# Patient Record
Sex: FEMALE | Race: WHITE | Employment: OTHER | URBAN - METROPOLITAN AREA
[De-identification: names, ages, dates, MRNs, and addresses within clinical notes are randomized per-mention and may not be internally consistent; named-entity substitution may affect disease eponyms.]

---

## 2017-05-22 RX ORDER — AMLODIPINE BESYLATE 5 MG/1
TABLET ORAL
Qty: 30 TABLET | Refills: 0 | Status: SHIPPED | OUTPATIENT
Start: 2017-05-22 | End: 2017-08-30 | Stop reason: SDUPTHER

## 2017-06-05 RX ORDER — PANTOPRAZOLE SODIUM 40 MG/1
TABLET, DELAYED RELEASE ORAL
Qty: 90 TABLET | Refills: 2 | OUTPATIENT
Start: 2017-06-05

## 2017-06-05 RX ORDER — DARIFENACIN HYDROBROMIDE 15 MG/1
TABLET, EXTENDED RELEASE ORAL
Qty: 90 TABLET | Refills: 2 | OUTPATIENT
Start: 2017-06-05

## 2017-06-13 ENCOUNTER — OFFICE VISIT (OUTPATIENT)
Dept: PRIMARY CARE CLINIC | Age: 67
End: 2017-06-13

## 2017-06-13 VITALS
HEIGHT: 64 IN | HEART RATE: 72 BPM | TEMPERATURE: 98.4 F | RESPIRATION RATE: 14 BRPM | DIASTOLIC BLOOD PRESSURE: 72 MMHG | SYSTOLIC BLOOD PRESSURE: 126 MMHG | WEIGHT: 211 LBS | BODY MASS INDEX: 36.02 KG/M2

## 2017-06-13 DIAGNOSIS — D68.9 COAGULOPATHY (HCC): ICD-10-CM

## 2017-06-13 DIAGNOSIS — Z12.31 ENCOUNTER FOR MAMMOGRAM TO ESTABLISH BASELINE MAMMOGRAM: ICD-10-CM

## 2017-06-13 DIAGNOSIS — Z23 NEED FOR PNEUMOCOCCAL VACCINATION: ICD-10-CM

## 2017-06-13 DIAGNOSIS — M81.0 OSTEOPOROSIS: ICD-10-CM

## 2017-06-13 DIAGNOSIS — Z00.00 ROUTINE GENERAL MEDICAL EXAMINATION AT A HEALTH CARE FACILITY: Primary | ICD-10-CM

## 2017-06-13 DIAGNOSIS — F32.A DEPRESSION, UNSPECIFIED DEPRESSION TYPE: ICD-10-CM

## 2017-06-13 DIAGNOSIS — Z00.00 ROUTINE GENERAL MEDICAL EXAMINATION AT A HEALTH CARE FACILITY: ICD-10-CM

## 2017-06-13 LAB
ALBUMIN SERPL-MCNC: 4.1 G/DL (ref 3.9–4.9)
ALP BLD-CCNC: 61 U/L (ref 40–130)
ALT SERPL-CCNC: 18 U/L (ref 0–33)
ANION GAP SERPL CALCULATED.3IONS-SCNC: 13 MEQ/L (ref 7–13)
AST SERPL-CCNC: 25 U/L (ref 0–35)
BASOPHILS ABSOLUTE: 0.1 K/UL (ref 0–0.2)
BASOPHILS RELATIVE PERCENT: 0.7 %
BILIRUB SERPL-MCNC: 0.5 MG/DL (ref 0–1.2)
BUN BLDV-MCNC: 22 MG/DL (ref 8–23)
CALCIUM SERPL-MCNC: 9.7 MG/DL (ref 8.6–10.2)
CHLORIDE BLD-SCNC: 106 MEQ/L (ref 98–107)
CHOLESTEROL, TOTAL: 181 MG/DL (ref 0–199)
CO2: 23 MEQ/L (ref 22–29)
CREAT SERPL-MCNC: 0.95 MG/DL (ref 0.5–0.9)
EOSINOPHILS ABSOLUTE: 0.3 K/UL (ref 0–0.7)
EOSINOPHILS RELATIVE PERCENT: 3.8 %
GFR AFRICAN AMERICAN: >60
GFR NON-AFRICAN AMERICAN: 58.3
GLOBULIN: 2.8 G/DL (ref 2.3–3.5)
GLUCOSE BLD-MCNC: 83 MG/DL (ref 74–109)
HCT VFR BLD CALC: 41.1 % (ref 37–47)
HDLC SERPL-MCNC: 70 MG/DL (ref 40–59)
HEMOGLOBIN: 13.4 G/DL (ref 12–16)
LDL CHOLESTEROL CALCULATED: 90 MG/DL (ref 0–129)
LYMPHOCYTES ABSOLUTE: 3.4 K/UL (ref 1–4.8)
LYMPHOCYTES RELATIVE PERCENT: 42.8 %
MCH RBC QN AUTO: 28 PG (ref 27–31.3)
MCHC RBC AUTO-ENTMCNC: 32.5 % (ref 33–37)
MCV RBC AUTO: 86.1 FL (ref 82–100)
MONOCYTES ABSOLUTE: 0.7 K/UL (ref 0.2–0.8)
MONOCYTES RELATIVE PERCENT: 8.2 %
NEUTROPHILS ABSOLUTE: 3.5 K/UL (ref 1.4–6.5)
NEUTROPHILS RELATIVE PERCENT: 44.5 %
PDW BLD-RTO: 14.6 % (ref 11.5–14.5)
PLATELET # BLD: 276 K/UL (ref 130–400)
POTASSIUM SERPL-SCNC: 4 MEQ/L (ref 3.5–5.1)
RBC # BLD: 4.77 M/UL (ref 4.2–5.4)
SODIUM BLD-SCNC: 142 MEQ/L (ref 132–144)
TOTAL PROTEIN: 6.9 G/DL (ref 6.4–8.1)
TRIGL SERPL-MCNC: 105 MG/DL (ref 0–200)
WBC # BLD: 8 K/UL (ref 4.8–10.8)

## 2017-06-13 PROCEDURE — 90670 PCV13 VACCINE IM: CPT | Performed by: FAMILY MEDICINE

## 2017-06-13 PROCEDURE — G0438 PPPS, INITIAL VISIT: HCPCS | Performed by: FAMILY MEDICINE

## 2017-06-13 PROCEDURE — G0009 ADMIN PNEUMOCOCCAL VACCINE: HCPCS | Performed by: FAMILY MEDICINE

## 2017-06-13 RX ORDER — PANTOPRAZOLE SODIUM 40 MG/1
40 TABLET, DELAYED RELEASE ORAL DAILY
Qty: 90 TABLET | Refills: 3 | Status: SHIPPED | OUTPATIENT
Start: 2017-06-13 | End: 2018-03-26 | Stop reason: SDUPTHER

## 2017-06-13 RX ORDER — DARIFENACIN HYDROBROMIDE 15 MG/1
TABLET, EXTENDED RELEASE ORAL
Qty: 90 TABLET | Refills: 3 | Status: SHIPPED | OUTPATIENT
Start: 2017-06-13 | End: 2018-03-26 | Stop reason: SDUPTHER

## 2017-06-13 ASSESSMENT — ANXIETY QUESTIONNAIRES: GAD7 TOTAL SCORE: 2

## 2017-06-13 ASSESSMENT — LIFESTYLE VARIABLES
AUDIT-C TOTAL SCORE: 2
HOW OFTEN DO YOU HAVE A DRINK CONTAINING ALCOHOL: 1
HOW OFTEN DO YOU HAVE SIX OR MORE DRINKS ON ONE OCCASION: 0
HOW MANY STANDARD DRINKS CONTAINING ALCOHOL DO YOU HAVE ON A TYPICAL DAY: 1

## 2017-06-13 ASSESSMENT — PATIENT HEALTH QUESTIONNAIRE - PHQ9: SUM OF ALL RESPONSES TO PHQ QUESTIONS 1-9: 2

## 2017-06-15 ENCOUNTER — TELEPHONE (OUTPATIENT)
Dept: PRIMARY CARE CLINIC | Age: 67
End: 2017-06-15

## 2017-06-26 ENCOUNTER — HOSPITAL ENCOUNTER (OUTPATIENT)
Dept: WOMENS IMAGING | Age: 67
Discharge: HOME | End: 2017-06-26
Payer: MEDICARE

## 2017-06-26 DIAGNOSIS — M81.0 OSTEOPOROSIS: ICD-10-CM

## 2017-06-26 DIAGNOSIS — Z12.31 ENCOUNTER FOR MAMMOGRAM TO ESTABLISH BASELINE MAMMOGRAM: ICD-10-CM

## 2017-06-26 PROCEDURE — 77080 DXA BONE DENSITY AXIAL: CPT

## 2017-06-26 PROCEDURE — G0202 SCR MAMMO BI INCL CAD: HCPCS

## 2017-06-28 ENCOUNTER — OFFICE VISIT (OUTPATIENT)
Dept: BEHAVIORAL/MENTAL HEALTH | Age: 67
End: 2017-06-28

## 2017-06-28 DIAGNOSIS — F32.A DEPRESSION, UNSPECIFIED DEPRESSION TYPE: Primary | ICD-10-CM

## 2017-06-28 PROCEDURE — 90791 PSYCH DIAGNOSTIC EVALUATION: CPT | Performed by: PSYCHOLOGIST

## 2017-06-28 ASSESSMENT — PATIENT HEALTH QUESTIONNAIRE - PHQ9
3. TROUBLE FALLING OR STAYING ASLEEP: 2
4. FEELING TIRED OR HAVING LITTLE ENERGY: 2
1. LITTLE INTEREST OR PLEASURE IN DOING THINGS: 1
SUM OF ALL RESPONSES TO PHQ9 QUESTIONS 1 & 2: 2
SUM OF ALL RESPONSES TO PHQ QUESTIONS 1-9: 12
7. TROUBLE CONCENTRATING ON THINGS, SUCH AS READING THE NEWSPAPER OR WATCHING TELEVISION: 1
6. FEELING BAD ABOUT YOURSELF - OR THAT YOU ARE A FAILURE OR HAVE LET YOURSELF OR YOUR FAMILY DOWN: 1
2. FEELING DOWN, DEPRESSED OR HOPELESS: 1
9. THOUGHTS THAT YOU WOULD BE BETTER OFF DEAD, OR OF HURTING YOURSELF: 0
8. MOVING OR SPEAKING SO SLOWLY THAT OTHER PEOPLE COULD HAVE NOTICED. OR THE OPPOSITE, BEING SO FIGETY OR RESTLESS THAT YOU HAVE BEEN MOVING AROUND A LOT MORE THAN USUAL: 1
10. IF YOU CHECKED OFF ANY PROBLEMS, HOW DIFFICULT HAVE THESE PROBLEMS MADE IT FOR YOU TO DO YOUR WORK, TAKE CARE OF THINGS AT HOME, OR GET ALONG WITH OTHER PEOPLE: 1
5. POOR APPETITE OR OVEREATING: 3

## 2017-07-13 ENCOUNTER — OFFICE VISIT (OUTPATIENT)
Dept: BEHAVIORAL/MENTAL HEALTH | Age: 67
End: 2017-07-13

## 2017-07-13 DIAGNOSIS — F32.A DEPRESSION, UNSPECIFIED DEPRESSION TYPE: Primary | ICD-10-CM

## 2017-07-13 PROCEDURE — 90832 PSYTX W PT 30 MINUTES: CPT | Performed by: PSYCHOLOGIST

## 2017-07-13 RX ORDER — PREDNISONE 10 MG/1
TABLET ORAL
Qty: 20 TABLET | Refills: 0 | Status: SHIPPED | OUTPATIENT
Start: 2017-07-13 | End: 2018-03-26

## 2017-07-13 RX ORDER — DARIFENACIN HYDROBROMIDE 7.5 MG/1
TABLET, EXTENDED RELEASE ORAL
Qty: 90 TABLET | Refills: 2 | Status: SHIPPED | OUTPATIENT
Start: 2017-07-13 | End: 2018-03-26 | Stop reason: SDUPTHER

## 2017-07-13 ASSESSMENT — PATIENT HEALTH QUESTIONNAIRE - PHQ9
9. THOUGHTS THAT YOU WOULD BE BETTER OFF DEAD, OR OF HURTING YOURSELF: 0
7. TROUBLE CONCENTRATING ON THINGS, SUCH AS READING THE NEWSPAPER OR WATCHING TELEVISION: 1
6. FEELING BAD ABOUT YOURSELF - OR THAT YOU ARE A FAILURE OR HAVE LET YOURSELF OR YOUR FAMILY DOWN: 1
SUM OF ALL RESPONSES TO PHQ9 QUESTIONS 1 & 2: 2
4. FEELING TIRED OR HAVING LITTLE ENERGY: 2
2. FEELING DOWN, DEPRESSED OR HOPELESS: 1
8. MOVING OR SPEAKING SO SLOWLY THAT OTHER PEOPLE COULD HAVE NOTICED. OR THE OPPOSITE, BEING SO FIGETY OR RESTLESS THAT YOU HAVE BEEN MOVING AROUND A LOT MORE THAN USUAL: 0
1. LITTLE INTEREST OR PLEASURE IN DOING THINGS: 1
5. POOR APPETITE OR OVEREATING: 2
SUM OF ALL RESPONSES TO PHQ QUESTIONS 1-9: 11
10. IF YOU CHECKED OFF ANY PROBLEMS, HOW DIFFICULT HAVE THESE PROBLEMS MADE IT FOR YOU TO DO YOUR WORK, TAKE CARE OF THINGS AT HOME, OR GET ALONG WITH OTHER PEOPLE: 1
3. TROUBLE FALLING OR STAYING ASLEEP: 3

## 2017-08-03 ENCOUNTER — OFFICE VISIT (OUTPATIENT)
Dept: BEHAVIORAL/MENTAL HEALTH | Age: 67
End: 2017-08-03

## 2017-08-03 DIAGNOSIS — F32.A DEPRESSION, UNSPECIFIED DEPRESSION TYPE: Primary | ICD-10-CM

## 2017-08-03 PROCEDURE — 90832 PSYTX W PT 30 MINUTES: CPT | Performed by: PSYCHOLOGIST

## 2017-08-30 RX ORDER — AMLODIPINE BESYLATE 5 MG/1
5 TABLET ORAL DAILY
Qty: 90 TABLET | Refills: 1 | Status: SHIPPED | OUTPATIENT
Start: 2017-08-30 | End: 2018-03-26 | Stop reason: SDUPTHER

## 2018-01-09 ENCOUNTER — TELEPHONE (OUTPATIENT)
Dept: PRIMARY CARE CLINIC | Age: 68
End: 2018-01-09

## 2018-01-10 DIAGNOSIS — F32.A DEPRESSION, UNSPECIFIED DEPRESSION TYPE: Primary | ICD-10-CM

## 2018-02-26 RX ORDER — AMLODIPINE BESYLATE 5 MG/1
TABLET ORAL
Qty: 90 TABLET | Refills: 1 | OUTPATIENT
Start: 2018-02-26

## 2018-03-26 ENCOUNTER — OFFICE VISIT (OUTPATIENT)
Dept: PRIMARY CARE CLINIC | Age: 68
End: 2018-03-26
Payer: MEDICARE

## 2018-03-26 VITALS
WEIGHT: 196 LBS | BODY MASS INDEX: 33.46 KG/M2 | HEART RATE: 61 BPM | SYSTOLIC BLOOD PRESSURE: 136 MMHG | HEIGHT: 64 IN | OXYGEN SATURATION: 96 % | RESPIRATION RATE: 15 BRPM | TEMPERATURE: 98.6 F | DIASTOLIC BLOOD PRESSURE: 86 MMHG

## 2018-03-26 DIAGNOSIS — M79.645 PAIN OF LEFT THUMB: ICD-10-CM

## 2018-03-26 DIAGNOSIS — D68.9 COAGULOPATHY (HCC): ICD-10-CM

## 2018-03-26 DIAGNOSIS — M54.16 LUMBAR RADICULITIS: ICD-10-CM

## 2018-03-26 DIAGNOSIS — G89.29 CHRONIC RIGHT-SIDED LOW BACK PAIN WITH RIGHT-SIDED SCIATICA: ICD-10-CM

## 2018-03-26 DIAGNOSIS — F32.5 MAJOR DEPRESSION, SINGLE EPISODE, IN COMPLETE REMISSION (HCC): ICD-10-CM

## 2018-03-26 DIAGNOSIS — M65.312 TRIGGER FINGER OF LEFT THUMB: Primary | ICD-10-CM

## 2018-03-26 DIAGNOSIS — M54.41 CHRONIC RIGHT-SIDED LOW BACK PAIN WITH RIGHT-SIDED SCIATICA: ICD-10-CM

## 2018-03-26 DIAGNOSIS — M48.061 SPINAL STENOSIS OF LUMBAR REGION, UNSPECIFIED WHETHER NEUROGENIC CLAUDICATION PRESENT: ICD-10-CM

## 2018-03-26 PROCEDURE — 99214 OFFICE O/P EST MOD 30 MIN: CPT | Performed by: FAMILY MEDICINE

## 2018-03-26 RX ORDER — AMLODIPINE BESYLATE 5 MG/1
5 TABLET ORAL DAILY
Qty: 90 TABLET | Refills: 1 | Status: SHIPPED | OUTPATIENT
Start: 2018-03-26 | End: 2018-03-26 | Stop reason: SDUPTHER

## 2018-03-26 RX ORDER — DARIFENACIN HYDROBROMIDE 7.5 MG/1
TABLET, EXTENDED RELEASE ORAL
Qty: 14 TABLET | Refills: 2 | Status: SHIPPED | OUTPATIENT
Start: 2018-03-26

## 2018-03-26 RX ORDER — AMLODIPINE BESYLATE 5 MG/1
5 TABLET ORAL DAILY
Qty: 14 TABLET | Refills: 1 | Status: SHIPPED | OUTPATIENT
Start: 2018-03-26 | End: 2018-10-25 | Stop reason: SDUPTHER

## 2018-03-26 RX ORDER — AMLODIPINE BESYLATE 5 MG/1
5 TABLET ORAL DAILY
Qty: 90 TABLET | Refills: 1 | Status: SHIPPED | OUTPATIENT
Start: 2018-03-26 | End: 2018-07-30

## 2018-03-26 RX ORDER — DARIFENACIN HYDROBROMIDE 15 MG/1
TABLET, EXTENDED RELEASE ORAL
Qty: 14 TABLET | Refills: 3 | Status: SHIPPED | OUTPATIENT
Start: 2018-03-26 | End: 2018-09-28

## 2018-03-26 RX ORDER — DARIFENACIN HYDROBROMIDE 15 MG/1
15 TABLET, EXTENDED RELEASE ORAL EVERY MORNING
Qty: 90 TABLET | Refills: 1 | Status: SHIPPED | OUTPATIENT
Start: 2018-03-26 | End: 2018-07-30

## 2018-03-26 RX ORDER — PANTOPRAZOLE SODIUM 40 MG/1
40 TABLET, DELAYED RELEASE ORAL DAILY
Qty: 90 TABLET | Refills: 3 | Status: SHIPPED | OUTPATIENT
Start: 2018-03-26

## 2018-03-26 RX ORDER — SIMVASTATIN 40 MG
TABLET ORAL
Qty: 90 TABLET | Refills: 3 | Status: SHIPPED | OUTPATIENT
Start: 2018-03-26 | End: 2019-03-21 | Stop reason: SDUPTHER

## 2018-03-26 RX ORDER — DARIFENACIN HYDROBROMIDE 15 MG/1
TABLET, EXTENDED RELEASE ORAL
Qty: 90 TABLET | Refills: 3 | Status: SHIPPED | OUTPATIENT
Start: 2018-03-26 | End: 2018-03-26 | Stop reason: SDUPTHER

## 2018-03-26 RX ORDER — DARIFENACIN HYDROBROMIDE 7.5 MG/1
TABLET, EXTENDED RELEASE ORAL
Qty: 90 TABLET | Refills: 2 | Status: SHIPPED | OUTPATIENT
Start: 2018-03-26 | End: 2018-03-26 | Stop reason: SDUPTHER

## 2018-03-26 RX ORDER — DARIFENACIN HYDROBROMIDE 7.5 MG/1
7.5 TABLET, EXTENDED RELEASE ORAL EVERY EVENING
Qty: 90 TABLET | Refills: 1 | Status: SHIPPED | OUTPATIENT
Start: 2018-03-26 | End: 2018-07-06 | Stop reason: SDUPTHER

## 2018-03-26 ASSESSMENT — ENCOUNTER SYMPTOMS
COLOR CHANGE: 0
PHOTOPHOBIA: 0
ABDOMINAL PAIN: 0
NAUSEA: 0
CHEST TIGHTNESS: 0
STRIDOR: 0
EYE PAIN: 0
BOWEL INCONTINENCE: 0
BACK PAIN: 1
FACIAL SWELLING: 0
CONSTIPATION: 0
APNEA: 0
EYE REDNESS: 0
WHEEZING: 0
DIARRHEA: 0
EYE DISCHARGE: 0
CHOKING: 0

## 2018-03-26 NOTE — PROGRESS NOTES
Refill:  3    simvastatin (ZOCOR) 40 MG tablet     Sig: TAKE 1 TABLET EVERY EVENING     Dispense:  90 tablet     Refill:  3    DISCONTD: darifenacin (ENABLEX) 15 MG extended release tablet     Sig: TAKE 1 TABLET DAILY     Dispense:  90 tablet     Refill:  3    DISCONTD: darifenacin (ENABLEX) 7.5 MG extended release tablet     Sig: TAKE 2 TABLETS (15 MG) EVERY MORNING AND 1 TABLET (7.5 MG) EVERY EVENING     Dispense:  90 tablet     Refill:  2    DISCONTD: amLODIPine (NORVASC) 5 MG tablet     Sig: Take 1 tablet by mouth daily     Dispense:  90 tablet     Refill:  1    darifenacin (ENABLEX) 15 MG extended release tablet     Sig: TAKE 1 TABLET DAILY     Dispense:  14 tablet     Refill:  3    amLODIPine (NORVASC) 5 MG tablet     Sig: Take 1 tablet by mouth daily     Dispense:  14 tablet     Refill:  1    darifenacin (ENABLEX) 7.5 MG extended release tablet     Sig: TAKE 2 TABLETS (15 MG) EVERY MORNING AND 1 TABLET (7.5 MG) EVERY EVENING     Dispense:  14 tablet     Refill:  2       Controlled Substances Monitoring:      No Follow-up on file. Jakub SANFORD RMA  , am scribing for and in the presence of [place] [employer], DO. Electronically signed by :  SIERRA Zelaya [employer], DO, personally performed the services described in this documentation, as scribed by SIERRA Zelaya   in my presence, and it is both accurate and complete.  Electronically signed by: Sapna Marte DO    3/26/18 2:52 PM    Sapna Marte DO

## 2018-03-29 ENCOUNTER — TELEPHONE (OUTPATIENT)
Dept: PRIMARY CARE CLINIC | Age: 68
End: 2018-03-29

## 2018-03-29 NOTE — TELEPHONE ENCOUNTER
[facility] called and needed clarification on enablex. Per Dr. Karlee Nance it is supposed to be the 15 mg once daily. This was verified by Sunny Hummel with the  and [facility] was called back.

## 2018-07-09 RX ORDER — DARIFENACIN HYDROBROMIDE 7.5 MG/1
7.5 TABLET, EXTENDED RELEASE ORAL EVERY EVENING
Qty: 90 TABLET | Refills: 3 | Status: SHIPPED | OUTPATIENT
Start: 2018-07-09 | End: 2018-07-30

## 2018-07-30 ENCOUNTER — OFFICE VISIT (OUTPATIENT)
Dept: PRIMARY CARE CLINIC | Age: 68
End: 2018-07-30
Payer: MEDICARE

## 2018-07-30 VITALS
HEIGHT: 64 IN | DIASTOLIC BLOOD PRESSURE: 80 MMHG | SYSTOLIC BLOOD PRESSURE: 138 MMHG | OXYGEN SATURATION: 95 % | TEMPERATURE: 98.5 F | WEIGHT: 198.8 LBS | BODY MASS INDEX: 33.94 KG/M2 | HEART RATE: 61 BPM

## 2018-07-30 DIAGNOSIS — I10 ESSENTIAL HYPERTENSION: ICD-10-CM

## 2018-07-30 DIAGNOSIS — M81.0 OSTEOPOROSIS, UNSPECIFIED OSTEOPOROSIS TYPE, UNSPECIFIED PATHOLOGICAL FRACTURE PRESENCE: ICD-10-CM

## 2018-07-30 DIAGNOSIS — Z01.818 PRE-OP EXAM: ICD-10-CM

## 2018-07-30 DIAGNOSIS — R79.1 ABNORMAL COAGULATION PROFILE: ICD-10-CM

## 2018-07-30 DIAGNOSIS — G89.29 CHRONIC RIGHT-SIDED LOW BACK PAIN WITH RIGHT-SIDED SCIATICA: ICD-10-CM

## 2018-07-30 DIAGNOSIS — M54.41 CHRONIC RIGHT-SIDED LOW BACK PAIN WITH RIGHT-SIDED SCIATICA: ICD-10-CM

## 2018-07-30 DIAGNOSIS — I25.10 CORONARY ARTERY DISEASE INVOLVING NATIVE CORONARY ARTERY OF NATIVE HEART WITHOUT ANGINA PECTORIS: ICD-10-CM

## 2018-07-30 DIAGNOSIS — Z01.818 PRE-OP EXAM: Primary | ICD-10-CM

## 2018-07-30 LAB
ALBUMIN SERPL-MCNC: 4 G/DL (ref 3.9–4.9)
ALP BLD-CCNC: 56 U/L (ref 40–130)
ALT SERPL-CCNC: 14 U/L (ref 0–33)
ANION GAP SERPL CALCULATED.3IONS-SCNC: 13 MEQ/L (ref 7–13)
APTT: 45.1 SEC (ref 21.6–35.4)
AST SERPL-CCNC: 21 U/L (ref 0–35)
BASOPHILS ABSOLUTE: 0.1 K/UL (ref 0–0.2)
BASOPHILS RELATIVE PERCENT: 0.9 %
BILIRUB SERPL-MCNC: 0.4 MG/DL (ref 0–1.2)
BILIRUBIN, POC: NORMAL
BLOOD URINE, POC: NORMAL
BUN BLDV-MCNC: 29 MG/DL (ref 8–23)
CALCIUM SERPL-MCNC: 9.4 MG/DL (ref 8.6–10.2)
CHLORIDE BLD-SCNC: 101 MEQ/L (ref 98–107)
CLARITY, POC: CLEAR
CO2: 24 MEQ/L (ref 22–29)
COLOR, POC: YELLOW
CREAT SERPL-MCNC: 0.83 MG/DL (ref 0.5–0.9)
EOSINOPHILS ABSOLUTE: 0.3 K/UL (ref 0–0.7)
EOSINOPHILS RELATIVE PERCENT: 3.9 %
GFR AFRICAN AMERICAN: >60
GFR NON-AFRICAN AMERICAN: >60
GLOBULIN: 2.8 G/DL (ref 2.3–3.5)
GLUCOSE BLD-MCNC: 83 MG/DL (ref 74–109)
GLUCOSE URINE, POC: NORMAL
HCT VFR BLD CALC: 37.8 % (ref 37–47)
HEMOGLOBIN: 12.4 G/DL (ref 12–16)
INR BLD: 1.3
KETONES, POC: NORMAL
LEUKOCYTE EST, POC: NORMAL
LYMPHOCYTES ABSOLUTE: 2.7 K/UL (ref 1–4.8)
LYMPHOCYTES RELATIVE PERCENT: 37.8 %
MCH RBC QN AUTO: 29.1 PG (ref 27–31.3)
MCHC RBC AUTO-ENTMCNC: 32.9 % (ref 33–37)
MCV RBC AUTO: 88.5 FL (ref 82–100)
MONOCYTES ABSOLUTE: 0.5 K/UL (ref 0.2–0.8)
MONOCYTES RELATIVE PERCENT: 7.1 %
NEUTROPHILS ABSOLUTE: 3.6 K/UL (ref 1.4–6.5)
NEUTROPHILS RELATIVE PERCENT: 50.3 %
NITRITE, POC: NORMAL
PDW BLD-RTO: 14.5 % (ref 11.5–14.5)
PH, POC: 6
PLATELET # BLD: 262 K/UL (ref 130–400)
POTASSIUM SERPL-SCNC: 4.4 MEQ/L (ref 3.5–5.1)
PROTEIN, POC: NORMAL
PROTHROMBIN TIME: 13.2 SEC (ref 9.6–12.3)
RBC # BLD: 4.28 M/UL (ref 4.2–5.4)
SODIUM BLD-SCNC: 138 MEQ/L (ref 132–144)
SPECIFIC GRAVITY, POC: 1.02
TOTAL PROTEIN: 6.8 G/DL (ref 6.4–8.1)
UROBILINOGEN, POC: NORMAL
WBC # BLD: 7.2 K/UL (ref 4.8–10.8)

## 2018-07-30 PROCEDURE — 81002 URINALYSIS NONAUTO W/O SCOPE: CPT | Performed by: FAMILY MEDICINE

## 2018-07-30 PROCEDURE — 99214 OFFICE O/P EST MOD 30 MIN: CPT | Performed by: FAMILY MEDICINE

## 2018-07-30 RX ORDER — ZOLPIDEM TARTRATE 5 MG/1
5 TABLET ORAL NIGHTLY PRN
COMMUNITY
End: 2018-09-28

## 2018-07-30 ASSESSMENT — ENCOUNTER SYMPTOMS
DIARRHEA: 0
FACIAL SWELLING: 0
APNEA: 0
NAUSEA: 0
EYE DISCHARGE: 0
CHEST TIGHTNESS: 0
WHEEZING: 0
PHOTOPHOBIA: 0
CONSTIPATION: 0
EYE PAIN: 0
COLOR CHANGE: 0
BACK PAIN: 1
EYE REDNESS: 0
STRIDOR: 0
ABDOMINAL PAIN: 0
CHOKING: 0

## 2018-07-30 NOTE — PROGRESS NOTES
Subjective:      Patient ID: Mitzi Jean is a 77 y.o. female who presents today for:  Chief Complaint   Patient presents with    Pre-op Exam     pt here for medical clearance for surgery. the surgery is dated for 8/22/2018. pt had an EKG done by her cardiologist last week that he will be faxing over to her Ortho Dr. She states she does not need another one.  Health Maintenance     pt wants to wait till after surgery for her pneumonia shot       HPI  Pre-op Exam  Patient is here today for pre-op exam for L3-4, L4-5,L5-S1 Laminectomy L3-4, L4-5 posterior lateral fusion and L5-S1 instrumented posterior interbody fusion with Dr. Darion Garnica at hospitals on 08/22/18. Patient has had an EKG with her cardiologist last week. Past Medical History:   Diagnosis Date    Abnormal EKG, needs cardiac clearence 12/23/2013    ACE-inhibitor cough 7/10/2012    ARB     Anemia     CAD (coronary artery disease) 2/2008    Coagulopathy ([address] 10/4/2012    Coagulopathy, on coumadin 11/13/2013    Cough 5/29/2012    Cough, Neg w/u Dr Sherryle Holm, ? Betablocker 9/22/2014    Depression     Depression 6/13/2017    Epistaxis 8/20/2012    Fibrocystic breast disease     Hiatal hernia 5/3/2016    History of chest x-ray, normal, 8/6/14 10/17/2014    History of PTCA 2, stent x 2 2009, 2012, Callkandace Sternhke 12/23/2013    Hyperlipidemia     Hypertension     Nephrolithiasis     OA (osteoarthritis)     Osteoporosis     Right-sided low back pain with right-sided sciatica 5/3/2016    Urge incontinence     Wheezes 4/20/2015     Past Surgical History:   Procedure Laterality Date    CORONARY ANGIOPLASTY WITH STENT PLACEMENT  3/12    [facility]    HYSTERECTOMY, TOTAL ABDOMINAL  1998    KNEE ARTHROSCOPY  11/11    [facility], [facility]    LITHOTRIPSY      PTCA  2/2008    Sherryle Holm    ROTATOR CUFF REPAIR      SKIN GRAFT  2/9/11    JORDAN AND BSO      TOTAL KNEE ARTHROPLASTY  8/2008     No family history on file.   Social History     Social History    Marital status:      Spouse name: N/A    Number of children: N/A    Years of education: N/A     Occupational History    Not on file. Social History Main Topics    Smoking status: Never Smoker    Smokeless tobacco: Never Used    Alcohol use Yes      Comment: occ    Drug use: No    Sexual activity: Not on file     Other Topics Concern    Not on file     Social History Narrative    No narrative on file     Allergies: Tolterodine    Review of Systems   Constitutional: Negative for activity change, appetite change, chills, diaphoresis and fever. HENT: Negative for congestion, ear discharge, ear pain, facial swelling, hearing loss and mouth sores. Eyes: Negative for photophobia, pain, discharge and redness. Respiratory: Negative for apnea, choking, chest tightness, wheezing and stridor. Cardiovascular: Negative for chest pain, palpitations and leg swelling. Gastrointestinal: Negative for abdominal pain, constipation, diarrhea and nausea. Endocrine: Negative for cold intolerance, heat intolerance, polydipsia and polyphagia. Genitourinary: Negative for dysuria and frequency. Musculoskeletal: Positive for back pain and gait problem. Negative for joint swelling, neck pain and neck stiffness. Skin: Negative for color change, pallor, rash and wound. Allergic/Immunologic: Negative for immunocompromised state. Neurological: Negative for tremors, syncope, facial asymmetry, speech difficulty and headaches. Psychiatric/Behavioral: Negative for confusion and hallucinations. The patient is not nervous/anxious and is not hyperactive. Objective:   /80 (Site: Right Arm, Position: Sitting, Cuff Size: Medium Adult)   Pulse 61   Temp 98.5 °F (36.9 °C) (Oral)   Ht 5' 4\" (1.626 m)   Wt 198 lb 12.8 oz (90.2 kg)   LMP 05/29/1998   SpO2 95%   BMI 34.12 kg/m²     Physical Exam   Constitutional: She is oriented to person, place, and time. She appears well-developed and well-nourished. without angina pectoris     6. Abnormal coagulation profile   APTT    Protime-INR       Plan:      Orders Placed This Encounter   Procedures    Mrsa Screening Culture Only     Standing Status:   Future     Standing Expiration Date:   7/30/2019    XR CHEST STANDARD (2 VW)     Standing Status:   Future     Standing Expiration Date:   7/30/2019    APTT     Standing Status:   Future     Standing Expiration Date:   7/30/2019     Order Specific Question:   Daily Heparin Dose? Answer:   0    Comprehensive Metabolic Panel     Standing Status:   Future     Standing Expiration Date:   7/30/2019    CBC Auto Differential     Standing Status:   Future     Standing Expiration Date:   7/30/2019    Protime-INR     Standing Status:   Future     Standing Expiration Date:   7/30/2019     Order Specific Question:   Daily Coumadin Dose? Answer:   0    POCT Urinalysis no Micro     No orders of the defined types were placed in this encounter. Controlled Substances Monitoring:      No Follow-up on file. I, Jane Miranda CMA   , am scribing for and in the presence of [email or web address], DO. Electronically signed by :  Jane Navarro DO, personally performed the services described in this documentation, as scribed by Joe Villavicencio CMA   in my presence, and it is both accurate and complete.  Electronically signed by: [place] [employer], DO    7/30/18 2:07 PM    [place] DO Rosanna

## 2018-08-01 LAB — MRSA CULTURE ONLY: NORMAL

## 2018-08-15 ENCOUNTER — HOSPITAL ENCOUNTER (OUTPATIENT)
Dept: PREADMISSION TESTING | Age: 68
Discharge: HOME | End: 2018-08-19
Payer: MEDICARE

## 2018-08-15 VITALS
HEIGHT: 64 IN | BODY MASS INDEX: 33.87 KG/M2 | TEMPERATURE: 98.5 F | SYSTOLIC BLOOD PRESSURE: 138 MMHG | WEIGHT: 198.4 LBS | OXYGEN SATURATION: 94 % | HEART RATE: 60 BPM | DIASTOLIC BLOOD PRESSURE: 65 MMHG | RESPIRATION RATE: 16 BRPM

## 2018-08-15 DIAGNOSIS — M71.38 SYNOVIAL CYST OF LUMBAR FACET JOINT: ICD-10-CM

## 2018-08-15 DIAGNOSIS — I26.99 PE (PULMONARY THROMBOEMBOLISM) (HCC): Chronic | ICD-10-CM

## 2018-08-15 PROBLEM — M48.061 LUMBAR STENOSIS: Status: ACTIVE | Noted: 2018-08-15

## 2018-08-15 LAB
BACTERIA: NORMAL /HPF
BILIRUBIN URINE: NEGATIVE
BLOOD, URINE: NEGATIVE
CLARITY: CLEAR
COLOR: YELLOW
EPITHELIAL CELLS, UA: NORMAL /HPF
GLUCOSE URINE: NEGATIVE MG/DL
KETONES, URINE: NEGATIVE MG/DL
LEUKOCYTE ESTERASE, URINE: ABNORMAL
NITRITE, URINE: NEGATIVE
PH UA: 6 (ref 5–9)
PROTEIN UA: NEGATIVE MG/DL
RBC UA: NORMAL /HPF (ref 0–2)
SPECIFIC GRAVITY UA: 1.02 (ref 1–1.03)
URINE REFLEX TO CULTURE: YES
UROBILINOGEN, URINE: 0.2 E.U./DL
WBC UA: NORMAL /HPF (ref 0–5)

## 2018-08-15 PROCEDURE — 86900 BLOOD TYPING SEROLOGIC ABO: CPT

## 2018-08-15 PROCEDURE — 81001 URINALYSIS AUTO W/SCOPE: CPT

## 2018-08-15 PROCEDURE — 86850 RBC ANTIBODY SCREEN: CPT

## 2018-08-15 PROCEDURE — 87086 URINE CULTURE/COLONY COUNT: CPT

## 2018-08-15 PROCEDURE — 86901 BLOOD TYPING SEROLOGIC RH(D): CPT

## 2018-08-15 RX ORDER — SODIUM CHLORIDE 0.9 % (FLUSH) 0.9 %
10 SYRINGE (ML) INJECTION PRN
Status: CANCELLED | OUTPATIENT
Start: 2018-08-15

## 2018-08-15 RX ORDER — LIDOCAINE HYDROCHLORIDE 10 MG/ML
1 INJECTION, SOLUTION EPIDURAL; INFILTRATION; INTRACAUDAL; PERINEURAL
Status: CANCELLED | OUTPATIENT
Start: 2018-08-15 | End: 2018-08-15

## 2018-08-15 RX ORDER — SODIUM CHLORIDE 0.9 % (FLUSH) 0.9 %
10 SYRINGE (ML) INJECTION EVERY 12 HOURS SCHEDULED
Status: CANCELLED | OUTPATIENT
Start: 2018-08-15

## 2018-08-15 RX ORDER — SODIUM CHLORIDE, SODIUM LACTATE, POTASSIUM CHLORIDE, CALCIUM CHLORIDE 600; 310; 30; 20 MG/100ML; MG/100ML; MG/100ML; MG/100ML
INJECTION, SOLUTION INTRAVENOUS CONTINUOUS
Status: CANCELLED | OUTPATIENT
Start: 2018-08-15

## 2018-08-15 NOTE — PROGRESS NOTES
Cardiologist Dr. Maurice Skinner / 6  / [facility]. EKG done 7/24/2018 & paper copy on chart. H & PE done by Dr. Talon Reaves dated 7/30/2018 & on Epic. Labs: CBC with diff, PT /INR, APTT, CMP, MRSA nasal swab done 7/30/2018 & on Epic.

## 2018-08-16 LAB
ABO/RH: NORMAL
ANTIBODY SCREEN: NORMAL
URINE CULTURE, ROUTINE: NORMAL

## 2018-08-21 ENCOUNTER — ANESTHESIA EVENT (OUTPATIENT)
Dept: OPERATING ROOM | Age: 68
DRG: 460 | End: 2018-08-21
Payer: MEDICARE

## 2018-08-22 ENCOUNTER — HOSPITAL ENCOUNTER (INPATIENT)
Age: 68
LOS: 2 days | Discharge: HOME | DRG: 460 | End: 2018-08-24
Attending: ORTHOPAEDIC SURGERY | Admitting: ORTHOPAEDIC SURGERY
Payer: MEDICARE

## 2018-08-22 ENCOUNTER — HOSPITAL ENCOUNTER (OUTPATIENT)
Dept: GENERAL RADIOLOGY | Age: 68
Setting detail: SURGERY ADMIT
Discharge: HOME | DRG: 460 | End: 2018-08-24
Attending: ORTHOPAEDIC SURGERY
Payer: MEDICARE

## 2018-08-22 ENCOUNTER — ANESTHESIA (OUTPATIENT)
Dept: OPERATING ROOM | Age: 68
DRG: 460 | End: 2018-08-22
Payer: MEDICARE

## 2018-08-22 VITALS — OXYGEN SATURATION: 100 % | DIASTOLIC BLOOD PRESSURE: 69 MMHG | SYSTOLIC BLOOD PRESSURE: 107 MMHG | TEMPERATURE: 95.5 F

## 2018-08-22 DIAGNOSIS — Z98.1 STATUS POST LUMBAR SPINAL FUSION: ICD-10-CM

## 2018-08-22 DIAGNOSIS — Z98.890 S/P LAMINECTOMY: ICD-10-CM

## 2018-08-22 DIAGNOSIS — M48.062 LUMBAR STENOSIS WITH NEUROGENIC CLAUDICATION: Primary | ICD-10-CM

## 2018-08-22 LAB
HCT VFR BLD CALC: 35 % (ref 37–47)
HEMOGLOBIN: 11.6 G/DL (ref 12–16)
MCH RBC QN AUTO: 29.1 PG (ref 27–31.3)
MCHC RBC AUTO-ENTMCNC: 33 % (ref 33–37)
MCV RBC AUTO: 88 FL (ref 82–100)
PDW BLD-RTO: 13.8 % (ref 11.5–14.5)
PLATELET # BLD: 286 K/UL (ref 130–400)
RBC # BLD: 3.97 M/UL (ref 4.2–5.4)
WBC # BLD: 16.3 K/UL (ref 4.8–10.8)

## 2018-08-22 PROCEDURE — 2709999900 HC NON-CHARGEABLE SUPPLY: Performed by: ORTHOPAEDIC SURGERY

## 2018-08-22 PROCEDURE — 6360000002 HC RX W HCPCS: Performed by: ORTHOPAEDIC SURGERY

## 2018-08-22 PROCEDURE — C1713 ANCHOR/SCREW BN/BN,TIS/BN: HCPCS | Performed by: ORTHOPAEDIC SURGERY

## 2018-08-22 PROCEDURE — 3600000004 HC SURGERY LEVEL 4 BASE: Performed by: ORTHOPAEDIC SURGERY

## 2018-08-22 PROCEDURE — 3700000001 HC ADD 15 MINUTES (ANESTHESIA): Performed by: ORTHOPAEDIC SURGERY

## 2018-08-22 PROCEDURE — 7100000000 HC PACU RECOVERY - FIRST 15 MIN: Performed by: ORTHOPAEDIC SURGERY

## 2018-08-22 PROCEDURE — A4648 IMPLANTABLE TISSUE MARKER: HCPCS | Performed by: ORTHOPAEDIC SURGERY

## 2018-08-22 PROCEDURE — 6360000002 HC RX W HCPCS: Performed by: ANESTHESIOLOGY

## 2018-08-22 PROCEDURE — 2500000003 HC RX 250 WO HCPCS: Performed by: NURSE PRACTITIONER

## 2018-08-22 PROCEDURE — 3600000014 HC SURGERY LEVEL 4 ADDTL 15MIN: Performed by: ORTHOPAEDIC SURGERY

## 2018-08-22 PROCEDURE — 2580000003 HC RX 258: Performed by: ORTHOPAEDIC SURGERY

## 2018-08-22 PROCEDURE — 6370000000 HC RX 637 (ALT 250 FOR IP)

## 2018-08-22 PROCEDURE — 2500000003 HC RX 250 WO HCPCS: Performed by: NURSE ANESTHETIST, CERTIFIED REGISTERED

## 2018-08-22 PROCEDURE — 2500000003 HC RX 250 WO HCPCS: Performed by: ORTHOPAEDIC SURGERY

## 2018-08-22 PROCEDURE — 6370000000 HC RX 637 (ALT 250 FOR IP): Performed by: ORTHOPAEDIC SURGERY

## 2018-08-22 PROCEDURE — 6360000002 HC RX W HCPCS: Performed by: NURSE PRACTITIONER

## 2018-08-22 PROCEDURE — 2580000003 HC RX 258

## 2018-08-22 PROCEDURE — 2720000010 HC SURG SUPPLY STERILE: Performed by: ORTHOPAEDIC SURGERY

## 2018-08-22 PROCEDURE — 01NB0ZZ RELEASE LUMBAR NERVE, OPEN APPROACH: ICD-10-PCS | Performed by: ORTHOPAEDIC SURGERY

## 2018-08-22 PROCEDURE — 88304 TISSUE EXAM BY PATHOLOGIST: CPT

## 2018-08-22 PROCEDURE — 2580000003 HC RX 258: Performed by: NURSE ANESTHETIST, CERTIFIED REGISTERED

## 2018-08-22 PROCEDURE — 2780000010 HC IMPLANT OTHER: Performed by: ORTHOPAEDIC SURGERY

## 2018-08-22 PROCEDURE — 2580000003 HC RX 258: Performed by: NURSE PRACTITIONER

## 2018-08-22 PROCEDURE — 6370000000 HC RX 637 (ALT 250 FOR IP): Performed by: PHYSICIAN ASSISTANT

## 2018-08-22 PROCEDURE — 0SG1071 FUSION OF 2 OR MORE LUMBAR VERTEBRAL JOINTS WITH AUTOLOGOUS TISSUE SUBSTITUTE, POSTERIOR APPROACH, POSTERIOR COLUMN, OPEN APPROACH: ICD-10-PCS | Performed by: ORTHOPAEDIC SURGERY

## 2018-08-22 PROCEDURE — 6360000002 HC RX W HCPCS: Performed by: NURSE ANESTHETIST, CERTIFIED REGISTERED

## 2018-08-22 PROCEDURE — 0SG3071 FUSION OF LUMBOSACRAL JOINT WITH AUTOLOGOUS TISSUE SUBSTITUTE, POSTERIOR APPROACH, POSTERIOR COLUMN, OPEN APPROACH: ICD-10-PCS | Performed by: ORTHOPAEDIC SURGERY

## 2018-08-22 PROCEDURE — 1210000000 HC MED SURG R&B

## 2018-08-22 PROCEDURE — 85027 COMPLETE CBC AUTOMATED: CPT

## 2018-08-22 PROCEDURE — 3700000000 HC ANESTHESIA ATTENDED CARE: Performed by: ORTHOPAEDIC SURGERY

## 2018-08-22 PROCEDURE — 3209999900 FLUORO FOR SURGICAL PROCEDURES

## 2018-08-22 PROCEDURE — 7100000001 HC PACU RECOVERY - ADDTL 15 MIN: Performed by: ORTHOPAEDIC SURGERY

## 2018-08-22 DEVICE — 5.5MM CURVED ROD, 40MM
Type: IMPLANTABLE DEVICE | Site: BACK | Status: FUNCTIONAL
Brand: REVERE

## 2018-08-22 DEVICE — REVERE LOCKING CAP
Type: IMPLANTABLE DEVICE | Site: BACK | Status: FUNCTIONAL
Brand: REVERE

## 2018-08-22 DEVICE — 6.5MM REVERE PEDICLE SCREW 50MM
Type: IMPLANTABLE DEVICE | Site: BACK | Status: FUNCTIONAL
Brand: REVERE

## 2018-08-22 DEVICE — 6.5MM REVERE PEDICLE SCREW 45MM
Type: IMPLANTABLE DEVICE | Site: BACK | Status: FUNCTIONAL
Brand: REVERE

## 2018-08-22 DEVICE — FLOSEAL HEMOSTATIC MATRIX, 5 ML
Type: IMPLANTABLE DEVICE | Site: BACK | Status: FUNCTIONAL
Brand: FLOSEAL

## 2018-08-22 RX ORDER — LIDOCAINE HYDROCHLORIDE 10 MG/ML
1 INJECTION, SOLUTION EPIDURAL; INFILTRATION; INTRACAUDAL; PERINEURAL
Status: COMPLETED | OUTPATIENT
Start: 2018-08-22 | End: 2018-08-22

## 2018-08-22 RX ORDER — PANTOPRAZOLE SODIUM 40 MG/1
40 TABLET, DELAYED RELEASE ORAL DAILY
Status: DISCONTINUED | OUTPATIENT
Start: 2018-08-23 | End: 2018-08-24 | Stop reason: HOSPADM

## 2018-08-22 RX ORDER — SIMVASTATIN 40 MG
40 TABLET ORAL NIGHTLY
Status: DISCONTINUED | OUTPATIENT
Start: 2018-08-22 | End: 2018-08-24 | Stop reason: HOSPADM

## 2018-08-22 RX ORDER — DIPHENHYDRAMINE HYDROCHLORIDE 50 MG/ML
12.5 INJECTION INTRAMUSCULAR; INTRAVENOUS
Status: DISCONTINUED | OUTPATIENT
Start: 2018-08-22 | End: 2018-08-22 | Stop reason: HOSPADM

## 2018-08-22 RX ORDER — PROPOFOL 10 MG/ML
INJECTION, EMULSION INTRAVENOUS PRN
Status: DISCONTINUED | OUTPATIENT
Start: 2018-08-22 | End: 2018-08-22 | Stop reason: SDUPTHER

## 2018-08-22 RX ORDER — DOCUSATE SODIUM 100 MG/1
100 CAPSULE, LIQUID FILLED ORAL 2 TIMES DAILY
Status: DISCONTINUED | OUTPATIENT
Start: 2018-08-22 | End: 2018-08-24 | Stop reason: HOSPADM

## 2018-08-22 RX ORDER — FENTANYL CITRATE 50 UG/ML
INJECTION, SOLUTION INTRAMUSCULAR; INTRAVENOUS PRN
Status: DISCONTINUED | OUTPATIENT
Start: 2018-08-22 | End: 2018-08-22 | Stop reason: SDUPTHER

## 2018-08-22 RX ORDER — REMIFENTANIL HYDROCHLORIDE 1 MG/ML
INJECTION, POWDER, LYOPHILIZED, FOR SOLUTION INTRAVENOUS PRN
Status: DISCONTINUED | OUTPATIENT
Start: 2018-08-22 | End: 2018-08-22 | Stop reason: SDUPTHER

## 2018-08-22 RX ORDER — SODIUM CHLORIDE 0.9 % (FLUSH) 0.9 %
10 SYRINGE (ML) INJECTION PRN
Status: DISCONTINUED | OUTPATIENT
Start: 2018-08-22 | End: 2018-08-24 | Stop reason: HOSPADM

## 2018-08-22 RX ORDER — FLUOXETINE HYDROCHLORIDE 20 MG/1
60 CAPSULE ORAL DAILY
COMMUNITY
Start: 2018-08-22

## 2018-08-22 RX ORDER — OMEGA-3/DHA/EPA/FISH OIL 500-1000MG
500 CAPSULE ORAL DAILY
Status: DISCONTINUED | OUTPATIENT
Start: 2018-08-23 | End: 2018-08-24 | Stop reason: HOSPADM

## 2018-08-22 RX ORDER — LIDOCAINE HYDROCHLORIDE AND EPINEPHRINE 10; 10 MG/ML; UG/ML
INJECTION, SOLUTION INFILTRATION; PERINEURAL PRN
Status: DISCONTINUED | OUTPATIENT
Start: 2018-08-22 | End: 2018-08-22 | Stop reason: HOSPADM

## 2018-08-22 RX ORDER — GLYCOPYRROLATE 1 MG/5 ML
SYRINGE (ML) INTRAVENOUS PRN
Status: DISCONTINUED | OUTPATIENT
Start: 2018-08-22 | End: 2018-08-22 | Stop reason: SDUPTHER

## 2018-08-22 RX ORDER — MEPERIDINE HYDROCHLORIDE 25 MG/ML
12.5 INJECTION INTRAMUSCULAR; INTRAVENOUS; SUBCUTANEOUS EVERY 5 MIN PRN
Status: DISCONTINUED | OUTPATIENT
Start: 2018-08-22 | End: 2018-08-22 | Stop reason: HOSPADM

## 2018-08-22 RX ORDER — HEPARIN SODIUM 1000 [USP'U]/ML
INJECTION, SOLUTION INTRAVENOUS; SUBCUTANEOUS PRN
Status: DISCONTINUED | OUTPATIENT
Start: 2018-08-22 | End: 2018-08-22 | Stop reason: HOSPADM

## 2018-08-22 RX ORDER — ACETAMINOPHEN 325 MG/1
650 TABLET ORAL EVERY 4 HOURS PRN
Status: DISCONTINUED | OUTPATIENT
Start: 2018-08-22 | End: 2018-08-24 | Stop reason: HOSPADM

## 2018-08-22 RX ORDER — MAGNESIUM HYDROXIDE 1200 MG/15ML
LIQUID ORAL CONTINUOUS PRN
Status: COMPLETED | OUTPATIENT
Start: 2018-08-22 | End: 2018-08-22

## 2018-08-22 RX ORDER — SODIUM CHLORIDE 0.9 % (FLUSH) 0.9 %
10 SYRINGE (ML) INJECTION EVERY 12 HOURS SCHEDULED
Status: DISCONTINUED | OUTPATIENT
Start: 2018-08-22 | End: 2018-08-24 | Stop reason: HOSPADM

## 2018-08-22 RX ORDER — LIDOCAINE HYDROCHLORIDE 20 MG/ML
INJECTION, SOLUTION INFILTRATION; PERINEURAL PRN
Status: DISCONTINUED | OUTPATIENT
Start: 2018-08-22 | End: 2018-08-22 | Stop reason: SDUPTHER

## 2018-08-22 RX ORDER — ONDANSETRON 2 MG/ML
4 INJECTION INTRAMUSCULAR; INTRAVENOUS
Status: DISCONTINUED | OUTPATIENT
Start: 2018-08-22 | End: 2018-08-22 | Stop reason: HOSPADM

## 2018-08-22 RX ORDER — FLUOXETINE HYDROCHLORIDE 20 MG/1
60 CAPSULE ORAL DAILY
Status: DISCONTINUED | OUTPATIENT
Start: 2018-08-23 | End: 2018-08-24 | Stop reason: HOSPADM

## 2018-08-22 RX ORDER — ROCURONIUM BROMIDE 10 MG/ML
INJECTION, SOLUTION INTRAVENOUS PRN
Status: DISCONTINUED | OUTPATIENT
Start: 2018-08-22 | End: 2018-08-22 | Stop reason: SDUPTHER

## 2018-08-22 RX ORDER — OXYCODONE HYDROCHLORIDE AND ACETAMINOPHEN 5; 325 MG/1; MG/1
2 TABLET ORAL EVERY 4 HOURS PRN
Status: DISCONTINUED | OUTPATIENT
Start: 2018-08-22 | End: 2018-08-23

## 2018-08-22 RX ORDER — HYDROCODONE BITARTRATE AND ACETAMINOPHEN 5; 325 MG/1; MG/1
2 TABLET ORAL PRN
Status: DISCONTINUED | OUTPATIENT
Start: 2018-08-22 | End: 2018-08-22 | Stop reason: HOSPADM

## 2018-08-22 RX ORDER — TROSPIUM CHLORIDE 20 MG/1
20 TABLET, FILM COATED ORAL
Status: DISCONTINUED | OUTPATIENT
Start: 2018-08-23 | End: 2018-08-24 | Stop reason: HOSPADM

## 2018-08-22 RX ORDER — SODIUM CHLORIDE, SODIUM LACTATE, POTASSIUM CHLORIDE, CALCIUM CHLORIDE 600; 310; 30; 20 MG/100ML; MG/100ML; MG/100ML; MG/100ML
INJECTION, SOLUTION INTRAVENOUS CONTINUOUS PRN
Status: DISCONTINUED | OUTPATIENT
Start: 2018-08-22 | End: 2018-08-22 | Stop reason: SDUPTHER

## 2018-08-22 RX ORDER — EPHEDRINE SULFATE 50 MG/ML
INJECTION, SOLUTION INTRAVENOUS PRN
Status: DISCONTINUED | OUTPATIENT
Start: 2018-08-22 | End: 2018-08-22 | Stop reason: SDUPTHER

## 2018-08-22 RX ORDER — SODIUM CHLORIDE 9 MG/ML
INJECTION, SOLUTION INTRAVENOUS CONTINUOUS
Status: DISCONTINUED | OUTPATIENT
Start: 2018-08-22 | End: 2018-08-24 | Stop reason: HOSPADM

## 2018-08-22 RX ORDER — TROSPIUM CHLORIDE 20 MG/1
20 TABLET, FILM COATED ORAL NIGHTLY
Status: DISCONTINUED | OUTPATIENT
Start: 2018-08-22 | End: 2018-08-22

## 2018-08-22 RX ORDER — ONDANSETRON 2 MG/ML
4 INJECTION INTRAMUSCULAR; INTRAVENOUS EVERY 6 HOURS PRN
Status: DISCONTINUED | OUTPATIENT
Start: 2018-08-22 | End: 2018-08-24 | Stop reason: HOSPADM

## 2018-08-22 RX ORDER — OXYCODONE HYDROCHLORIDE AND ACETAMINOPHEN 5; 325 MG/1; MG/1
1 TABLET ORAL EVERY 4 HOURS PRN
Status: DISCONTINUED | OUTPATIENT
Start: 2018-08-22 | End: 2018-08-24 | Stop reason: HOSPADM

## 2018-08-22 RX ORDER — FENTANYL CITRATE 50 UG/ML
50 INJECTION, SOLUTION INTRAMUSCULAR; INTRAVENOUS EVERY 10 MIN PRN
Status: DISCONTINUED | OUTPATIENT
Start: 2018-08-22 | End: 2018-08-22 | Stop reason: HOSPADM

## 2018-08-22 RX ORDER — METOCLOPRAMIDE HYDROCHLORIDE 5 MG/ML
10 INJECTION INTRAMUSCULAR; INTRAVENOUS
Status: DISCONTINUED | OUTPATIENT
Start: 2018-08-22 | End: 2018-08-22 | Stop reason: HOSPADM

## 2018-08-22 RX ORDER — HYDROCODONE BITARTRATE AND ACETAMINOPHEN 5; 325 MG/1; MG/1
1 TABLET ORAL PRN
Status: DISCONTINUED | OUTPATIENT
Start: 2018-08-22 | End: 2018-08-22 | Stop reason: HOSPADM

## 2018-08-22 RX ORDER — DEXAMETHASONE SODIUM PHOSPHATE 10 MG/ML
INJECTION INTRAMUSCULAR; INTRAVENOUS PRN
Status: DISCONTINUED | OUTPATIENT
Start: 2018-08-22 | End: 2018-08-22 | Stop reason: SDUPTHER

## 2018-08-22 RX ORDER — FERROUS SULFATE 325(65) MG
325 TABLET ORAL
Status: DISCONTINUED | OUTPATIENT
Start: 2018-08-23 | End: 2018-08-24 | Stop reason: HOSPADM

## 2018-08-22 RX ORDER — DIMETHICONE, CAMPHOR (SYNTHETIC), MENTHOL, AND PHENOL 1.1; .5; .625; .5 G/100G; G/100G; G/100G; G/100G
OINTMENT TOPICAL
Status: COMPLETED
Start: 2018-08-22 | End: 2018-08-22

## 2018-08-22 RX ORDER — ONDANSETRON 2 MG/ML
INJECTION INTRAMUSCULAR; INTRAVENOUS PRN
Status: DISCONTINUED | OUTPATIENT
Start: 2018-08-22 | End: 2018-08-22 | Stop reason: SDUPTHER

## 2018-08-22 RX ORDER — SODIUM CHLORIDE, SODIUM LACTATE, POTASSIUM CHLORIDE, CALCIUM CHLORIDE 600; 310; 30; 20 MG/100ML; MG/100ML; MG/100ML; MG/100ML
INJECTION, SOLUTION INTRAVENOUS CONTINUOUS
Status: DISCONTINUED | OUTPATIENT
Start: 2018-08-22 | End: 2018-08-22

## 2018-08-22 RX ORDER — MIDAZOLAM HYDROCHLORIDE 1 MG/ML
INJECTION INTRAMUSCULAR; INTRAVENOUS PRN
Status: DISCONTINUED | OUTPATIENT
Start: 2018-08-22 | End: 2018-08-22 | Stop reason: SDUPTHER

## 2018-08-22 RX ORDER — SODIUM CHLORIDE 9 MG/ML
INJECTION, SOLUTION INTRAVENOUS
Status: COMPLETED
Start: 2018-08-22 | End: 2018-08-22

## 2018-08-22 RX ADMIN — PHENYLEPHRINE HYDROCHLORIDE 100 MCG: 10 INJECTION INTRAVENOUS at 09:20

## 2018-08-22 RX ADMIN — REMIFENTANIL HYDROCHLORIDE 100 MCG: 1 INJECTION, POWDER, LYOPHILIZED, FOR SOLUTION INTRAVENOUS at 12:05

## 2018-08-22 RX ADMIN — DOCUSATE SODIUM 100 MG: 100 CAPSULE, LIQUID FILLED ORAL at 22:18

## 2018-08-22 RX ADMIN — DEXAMETHASONE SODIUM PHOSPHATE 10 MG: 10 INJECTION INTRAMUSCULAR; INTRAVENOUS at 09:16

## 2018-08-22 RX ADMIN — PHENYLEPHRINE HYDROCHLORIDE 100 MCG: 10 INJECTION INTRAVENOUS at 09:53

## 2018-08-22 RX ADMIN — PHENYLEPHRINE HYDROCHLORIDE 50 MCG: 10 INJECTION INTRAVENOUS at 10:44

## 2018-08-22 RX ADMIN — PHENYLEPHRINE HYDROCHLORIDE 50 MCG: 10 INJECTION INTRAVENOUS at 10:36

## 2018-08-22 RX ADMIN — PHENYLEPHRINE HYDROCHLORIDE 100 MCG: 10 INJECTION INTRAVENOUS at 10:21

## 2018-08-22 RX ADMIN — Medication 0.5 MG: at 14:25

## 2018-08-22 RX ADMIN — ROCURONIUM BROMIDE 30 MG: 10 INJECTION INTRAVENOUS at 09:42

## 2018-08-22 RX ADMIN — EPHEDRINE SULFATE 10 MG: 50 INJECTION, SOLUTION INTRAMUSCULAR; INTRAVENOUS; SUBCUTANEOUS at 09:37

## 2018-08-22 RX ADMIN — DIMETHICONE, CAMPHOR (SYNTHETIC), MENTHOL, AND PHENOL: 1.1; .5; .625; .5 OINTMENT TOPICAL at 19:39

## 2018-08-22 RX ADMIN — ROCURONIUM BROMIDE 10 MG: 10 INJECTION INTRAVENOUS at 10:59

## 2018-08-22 RX ADMIN — SODIUM CHLORIDE, POTASSIUM CHLORIDE, SODIUM LACTATE AND CALCIUM CHLORIDE: 600; 310; 30; 20 INJECTION, SOLUTION INTRAVENOUS at 07:40

## 2018-08-22 RX ADMIN — PROPOFOL 30 MG: 10 INJECTION, EMULSION INTRAVENOUS at 12:01

## 2018-08-22 RX ADMIN — EPHEDRINE SULFATE 10 MG: 50 INJECTION, SOLUTION INTRAMUSCULAR; INTRAVENOUS; SUBCUTANEOUS at 11:55

## 2018-08-22 RX ADMIN — EPHEDRINE SULFATE 10 MG: 50 INJECTION, SOLUTION INTRAMUSCULAR; INTRAVENOUS; SUBCUTANEOUS at 09:18

## 2018-08-22 RX ADMIN — PHENYLEPHRINE HYDROCHLORIDE 25 MCG/MIN: 10 INJECTION INTRAVENOUS at 10:27

## 2018-08-22 RX ADMIN — SODIUM CHLORIDE, POTASSIUM CHLORIDE, SODIUM LACTATE AND CALCIUM CHLORIDE: 600; 310; 30; 20 INJECTION, SOLUTION INTRAVENOUS at 09:20

## 2018-08-22 RX ADMIN — PROPOFOL 30 MG: 10 INJECTION, EMULSION INTRAVENOUS at 10:15

## 2018-08-22 RX ADMIN — FENTANYL CITRATE 50 MCG: 50 INJECTION, SOLUTION INTRAMUSCULAR; INTRAVENOUS at 13:55

## 2018-08-22 RX ADMIN — PHENYLEPHRINE HYDROCHLORIDE 100 MCG: 10 INJECTION INTRAVENOUS at 09:47

## 2018-08-22 RX ADMIN — REMIFENTANIL HYDROCHLORIDE 50 MCG: 1 INJECTION, POWDER, LYOPHILIZED, FOR SOLUTION INTRAVENOUS at 11:21

## 2018-08-22 RX ADMIN — SIMVASTATIN 40 MG: 40 TABLET, FILM COATED ORAL at 22:19

## 2018-08-22 RX ADMIN — SODIUM CHLORIDE: 9 INJECTION, SOLUTION INTRAVENOUS at 15:55

## 2018-08-22 RX ADMIN — FENTANYL CITRATE 50 MCG: 50 INJECTION, SOLUTION INTRAMUSCULAR; INTRAVENOUS at 13:09

## 2018-08-22 RX ADMIN — FENTANYL CITRATE 50 MCG: 50 INJECTION, SOLUTION INTRAMUSCULAR; INTRAVENOUS at 14:05

## 2018-08-22 RX ADMIN — ONDANSETRON HYDROCHLORIDE 4 MG: 2 INJECTION, SOLUTION INTRAMUSCULAR; INTRAVENOUS at 12:12

## 2018-08-22 RX ADMIN — PROPOFOL 100 MG: 10 INJECTION, EMULSION INTRAVENOUS at 08:50

## 2018-08-22 RX ADMIN — FENTANYL CITRATE 50 MCG: 50 INJECTION, SOLUTION INTRAMUSCULAR; INTRAVENOUS at 10:12

## 2018-08-22 RX ADMIN — OXYCODONE HYDROCHLORIDE AND ACETAMINOPHEN 2 TABLET: 5; 325 TABLET ORAL at 17:37

## 2018-08-22 RX ADMIN — PROPOFOL 50 MG: 10 INJECTION, EMULSION INTRAVENOUS at 12:07

## 2018-08-22 RX ADMIN — PROPOFOL 30 MG: 10 INJECTION, EMULSION INTRAVENOUS at 12:09

## 2018-08-22 RX ADMIN — FENTANYL CITRATE 50 MCG: 50 INJECTION, SOLUTION INTRAMUSCULAR; INTRAVENOUS at 13:13

## 2018-08-22 RX ADMIN — Medication 0.5 MG: at 13:45

## 2018-08-22 RX ADMIN — PHENYLEPHRINE HYDROCHLORIDE 100 MCG: 10 INJECTION INTRAVENOUS at 09:22

## 2018-08-22 RX ADMIN — SODIUM CHLORIDE, POTASSIUM CHLORIDE, SODIUM LACTATE AND CALCIUM CHLORIDE: 600; 310; 30; 20 INJECTION, SOLUTION INTRAVENOUS at 08:48

## 2018-08-22 RX ADMIN — LIDOCAINE HYDROCHLORIDE 0.1 ML: 10 INJECTION, SOLUTION EPIDURAL; INFILTRATION; INTRACAUDAL; PERINEURAL at 07:38

## 2018-08-22 RX ADMIN — SUGAMMADEX 200 MG: 100 INJECTION, SOLUTION INTRAVENOUS at 11:39

## 2018-08-22 RX ADMIN — Medication 0.5 MG: at 13:35

## 2018-08-22 RX ADMIN — PROPOFOL 30 MG: 10 INJECTION, EMULSION INTRAVENOUS at 12:04

## 2018-08-22 RX ADMIN — PHENYLEPHRINE HYDROCHLORIDE 100 MCG: 10 INJECTION INTRAVENOUS at 10:26

## 2018-08-22 RX ADMIN — Medication 2 G: at 08:55

## 2018-08-22 RX ADMIN — PHENYLEPHRINE HYDROCHLORIDE 100 MCG: 10 INJECTION INTRAVENOUS at 09:50

## 2018-08-22 RX ADMIN — SODIUM CHLORIDE, POTASSIUM CHLORIDE, SODIUM LACTATE AND CALCIUM CHLORIDE: 600; 310; 30; 20 INJECTION, SOLUTION INTRAVENOUS at 14:00

## 2018-08-22 RX ADMIN — ROCURONIUM BROMIDE 50 MG: 10 INJECTION INTRAVENOUS at 08:52

## 2018-08-22 RX ADMIN — LIDOCAINE HYDROCHLORIDE 60 MG: 20 INJECTION, SOLUTION INFILTRATION; PERINEURAL at 08:50

## 2018-08-22 RX ADMIN — Medication 2 G: at 17:38

## 2018-08-22 RX ADMIN — REMIFENTANIL HYDROCHLORIDE 100 MCG: 1 INJECTION, POWDER, LYOPHILIZED, FOR SOLUTION INTRAVENOUS at 09:30

## 2018-08-22 RX ADMIN — MIDAZOLAM HYDROCHLORIDE 2 MG: 1 INJECTION, SOLUTION INTRAMUSCULAR; INTRAVENOUS at 08:48

## 2018-08-22 RX ADMIN — OXYCODONE HYDROCHLORIDE AND ACETAMINOPHEN 1 TABLET: 5; 325 TABLET ORAL at 22:18

## 2018-08-22 RX ADMIN — Medication 0.2 MG: at 12:00

## 2018-08-22 RX ADMIN — REMIFENTANIL HYDROCHLORIDE 0.1 MCG/KG/MIN: 1 INJECTION, POWDER, LYOPHILIZED, FOR SOLUTION INTRAVENOUS at 08:57

## 2018-08-22 RX ADMIN — PHENYLEPHRINE HYDROCHLORIDE 100 MCG: 10 INJECTION INTRAVENOUS at 09:45

## 2018-08-22 RX ADMIN — EPHEDRINE SULFATE 10 MG: 50 INJECTION, SOLUTION INTRAMUSCULAR; INTRAVENOUS; SUBCUTANEOUS at 11:11

## 2018-08-22 RX ADMIN — EPHEDRINE SULFATE 10 MG: 50 INJECTION, SOLUTION INTRAMUSCULAR; INTRAVENOUS; SUBCUTANEOUS at 11:08

## 2018-08-22 RX ADMIN — FENTANYL CITRATE 50 MCG: 50 INJECTION, SOLUTION INTRAMUSCULAR; INTRAVENOUS at 08:51

## 2018-08-22 RX ADMIN — REMIFENTANIL HYDROCHLORIDE 100 MCG: 1 INJECTION, POWDER, LYOPHILIZED, FOR SOLUTION INTRAVENOUS at 11:03

## 2018-08-22 ASSESSMENT — PULMONARY FUNCTION TESTS
PIF_VALUE: 17
PIF_VALUE: 16
PIF_VALUE: 17
PIF_VALUE: 18
PIF_VALUE: 16
PIF_VALUE: 14
PIF_VALUE: 17
PIF_VALUE: 1
PIF_VALUE: 16
PIF_VALUE: 17
PIF_VALUE: 16
PIF_VALUE: 16
PIF_VALUE: 17
PIF_VALUE: 16
PIF_VALUE: 14
PIF_VALUE: 17
PIF_VALUE: 18
PIF_VALUE: 16
PIF_VALUE: 6
PIF_VALUE: 16
PIF_VALUE: 17
PIF_VALUE: 17
PIF_VALUE: 1
PIF_VALUE: 16
PIF_VALUE: 16
PIF_VALUE: 17
PIF_VALUE: 16
PIF_VALUE: 16
PIF_VALUE: 14
PIF_VALUE: 14
PIF_VALUE: 16
PIF_VALUE: 16
PIF_VALUE: 17
PIF_VALUE: 17
PIF_VALUE: 21
PIF_VALUE: 17
PIF_VALUE: 17
PIF_VALUE: 1
PIF_VALUE: 14
PIF_VALUE: 16
PIF_VALUE: 17
PIF_VALUE: 16
PIF_VALUE: 16
PIF_VALUE: 18
PIF_VALUE: 18
PIF_VALUE: 14
PIF_VALUE: 14
PIF_VALUE: 17
PIF_VALUE: 16
PIF_VALUE: 18
PIF_VALUE: 16
PIF_VALUE: 14
PIF_VALUE: 16
PIF_VALUE: 17
PIF_VALUE: 16
PIF_VALUE: 17
PIF_VALUE: 17
PIF_VALUE: 16
PIF_VALUE: 17
PIF_VALUE: 18
PIF_VALUE: 18
PIF_VALUE: 16
PIF_VALUE: 0
PIF_VALUE: 17
PIF_VALUE: 17
PIF_VALUE: 14
PIF_VALUE: 18
PIF_VALUE: 17
PIF_VALUE: 16
PIF_VALUE: 17
PIF_VALUE: 14
PIF_VALUE: 16
PIF_VALUE: 17
PIF_VALUE: 16
PIF_VALUE: 17
PIF_VALUE: 17
PIF_VALUE: 18
PIF_VALUE: 16
PIF_VALUE: 18
PIF_VALUE: 17
PIF_VALUE: 16
PIF_VALUE: 17
PIF_VALUE: 17
PIF_VALUE: 15
PIF_VALUE: 16
PIF_VALUE: 16
PIF_VALUE: 2
PIF_VALUE: 21
PIF_VALUE: 18
PIF_VALUE: 16
PIF_VALUE: 16
PIF_VALUE: 14
PIF_VALUE: 18
PIF_VALUE: 16
PIF_VALUE: 16
PIF_VALUE: 18
PIF_VALUE: 14
PIF_VALUE: 16
PIF_VALUE: 17
PIF_VALUE: 18
PIF_VALUE: 14
PIF_VALUE: 16
PIF_VALUE: 16
PIF_VALUE: 17
PIF_VALUE: 16
PIF_VALUE: 17
PIF_VALUE: 17
PIF_VALUE: 16
PIF_VALUE: 16
PIF_VALUE: 17
PIF_VALUE: 18
PIF_VALUE: 16
PIF_VALUE: 16
PIF_VALUE: 18
PIF_VALUE: 16
PIF_VALUE: 16
PIF_VALUE: 17
PIF_VALUE: 16
PIF_VALUE: 17
PIF_VALUE: 16
PIF_VALUE: 17
PIF_VALUE: 16
PIF_VALUE: 17
PIF_VALUE: 12
PIF_VALUE: 17
PIF_VALUE: 16
PIF_VALUE: 14
PIF_VALUE: 17
PIF_VALUE: 16
PIF_VALUE: 17
PIF_VALUE: 18
PIF_VALUE: 17
PIF_VALUE: 14
PIF_VALUE: 17
PIF_VALUE: 17
PIF_VALUE: 16
PIF_VALUE: 17
PIF_VALUE: 16
PIF_VALUE: 17
PIF_VALUE: 16
PIF_VALUE: 18
PIF_VALUE: 17
PIF_VALUE: 16
PIF_VALUE: 16
PIF_VALUE: 17
PIF_VALUE: 17
PIF_VALUE: 16
PIF_VALUE: 14
PIF_VALUE: 17
PIF_VALUE: 17
PIF_VALUE: 16
PIF_VALUE: 2
PIF_VALUE: 17
PIF_VALUE: 16
PIF_VALUE: 6
PIF_VALUE: 17
PIF_VALUE: 20
PIF_VALUE: 16
PIF_VALUE: 17
PIF_VALUE: 18
PIF_VALUE: 16
PIF_VALUE: 16
PIF_VALUE: 17
PIF_VALUE: 16
PIF_VALUE: 17
PIF_VALUE: 17
PIF_VALUE: 16
PIF_VALUE: 17
PIF_VALUE: 16
PIF_VALUE: 17
PIF_VALUE: 15
PIF_VALUE: 19
PIF_VALUE: 17
PIF_VALUE: 16
PIF_VALUE: 16
PIF_VALUE: 17
PIF_VALUE: 13
PIF_VALUE: 18
PIF_VALUE: 17
PIF_VALUE: 13
PIF_VALUE: 17
PIF_VALUE: 16
PIF_VALUE: 16
PIF_VALUE: 10
PIF_VALUE: 17
PIF_VALUE: 16
PIF_VALUE: 16
PIF_VALUE: 18
PIF_VALUE: 17
PIF_VALUE: 16
PIF_VALUE: 16
PIF_VALUE: 17
PIF_VALUE: 16
PIF_VALUE: 17
PIF_VALUE: 16
PIF_VALUE: 16
PIF_VALUE: 17
PIF_VALUE: 17
PIF_VALUE: 16
PIF_VALUE: 17
PIF_VALUE: 17
PIF_VALUE: 16
PIF_VALUE: 17
PIF_VALUE: 16
PIF_VALUE: 17
PIF_VALUE: 17
PIF_VALUE: 16
PIF_VALUE: 16
PIF_VALUE: 18
PIF_VALUE: 17

## 2018-08-22 ASSESSMENT — PAIN SCALES - GENERAL
PAINLEVEL_OUTOF10: 5
PAINLEVEL_OUTOF10: 6
PAINLEVEL_OUTOF10: 7
PAINLEVEL_OUTOF10: 7
PAINLEVEL_OUTOF10: 5
PAINLEVEL_OUTOF10: 6
PAINLEVEL_OUTOF10: 8
PAINLEVEL_OUTOF10: 6

## 2018-08-22 ASSESSMENT — PAIN - FUNCTIONAL ASSESSMENT: PAIN_FUNCTIONAL_ASSESSMENT: 0-10

## 2018-08-22 ASSESSMENT — PAIN DESCRIPTION - PAIN TYPE: TYPE: ACUTE PAIN

## 2018-08-22 ASSESSMENT — PAIN DESCRIPTION - DESCRIPTORS: DESCRIPTORS: ACHING

## 2018-08-22 NOTE — ANESTHESIA PRE PROCEDURE
Department of Anesthesiology  Preprocedure Note       Name:  Radha Coburn   Age:  77 y.o.  :  1950                                          MRN:  [id number]         Date:  2018      Surgeon: Nik Eric):  Jeremiah Sheffield MD    Procedure: Procedure(s):  SPINE L3-4, L4-5, L5-S1 LAMINECTOMY, L3-4, L4-5 POSTERIOR LATERAL FUSION, L5-S1 INSTRUMENTED POSTERIOR INTERBODY FUSION, PRONE, AXIS SPINE TABLE, ACTIFUSE, CELL SAVER, NEW MICROSCOPE, NEW C-ARM X2, PEDIGUARD, MISONIX BONE SCALPAL, PNEUMATIC KERRISONS, SPINAL CORD MONITORING, LUMBAR BRACE, GLOBUS, OPEN TLIF, (H.S.C.G.#[id number])    Medications prior to admission:   Prior to Admission medications    Medication Sig Start Date End Date Taking? Authorizing Provider   zolpidem (AMBIEN) 5 MG tablet Take 5 mg by mouth nightly as needed for Sleep. Jenelle Kehr Historical Provider, MD   pantoprazole (PROTONIX) 40 MG tablet Take 1 tablet by mouth daily 3/26/18  Yes Christine Murillo DO   simvastatin (ZOCOR) 40 MG tablet TAKE 1 TABLET EVERY EVENING 3/26/18  Yes Christine Murillo DO   darifenacin (ENABLEX) 15 MG extended release tablet TAKE 1 TABLET DAILY 3/26/18  Yes Jameel Dumont DO   amLODIPine (NORVASC) 5 MG tablet Take 1 tablet by mouth daily  Patient taking differently: Take 5 mg by mouth every evening  3/26/18  Yes Christine Murillo DO   darifenacin (ENABLEX) 7.5 MG extended release tablet TAKE 2 TABLETS (15 MG) EVERY MORNING AND 1 TABLET (7.5 MG) EVERY EVENING 3/26/18  Yes Jameel Dumont DO   FLUoxetine (PROZAC) 20 MG capsule TAKE 1 CAPSULE THREE TIMES A DAY 5/3/16  Yes Jameel Dumont DO   aspirin 81 MG tablet Take 81 mg by mouth    Historical Provider, MD   Calcium-Magnesium 500-250 MG TABS Take by mouth daily     Historical Provider, MD   Multiple Vitamin (MULTI VITAMIN DAILY PO) Take by mouth daily     Historical Provider, MD   dabigatran (PRADAXA) 75 MG capsule Take 75 mg by mouth 2 times daily.     Historical Provider, MD   Vitamin D (CHOLECALCIFEROL) 1000 UNITS CAPS capsule Take 1,000 Units by mouth daily. Historical Provider, MD   OMEGA 3 340 MG CPDR Take 500 mg by mouth daily     Historical Provider, MD   ferrous sulfate 325 (65 FE) MG tablet Take 325 mg by mouth daily (with breakfast). Historical Provider, MD   Coenzyme Q10 (COQ10) 100 MG CAPS Take  by mouth. Historical Provider, MD       Current medications:    Current Facility-Administered Medications   Medication Dose Route Frequency Provider Last Rate Last Dose    lactated ringers infusion   Intravenous Continuous Amber Postin, APRN -  mL/hr at 08/22/18 0740      ceFAZolin (ANCEF) 2 g in dextrose 5 % 100 mL IVPB  2 g Intravenous Once Amber Postin, APRN - CNP        sodium chloride 0.9 % infusion                Allergies: Allergies   Allergen Reactions    Tolterodine Diarrhea     Patient CANNOT tolerate this med. Causes diarrhea! Detrol       Problem List:    Patient Active Problem List   Diagnosis Code    Nephrolithiasis N20.0    Depression F32.9    Hyperlipidemia E78.5    OA (osteoarthritis) M19.90    Osteoporosis M81.0    Hypertension I10    Fibrocystic breast disease N60.19    CAD (coronary artery disease) I25.10    Urge incontinence N39.41    Anemia D64.9    Cough R05    ACE-inhibitor cough R05, T46.4X5A    Epistaxis R04.0    Need for tetanus booster Z23    Coagulopathy, on coumadin D68.9    Hiatal hernia K44.9    Calculus of gallbladder K80.20    History of PTCA 2, stent x 2 2009, 2012, Karissa Z98.61    Abnormal EKG, needs cardiac clearence R94.31    Cough, Neg w/u Dr Amber Mccall, ?  Betablocker R05    History of chest x-ray, normal, 8/6/14 Z92.89    Wheezes R06.2    Right-sided low back pain with right-sided sciatica M54.41    Lumbar stenosis M48.061    Synovial cyst of lumbar facet joint M71.38    PE (pulmonary thromboembolism) (HCC) I26.99       Past Medical History:        Diagnosis Date    Abnormal EKG, needs cardiac clearence 12/23/2013    ACE-inhibitor cough 7/10/2012    ARB     Anemia     CAD (coronary artery disease) 2/2008    cardia stents x 2    Cancer (HCC)     facial skin cancer excision    Coagulopathy ([address] 10/4/2012    Coagulopathy, on coumadin 11/13/2013    Cough 5/29/2012    Cough, Neg w/u Dr Yoon Lopez, ?  Betablocker 9/22/2014    Depression     Depression 6/13/2017    Epistaxis 8/20/2012    Fibrocystic breast disease     Hiatal hernia 5/3/2016    History of chest x-ray, normal, 8/6/14 10/17/2014    History of PTCA 2, stent x 2 2009, 2012, Jean Paul Less 12/23/2013    Hx of blood clots 2012    PE s/p RTKR post op    Hyperlipidemia     meds > 10 yrs    Hypertension     meds > 10 yrs    Nephrolithiasis     OA (osteoarthritis)     Osteoporosis     Right-sided low back pain with right-sided sciatica 5/3/2016    Urge incontinence     Wheezes 4/20/2015       Past Surgical History:        Procedure Laterality Date    CARDIAC SURGERY      stent x 2    COLONOSCOPY      [facility]ON[facility]    ENDOSCOPY, COLON, DIAGNOSTIC      EYE SURGERY      Phaco with IOL OU    HERNIA REPAIR      left inguinal hernia    HYSTERECTOMY, TOTAL ABDOMINAL  1998    JOINT REPLACEMENT  2008    LTKR    JOINT REPLACEMENT  2017    RTSR    KNEE ARTHROSCOPY  11/11    R knee, [facility]    LITHOTRIPSY  2003 approx    [facility]    ROTATOR CUFF REPAIR Right     SKIN GRAFT  2/9/11    JORDAN AND BSO  1996 approx    TOTAL KNEE ARTHROPLASTY Left 08/2008       Social History:    Social History   Substance Use Topics    Smoking status: Never Smoker    Smokeless tobacco: Never Used    Alcohol use Yes      Comment: occ                                Counseling given: Not Answered      Vital Signs (Current):   Vitals:    08/22/18 0659   BP: (!) 108/58   Pulse: 61   Resp: 20   Temp: 97 °F (36.1 °C)   TempSrc: Temporal   SpO2: 94%   Weight: 198 lb (89.8 kg)   Height: 5' 4\" (1.626 m)

## 2018-08-22 NOTE — ANESTHESIA POSTPROCEDURE EVALUATION
Department of Anesthesiology  Postprocedure Note    Patient: Leo Feldman  MRN: [id number]  YOB: 1950  Date of evaluation: 8/22/2018  Time:  1:22 PM     Procedure Summary     Date:  08/22/18 Room / Location:  [facility] OR  / [facility]    Anesthesia Start:  0848 Anesthesia Stop:  2786    Procedure:  SPINE L3-4, L4-5, L5-S1 LAMINECTOMY, L3-4, L4-5 POSTERIOR LATERAL FUSION, L5-S1 INSTRUMENTED POSTERIOR INTERBODY FUSION, PRONE, AXIS SPINE TABLE, ACTIFUSE, CELL SAVER, NEW MICROSCOPE, NEW C-ARM X2, PEDIGUARD, MISONIX BONE SCALPAL, PNEUMATIC KERRISONS, SPINAL CORD MONITORING, LUMBAR BRACE, GLOBUS, OPEN TLIF, (H.S.C.G.#[id number]) (N/A ) Diagnosis:  (SPINE L3-4, L4-5, L5-S1 STENOSIS, L5-S1 FACET CYST)    Surgeon:  Isauro Mancini MD Responsible Provider:  Terese Zacarias MD    Anesthesia Type:  general ASA Status:  3          Anesthesia Type: general    Wandy Phase I:      Wandy Phase II:      Last vitals: Reviewed and per EMR flowsheets.        Anesthesia Post Evaluation    Patient location during evaluation: bedside  Patient participation: complete - patient participated  Level of consciousness: awake and awake and alert  Airway patency: patent  Nausea & Vomiting: no nausea and no vomiting  Complications: no  Cardiovascular status: blood pressure returned to baseline and hemodynamically stable  Respiratory status: acceptable  Hydration status: euvolemic

## 2018-08-22 NOTE — PROGRESS NOTES
[employer] set up and run per policy by Mey Pimentel.   Constantly monitored throughout case by [facility] GABRIELA JONES

## 2018-08-23 LAB
ANION GAP SERPL CALCULATED.3IONS-SCNC: 10 MEQ/L (ref 7–13)
BASOPHILS ABSOLUTE: 0 K/UL (ref 0–0.2)
BASOPHILS RELATIVE PERCENT: 0.1 %
BUN BLDV-MCNC: 18 MG/DL (ref 8–23)
CALCIUM SERPL-MCNC: 8 MG/DL (ref 8.6–10.2)
CHLORIDE BLD-SCNC: 102 MEQ/L (ref 98–107)
CO2: 25 MEQ/L (ref 22–29)
CREAT SERPL-MCNC: 0.77 MG/DL (ref 0.5–0.9)
EOSINOPHILS ABSOLUTE: 0 K/UL (ref 0–0.7)
EOSINOPHILS RELATIVE PERCENT: 0 %
GFR AFRICAN AMERICAN: >60
GFR NON-AFRICAN AMERICAN: >60
GLUCOSE BLD-MCNC: 144 MG/DL (ref 74–109)
HCT VFR BLD CALC: 30 % (ref 37–47)
HEMOGLOBIN: 9.8 G/DL (ref 12–16)
LYMPHOCYTES ABSOLUTE: 1.3 K/UL (ref 1–4.8)
LYMPHOCYTES RELATIVE PERCENT: 10 %
MAGNESIUM: 1.9 MG/DL (ref 1.7–2.3)
MCH RBC QN AUTO: 29 PG (ref 27–31.3)
MCHC RBC AUTO-ENTMCNC: 32.6 % (ref 33–37)
MCV RBC AUTO: 88.7 FL (ref 82–100)
MONOCYTES ABSOLUTE: 0.7 K/UL (ref 0.2–0.8)
MONOCYTES RELATIVE PERCENT: 5.7 %
NEUTROPHILS ABSOLUTE: 10.8 K/UL (ref 1.4–6.5)
NEUTROPHILS RELATIVE PERCENT: 84.2 %
PDW BLD-RTO: 13.8 % (ref 11.5–14.5)
PLATELET # BLD: 231 K/UL (ref 130–400)
POTASSIUM SERPL-SCNC: 4.7 MEQ/L (ref 3.5–5.1)
RBC # BLD: 3.38 M/UL (ref 4.2–5.4)
SODIUM BLD-SCNC: 137 MEQ/L (ref 132–144)
WBC # BLD: 12.8 K/UL (ref 4.8–10.8)

## 2018-08-23 PROCEDURE — G8979 MOBILITY GOAL STATUS: HCPCS

## 2018-08-23 PROCEDURE — 97166 OT EVAL MOD COMPLEX 45 MIN: CPT

## 2018-08-23 PROCEDURE — 36415 COLL VENOUS BLD VENIPUNCTURE: CPT

## 2018-08-23 PROCEDURE — 2580000003 HC RX 258: Performed by: ORTHOPAEDIC SURGERY

## 2018-08-23 PROCEDURE — 83735 ASSAY OF MAGNESIUM: CPT

## 2018-08-23 PROCEDURE — 1210000000 HC MED SURG R&B

## 2018-08-23 PROCEDURE — 97535 SELF CARE MNGMENT TRAINING: CPT

## 2018-08-23 PROCEDURE — 6370000000 HC RX 637 (ALT 250 FOR IP): Performed by: NURSE PRACTITIONER

## 2018-08-23 PROCEDURE — 6370000000 HC RX 637 (ALT 250 FOR IP): Performed by: PHYSICIAN ASSISTANT

## 2018-08-23 PROCEDURE — 6360000002 HC RX W HCPCS: Performed by: ORTHOPAEDIC SURGERY

## 2018-08-23 PROCEDURE — 6370000000 HC RX 637 (ALT 250 FOR IP): Performed by: ORTHOPAEDIC SURGERY

## 2018-08-23 PROCEDURE — 97116 GAIT TRAINING THERAPY: CPT

## 2018-08-23 PROCEDURE — 80048 BASIC METABOLIC PNL TOTAL CA: CPT

## 2018-08-23 PROCEDURE — 85025 COMPLETE CBC W/AUTO DIFF WBC: CPT

## 2018-08-23 PROCEDURE — G8978 MOBILITY CURRENT STATUS: HCPCS

## 2018-08-23 PROCEDURE — G8988 SELF CARE GOAL STATUS: HCPCS

## 2018-08-23 PROCEDURE — 97162 PT EVAL MOD COMPLEX 30 MIN: CPT

## 2018-08-23 PROCEDURE — G8987 SELF CARE CURRENT STATUS: HCPCS

## 2018-08-23 PROCEDURE — 2700000000 HC OXYGEN THERAPY PER DAY

## 2018-08-23 RX ORDER — METHOCARBAMOL 500 MG/1
500 TABLET, FILM COATED ORAL 4 TIMES DAILY PRN
Status: DISCONTINUED | OUTPATIENT
Start: 2018-08-23 | End: 2018-08-24 | Stop reason: HOSPADM

## 2018-08-23 RX ADMIN — OXYCODONE HYDROCHLORIDE AND ACETAMINOPHEN 1 TABLET: 5; 325 TABLET ORAL at 06:37

## 2018-08-23 RX ADMIN — METHOCARBAMOL 500 MG: 500 TABLET ORAL at 07:46

## 2018-08-23 RX ADMIN — OXYCODONE HYDROCHLORIDE AND ACETAMINOPHEN 1 TABLET: 5; 325 TABLET ORAL at 11:04

## 2018-08-23 RX ADMIN — METHOCARBAMOL 500 MG: 500 TABLET ORAL at 21:14

## 2018-08-23 RX ADMIN — SIMVASTATIN 40 MG: 40 TABLET, FILM COATED ORAL at 20:06

## 2018-08-23 RX ADMIN — OXYCODONE HYDROCHLORIDE AND ACETAMINOPHEN 1 TABLET: 5; 325 TABLET ORAL at 02:09

## 2018-08-23 RX ADMIN — Medication 2 G: at 01:20

## 2018-08-23 RX ADMIN — PANTOPRAZOLE SODIUM 40 MG: 40 TABLET, DELAYED RELEASE ORAL at 09:03

## 2018-08-23 RX ADMIN — FERROUS SULFATE TAB 325 MG (65 MG ELEMENTAL FE) 325 MG: 325 (65 FE) TAB at 08:58

## 2018-08-23 RX ADMIN — OXYCODONE HYDROCHLORIDE AND ACETAMINOPHEN 1 TABLET: 5; 325 TABLET ORAL at 20:06

## 2018-08-23 RX ADMIN — DOCUSATE SODIUM 100 MG: 100 CAPSULE, LIQUID FILLED ORAL at 20:06

## 2018-08-23 RX ADMIN — METHOCARBAMOL 500 MG: 500 TABLET ORAL at 14:07

## 2018-08-23 RX ADMIN — SODIUM CHLORIDE, PRESERVATIVE FREE 10 ML: 5 INJECTION INTRAVENOUS at 20:07

## 2018-08-23 RX ADMIN — TROSPIUM CHLORIDE 20 MG: 20 TABLET ORAL at 07:46

## 2018-08-23 RX ADMIN — DOCUSATE SODIUM 100 MG: 100 CAPSULE, LIQUID FILLED ORAL at 08:58

## 2018-08-23 RX ADMIN — OXYCODONE HYDROCHLORIDE AND ACETAMINOPHEN 1 TABLET: 5; 325 TABLET ORAL at 16:11

## 2018-08-23 RX ADMIN — SODIUM CHLORIDE: 9 INJECTION, SOLUTION INTRAVENOUS at 01:20

## 2018-08-23 RX ADMIN — TROSPIUM CHLORIDE 20 MG: 20 TABLET ORAL at 16:11

## 2018-08-23 RX ADMIN — FLUOXETINE 60 MG: 20 CAPSULE ORAL at 08:58

## 2018-08-23 ASSESSMENT — PAIN SCALES - GENERAL
PAINLEVEL_OUTOF10: 5
PAINLEVEL_OUTOF10: 5
PAINLEVEL_OUTOF10: 4
PAINLEVEL_OUTOF10: 3
PAINLEVEL_OUTOF10: 0
PAINLEVEL_OUTOF10: 4

## 2018-08-23 ASSESSMENT — PAIN DESCRIPTION - PAIN TYPE
TYPE: ACUTE PAIN
TYPE: ACUTE PAIN
TYPE: ACUTE PAIN;SURGICAL PAIN

## 2018-08-23 ASSESSMENT — PAIN DESCRIPTION - DESCRIPTORS: DESCRIPTORS: DULL;ACHING

## 2018-08-23 ASSESSMENT — PAIN DESCRIPTION - LOCATION
LOCATION: BACK
LOCATION: BACK

## 2018-08-23 NOTE — PROGRESS NOTES
Physical Therapy Med Surg Daily Treatment Note  Facility/Department: [facility]  Room: N225/N2[facility]       NAME: Roxanne Yadav  : 1950 (77 y.o.)  MRN: [id number]  CODE STATUS: Full Code    Date of Service: 2018    Patient Diagnosis(es): Lumbar stenosis with neurogenic claudication [M48.062]   No chief complaint on file. Patient Active Problem List    Diagnosis Date Noted    Coagulopathy, on coumadin 2013     Priority: Low    Lumbar stenosis with neurogenic claudication 2018    Lumbar stenosis 08/15/2018    Synovial cyst of lumbar facet joint 08/15/2018    PE (pulmonary thromboembolism) ([facility] [address] 08/15/2018    Right-sided low back pain with right-sided sciatica 2016    Wheezes 2015    History of chest x-ray, normal, 8/6/14 10/17/2014    Cough, Neg w/u Dr Kelley Franz, ? Betablocker 2014    History of PTCA 2, stent x 2 2012, Del Cid Stage 2013    Abnormal EKG, needs cardiac clearence 2013    Hiatal hernia 2013    Calculus of gallbladder 2013    Need for tetanus booster 10/10/2013    Epistaxis 2012    ACE-inhibitor cough 07/10/2012    Cough 2012    Nephrolithiasis     Depression     Hyperlipidemia     OA (osteoarthritis)     Osteoporosis     Hypertension     Fibrocystic breast disease     Urge incontinence     Anemia     CAD (coronary artery disease) 2008        Past Medical History:   Diagnosis Date    Abnormal EKG, needs cardiac clearence 2013    ACE-inhibitor cough 7/10/2012    ARB     Anemia     CAD (coronary artery disease) 2008    cardia stents x 2    Cancer ([facility] [address]     facial skin cancer excision    Coagulopathy ([facility] [address] 10/4/2012    Coagulopathy, on coumadin 2013    Cough 2012    Cough, Neg w/u Dr Kelley Franz, ?  Betablocker 2014    Depression     Depression 2017    Epistaxis 2012    Fibrocystic breast disease     Hiatal hernia 5/3/2016    History of chest x-ray, sit: Supervision  Car Transfer: Supervision  Comment: VCs for safety/sequencing with good follow through    Ambulation  Ambulation?: Yes  Ambulation 1  Surface: level tile  Device: Rolling Walker  Assistance: Supervision  Quality of Gait: decreased waldo, otherwise normal gait quality  Distance: 25' x 2  Comments: no LOB  Stairs/Curb  Stairs?: Yes  Stairs  # Steps : 4  Stairs Height: 6\"  Rails: Bilateral  Device: No Device  Assistance: Supervision  Comment: nonreciprocal up, reciprocal down       Balance  Standing - Static: Fair;+  Standing - Dynamic: Fair;+  Comments: at [employer]                        Assessment   Activity Tolerance  Activity Tolerance: Patient Tolerated treatment well     Discharge Recommendations:  Continue to assess pending progress    Goals  Short term goals  Short term goal 1: Pt to ambulate 150' LRAD mod I  Short term goal 2: Pt to navigate 10 steps with 1 handrail supervision  Patient Goals   Patient goals : go home    Plan    Plan  Current Treatment Recommendations: Strengthening, Balance Training, Functional Mobility Training, Stair training, Gait Training, Endurance Training, Neuromuscular Re-education, Patient/Caregiver Education & Training, Home Exercise Program  Plan Comment: cont current POC  Safety Devices  Type of devices: Bed alarm in place, Call light within reach, Left in bed, Nurse notified     [facility] (6 CLICK) [id number] Mobility Raw Score : 19     Therapy Time   Individual   Time In 1328   Time Out 1356   Minutes 28      Gait 14  Trsfs  14       Mari Olmos PTA, 08/23/18 at 1:57 PM

## 2018-08-23 NOTE — CARE COORDINATION
MET WITH PATIENT REGARDING DISCHARGE PLANNING. SHE STATES SHE LIVES ALONE.  SHE HAS A SON THAT HELPS AND HE WILL PICK HER UP AT DISCHARGE. SHE HAS A WHEELED WALKER. PER ROXIE NP, AFTER HER AFTERNOON THERAPY THEY WILL DECIDE IF SHE NEEDS HHC. WILL FOLLOW UP. Electronically signed by Jerson Fontaine RN on 8/23/18 at 10:15 AM    PER ROXIE PATIENT WILL NOT BE DISCHARGED TODAY. SHE PUT A HOME CARE ORDER IN THE SYSTEM, HOWEVER, SHE DOES NOT WANT THE REFERRAL GIVEN UNTIL AFTER SHE SEES HOW THE PATIENT DOES WITH THERPY TOMORROW.   Electronically signed by Jerson Fontaine RN on 8/23/18 at 11:38 AM

## 2018-08-23 NOTE — CONSULTS
Consult Note    Reason for Consult:  Post op med mgmt     Requesting Physician:  Steff Lopez MD    HISTORY OF PRESENT ILLNESS:    The patient is a 77 y.o. female who presents with s/p multilevel lumbar decompression, cyst excision and posterior fusion and instrumentation. Pt reports pain well controlled and denies CP, palp, SOB, abd pain, NVDFC. Hospitalist service consulted for mgmt of underlying HTN, CAD, hx DVTs, CAD. Past Medical History:   Diagnosis Date    Abnormal EKG, needs cardiac clearence 12/23/2013    ACE-inhibitor cough 7/10/2012    ARB     Anemia     CAD (coronary artery disease) 2/2008    cardia stents x 2    Cancer (HCC)     facial skin cancer excision    Coagulopathy ([facility] [address] 10/4/2012    Coagulopathy, on coumadin 11/13/2013    Cough 5/29/2012    Cough, Neg w/u Dr Raymon Rinne, ?  Betablocker 9/22/2014    Depression     Depression 6/13/2017    Epistaxis 8/20/2012    Fibrocystic breast disease     Hiatal hernia 5/3/2016    History of chest x-ray, normal, 8/6/14 10/17/2014    History of PTCA 2, stent x 2 2009, 2012, [place] 12/23/2013    Hx of blood clots 2012    PE s/p RTKR post op    Hyperlipidemia     meds > 10 yrs    Hypertension     meds > 10 yrs    Nephrolithiasis     OA (osteoarthritis)     Osteoporosis     Right-sided low back pain with right-sided sciatica 5/3/2016    Urge incontinence     Wheezes 4/20/2015       Past Surgical History:   Procedure Laterality Date    CARDIAC SURGERY      stent x 2    COLONOSCOPY      CORONARY ANGIOPLASTY WITH STENT PLACEMENT  3/12    [facility]    ENDOSCOPY, COLON, DIAGNOSTIC      EYE SURGERY      Phaco with IOL OU    HERNIA REPAIR      left inguinal hernia    HYSTERECTOMY, TOTAL ABDOMINAL  1998    JOINT REPLACEMENT  2008    LTKR    JOINT REPLACEMENT  2017    RTSR    KNEE ARTHROSCOPY  11/11    R knee, [facility]    LITHOTRIPSY  2003 approx    PTCA  2/2008    [facility]    ROTATOR CUFF REPAIR Right     SKIN GRAFT  2/9/11    JORDAN AND BSO ROS reviewed and otherwise negative. Physical Exam:  Vitals:    08/22/18 1919 08/22/18 1930 08/22/18 2001 08/22/18 2131   BP: (!) 103/42 (!) 99/50 (!) 105/47 (!) 103/45   Pulse: 76 68 64 65   Resp:       Temp:       TempSrc:       SpO2:   95% 95%   Weight:       Height:           CONSTITUTIONAL:  awake and resting comfortably  EYES:  pupils equal, round and reactive to light, sclera clear and conjunctiva normal  ENT:  normocepalic, without obvious abnormality, atraumatic, good dentition, oral pharynx with moist mucus membranes  NECK:  supple, symmetrical, trachea midline, skin normal and no carotid bruits  LUNGS:  no increased work of breathing and clear to auscultation  CARDIOVASCULAR:  normal apical pulses and normal S1 and S2  ABDOMEN:  hypoactive bowel sounds and non-tender  MUSCULOSKELETAL:  Limited ROM 2/2 pain  there is no redness, warmth, or swelling of the joints  :  Thomas catheter draining dark yellow urine  NEUROLOGIC:  Mental Status Exam:  Level of Alertness:   awake  Orientation:   person, place, time  Cranial Nerves:  cranial nerves II-XII are grossly intact  SKIN:  normal skin color, texture, turgor    Labs:  Recent Results (from the past 72 hour(s))   CBC without Diff    Collection Time: 08/22/18  1:42 PM   Result Value Ref Range    WBC 16.3 (H) 4.8 - 10.8 K/uL    RBC 3.97 (L) 4.20 - 5.40 M/uL    Hemoglobin 11.6 (L) 12.0 - 16.0 g/dL    Hematocrit 35.0 (L) 37.0 - 47.0 %    MCV 88.0 82.0 - 100.0 fL    MCH 29.1 27.0 - 31.3 pg    MCHC 33.0 33.0 - 37.0 %    RDW 13.8 11.5 - 14.5 %    Platelets 316 531 - 556 K/uL       Data:    Fluoro For Surgical Procedures    Result Date: 8/22/2018  FLUORO FOR SURGICAL PROCEDURES : 8/22/2018 7:11 AM CLINICAL HISTORY: Z98.890 S/P laminectomy ICD10. COMPARISON: None available. Intraoperative fluoroscopy was provided for Dr. Jeanie nazario. A total of 2.7 seconds of fluoroscopy was used, with 4 fluoroscopic stills saved. No diagnostic images were obtained.  Please

## 2018-08-23 NOTE — FLOWSHEET NOTE
Dangled patient, tolerated well. Stood patient up to side of bed, shortly after patient states felt light headed. Assisted patient back to bed. Will continue to monitor and hourly round.

## 2018-08-23 NOTE — PATIENT CARE CONFERENCE
Spoke with patient she is on Pradaxa- d/t hx of blood clot after her TKA few years back  Per her cardiologist she was ok to hold the med for 10 days prior to surgery- and per Dr Luly Gonsalez would like to hold the med for a weak if possible, and do not start prior to saturday   Pt is progressing well through her recovery   we will plan to place her on a tele monitor and keep through the night- we spoke about d/c after therapy in am  awaiting pt;s ability to urinate and continue monitoring progress with pt ot

## 2018-08-23 NOTE — FLOWSHEET NOTE
Moderate draining from TROY drain, non observed in woundvac. Night supervisor called to assess also. Everything working. [place] notified to call [employer]. Will continue to monitor.

## 2018-08-23 NOTE — PLAN OF CARE
Problem: Falls - Risk of:  Goal: Absence of physical injury  Absence of physical injury   Outcome: Met This Shift      Problem: Wound:  Goal: Will show signs of wound healing; wound closure and no evidence of infection  Will show signs of wound healing; wound closure and no evidence of infection  Outcome: Met This Shift

## 2018-08-24 VITALS
BODY MASS INDEX: 33.8 KG/M2 | DIASTOLIC BLOOD PRESSURE: 47 MMHG | OXYGEN SATURATION: 91 % | RESPIRATION RATE: 20 BRPM | TEMPERATURE: 98.2 F | HEART RATE: 85 BPM | SYSTOLIC BLOOD PRESSURE: 127 MMHG | HEIGHT: 64 IN | WEIGHT: 198 LBS

## 2018-08-24 PROCEDURE — 6370000000 HC RX 637 (ALT 250 FOR IP): Performed by: PHYSICIAN ASSISTANT

## 2018-08-24 PROCEDURE — 6370000000 HC RX 637 (ALT 250 FOR IP): Performed by: NURSE PRACTITIONER

## 2018-08-24 PROCEDURE — 6370000000 HC RX 637 (ALT 250 FOR IP): Performed by: ORTHOPAEDIC SURGERY

## 2018-08-24 PROCEDURE — 97116 GAIT TRAINING THERAPY: CPT

## 2018-08-24 PROCEDURE — 2580000003 HC RX 258: Performed by: ORTHOPAEDIC SURGERY

## 2018-08-24 RX ORDER — DABIGATRAN ETEXILATE 75 MG/1
75 CAPSULE, COATED PELLETS ORAL 2 TIMES DAILY
Qty: 60 CAPSULE | Refills: 0 | Status: ON HOLD
Start: 2018-08-27 | End: 2022-10-13

## 2018-08-24 RX ORDER — OXYCODONE HYDROCHLORIDE AND ACETAMINOPHEN 5; 325 MG/1; MG/1
1 TABLET ORAL EVERY 4 HOURS PRN
Qty: 30 TABLET | Refills: 0 | Status: ON HOLD | OUTPATIENT
Start: 2018-08-24 | End: 2018-08-28 | Stop reason: HOSPADM

## 2018-08-24 RX ORDER — METHOCARBAMOL 500 MG/1
500 TABLET, FILM COATED ORAL 4 TIMES DAILY PRN
Qty: 24 TABLET | Refills: 0 | Status: SHIPPED | OUTPATIENT
Start: 2018-08-24 | End: 2018-08-31

## 2018-08-24 RX ADMIN — TROSPIUM CHLORIDE 20 MG: 20 TABLET ORAL at 05:58

## 2018-08-24 RX ADMIN — OXYCODONE HYDROCHLORIDE AND ACETAMINOPHEN 1 TABLET: 5; 325 TABLET ORAL at 05:10

## 2018-08-24 RX ADMIN — OXYCODONE HYDROCHLORIDE AND ACETAMINOPHEN 1 TABLET: 5; 325 TABLET ORAL at 08:50

## 2018-08-24 RX ADMIN — SODIUM CHLORIDE, PRESERVATIVE FREE 10 ML: 5 INJECTION INTRAVENOUS at 08:51

## 2018-08-24 RX ADMIN — FERROUS SULFATE TAB 325 MG (65 MG ELEMENTAL FE) 325 MG: 325 (65 FE) TAB at 08:50

## 2018-08-24 RX ADMIN — PANTOPRAZOLE SODIUM 40 MG: 40 TABLET, DELAYED RELEASE ORAL at 08:50

## 2018-08-24 RX ADMIN — FLUOXETINE 60 MG: 20 CAPSULE ORAL at 08:50

## 2018-08-24 RX ADMIN — METHOCARBAMOL 500 MG: 500 TABLET ORAL at 10:21

## 2018-08-24 RX ADMIN — DOCUSATE SODIUM 100 MG: 100 CAPSULE, LIQUID FILLED ORAL at 08:50

## 2018-08-24 ASSESSMENT — PAIN SCALES - GENERAL
PAINLEVEL_OUTOF10: 4
PAINLEVEL_OUTOF10: 6
PAINLEVEL_OUTOF10: 5
PAINLEVEL_OUTOF10: 0

## 2018-08-24 ASSESSMENT — PAIN DESCRIPTION - DESCRIPTORS: DESCRIPTORS: ACHING;DULL

## 2018-08-24 ASSESSMENT — PAIN DESCRIPTION - LOCATION: LOCATION: BACK

## 2018-08-24 ASSESSMENT — PAIN DESCRIPTION - PAIN TYPE: TYPE: ACUTE PAIN

## 2018-08-24 NOTE — PROGRESS NOTES
Physical Therapy Med Surg Daily Treatment Note  Facility/Department: [facility]  Room: N225/N225-       NAME: Bird Nguyen  : 1950 (77 y.o.)  MRN: [id number]  CODE STATUS: Full Code    Date of Service: 2018    Patient Diagnosis(es): Lumbar stenosis with neurogenic claudication [M48.062]   No chief complaint on file. Patient Active Problem List    Diagnosis Date Noted    Coagulopathy, on coumadin 2013     Priority: Low    Lumbar stenosis with neurogenic claudication 2018    Lumbar stenosis 08/15/2018    Synovial cyst of lumbar facet joint 08/15/2018    PE (pulmonary thromboembolism) ([facility] [address] 08/15/2018    Right-sided low back pain with right-sided sciatica 2016    Wheezes 2015    History of chest x-ray, normal, 8/6/14 10/17/2014    Cough, Neg w/u Dr Xu Longoria, ? Betablocker 2014    History of PTCA 2, stent x 2 2012, Vallarie Yvette 2013    Abnormal EKG, needs cardiac clearence 2013    Hiatal hernia 2013    Calculus of gallbladder 2013    Need for tetanus booster 10/10/2013    Epistaxis 2012    ACE-inhibitor cough 07/10/2012    Cough 2012    Nephrolithiasis     Depression     Hyperlipidemia     OA (osteoarthritis)     Osteoporosis     Hypertension     Fibrocystic breast disease     Urge incontinence     Anemia     CAD (coronary artery disease) 2008        Past Medical History:   Diagnosis Date    Abnormal EKG, needs cardiac clearence 2013    ACE-inhibitor cough 7/10/2012    ARB     Anemia     CAD (coronary artery disease) 2008    cardia stents x 2    Cancer ([facility] [address]     facial skin cancer excision    Coagulopathy ([facility] [address] 10/4/2012    Coagulopathy, on coumadin 2013    Cough 2012    Cough, Neg w/u Dr Xu Longoria, ?  Betablocker 2014    Depression     Depression 2017    Epistaxis 2012    Fibrocystic breast disease     Hiatal hernia 5/3/2016    History of chest x-ray, normal, 8/6/14 10/17/2014    History of PTCA 2, stent x 2 2009, 2012, Virgil Barriosy 12/23/2013    Hx of blood clots 2012    PE s/p RTKR post op    Hyperlipidemia     meds > 10 yrs    Hypertension     meds > 10 yrs    Nephrolithiasis     OA (osteoarthritis)     Osteoporosis     Right-sided low back pain with right-sided sciatica 5/3/2016    Urge incontinence     Wheezes 4/20/2015     Past Surgical History:   Procedure Laterality Date    CARDIAC SURGERY      stent x 2    COLONOSCOPY      [facility]    ENDOSCOPY, COLON, DIAGNOSTIC      EYE SURGERY      [place]aco with IOL OU    HERNIA REPAIR      left inguinal hernia    HYSTERECTOMY, TOTAL ABDOMINAL  1998    JOINT REPLACEMENT  2008    LTKR    JOINT REPLACEMENT  2017    RTSR    KNEE ARTHROSCOPY  11/11    R knee, [facility]    LITHOTRIPSY  2003 approx    PTCA  2/2008    [facility]    ROTATOR CUFF REPAIR Right     SKIN GRAFT  2/9/11    JORDAN AND BSO  1996 approx    TOTAL KNEE ARTHROPLASTY Left 08/2008         Restrictions  Restrictions/Precautions: Fall Risk  Position Activity Restriction  Spinal Precautions: No Bending, No Lifting, No Twisting    Subjective   General  Chart Reviewed: Yes  Family / Caregiver Present: No  Pre Treatment Pain Screening  Pain at present: 5  Scale Used: Numeric Score  Intervention List: Patient able to continue with treatment    Pain Screening  Patient Currently in Pain: Yes     Pain Reassessment:   Pain Assessment  Pain Assessment: 0-10  Pain Level: 5  Pain Type: Acute pain  Pain Location: Back  Pain Descriptors: Aching;Dull  Pain Intervention(s): Repositioned;RN notified       Orientation  Orientation  Overall Orientation Status: Within Functional Limits    Objective   Bed mobility  Comment: NT- pt up in chair before and after Tx session    Transfers  Sit to Stand: Modified independent;Supervision  Stand to sit: Modified independent;Supervision  Comment: with WW and occasional VCs for safety/sequencing with good follow through    Ambulation  Ambulation?: Yes  Ambulation 1  Surface: level tile  Device: Rolling Walker  Assistance: Supervision;Modified Independent  Quality of Gait: reciprocal, no LOB  Distance: 100'       Balance  Standing - Static: Fair;+  Standing - Dynamic: Fair;+  Comments: at [facility]                        Assessment   Activity Tolerance  Activity Tolerance: Patient Tolerated treatment well     Discharge Recommendations:  Continue to assess pending progress    Goals  Short term goals  Short term goal 1: Pt to ambulate 150' LRAD mod I  Short term goal 2: Pt to navigate 10 steps with 1 handrail supervision  Patient Goals   Patient goals : go home    Plan    Plan  Current Treatment Recommendations: Strengthening, Balance Training, Functional Mobility Training, Stair training, Gait Training, Endurance Training, Neuromuscular Re-education, Patient/Caregiver Education & Training, Home Exercise Program  Plan Comment: cont current POC  Safety Devices  Type of devices: Call light within reach, Chair alarm in place, Left in chair, Nurse notified     [facility] (6 CLICK) [address] Mobility Raw Score : 23     Therapy Time   Individual   Time In 5883   Time Out 0910   Minutes 11 gait             Roseanne Powers PTA, 08/24/18 at 9:11 AM

## 2018-08-24 NOTE — PROGRESS NOTES
Physician Progress Note    2018   8:45 AM    Name:  Mady Souza  MRN:    [id number]     IP Day: 2     Admit Date: 2018  6:32 AM  PCP: Luis Dubon DO    Code Status:  Full Code    Subjective:      no new events. No new symptoms. Physical Examination:      Vitals:  BP (!) 127/47   Pulse 85   Temp 98.2 °F (36.8 °C) (Oral)   Resp 20   Ht 5' 4\" (1.626 m)   Wt 198 lb (89.8 kg)   LMP 1998   SpO2 91%   BMI 33.99 kg/m²   Temp (24hrs), Av.4 °F (36.9 °C), Min:98.1 °F (36.7 °C), Max:99 °F (37.2 °C)      General appearance: alert, cooperative and no distress  Mental Status: oriented to person, place and time and normal affect  Lungs: clear to auscultation bilaterally, normal effort  Heart: regular rate and rhythm, no murmur  Abdomen: soft, nontender, nondistended, bowel sounds present, no masses  Extremities: no edema, redness, tenderness in the calves  Skin: no gross lesions, rashes    Data:     Labs:  Recent Labs      18   1342  18   0537   WBC  16.3*  12.8*   HGB  11.6*  9.8*   PLT  286  231     Recent Labs      18   0537   NA  137   K  4.7   CL  102   CO2  25   BUN  18   CREATININE  0.77   GLUCOSE  144*     No results for input(s): AST, ALT, ALB, BILITOT, ALKPHOS in the last 72 hours.     Current Facility-Administered Medications   Medication Dose Route Frequency Provider Last Rate Last Dose    methocarbamol (ROBAXIN) tablet 500 mg  500 mg Oral 4x Daily PRN ANA Pinedo - CNP   500 mg at 18    0.9 % sodium chloride infusion   Intravenous Continuous Sophie Leigh MD   Stopped at 18    sodium chloride flush 0.9 % injection 10 mL  10 mL Intravenous 2 times per day Sophie Leigh MD   10 mL at 18    sodium chloride flush 0.9 % injection 10 mL  10 mL Intravenous PRN Sophie Leigh MD        acetaminophen (TYLENOL) tablet 650 mg  650 mg Oral Q4H PRN Sophie Leigh MD        oxyCODONE-acetaminophen (PERCOCET) 5-325 MG

## 2018-08-25 NOTE — DISCHARGE SUMMARY
Discharge summary  This patient Maury Degroot was admitted to the hospital on 8/22/2018  after undergoing Procedure(s) (LRB):  SPINE L3-4, L4-5, L5-S1 LAMINECTOMY, L3-4, L4-5 POSTERIOR LATERAL FUSION, L5-S1 INSTRUMENTED POSTERIOR INTERBODY FUSION, PRONE, AXIS SPINE TABLE, ACTIFUSE, CELL SAVER, NEW MICROSCOPE, NEW C-ARM X2, PEDIGUARD, MISONIX BONE SCALPAL, PNEUMATIC KERRISONS, SPINAL CORD MONITORING, LUMBAR BRACE, GLOBUS, OPEN TLIF, (H.S.C.G.#[id number]) (N/A) without complications that morning. During the postoperative period,while in hospital, patient was medically managed by the hospitalist. Please see medial notes and H&P for patients additional diagnoses. Ortho agrees with all medical diagnoses and treatments while patient in hospital.  No significant or unexpected findings or abnormal ortho imaging were noted during the hospital stay    Hospital course  Patient tolerated surgical procedure well and there was no complications. Patient progressed adequtly through their recovery during hospital stay including PT and rehabilitation. Patient was then D/C on 8/24/2018 to Home  in stable condition. Patient was instructed on the use of pain medications, the signs and symptoms of infection, and was given our number to call should they have any questions or concerns following discharge.

## 2018-08-26 ENCOUNTER — APPOINTMENT (OUTPATIENT)
Dept: CT IMAGING | Age: 68
End: 2018-08-26
Payer: MEDICARE

## 2018-08-26 ENCOUNTER — HOSPITAL ENCOUNTER (OUTPATIENT)
Age: 68
Setting detail: OBSERVATION
Discharge: NURSING, REHAB OR LONG-TERM CARE | End: 2018-08-28
Attending: FAMILY MEDICINE | Admitting: ORTHOPAEDIC SURGERY
Payer: MEDICARE

## 2018-08-26 DIAGNOSIS — R29.898 BILATERAL LEG WEAKNESS: ICD-10-CM

## 2018-08-26 DIAGNOSIS — R29.898 WEAKNESS OF BOTH LOWER EXTREMITIES: ICD-10-CM

## 2018-08-26 DIAGNOSIS — Z98.1 STATUS POST LUMBAR SPINAL FUSION: Primary | ICD-10-CM

## 2018-08-26 LAB
ALBUMIN SERPL-MCNC: 3.1 G/DL (ref 3.9–4.9)
ALP BLD-CCNC: 56 U/L (ref 40–130)
ALT SERPL-CCNC: 16 U/L (ref 0–33)
ANION GAP SERPL CALCULATED.3IONS-SCNC: 11 MEQ/L (ref 7–13)
AST SERPL-CCNC: 24 U/L (ref 0–35)
BASOPHILS ABSOLUTE: 0.1 K/UL (ref 0–0.2)
BASOPHILS RELATIVE PERCENT: 0.5 %
BILIRUB SERPL-MCNC: 0.4 MG/DL (ref 0–1.2)
BUN BLDV-MCNC: 11 MG/DL (ref 8–23)
CALCIUM SERPL-MCNC: 8.2 MG/DL (ref 8.6–10.2)
CHLORIDE BLD-SCNC: 102 MEQ/L (ref 98–107)
CO2: 26 MEQ/L (ref 22–29)
CREAT SERPL-MCNC: 0.65 MG/DL (ref 0.5–0.9)
EOSINOPHILS ABSOLUTE: 0.3 K/UL (ref 0–0.7)
EOSINOPHILS RELATIVE PERCENT: 2.9 %
GFR AFRICAN AMERICAN: >60
GFR NON-AFRICAN AMERICAN: >60
GLOBULIN: 2.8 G/DL (ref 2.3–3.5)
GLUCOSE BLD-MCNC: 99 MG/DL (ref 74–109)
HCT VFR BLD CALC: 29.5 % (ref 37–47)
HEMOGLOBIN: 9.8 G/DL (ref 12–16)
LYMPHOCYTES ABSOLUTE: 1.9 K/UL (ref 1–4.8)
LYMPHOCYTES RELATIVE PERCENT: 19 %
MCH RBC QN AUTO: 28.9 PG (ref 27–31.3)
MCHC RBC AUTO-ENTMCNC: 33.1 % (ref 33–37)
MCV RBC AUTO: 87.1 FL (ref 82–100)
MONOCYTES ABSOLUTE: 1 K/UL (ref 0.2–0.8)
MONOCYTES RELATIVE PERCENT: 10.5 %
NEUTROPHILS ABSOLUTE: 6.7 K/UL (ref 1.4–6.5)
NEUTROPHILS RELATIVE PERCENT: 67.1 %
PDW BLD-RTO: 13.6 % (ref 11.5–14.5)
PLATELET # BLD: 276 K/UL (ref 130–400)
POTASSIUM SERPL-SCNC: 3.7 MEQ/L (ref 3.5–5.1)
RBC # BLD: 3.39 M/UL (ref 4.2–5.4)
SODIUM BLD-SCNC: 139 MEQ/L (ref 132–144)
TOTAL PROTEIN: 5.9 G/DL (ref 6.4–8.1)
WBC # BLD: 10 K/UL (ref 4.8–10.8)

## 2018-08-26 PROCEDURE — 36415 COLL VENOUS BLD VENIPUNCTURE: CPT

## 2018-08-26 PROCEDURE — 80053 COMPREHEN METABOLIC PANEL: CPT

## 2018-08-26 PROCEDURE — G0378 HOSPITAL OBSERVATION PER HR: HCPCS

## 2018-08-26 PROCEDURE — 85025 COMPLETE CBC W/AUTO DIFF WBC: CPT

## 2018-08-26 PROCEDURE — 99284 EMERGENCY DEPT VISIT MOD MDM: CPT

## 2018-08-26 PROCEDURE — 6370000000 HC RX 637 (ALT 250 FOR IP): Performed by: FAMILY MEDICINE

## 2018-08-26 PROCEDURE — 6360000004 HC RX CONTRAST MEDICATION: Performed by: RADIOLOGY

## 2018-08-26 PROCEDURE — 71275 CT ANGIOGRAPHY CHEST: CPT

## 2018-08-26 RX ORDER — SODIUM CHLORIDE 0.9 % (FLUSH) 0.9 %
10 SYRINGE (ML) INJECTION EVERY 12 HOURS SCHEDULED
Status: DISCONTINUED | OUTPATIENT
Start: 2018-08-26 | End: 2018-08-28 | Stop reason: HOSPADM

## 2018-08-26 RX ORDER — SODIUM CHLORIDE 0.9 % (FLUSH) 0.9 %
10 SYRINGE (ML) INJECTION PRN
Status: DISCONTINUED | OUTPATIENT
Start: 2018-08-26 | End: 2018-08-28 | Stop reason: HOSPADM

## 2018-08-26 RX ORDER — ACETAMINOPHEN 325 MG/1
650 TABLET ORAL EVERY 4 HOURS PRN
Status: DISCONTINUED | OUTPATIENT
Start: 2018-08-26 | End: 2018-08-28 | Stop reason: HOSPADM

## 2018-08-26 RX ORDER — OXYCODONE HYDROCHLORIDE AND ACETAMINOPHEN 5; 325 MG/1; MG/1
1 TABLET ORAL ONCE
Status: COMPLETED | OUTPATIENT
Start: 2018-08-26 | End: 2018-08-26

## 2018-08-26 RX ADMIN — OXYCODONE HYDROCHLORIDE AND ACETAMINOPHEN 1 TABLET: 5; 325 TABLET ORAL at 21:03

## 2018-08-26 RX ADMIN — IOPAMIDOL 100 ML: 755 INJECTION, SOLUTION INTRAVENOUS at 19:31

## 2018-08-26 ASSESSMENT — PAIN SCALES - GENERAL
PAINLEVEL_OUTOF10: 4
PAINLEVEL_OUTOF10: 9
PAINLEVEL_OUTOF10: 4

## 2018-08-26 ASSESSMENT — PAIN DESCRIPTION - DESCRIPTORS
DESCRIPTORS: PRESSURE
DESCRIPTORS: ACHING

## 2018-08-26 ASSESSMENT — PAIN DESCRIPTION - PAIN TYPE
TYPE: ACUTE PAIN
TYPE: ACUTE PAIN

## 2018-08-26 ASSESSMENT — PAIN DESCRIPTION - ORIENTATION: ORIENTATION: LOWER

## 2018-08-26 ASSESSMENT — PAIN DESCRIPTION - LOCATION
LOCATION: BACK
LOCATION: BACK

## 2018-08-26 ASSESSMENT — PAIN DESCRIPTION - FREQUENCY
FREQUENCY: CONTINUOUS
FREQUENCY: INTERMITTENT

## 2018-08-26 NOTE — ED NOTES
This nurse took pt to CT scan via cart. Waited for procedure to be done (per ED protocol). Returned pt to ED.      Lili Beckwith RN  08/26/18 [id number]

## 2018-08-26 NOTE — ED TRIAGE NOTES
Pt had recent back surgery. She is c/o increased weakness, increased pain, shortness of breath and difficulties with ADLs at home.

## 2018-08-27 LAB
BACTERIA: NORMAL /HPF
BILIRUBIN URINE: NEGATIVE
BLOOD, URINE: NEGATIVE
CLARITY: CLEAR
COLOR: YELLOW
EPITHELIAL CELLS, UA: NORMAL /HPF
GLUCOSE URINE: NEGATIVE MG/DL
KETONES, URINE: NEGATIVE MG/DL
LEUKOCYTE ESTERASE, URINE: ABNORMAL
MUCUS: PRESENT
NITRITE, URINE: NEGATIVE
PH UA: 7.5 (ref 5–9)
PROTEIN UA: NEGATIVE MG/DL
RBC UA: NORMAL /HPF (ref 0–2)
SPECIFIC GRAVITY UA: 1.03 (ref 1–1.03)
UROBILINOGEN, URINE: 1 E.U./DL
WBC UA: NORMAL /HPF (ref 0–5)

## 2018-08-27 PROCEDURE — 2580000003 HC RX 258: Performed by: NURSE PRACTITIONER

## 2018-08-27 PROCEDURE — G8988 SELF CARE GOAL STATUS: HCPCS

## 2018-08-27 PROCEDURE — 87077 CULTURE AEROBIC IDENTIFY: CPT

## 2018-08-27 PROCEDURE — 81001 URINALYSIS AUTO W/SCOPE: CPT

## 2018-08-27 PROCEDURE — 6370000000 HC RX 637 (ALT 250 FOR IP): Performed by: NURSE PRACTITIONER

## 2018-08-27 PROCEDURE — G0378 HOSPITAL OBSERVATION PER HR: HCPCS

## 2018-08-27 PROCEDURE — 97167 OT EVAL HIGH COMPLEX 60 MIN: CPT

## 2018-08-27 PROCEDURE — 87086 URINE CULTURE/COLONY COUNT: CPT

## 2018-08-27 PROCEDURE — 6370000000 HC RX 637 (ALT 250 FOR IP): Performed by: INTERNAL MEDICINE

## 2018-08-27 PROCEDURE — 6370000000 HC RX 637 (ALT 250 FOR IP): Performed by: ORTHOPAEDIC SURGERY

## 2018-08-27 PROCEDURE — 2580000003 HC RX 258: Performed by: ORTHOPAEDIC SURGERY

## 2018-08-27 PROCEDURE — G8987 SELF CARE CURRENT STATUS: HCPCS

## 2018-08-27 PROCEDURE — G8978 MOBILITY CURRENT STATUS: HCPCS

## 2018-08-27 PROCEDURE — 87186 SC STD MICRODIL/AGAR DIL: CPT

## 2018-08-27 PROCEDURE — 97535 SELF CARE MNGMENT TRAINING: CPT

## 2018-08-27 PROCEDURE — 97162 PT EVAL MOD COMPLEX 30 MIN: CPT

## 2018-08-27 PROCEDURE — 97530 THERAPEUTIC ACTIVITIES: CPT

## 2018-08-27 PROCEDURE — G8979 MOBILITY GOAL STATUS: HCPCS

## 2018-08-27 RX ORDER — AMLODIPINE BESYLATE 5 MG/1
5 TABLET ORAL EVERY EVENING
Status: DISCONTINUED | OUTPATIENT
Start: 2018-08-27 | End: 2018-08-28 | Stop reason: HOSPADM

## 2018-08-27 RX ORDER — TROSPIUM CHLORIDE 20 MG/1
20 TABLET, FILM COATED ORAL
Status: DISCONTINUED | OUTPATIENT
Start: 2018-08-27 | End: 2018-08-28 | Stop reason: HOSPADM

## 2018-08-27 RX ORDER — METHOCARBAMOL 500 MG/1
500 TABLET, FILM COATED ORAL 4 TIMES DAILY PRN
Status: DISCONTINUED | OUTPATIENT
Start: 2018-08-27 | End: 2018-08-28 | Stop reason: HOSPADM

## 2018-08-27 RX ORDER — PANTOPRAZOLE SODIUM 40 MG/1
40 TABLET, DELAYED RELEASE ORAL DAILY
Status: DISCONTINUED | OUTPATIENT
Start: 2018-08-27 | End: 2018-08-28 | Stop reason: HOSPADM

## 2018-08-27 RX ORDER — FLUOXETINE HYDROCHLORIDE 20 MG/1
60 CAPSULE ORAL DAILY
Status: DISCONTINUED | OUTPATIENT
Start: 2018-08-27 | End: 2018-08-28 | Stop reason: HOSPADM

## 2018-08-27 RX ORDER — SODIUM CHLORIDE 9 MG/ML
INJECTION, SOLUTION INTRAVENOUS CONTINUOUS
Status: DISCONTINUED | OUTPATIENT
Start: 2018-08-27 | End: 2018-08-28 | Stop reason: HOSPADM

## 2018-08-27 RX ORDER — SIMVASTATIN 40 MG
40 TABLET ORAL NIGHTLY
Status: DISCONTINUED | OUTPATIENT
Start: 2018-08-27 | End: 2018-08-28 | Stop reason: HOSPADM

## 2018-08-27 RX ORDER — OXYCODONE HYDROCHLORIDE 5 MG/1
2.5 TABLET ORAL EVERY 4 HOURS PRN
Status: DISCONTINUED | OUTPATIENT
Start: 2018-08-27 | End: 2018-08-28 | Stop reason: HOSPADM

## 2018-08-27 RX ORDER — ACETAMINOPHEN 80 MG
TABLET,CHEWABLE ORAL ONCE
Status: COMPLETED | OUTPATIENT
Start: 2018-08-27 | End: 2018-08-27

## 2018-08-27 RX ORDER — OXYCODONE HYDROCHLORIDE 5 MG/1
5 TABLET ORAL EVERY 4 HOURS PRN
Status: DISCONTINUED | OUTPATIENT
Start: 2018-08-27 | End: 2018-08-28 | Stop reason: HOSPADM

## 2018-08-27 RX ADMIN — SODIUM CHLORIDE: 9 INJECTION, SOLUTION INTRAVENOUS at 21:31

## 2018-08-27 RX ADMIN — SIMVASTATIN 40 MG: 40 TABLET, FILM COATED ORAL at 21:30

## 2018-08-27 RX ADMIN — SODIUM CHLORIDE: 9 INJECTION, SOLUTION INTRAVENOUS at 08:23

## 2018-08-27 RX ADMIN — FLUOXETINE 60 MG: 20 CAPSULE ORAL at 08:30

## 2018-08-27 RX ADMIN — MELATONIN TAB 3 MG 5 MG: 3 TAB at 21:29

## 2018-08-27 RX ADMIN — OXYCODONE HYDROCHLORIDE 5 MG: 5 TABLET ORAL at 08:23

## 2018-08-27 RX ADMIN — PANTOPRAZOLE SODIUM 40 MG: 40 TABLET, DELAYED RELEASE ORAL at 08:30

## 2018-08-27 RX ADMIN — OXYCODONE HYDROCHLORIDE 5 MG: 5 TABLET ORAL at 12:39

## 2018-08-27 RX ADMIN — AMLODIPINE BESYLATE 5 MG: 5 TABLET ORAL at 18:02

## 2018-08-27 RX ADMIN — METHOCARBAMOL 500 MG: 500 TABLET ORAL at 09:15

## 2018-08-27 RX ADMIN — ACETAMINOPHEN 650 MG: 325 TABLET ORAL at 21:30

## 2018-08-27 RX ADMIN — TROSPIUM CHLORIDE 20 MG: 20 TABLET, FILM COATED ORAL at 08:30

## 2018-08-27 RX ADMIN — Medication: at 09:46

## 2018-08-27 RX ADMIN — TROSPIUM CHLORIDE 20 MG: 20 TABLET, FILM COATED ORAL at 17:05

## 2018-08-27 RX ADMIN — METHOCARBAMOL 500 MG: 500 TABLET ORAL at 21:43

## 2018-08-27 RX ADMIN — METHOCARBAMOL 500 MG: 500 TABLET ORAL at 15:39

## 2018-08-27 RX ADMIN — Medication 10 ML: at 21:46

## 2018-08-27 RX ADMIN — OXYCODONE HYDROCHLORIDE 5 MG: 5 TABLET ORAL at 17:02

## 2018-08-27 RX ADMIN — Medication 10 ML: at 08:32

## 2018-08-27 ASSESSMENT — PAIN DESCRIPTION - DESCRIPTORS
DESCRIPTORS: ACHING

## 2018-08-27 ASSESSMENT — ENCOUNTER SYMPTOMS
CHEST TIGHTNESS: 0
SINUS PRESSURE: 0
SORE THROAT: 0
ABDOMINAL PAIN: 0
DIARRHEA: 0
NAUSEA: 0
BACK PAIN: 1
SHORTNESS OF BREATH: 0
CONSTIPATION: 0
ABDOMINAL DISTENTION: 0
COUGH: 0
WHEEZING: 0
EYE DISCHARGE: 0
FACIAL SWELLING: 0
EYE REDNESS: 0

## 2018-08-27 ASSESSMENT — PAIN DESCRIPTION - FREQUENCY
FREQUENCY: CONTINUOUS

## 2018-08-27 ASSESSMENT — PAIN DESCRIPTION - ONSET
ONSET: ON-GOING

## 2018-08-27 ASSESSMENT — PAIN DESCRIPTION - ORIENTATION
ORIENTATION: LOWER

## 2018-08-27 ASSESSMENT — PAIN DESCRIPTION - PAIN TYPE
TYPE: ACUTE PAIN

## 2018-08-27 ASSESSMENT — PAIN DESCRIPTION - LOCATION
LOCATION: BACK

## 2018-08-27 ASSESSMENT — PAIN SCALES - GENERAL
PAINLEVEL_OUTOF10: 10
PAINLEVEL_OUTOF10: 10
PAINLEVEL_OUTOF10: 4
PAINLEVEL_OUTOF10: 6
PAINLEVEL_OUTOF10: 5
PAINLEVEL_OUTOF10: 6
PAINLEVEL_OUTOF10: 5
PAINLEVEL_OUTOF10: 4
PAINLEVEL_OUTOF10: 7
PAINLEVEL_OUTOF10: 7

## 2018-08-27 ASSESSMENT — PAIN DESCRIPTION - PROGRESSION: CLINICAL_PROGRESSION: GRADUALLY IMPROVING

## 2018-08-27 NOTE — ED NOTES
Contact williams Asher) with update on admission or discharge.  [address] [facility] Smooth Lees RN  08/26/18 2035

## 2018-08-27 NOTE — PROGRESS NOTES
Physical Therapy Med Surg Initial Assessment  Facility/Department: [facility]  Room: [place] [id number]       NAME: Anabell Strickland  : 1950 (77 y.o.)  MRN: [id number]  CODE STATUS: Full Code    Date of Service: 2018    Patient Diagnosis(es): Bilateral leg weakness [R29.898]   Chief Complaint   Patient presents with    Fatigue     sob-since friday-had lumbarectomy here by dr Sadaf Dietrich this past wednesday     Patient Active Problem List    Diagnosis Date Noted    Coagulopathy, on coumadin 2013     Priority: Low    Bilateral leg weakness 2018    Lumbar stenosis with neurogenic claudication 2018    Lumbar stenosis 08/15/2018    Synovial cyst of lumbar facet joint 08/15/2018    PE (pulmonary thromboembolism) ([facility] [address] 08/15/2018    Right-sided low back pain with right-sided sciatica 2016    Wheezes 2015    History of chest x-ray, normal, 8/6/14 10/17/2014    Cough, Neg w/u Dr Amber Mccall, ? Betablocker 2014    History of PTCA 2, stent x 2 , , Rafal Dixonuphin 2013    Abnormal EKG, needs cardiac clearence 2013    Hiatal hernia 2013    Calculus of gallbladder 2013    Need for tetanus booster 10/10/2013    Epistaxis 2012    ACE-inhibitor cough 07/10/2012    Cough 2012    Nephrolithiasis     Depression     Hyperlipidemia     OA (osteoarthritis)     Osteoporosis     Hypertension     Fibrocystic breast disease     Urge incontinence     Anemia     CAD (coronary artery disease) 2008        Past Medical History:   Diagnosis Date    Abnormal EKG, needs cardiac clearence 2013    ACE-inhibitor cough 7/10/2012    ARB     Anemia     CAD (coronary artery disease) 2008    cardia stents x 2    Cancer ([facility] [address]     facial skin cancer excision    Coagulopathy ([facility] [address] 10/4/2012    Coagulopathy, on coumadin 2013    Cough 2012    Cough, Neg w/u Dr Amber Mccall, ?  Betablocker 2014    Depression     Depression 6/13/2017    Epistaxis 8/20/2012    Fibrocystic breast disease     Hiatal hernia 5/3/2016    History of chest x-ray, normal, 8/6/14 10/17/2014    History of PTCA 2, stent x 2 2009, 2012, Karen Newcomb 12/23/2013    Hx of blood clots 2012    PE s/p RTKR post op    Hyperlipidemia     meds > 10 yrs    Hypertension     meds > 10 yrs    Nephrolithiasis     OA (osteoarthritis)     Osteoporosis     Right-sided low back pain with right-sided sciatica 5/3/2016    Urge incontinence     Wheezes 4/20/2015     Past Surgical History:   Procedure Laterality Date    CARDIAC SURGERY      stent x 2    COLONOSCOPY      CORONARY ANGIOPLASTY WITH STENT PLACEMENT  3/12    [facility]    ENDOSCOPY, COLON, DIAGNOSTIC      EYE SURGERY      [place]aco with IOL OU    HERNIA REPAIR      left inguinal hernia    HYSTERECTOMY, TOTAL ABDOMINAL  1998    JOINT REPLACEMENT  2008    LTKR    JOINT REPLACEMENT  2017    RTSR    KNEE ARTHROSCOPY  11/11    R knee, [facility]    LITHOTRIPSY  2003 approx    PTCA  2/2008    [facility]    ROTATOR CUFF REPAIR Right     SKIN GRAFT  2/9/11    JORDAN AND BSO  1996 approx    TOTAL KNEE ARTHROPLASTY Left 08/2008       Chart Reviewed: Yes  Patient assessed for rehabilitation services?: Yes  Family / Caregiver Present: No  General Comment  Comments: pt awake in bed, agreeable to PT eval    Restrictions:  Restrictions/Precautions: Fall Risk  Position Activity Restriction  Spinal Precautions: No Bending, No Lifting, No Twisting     SUBJECTIVE: Subjective: \"I was doing just fine when I was here, then I got home and I felt so weak like I got hit by a truck\" \"I couldn't get up from the bed or the chair\"  Pre Treatment Pain Screening  Pain at present: 10  Intervention List: Nurse/physician notified; Patient able to continue with treatment    Post Treatment Pain Screening:   Pain Screening  Patient Currently in Pain: Yes  Pain Assessment  Pain Level: 10  Pain Type: Acute pain  Pain Location: Back    Prior Level of

## 2018-08-27 NOTE — CARE COORDINATION
SPOKE WITH LISA AT [facility]. ALL INFO HAS BEEN FAXED. SHE'S AWAITING TO HEAR BACK FROM [facility] access hospital. FLOOR NUMBER GIVEN TO LISA IN CASE I'M NOT HERE. PATIENT WILL BE GOING INTO ROOM #224. REPORT CAN BE CALLED [phone]. I DID ASK IF THEY COULD TAKE PATIENT EARLY AND SHE SAID THEY ARE NOT AUTHORIZED.  Electronically signed by Zak Foreman RN on 8/27/2018 at 3:46 PM

## 2018-08-27 NOTE — PROGRESS NOTES
[facility] OCCUPATIONAL THERAPY EVALUATION - ACUTE   Room: W278/W278-01  Date: 2018  Patient Name: Kathe Chaudhari        MRN: [id number]  Account: [de-identified]   : 1950  (77 y.o.)    Chart Review:  Diagnosis:  The primary encounter diagnosis was Status post lumbar spinal fusion. A diagnosis of Weakness of both lower extremities was also pertinent to this visit. Past Medical History:   Diagnosis Date    Abnormal EKG, needs cardiac clearence 2013    ACE-inhibitor cough 7/10/2012    ARB     Anemia     CAD (coronary artery disease) 2008    cardia stents x 2    Cancer (HCC)     facial skin cancer excision    Coagulopathy ([address] 10/4/2012    Coagulopathy, on coumadin 2013    Cough 2012    Cough, Neg w/u Dr Krish Mccall, ?  Betablocker 2014    Depression     Depression 2017    Epistaxis 2012    Fibrocystic breast disease     Hiatal hernia 5/3/2016    History of chest x-ray, normal, 8/6/14 10/17/2014    History of PTCA 2, stent x 2 , , Robbie Pleva 2013    Hx of blood clots     PE s/p RTKR post op    Hyperlipidemia     meds > 10 yrs    Hypertension     meds > 10 yrs    Nephrolithiasis     OA (osteoarthritis)     Osteoporosis     Right-sided low back pain with right-sided sciatica 5/3/2016    Urge incontinence     Wheezes 2015     Past Surgical History:   Procedure Laterality Date    CARDIAC SURGERY      stent x 2    COLONOSCOPY      CORONARY [facility]    [facility]    ENDOSCOPY, COLON, DIAGNOSTIC      EYE SURGERY      Phaco with IOL OU    HERNIA REPAIR      left inguinal hernia    HYSTERECTOMY, TOTAL ABDOMINAL  1998    JOINT REPLACEMENT      LTKR    JOINT REPLACEMENT      RTSR    KNEE ARTHROSCOPY      R knee, [facility]    LITHOTRIPSY   approx    PTCA  2008    [facility]    ROTATOR CUFF REPAIR Right     SKIN GRAFT  11    JORDAN AND BSO   approx    TOTAL KNEE ARTHROPLASTY Left 2008 Transfers:  Min A with BSC and [employer]  Bed Transfer:Min A supine to sit  Sit to stand: Min A  Bed to Chair:  CGA with [employer] with increased pain  Seated Balance:      Static: [x] Good  [] Fair   [] Poor   Dynamic: []  Good  [x] Fair   [] Poor     Standing Balance:     Static: [] Good   [x] Fair  [] Poor   Dynamic: [] Good   [x] Fair   [] Poor     Functional Endurance: [] Good  [x] Fair  [] Poor     ADLs  Feeding:  Ind  UE Dressing:  Min A pain limiting  LB Dressing:  Max A  Bathing: Mod A  Toileting: Min A  Grooming: Set up seated    Goals:   Patient will:   [x]  Improve functional endurance to tolerate/complete 30 mins of ADL's   [x]  Be Ind in UB ADLs    [x]  Be Mod I in LB ADLs   [x]  Be Mod I in ADL transfers without LOB   [x]  Be Ind in toileting tasks   [x]  Improve B hand fine motor coordination to [facility] in order to manage clothing fasteners/self-care containers in a timely manner   [x]  Improve B UE Function (AROM, strength, motor control, tone normalization) to complete ADLs as projected. [x]  Improve B UE strength and endurance to [facility] in order to participate in self-care activities as projected. [x]  Access appropriate D/C site with as few architectural barriers as possible.       Treatment Plan will consist of:   [x] ADL Training   [x] Strengthening   [x] Endurance   [x] Transfer Training   [x]  DME ed      [x] HEP  [] Manual Therapy   [] AROM/PROM    [] Coordination   [] Cognitive Training   [x]Safety training   [] Other :    Patient Goal: Rehab before home  Discussed and agreed upon: [x] Yes   [] No Comment:     Assessment/Discharge Disposition:     Performance deficits / Impairments: Decreased functional mobility , Decreased ADL status, Decreased safe awareness, Decreased strength, Decreased endurance, Decreased high-level IADLs, Decreased fine motor control, Decreased balance, Decreased sensation  Prognosis: Good  Discharge Recommendations: Continue to assess pending progress  History: multi comorb  Exam: 9 deficits  Assistance / Modification: Max A      Barriers to Improvement:  pain      Discharge Recommendations:  Therapy (R/S)    Six Click Score  How much help for putting on and taking off regular lower body clothing?: A Lot  How much help for Bathing?: A Lot  How much help for Toileting?: A Little  How much help for putting on and taking off regular upper body clothing?: A Little  How much help for taking care of personal grooming?: A Little  How much help for eating meals?: None  AM-PAC Inpatient Daily Activity Raw Score: 17  AM-PAC Inpatient ADL T-Scale Score : 37.26  ADL Inpatient CMS 0-100% Score: 50.11    Recommended DME:  [x] W/W   [] Genia Hoyos   [] Rollator   [] W/C   [x] Ish Yoo  [x] Shower Chair   [x]Dressing AD []  BSC  [] Other:    Plan:Times per week: 1-3x,      G-Codes:  OT G-codes  Functional Limitation: Self care  Self Care Current Status (): At least 60 percent but less than 80 percent impaired, limited or restricted  Self Care Goal Status ():  At least 1 percent but less than 20 percent impaired, limited or restricted    Time in:  8:40  Time out:  9:20  Total minutes:  40  Timed treatment minutes:  10  Tx:  Educated pt on wound vac and when to contact professional.  Pt educated on LB AD needs and use and safety with [employer]      Electronically signed by:    VARGHESE Lantigua/L  8/27/2018, 9:23 AM

## 2018-08-27 NOTE — ED NOTES
Son Philip Chavira was called and given status update on his mother and room number.       Sascha Lora RN  08/26/18 [id number]

## 2018-08-27 NOTE — H&P
History and Physical  Patient: Emi Bowie  Unit/Bed: J762/V916-28  YOB: 1950  MRN: [id number]  Acct: [de-identified]   Admitting Diagnosis: Bilateral leg weakness [R29.898]  Admit Date:  8/26/2018  Hospital Day: 0      Chief Complaint:   Bilateral leg weakness    History of Present Illness:   pt has underwent lumbar fusion last Wednesday. She has progressed through her recovery and PT/ OT while in hospital. She did not want any home health at time of d/c. Pt states she was able to go home and over the few days she feels like she started declining- and she states she feels like \"she got ran over\". Pt states her legs feel week and she did have an episode of urinary incontinence. Pt states she does have bladder issue hx with overactive bladder but this she said was no control at all. She denies any urinary symptoms of discomfort or burning. Pt states she did have 2 BM while at home and she did not get incontinent with that. Allergies   Allergen Reactions    Tolterodine Diarrhea     Patient CANNOT tolerate this med. Causes diarrhea!  Detrol       Current Facility-Administered Medications   Medication Dose Route Frequency Provider Last Rate Last Dose    amLODIPine (NORVASC) tablet 5 mg  5 mg Oral QPM Verlena Lulyria Miguelar, DO        trospium (SANCTURA) tablet 20 mg  20 mg Oral BID AC Kj Sood, DO        FLUoxetine (PROZAC) capsule 60 mg  60 mg Oral Daily Kj Sood, DO        pantoprazole (PROTONIX) tablet 40 mg  40 mg Oral Daily Kj Sood, DO        simvastatin (ZOCOR) tablet 40 mg  40 mg Oral Nightly Kj Sood, DO        melatonin tablet 5 mg  5 mg Oral Nightly PRN Carmen Riveraar, DO        0.9 % sodium chloride infusion   Intravenous Continuous Griceldal Sherif, APRN - CNP        oxyCODONE (ROXICODONE) immediate release tablet 2.5 mg  2.5 mg Oral Q4H PRN Estel Sherif, APRN - CNP        oxyCODONE (ROXICODONE) immediate release tablet 5 mg  5 mg Oral Q4H PRN Estel Sherif, APRN - CELIO  methocarbamol (ROBAXIN) tablet 500 mg  500 mg Oral 4x Daily PRN ANA Cai - CNP        sodium chloride flush 0.9 % injection 10 mL  10 mL Intravenous 2 times per day Maikol Garcia MD        sodium chloride flush 0.9 % injection 10 mL  10 mL Intravenous PRN Maikol Garcia MD        acetaminophen (TYLENOL) tablet 650 mg  650 mg Oral Q4H PRN Maikol Garcia MD           PMHx:  Past Medical History:   Diagnosis Date    Abnormal EKG, needs cardiac clearence 12/23/2013    ACE-inhibitor cough 7/10/2012    ARB     Anemia     CAD (coronary artery disease) 2/2008    cardia stents x 2    Cancer ([facility] [address]     facial skin cancer excision    Coagulopathy ([facility] [address] 10/4/2012    Coagulopathy, on coumadin 11/13/2013    Cough 5/29/2012    Cough, Neg w/u Dr Robin Herbert, ?  [employer]ablocker 9/22/2014    Depression     Depression 6/13/2017    Epistaxis 8/20/2012    Fibrocystic breast disease     Hiatal hernia 5/3/2016    History of chest x-ray, normal, 8/6/14 10/17/2014    History of PTCA 2, stent x 2 2009, 2012, [place] 12/23/2013    Hx of blood clots 2012    PE s/p RTKR post op    Hyperlipidemia     meds > 10 yrs    Hypertension     meds > 10 yrs    Nephrolithiasis     OA (osteoarthritis)     Osteoporosis     Right-sided low back pain with right-sided sciatica 5/3/2016    Urge incontinence     Wheezes 4/20/2015       PSHx:  Past Surgical History:   Procedure Laterality Date    CARDIAC SURGERY      stent x 2    COLONOSCOPY      CORONARY ANGIOPLAST[facility]AC[facility]  [facility]12    [facility]    ENDOSCOPY, COLON, DIAGNOSTIC      EYE SURGERY      Phaco with IOL OU    HERNIA REPAIR      left inguinal hernia    HYSTERECTOMY, TOTAL ABDOMINAL  1998    JOINT REPLACEMENT  2008    LTKR    JOINT REPLACEMENT  2017    RTSR    KNEE ARTHROSCOPY  11/11    R knee, [facility]    LITHOTRIPSY  2003 approx    PTCA  2/2008    [facility]    ROTATOR CUFF REPAIR Right     SKIN GRAFT  2/9/11    JORDAN AND BSO  1996 approx    TOTAL KNEE

## 2018-08-27 NOTE — ED PROVIDER NOTES
[address] [facility] ED  eMERGENCY dEPARTMENT eNCOUnter      Pt Name: Anabell Strickland  MRN: [id number]  [place] 1950  Date of evaluation: 8/26/2018  Provider: Anibal Armenta MD    CHIEF COMPLAINT       Chief Complaint   Patient presents with    Fatigue     sob-since friday-had lumbarectomy here by dr Sadaf Dietrich this past wednesday         HISTORY OF PRESENT ILLNESS   (Location/Symptom, Timing/Onset, Context/Setting, Quality, Duration, Modifying Factors, Severity)  Note limiting factors. Anabell Strickland is a 77 y.o. female who presents to the emergency department    77years old who had a lumbar laminectomy last Wednesday patient was released from the hospital Friday states since she was discharged home staying in bed unable to ambulate due to severe pain and heart time walking. Patient state she had very hard time getting to the bathroom due to her pain and make an attempt on time. Patient also have been having some shortness of breath with her exertion deny fevers chills body ache deny any cough states she feel like she was released too early and needs some rehab to be able to ambulate and be independent of her own at home      The history is provided by the patient. Nursing Notes were reviewed. REVIEW OF SYSTEMS    (2-9 systems for level 4, 10 or more for level 5)     Review of Systems    Except as noted above the remainder of the review of systems was reviewed and negative. PAST MEDICAL HISTORY     Past Medical History:   Diagnosis Date    Abnormal EKG, needs cardiac clearence 12/23/2013    ACE-inhibitor cough 7/10/2012    ARB     Anemia     CAD (coronary artery disease) 2/2008    cardia stents x 2    Cancer ([facility]     facial skin cancer excision    Coagulopathy ([facility] 10/4/2012    Coagulopathy, on coumadin 11/13/2013    Cough 5/29/2012    Cough, Neg w/u Dr Amber Mccall, ?  Betablocker 9/22/2014    Depression     Depression 6/13/2017    Epistaxis 8/20/2012    Fibrocystic breast daily     OMEGA 3 340 MG CPDR    Take 500 mg by mouth daily     OXYCODONE-ACETAMINOPHEN (PERCOCET) 5-325 MG PER TABLET    Take 1 tablet by mouth every 4 hours as needed for Pain for up to 7 days. Mickeal Rink PANTOPRAZOLE (PROTONIX) 40 MG TABLET    Take 1 tablet by mouth daily    SIMVASTATIN (ZOCOR) 40 MG TABLET    TAKE 1 TABLET EVERY EVENING    VITAMIN D (CHOLECALCIFEROL) 1000 UNITS CAPS CAPSULE    Take 1,000 Units by mouth daily. ZOLPIDEM (AMBIEN) 5 MG TABLET    Take 5 mg by mouth nightly as needed for Sleep. Pt takes 6.5mg at bedtime as needed. .       ALLERGIES     Tolterodine    FAMILY HISTORY       Family History   Problem Relation Age of Onset    Diabetes Mother     High Blood Pressure Mother     Other Mother          of creutsfeld jocob disease / mad cow disease    COPD Father     Heart Disease Father     No Known Problems Sister     Atrial Fibrillation Brother     High Blood Pressure Brother     Other Brother         post polio    No Known Problems Daughter     High Cholesterol Son     Stroke Sister     Diabetes Brother     COPD Brother     Other Brother         suicide at age 77          SOCIAL HISTORY       Social History     Social History    Marital status:      Spouse name: N/A    Number of children: N/A    Years of education: N/A     Social History Main Topics    Smoking status: Never Smoker    Smokeless tobacco: Never Used    Alcohol use Yes      Comment: occ    Drug use: No    Sexual activity: Not Asked     Other Topics Concern    None     Social History Narrative    None       SCREENINGS             PHYSICAL EXAM    (up to 7 for level 4, 8 or more for level 5)     ED Triage Vitals [18 1655]   BP Temp Temp src Pulse Resp SpO2 Height Weight   (!) 142/64 98.9 °F (37.2 °C) -- 80 16 97 % -- 195 lb (88.5 kg)       Physical Exam   Constitutional: She is oriented to person, place, and time. She appears well-developed and well-nourished.    HENT:   Head: Normocephalic and

## 2018-08-27 NOTE — CARE COORDINATION
SPOKE WITH LISA AT [facility]. INFO FAXED TO START PRECERT.  Electronically signed by Tr Brown RN on 8/27/2018 at 10:34 AM

## 2018-08-28 ENCOUNTER — TELEPHONE (OUTPATIENT)
Dept: PRIMARY CARE CLINIC | Age: 68
End: 2018-08-28

## 2018-08-28 VITALS
OXYGEN SATURATION: 94 % | DIASTOLIC BLOOD PRESSURE: 59 MMHG | HEIGHT: 64 IN | HEART RATE: 71 BPM | BODY MASS INDEX: 34.97 KG/M2 | TEMPERATURE: 98.6 F | RESPIRATION RATE: 18 BRPM | SYSTOLIC BLOOD PRESSURE: 147 MMHG | WEIGHT: 204.8 LBS

## 2018-08-28 PROCEDURE — 6370000000 HC RX 637 (ALT 250 FOR IP): Performed by: INTERNAL MEDICINE

## 2018-08-28 PROCEDURE — G0378 HOSPITAL OBSERVATION PER HR: HCPCS

## 2018-08-28 PROCEDURE — 6370000000 HC RX 637 (ALT 250 FOR IP): Performed by: NURSE PRACTITIONER

## 2018-08-28 PROCEDURE — 97116 GAIT TRAINING THERAPY: CPT

## 2018-08-28 PROCEDURE — 97535 SELF CARE MNGMENT TRAINING: CPT

## 2018-08-28 PROCEDURE — 2580000003 HC RX 258: Performed by: ORTHOPAEDIC SURGERY

## 2018-08-28 RX ORDER — ACETAMINOPHEN 325 MG/1
650 TABLET ORAL EVERY 6 HOURS PRN
Qty: 30 TABLET | Refills: 0 | Status: ON HOLD
Start: 2018-08-28 | End: 2022-10-13

## 2018-08-28 RX ORDER — DABIGATRAN ETEXILATE 75 MG/1
75 CAPSULE, COATED PELLETS ORAL 2 TIMES DAILY
Status: DISCONTINUED | OUTPATIENT
Start: 2018-08-28 | End: 2018-08-28 | Stop reason: HOSPADM

## 2018-08-28 RX ORDER — OXYCODONE HYDROCHLORIDE 5 MG/1
2.5 TABLET ORAL EVERY 4 HOURS PRN
Qty: 30 TABLET | Refills: 0 | Status: SHIPPED | OUTPATIENT
Start: 2018-08-28 | End: 2018-09-04

## 2018-08-28 RX ADMIN — DABIGATRAN ETEXILATE MESYLATE 75 MG: 75 CAPSULE ORAL at 11:45

## 2018-08-28 RX ADMIN — AMLODIPINE BESYLATE 5 MG: 5 TABLET ORAL at 17:22

## 2018-08-28 RX ADMIN — METHOCARBAMOL 500 MG: 500 TABLET ORAL at 06:33

## 2018-08-28 RX ADMIN — FLUOXETINE 60 MG: 20 CAPSULE ORAL at 10:36

## 2018-08-28 RX ADMIN — TROSPIUM CHLORIDE 20 MG: 20 TABLET, FILM COATED ORAL at 17:22

## 2018-08-28 RX ADMIN — OXYCODONE HYDROCHLORIDE 5 MG: 5 TABLET ORAL at 17:23

## 2018-08-28 RX ADMIN — METHOCARBAMOL 500 MG: 500 TABLET ORAL at 14:12

## 2018-08-28 RX ADMIN — Medication 10 ML: at 10:37

## 2018-08-28 RX ADMIN — OXYCODONE HYDROCHLORIDE 5 MG: 5 TABLET ORAL at 10:36

## 2018-08-28 RX ADMIN — TROSPIUM CHLORIDE 20 MG: 20 TABLET, FILM COATED ORAL at 06:33

## 2018-08-28 RX ADMIN — OXYCODONE HYDROCHLORIDE 5 MG: 5 TABLET ORAL at 06:34

## 2018-08-28 RX ADMIN — PANTOPRAZOLE SODIUM 40 MG: 40 TABLET, DELAYED RELEASE ORAL at 10:36

## 2018-08-28 ASSESSMENT — PAIN DESCRIPTION - DESCRIPTORS
DESCRIPTORS: ACHING
DESCRIPTORS: ACHING

## 2018-08-28 ASSESSMENT — PAIN SCALES - GENERAL
PAINLEVEL_OUTOF10: 7
PAINLEVEL_OUTOF10: 6
PAINLEVEL_OUTOF10: 7
PAINLEVEL_OUTOF10: 4
PAINLEVEL_OUTOF10: 8

## 2018-08-28 ASSESSMENT — PAIN DESCRIPTION - FREQUENCY: FREQUENCY: CONTINUOUS

## 2018-08-28 ASSESSMENT — PAIN DESCRIPTION - PAIN TYPE: TYPE: ACUTE PAIN

## 2018-08-28 ASSESSMENT — PAIN DESCRIPTION - ORIENTATION
ORIENTATION: LOWER
ORIENTATION: LOWER

## 2018-08-28 ASSESSMENT — PAIN DESCRIPTION - PROGRESSION
CLINICAL_PROGRESSION: GRADUALLY IMPROVING
CLINICAL_PROGRESSION: GRADUALLY IMPROVING

## 2018-08-28 ASSESSMENT — PAIN DESCRIPTION - LOCATION
LOCATION: BACK
LOCATION: BACK

## 2018-08-28 ASSESSMENT — PAIN DESCRIPTION - ONSET: ONSET: ON-GOING

## 2018-08-28 ASSESSMENT — PAIN DESCRIPTION - DIRECTION: RADIATING_TOWARDS: DENIES RADICULAR SYMPTOMS

## 2018-08-28 NOTE — CARE COORDINATION
RECEIVED CALL FROM LISA AT [facility]. PRECERT WAS OBTAINED. I DID INFORM PATIENT AND SHE WOULD LIKE TO CALL HER SON  TO SEE IF HE CAN TRANSPORT HER AFTER HE GETS OFF WORK.  Electronically signed by Nohemy Orozco RN on 8/28/2018 at 12:14 PM

## 2018-08-28 NOTE — PROGRESS NOTES
Physical Therapy Med Surg Daily Treatment Note  [facility]Department[facility]       NAME: Roxanne Yadav  : 1950 (77 y.o.)  MRN: [id number]  CODE STATUS: Full Code    Date of Service: 2018    Patient Diagnosis(es): Bilateral leg weakness [R29.898]   Chief Complaint   Patient presents with    Fatigue     sob-since friday-had lumbarectomy here by dr Judith Tellez this past wednesday     Patient Active Problem List    Diagnosis Date Noted    Coagulopathy, on coumadin 2013     Priority: Low    Bilateral leg weakness 2018    Lumbar stenosis with neurogenic claudication 2018    Lumbar stenosis 08/15/2018    Synovial cyst of lumbar facet joint 08/15/2018    PE (pulmonary thromboembolism) ([facility] 08/15/2018    Right-sided low back pain with right-sided sciatica 2016    Wheezes 2015    History of chest x-ray, normal, 8/6/14 10/17/2014    Cough, Neg w/u Dr Kelley Franz, ? Betablocker 2014    History of PTCA 2, stent x 2 , , Del Cid Stage 2013    Abnormal EKG, needs cardiac clearence 2013    Hiatal hernia 2013    Calculus of gallbladder 2013    Need for tetanus booster 10/10/2013    Epistaxis 2012    ACE-inhibitor cough 07/10/2012    Cough 2012    Nephrolithiasis     Depression     Hyperlipidemia     OA (osteoarthritis)     Osteoporosis     Hypertension     Fibrocystic breast disease     Urge incontinence     Anemia     CAD (coronary artery disease) 2008        Past Medical History:   Diagnosis Date    Abnormal EKG, needs cardiac clearence 2013    ACE-inhibitor cough 7/10/2012    ARB     Anemia     CAD (coronary artery disease) 2008    cardia stents x 2    Cancer ([facility] [facility]     facial skin cancer excision    Coagulopathy ([facility] 10/4/2012    Coagulopathy, on coumadin 2013    Cough 2012    Cough, Neg w/u Dr Kelley Franz, ?  Betablocker 2014    Depression     Depression Supervision    Ambulation  Ambulation?: Yes  More Ambulation?: Yes  Ambulation 1  Surface: level tile  Device: No Device  Assistance: Contact guard assistance;Minimal assistance  Quality of Gait: WBOS, antalgic gait, decreased waldo, flexed posture, patient unsteady reaching for furniture to steady self  Distance: 15'   Ambulation 2  Surface - 2: level tile  Device 2: [address] 2: Stand by assistance  Quality of Gait 2: flexed forward posture, decreased waldo and speed, requires extra time for change in direction   Distance: 50'x3   Comments: patient reports she was ambulating without AD prior to surgery   Exercises  Hip Flexion: x20  Hip Abduction: adduction x20/ abduction x20   Knee Long Arc Quad: x20  Ankle Pumps: x20  Comments: reviewed seated HEP- patient with good understanding     Assessment   Body structures, Functions, Activity limitations: Decreased functional mobility ; Decreased balance;Decreased strength;Decreased endurance;Decreased coordination  Assessment: patient requires increased time and effort for all activity due to pain. expresses fear of falling due to bilateral LE weakness.    Prognosis: Good  REQUIRES PT FOLLOW UP: Yes  Activity Tolerance  Activity Tolerance: Patient Tolerated treatment well     Discharge Recommendations:  Continue to assess pending progress    Goals  Short term goals  Short term goal 1: Pt to demo mod I with HEP  Short term goal 2: Pt to tolerate 5 min dynamic activity  Long term goals  Long term goal 1: Pt to demo supervision bed mob  Long term goal 2: Pt to demo supervision transfers  Long term goal 3: Pt to ambulate >50 ft with FWW supervision  Long term goal 4: Pt to navigate 10 steps with B handrails supervision  Patient Goals   Patient goals : to not feel like this    Plan    Plan  Times per week: 7x/wk  Current Treatment Recommendations: Strengthening, Balance Training, Endurance Training, Stair training, Pain Management, Positioning, Safety Education & Training, Gait Training, Transfer Training, Functional Mobility Training, Neuromuscular Re-education, Home Exercise Program, Patient/Caregiver Education & Training, Modalities  Safety Devices  Type of devices: Chair alarm in place, Left in chair, Nurse notified     [facility] (6 CLICK) [address] Mobility Raw Score : 16     Therapy Time   Individual   Time In 0845   Time Out 0915   Minutes 30     Timed Code Treatment Minutes: 30 Minutes   gt 15  Tr 10   LAURA 5     Tanya Angulo 40, PTA, 08/28/18 at 9:42 AM

## 2018-08-29 NOTE — PROGRESS NOTES
Physical Therapy  Facility/Department: [facility] MED SURG [id number]  Physical Therapy Discharge      NAME: Efren Champion    : 1950 (77 y.o.)  MRN: [id number]    Account: [de-identified]  Gender: female      Patient has been discharged from acute care hospital. DC patient from current PT program.      Electronically signed by Yaya Gottlieb PT on 18 at 4:22 PM

## 2018-08-30 LAB
ORGANISM: ABNORMAL
URINE CULTURE, ROUTINE: ABNORMAL
URINE CULTURE, ROUTINE: ABNORMAL

## 2018-09-26 ENCOUNTER — CARE COORDINATION (OUTPATIENT)
Dept: CASE MANAGEMENT | Age: 68
End: 2018-09-26

## 2018-09-28 ENCOUNTER — TELEPHONE (OUTPATIENT)
Dept: PHARMACY | Facility: CLINIC | Age: 68
End: 2018-09-28

## 2018-09-28 DIAGNOSIS — R29.898 BILATERAL LEG WEAKNESS: Primary | ICD-10-CM

## 2018-09-28 PROCEDURE — 1111F DSCHRG MED/CURRENT MED MERGE: CPT

## 2018-09-28 RX ORDER — ASPIRIN 81 MG/1
81 TABLET ORAL DAILY
COMMUNITY

## 2018-09-28 RX ORDER — ZOLPIDEM TARTRATE 6.25 MG/1
12.5 TABLET, FILM COATED, EXTENDED RELEASE ORAL NIGHTLY PRN
Status: ON HOLD | COMMUNITY
End: 2022-10-13

## 2018-09-28 NOTE — TELEPHONE ENCOUNTER
Take 1 tablet by mouth daily    simvastatin (ZOCOR) 40 MG tablet  · Max dose = 20 mg if taking amlodipine. TAKE 1 TABLET EVERY EVENING    amLODIPine (NORVASC) 5 MG tablet Take 1 tablet by mouth daily    darifenacin (ENABLEX) 7.5 MG extended release tablet TAKE 2 TABLETS (15 MG) EVERY MORNING AND 1 TABLET (7.5 MG) EVERY EVENING    Calcium-Magnesium 500-250 MG TABS Take 1 tablet by mouth 2 times daily     Multiple Vitamin (MULTI VITAMIN DAILY PO) Take 1 tablet by mouth daily     Vitamin D (CHOLECALCIFEROL) 1000 UNITS CAPS capsule Take 1,000 Units by mouth 2 times daily     OMEGA 3 340 MG CPDR Take 500 mg by mouth daily     ferrous sulfate 325 (65 FE) MG tablet Take 325 mg by mouth daily (with breakfast).  Coenzyme Q10 (COQ10) 100 MG CAPS Take 1 tablet by mouth daily      Meds to Beds:No    Estimated Creatinine Clearance: 89 mL/min (based on SCr of 0.65 mg/dL). Assessment/Plan:  - Medication reconciliation completed. Number of medications reviewed: 15    - Pt is taking medications as directed by discharging physician. Number of discrepancies: 2. Instructions per discharge list provided except per below documentation. Identified medication discrepancies/issues:   · Category 1 (0)  · Category 2 (0)  · Category 3 (0)  · Category 4 (2):  1. Simvastatin and amlodipine drug-drug interaction. Note to PCP to consider decreasing simvastatin dose from 40 mg daily to 20 mg daily. 2. Updated home med list. Patient is taking aspirin (on hold prior to surgery and resumed at [facility]) - added to home med list. Patient is taking zolpidem CR tablets. Updated home med list and provided counseling of HRME and to try melatonin OTC instead. - [facility] active medication list updated:  · Medications Added (1):  aspirin  · Medications Removed (0)  · Medications Changed (1):  zolpidem    - Identified Potential Medication Interactions:  The following clinically significant interactions were identified via [employer] Interaction Analysis as category D or higher: simvastatin and amlodipine - see above. .    - Renal Dosing: No renal adjustments necessary.    - Follow up appointment date (7 days for more severe illness, 14 days for others):    · Patient encouraged to schedule follow up with PCP.      Thank you,    Jacky Silva, PharmD, [address]: [phone] C: [phone]  Department, toll free [phone], option 7        CLINICAL PHARMACY NOTE   POST-DISCHARGE TELEPHONE FOLLOW-UP ADDENDUM    For Pharmacy Admin Tracking Only    TCM Call Made?: No  [facility]) Select Patient?: Yes  Total # of Interventions Recommended: 2 -   - Decreased Dose #: 1  - Updated Order #: 1  Total # Interventions Accepted: 1  Intervention Severity:   - Level 1 Intervention Present?: No   - Level 2 #: 0   - Level 3 #: 0  Outreach Status: Review Complete  Care Coordinator Outreach to Patient?: No  Provider Contacted?: Yes - Note Routed, Routine  Waiting on response from: n/a  Time Spent (min): 30

## 2018-10-25 DIAGNOSIS — I10 ESSENTIAL HYPERTENSION: Primary | ICD-10-CM

## 2018-10-25 RX ORDER — AMLODIPINE BESYLATE 5 MG/1
5 TABLET ORAL DAILY
Qty: 90 TABLET | Refills: 1 | Status: ON HOLD | OUTPATIENT
Start: 2018-10-25 | End: 2022-10-13

## 2019-03-07 ENCOUNTER — TELEPHONE (OUTPATIENT)
Dept: PRIMARY CARE CLINIC | Age: 69
End: 2019-03-07

## 2019-03-21 RX ORDER — SIMVASTATIN 40 MG
TABLET ORAL
Qty: 90 TABLET | Refills: 0 | Status: ON HOLD | OUTPATIENT
Start: 2019-03-21 | End: 2022-10-13

## 2019-04-27 ENCOUNTER — APPOINTMENT (OUTPATIENT)
Dept: CT IMAGING | Age: 69
End: 2019-04-27
Payer: MEDICARE

## 2019-04-27 ENCOUNTER — HOSPITAL ENCOUNTER (EMERGENCY)
Age: 69
Discharge: HOME | End: 2019-04-27
Attending: EMERGENCY MEDICINE
Payer: MEDICARE

## 2019-04-27 VITALS
HEIGHT: 64 IN | TEMPERATURE: 98.5 F | HEART RATE: 61 BPM | OXYGEN SATURATION: 95 % | BODY MASS INDEX: 33.63 KG/M2 | WEIGHT: 197 LBS | SYSTOLIC BLOOD PRESSURE: 126 MMHG | RESPIRATION RATE: 16 BRPM | DIASTOLIC BLOOD PRESSURE: 62 MMHG

## 2019-04-27 DIAGNOSIS — H81.10 BENIGN PAROXYSMAL POSITIONAL VERTIGO, UNSPECIFIED LATERALITY: Primary | ICD-10-CM

## 2019-04-27 LAB
ALBUMIN SERPL-MCNC: 3.8 G/DL (ref 3.5–4.6)
ALP BLD-CCNC: 75 U/L (ref 40–130)
ALT SERPL-CCNC: 17 U/L (ref 0–33)
ANION GAP SERPL CALCULATED.3IONS-SCNC: 13 MEQ/L (ref 9–15)
AST SERPL-CCNC: 23 U/L (ref 0–35)
BACTERIA: NEGATIVE /HPF
BASOPHILS ABSOLUTE: 0.1 K/UL (ref 0–0.2)
BASOPHILS RELATIVE PERCENT: 0.9 %
BILIRUB SERPL-MCNC: <0.2 MG/DL (ref 0.2–0.7)
BILIRUBIN URINE: NEGATIVE
BLOOD, URINE: NEGATIVE
BUN BLDV-MCNC: 23 MG/DL (ref 8–23)
CALCIUM SERPL-MCNC: 9 MG/DL (ref 8.5–9.9)
CHLORIDE BLD-SCNC: 102 MEQ/L (ref 95–107)
CLARITY: CLEAR
CO2: 24 MEQ/L (ref 20–31)
COLOR: YELLOW
CREAT SERPL-MCNC: 0.86 MG/DL (ref 0.5–0.9)
EKG ATRIAL RATE: 60 BPM
EKG P AXIS: 12 DEGREES
EKG P-R INTERVAL: 110 MS
EKG Q-T INTERVAL: 478 MS
EKG QRS DURATION: 148 MS
EKG QTC CALCULATION (BAZETT): 478 MS
EKG R AXIS: -38 DEGREES
EKG T AXIS: 12 DEGREES
EKG VENTRICULAR RATE: 60 BPM
EOSINOPHILS ABSOLUTE: 0.2 K/UL (ref 0–0.7)
EOSINOPHILS RELATIVE PERCENT: 2.2 %
EPITHELIAL CELLS, UA: NORMAL /HPF (ref 0–5)
GFR AFRICAN AMERICAN: >60
GFR NON-AFRICAN AMERICAN: >60
GLOBULIN: 2.8 G/DL (ref 2.3–3.5)
GLUCOSE BLD-MCNC: 100 MG/DL (ref 70–99)
GLUCOSE URINE: NEGATIVE MG/DL
HCT VFR BLD CALC: 37.1 % (ref 37–47)
HEMOGLOBIN: 12.5 G/DL (ref 12–16)
HYALINE CASTS: NORMAL /HPF (ref 0–5)
KETONES, URINE: NEGATIVE MG/DL
LEUKOCYTE ESTERASE, URINE: ABNORMAL
LYMPHOCYTES ABSOLUTE: 1.8 K/UL (ref 1–4.8)
LYMPHOCYTES RELATIVE PERCENT: 20.3 %
MCH RBC QN AUTO: 28.4 PG (ref 27–31.3)
MCHC RBC AUTO-ENTMCNC: 33.6 % (ref 33–37)
MCV RBC AUTO: 84.6 FL (ref 82–100)
MONOCYTES ABSOLUTE: 0.6 K/UL (ref 0.2–0.8)
MONOCYTES RELATIVE PERCENT: 6.4 %
NEUTROPHILS ABSOLUTE: 6.3 K/UL (ref 1.4–6.5)
NEUTROPHILS RELATIVE PERCENT: 70.2 %
NITRITE, URINE: NEGATIVE
PDW BLD-RTO: 14.8 % (ref 11.5–14.5)
PH UA: 8 (ref 5–9)
PLATELET # BLD: 262 K/UL (ref 130–400)
POTASSIUM SERPL-SCNC: 3.8 MEQ/L (ref 3.4–4.9)
PROTEIN UA: NEGATIVE MG/DL
RBC # BLD: 4.39 M/UL (ref 4.2–5.4)
RBC UA: NORMAL /HPF (ref 0–5)
SODIUM BLD-SCNC: 139 MEQ/L (ref 135–144)
SPECIFIC GRAVITY UA: 1.01 (ref 1–1.03)
TOTAL PROTEIN: 6.6 G/DL (ref 6.3–8)
TROPONIN: <0.01 NG/ML (ref 0–0.01)
URINE REFLEX TO CULTURE: YES
UROBILINOGEN, URINE: 0.2 E.U./DL
WBC # BLD: 9 K/UL (ref 4.8–10.8)
WBC UA: NORMAL /HPF (ref 0–5)

## 2019-04-27 PROCEDURE — 93005 ELECTROCARDIOGRAM TRACING: CPT

## 2019-04-27 PROCEDURE — 36415 COLL VENOUS BLD VENIPUNCTURE: CPT

## 2019-04-27 PROCEDURE — 70450 CT HEAD/BRAIN W/O DYE: CPT

## 2019-04-27 PROCEDURE — 6360000002 HC RX W HCPCS: Performed by: EMERGENCY MEDICINE

## 2019-04-27 PROCEDURE — 2580000003 HC RX 258: Performed by: EMERGENCY MEDICINE

## 2019-04-27 PROCEDURE — 84484 ASSAY OF TROPONIN QUANT: CPT

## 2019-04-27 PROCEDURE — 85025 COMPLETE CBC W/AUTO DIFF WBC: CPT

## 2019-04-27 PROCEDURE — 87086 URINE CULTURE/COLONY COUNT: CPT

## 2019-04-27 PROCEDURE — 81001 URINALYSIS AUTO W/SCOPE: CPT

## 2019-04-27 PROCEDURE — 6370000000 HC RX 637 (ALT 250 FOR IP): Performed by: EMERGENCY MEDICINE

## 2019-04-27 PROCEDURE — 96374 THER/PROPH/DIAG INJ IV PUSH: CPT

## 2019-04-27 PROCEDURE — 99285 EMERGENCY DEPT VISIT HI MDM: CPT

## 2019-04-27 PROCEDURE — 80053 COMPREHEN METABOLIC PANEL: CPT

## 2019-04-27 RX ORDER — MECLIZINE HYDROCHLORIDE 25 MG/1
25 TABLET ORAL 3 TIMES DAILY PRN
Qty: 30 TABLET | Refills: 0 | Status: SHIPPED | OUTPATIENT
Start: 2019-04-27 | End: 2019-05-07

## 2019-04-27 RX ORDER — LORAZEPAM 2 MG/ML
1 INJECTION INTRAMUSCULAR ONCE
Status: COMPLETED | OUTPATIENT
Start: 2019-04-27 | End: 2019-04-27

## 2019-04-27 RX ORDER — 0.9 % SODIUM CHLORIDE 0.9 %
1000 INTRAVENOUS SOLUTION INTRAVENOUS ONCE
Status: COMPLETED | OUTPATIENT
Start: 2019-04-27 | End: 2019-04-27

## 2019-04-27 RX ORDER — MECLIZINE HYDROCHLORIDE 25 MG/1
25 TABLET ORAL ONCE
Status: COMPLETED | OUTPATIENT
Start: 2019-04-27 | End: 2019-04-27

## 2019-04-27 RX ADMIN — LORAZEPAM 1 MG: 2 INJECTION INTRAMUSCULAR; INTRAVENOUS at 01:48

## 2019-04-27 RX ADMIN — MECLIZINE HYDROCHLORIDE 25 MG: 25 TABLET ORAL at 01:50

## 2019-04-27 RX ADMIN — SODIUM CHLORIDE 1000 ML: 9 INJECTION, SOLUTION INTRAVENOUS at 01:48

## 2019-04-27 ASSESSMENT — ENCOUNTER SYMPTOMS
CHEST TIGHTNESS: 0
PHOTOPHOBIA: 0
COUGH: 0
ABDOMINAL PAIN: 0
SHORTNESS OF BREATH: 0
ABDOMINAL DISTENTION: 0
EYE DISCHARGE: 0
VOMITING: 0
SORE THROAT: 0
WHEEZING: 0

## 2019-04-27 ASSESSMENT — PAIN SCALES - GENERAL: PAINLEVEL_OUTOF10: 4

## 2019-04-27 ASSESSMENT — PAIN DESCRIPTION - PAIN TYPE: TYPE: ACUTE PAIN

## 2019-04-27 ASSESSMENT — PAIN DESCRIPTION - LOCATION: LOCATION: CHEST

## 2019-04-27 NOTE — ED TRIAGE NOTES
Pt arrives to ED room 7 on stretcher via EMS. Pt transferred into stretcher via EMS. RN to bedside. Pt greeted, introductions made. Pt in NAD at this time; respirations even and non-labored. Pt changed into gown and connected to monitor. VS obtained and pt triaged. #18g IV inserted via EMS PTA. Physical assessment performed and history obtained.   Pt c/o sudden onset dizziness with chest feeling \"hot\" in center; slight dyspnea, increased with exertion

## 2019-04-27 NOTE — ED PROVIDER NOTES
[address] [facility] ED  eMERGENCY dEPARTMENT eNCOUnter      Pt Name: Álvaro Bush  MRN: [id number]  [place] 1950  Date of evaluation: 4/27/2019  Provider: Allie Daley MD    CHIEF COMPLAINT       Chief Complaint   Patient presents with    Dizziness     sudden onset with chest feeling \"hot\" in center; slight dyspnea, increased with exertion         HISTORY OF PRESENT ILLNESS   (Location/Symptom, Timing/Onset,Context/Setting, Quality, Duration, Modifying Factors, Severity)  Note limiting factors. Álvaro Bush is a 68 y.o. female who presents to the emergency department for evaluation of vertigo. Patient had the onset of vertigo this evening about hour prior arrival.  She states it was a room spinning sensation which may difficult to walk down the hallway. She had a hot feeling in the center of her chest also with slight dyspnea. No chest pain. No nausea or vomiting. The hot feeling in the chest is now gone she still has some vertigo-like symptoms worse with movement. No related headache. Nothing seems to make symptoms better. HPI    NursingNotes were reviewed. REVIEW OF SYSTEMS    (2-9 systems for level 4, 10 or more for level 5)     Review of Systems   Constitutional: Negative for chills and diaphoresis. HENT: Negative for congestion, ear pain, mouth sores and sore throat. Eyes: Negative for photophobia and discharge. Respiratory: Negative for cough, chest tightness, shortness of breath and wheezing. Cardiovascular: Negative for chest pain and palpitations. Gastrointestinal: Negative for abdominal distention, abdominal pain and vomiting. Endocrine: Negative for cold intolerance. Genitourinary: Negative for difficulty urinating. Musculoskeletal: Negative for arthralgias and gait problem. Skin: Negative for pallor and rash. Allergic/Immunologic: Negative for immunocompromised state. Neurological: Positive for dizziness.  Negative for tremors, syncope, weakness and light-headedness. Hematological: Negative for adenopathy. Psychiatric/Behavioral: Negative for agitation and hallucinations. All other systems reviewed and are negative. Except as noted above the remainder of the review of systems was reviewed and negative. PAST MEDICAL HISTORY     Past Medical History:   Diagnosis Date    Abnormal EKG, needs cardiac clearence 12/23/2013    ACE-inhibitor cough 7/10/2012    ARB     Anemia     CAD (coronary artery disease) 2/2008    cardia stents x 2    Cancer ([facility] [address]     facial skin cancer excision    Coagulopathy ([facility] 10/4/2012    Coagulopathy, on coumadin 11/13/2013    Cough 5/29/2012    Cough, Neg w/u Dr Jairon Dillon, ?  Betablocker 9/22/2014    Depression     Depression 6/13/2017    Epistaxis 8/20/2012    Fibrocystic breast disease     Hiatal hernia 5/3/2016    History of chest x-ray, normal, 8/6/14 10/17/2014    History of PTCA 2, stent x 2 2009, 2012, Yvan Skipper 12/23/2013    Hx of blood clots 2012    PE s/p RTKR post op    Hyperlipidemia     meds > 10 yrs    Hypertension     meds > 10 yrs    Nephrolithiasis     OA (osteoarthritis)     Osteoporosis     Right-sided low back pain with right-sided sciatica 5/3/2016    Urge incontinence     Wheezes 4/20/2015         SURGICALHISTORY       Past Surgical History:   Procedure Laterality Date    CARDIAC SURGERY      stent x 2    COLONOSCOPY      CORONARY ANGIOPLASTY WITH STENT PLACEMENT  3/12    Zarha    ENDOSCOPY, COLON, DIAGNOSTIC      EYE SURGERY      Phaco with IOL OU    HERNIA REPAIR      left inguinal hernia    HYSTERECTOMY, TOTAL ABDOMINAL  1998    JOINT REPLACEMENT  2008    LTKR    JOINT REPLACEMENT  2017    RTSR    KNEE ARTHROSCOPY  11/11    R knee, [facility]    LITHOTRIPSY  2003 approx    DC LAMINECTOMY,>2 SGMT,LUMBAR N/A 8/22/2018    SPINE L3-4, L4-5, L5-S1 LAMINECTOMY, L3-4, L4-5 POSTERIOR LATERAL FUSION, L5-S1 INSTRUMENTED POSTERIOR INTERBODY FUSION, PRONE, AXIS SPINE TABLE, ACTIFUSE, CELL SAVER, NEW MICROSCOPE, NEW C-ARM X2, PEDIGUARD, MISONIX BONE SCALPAL, PNEUMATIC KERRISONS, SPINAL CORD MONITORING, LUMBAR BRACE, GLOBUS, OPEN TLIF, (H.S.C.G.#[id number]) performed by Junior Guzman MD at [address] [place] [address] PTCA  2008    [address] Right     SKIN GRAFT  11    JORDAN AND BSO   approx    TOTAL KNEE ARTHROPLASTY Left 2008         CURRENT MEDICATIONS       Previous Medications    ACETAMINOPHEN (TYLENOL) 325 MG TABLET    Take 2 tablets by mouth every 6 hours as needed for Pain    AMLODIPINE (NORVASC) 5 MG TABLET    Take 1 tablet by mouth daily    ASPIRIN 81 MG EC TABLET    Take 81 mg by mouth daily    CALCIUM-MAGNESIUM 500-250 MG TABS    Take 1 tablet by mouth 2 times daily     COENZYME Q10 (COQ10) 100 MG CAPS    Take 1 tablet by mouth daily     DABIGATRAN (PRADAXA) 75 MG CAPSULE    Take 1 capsule by mouth 2 times daily    DARIFENACIN (ENABLEX) 7.5 MG EXTENDED RELEASE TABLET    TAKE 2 TABLETS (15 MG) EVERY MORNING AND 1 TABLET (7.5 MG) EVERY EVENING    FERROUS SULFATE 325 (65 FE) MG TABLET    Take 325 mg by mouth daily (with breakfast). FLUOXETINE (PROZAC) 20 MG CAPSULE    Take 60 mg by mouth daily    MULTIPLE VITAMIN (MULTI VITAMIN DAILY PO)    Take 1 tablet by mouth daily     OMEGA 3 340 MG CPDR    Take 500 mg by mouth daily     PANTOPRAZOLE (PROTONIX) 40 MG TABLET    Take 1 tablet by mouth daily    SIMVASTATIN (ZOCOR) 40 MG TABLET    TAKE 1 TABLET EVERY EVENING    VITAMIN D (CHOLECALCIFEROL) 1000 UNITS CAPS CAPSULE    Take 1,000 Units by mouth 2 times daily     ZOLPIDEM (AMBIEN CR) 6.25 MG EXTENDED RELEASE TABLET    Take 12.5 mg by mouth nightly as needed for Sleep.         ALLERGIES     Tolterodine    FAMILY HISTORY       Family History   Problem Relation Age of Onset    Diabetes Mother     High Blood Pressure Mother     Other Mother          of creutsfeld jocob disease / mad cow disease    COPD Father     Heart Disease Father     No Known Problems Sister     Atrial Fibrillation Brother     High Blood Pressure Brother     Other Brother         post polio    No Known Problems Daughter     High Cholesterol Son     Stroke Sister     Diabetes Brother     COPD Brother     Other Brother         suicide at age 77          SOCIAL HISTORY       Social History     Socioeconomic History    Marital status:      Spouse name: None    Number of children: None    Years of education: None    Highest education level: None   Occupational History    None   Social Needs    Financial resource strain: None    Food insecurity:     Worry: None     Inability: None    Transportation needs:     Medical: None     Non-medical: None   Tobacco Use    Smoking status: Never Smoker    Smokeless tobacco: Never Used   Substance and Sexual Activity    Alcohol use: Yes     Comment: occ    Drug use: No    Sexual activity: None   Lifestyle    Physical activity:     Days per week: None     Minutes per session: None    Stress: None   Relationships    Social connections:     Talks on phone: None     Gets together: None     Attends [employer] service: None     Active member of club or organization: None     Attends meetings of clubs or organizations: None     Relationship status: None    Intimate partner violence:     Fear of current or ex partner: None     Emotionally abused: None     Physically abused: None     Forced sexual activity: None   Other Topics Concern    None   Social History Narrative    None       SCREENINGS    Renan Coma Scale  Eye Opening: Spontaneous  Best Verbal Response: Oriented  Best Motor Response: Obeys commands  [place] Coma Scale Score: 15 @FLOW([id number])@      PHYSICAL EXAM    (up to 7 for level 4, 8 or more for level 5)     ED Triage Vitals [04/27/19 0116]   BP Temp Temp Source Pulse Resp SpO2 Height Weight   (!) 158/67 98.5 °F (36.9 °C) Oral 61 14 97 % 5' 4\" (1.626 m) 197 lb (89.4 kg)       Physical Exam   Constitutional: She is oriented within normal limits   URINE RT REFLEX TO CULTURE - Abnormal; Notable for the following components:    Leukocyte Esterase, Urine SMALL (*)     All other components within normal limits   URINE CULTURE   TROPONIN   MICROSCOPIC URINALYSIS       All other labs were within normal range or not returned as of this dictation. EMERGENCY DEPARTMENT COURSE and DIFFERENTIAL DIAGNOSIS/MDM:   Vitals:    Vitals:    04/27/19 0116 04/27/19 0212   BP: (!) 158/67 126/62   Pulse: 61 61   Resp: 14 16   Temp: 98.5 °F (36.9 °C)    TempSrc: Oral    SpO2: 97% 95%   Weight: 197 lb (89.4 kg)    Height: 5' 4\" (1.626 m)           MDM patient's feeling better on reevaluation. Again she no longer has that hot feeling in her chest.  More importantly, the patient tolerated a trial of walking up and down the hallways here. She had mild vertigo while doing her walking trial.  Overall her symptoms are much better. She has no nausea. Her workup today was negative including negative head CT and lab studies. Patient's symptoms of all been consistent with that of peripheral vertigo. I explained to her and her significant other that was here if she has any progressive vomiting, headache or worsening symptoms she is to return here. She should expect some mild vertigo symptoms over the next couple weeks gradually improving. I would like her to follow up early next week with her primary care physician to document the progress        CONSULTS:  None    PROCEDURES:  Unless otherwise noted below, none     Procedures    FINAL IMPRESSION      1.  Benign paroxysmal positional vertigo, unspecified laterality          DISPOSITION/PLAN   DISPOSITION Decision To Discharge 04/27/2019 03:15:59 AM      PATIENT REFERRED TO:  Darren Fried DO  [address] [facility] [address]  [phone]    In 3 days        DISCHARGE MEDICATIONS:  New Prescriptions    MECLIZINE (ANTIVERT) 25 MG TABLET    Take 1 tablet by mouth 3 times daily as needed for Dizziness (Please note that portions of this note were completed with a voice recognition program.  Efforts were made to edit the dictations but occasionally words are mis-transcribed.)    Kyle Morton MD (electronically signed)  Attending Emergency Physician         Kyle Morton MD  04/27/19 8802       Kyle Morton MD  04/27/19 7344

## 2019-04-28 LAB — URINE CULTURE, ROUTINE: NORMAL

## 2019-04-29 PROCEDURE — 93010 ELECTROCARDIOGRAM REPORT: CPT | Performed by: INTERNAL MEDICINE

## 2019-05-21 ENCOUNTER — TELEPHONE (OUTPATIENT)
Dept: PRIMARY CARE CLINIC | Age: 69
End: 2019-05-21

## 2019-05-21 ENCOUNTER — TELEPHONE (OUTPATIENT)
Dept: ADMINISTRATIVE | Age: 69
End: 2019-05-21

## 2019-05-21 NOTE — TELEPHONE ENCOUNTER
Deana Monahan DO. Patient eligible for AWV.   Left message to call back If we can schedule patient with a [facility] provider or if patient needs any other assistance

## 2019-10-09 ENCOUNTER — TELEPHONE (OUTPATIENT)
Dept: ENDOCRINOLOGY | Age: 69
End: 2019-10-09

## 2020-02-05 NOTE — PROGRESS NOTES
8/6/14 10/17/2014    History of PTCA 2, stent x 2 2009, 2012, Bryan Chloe 12/23/2013    Hx of blood clots 2012    PE s/p RTKR post op    Hyperlipidemia     meds > 10 yrs    Hypertension     meds > 10 yrs    Nephrolithiasis     OA (osteoarthritis)     Osteoporosis     Right-sided low back pain with right-sided sciatica 5/3/2016    Urge incontinence     Wheezes 4/20/2015     Past Surgical History:   Procedure Laterality Date    CARDIAC SURGERY      stent x 2    COLONOSCOPY      CORONARY ANGIOPLAST[facility] ST[facility] PLACEMENT  3/12    [facility]    ENDOSCOPY, COLON, DIAGNOSTIC      EYE SURGERY      [place] with IOL OU    HERNIA REPAIR      left inguinal hernia    HYSTERECTOMY, TOTAL ABDOMINAL  1998    JOINT REPLACEMENT  2008    LTKR    JOINT REPLACEMENT  2017    RTSR    KNEE ARTHROSCOPY  11/11    R knee, [facility]    LITHOTRIPSY  2003 approx    PTCA  2/2008    [facility]    ROTATOR CUFF REPAIR Right     SKIN GRAFT  2/9/11    JORDAN AND BSO  1996 approx    TOTAL KNEE ARTHROPLASTY Left 08/2008       Chart Reviewed: Yes  Patient assessed for rehabilitation services?: Yes  General Comment  Comments: Pt awake in bed, agreeable to PT eval    Restrictions:  Restrictions/Precautions: Fall Risk  Position Activity Restriction  Spinal Precautions: No Bending, No Lifting, No Twisting     SUBJECTIVE:    Pre Treatment Pain Screening  Pain at present: 4  Intervention List: Patient able to continue with treatment    Post Treatment Pain Screening:   Pain Assessment  Pain Level: 4  Pain Type: Acute pain  Pain Location: Back    Prior Level of Function:  Social/Functional History  Lives With: Alone  Type of Home: Apartment  Home Layout:  (2nd story)  Home Access: Stairs to enter with rails  Entrance Stairs - Number of Steps: 10  Entrance Stairs - Rails: Both  Bathroom Shower/Tub: Tub/Shower unit  Bathroom Equipment: Tub transfer bench  Home Equipment: Amina Figueroa  ADL Assistance: Independent  Homemaking Assistance: Independent  Ambulation Assistance: Independent  Transfer Assistance: Independent  Active : Yes  Additional Comments: Pt reports she has a son who lives nearby who could potentially help, but she does not want to bother him because he works; dtr may come from [place] to stay with pt over the weekend    OBJECTIVE:   Vision/Hearing:  Vision: Within Functional Limits  Hearing: Within functional limits    Cognition:  Overall Orientation Status: Within Functional Limits         ROM:  RLE AROM: WFL  LLE AROM : WFL    Strength:  Strength RLE  Comment: 4/5  Strength LLE  Comment: 4/5    Neuro:  Balance  Standing - Static: Fair  Standing - Dynamic: Fair  Comments: stands without UE support, requiring UE support for SLS activities including side and fwd stepping             Bed mobility  Supine to Sit: Supervision  Sit to Supine: Supervision  Comment: mild complaints of dizziness that lessen over time    Transfers  Stand to sit: Supervision  Bed to Chair: Supervision  Comment: VCs for use of FWW, hand placement, safety with turning    Ambulation  Ambulation?: Yes  Ambulation 1  Surface: level tile  Device: Rolling Walker  Assistance: Supervision  Quality of Gait: decreased waldo, otherwise normal gait quality  Distance: 50 ft  Comments: education with compensatory strategies, maneuvering FWW in small spaces, decreasing pain, techniques to maintain spinal precautions    Activity Tolerance  Activity Tolerance: Patient Tolerated treatment well          ASSESSMENT:   Body structures, Functions, Activity limitations: Decreased functional mobility ; Decreased strength;Decreased endurance;Decreased balance  Decision Making: Medium Complexity    Prognosis: Good    DISCHARGE RECOMMENDATIONS:  Discharge Recommendations: Continue to assess pending progress    Assessment: Pt demo's functional mobility decline s/p lumbar surgery, pt will benefit from 1-2 treatments to include gait and stair training to ensure safe DC home.  recommend use of FWW for initial DC due to balance deficits to decrease risk for falls. REQUIRES PT FOLLOW UP: Yes      PLAN OF CARE:  Plan  Current Treatment Recommendations: Strengthening, Balance Training, Functional Mobility Training, Stair training, Gait Training, Endurance Training, Neuromuscular Re-education, Patient/Caregiver Education & Training, Home Exercise Program  Safety Devices  Type of devices: Chair alarm in place, Nurse notified    G-Code:  PT G-Codes  Functional Assessment Tool Used: clinical judgement  Functional Limitation: Mobility: Walking and moving around  Mobility: Walking and Moving Around Current Status ():  At least 1 percent but less than 20 percent impaired, limited or restricted  Mobility: Walking and Moving Around Goal Status (): 0 percent impaired, limited or restricted    Goals:  Patient goals : go home  Short term goals  Short term goal 1: Pt to ambulate 150' LRAD mod I  Short term goal 2: Pt to navigate 10 steps with 1 handrail supervision    [facility] (6 CLICK) [address] Mobility Raw Score : 19     Therapy Time:   Individual   Time In 0858   Time Out 0926   Minutes 28       Gait: 8 min    Wilma Young PT, 08/23/18 at 9:38 AM Principal Discharge DX:	Tinea capitis Principal Discharge DX:	Tinea capitis  Secondary Diagnosis:	Folliculitis

## 2022-10-11 ENCOUNTER — HOSPITAL ENCOUNTER (INPATIENT)
Age: 72
LOS: 8 days | Discharge: HOME HEALTH | DRG: 516 | End: 2022-10-19
Attending: EMERGENCY MEDICINE | Admitting: SURGERY
Payer: MEDICARE

## 2022-10-11 ENCOUNTER — APPOINTMENT (OUTPATIENT)
Dept: CT IMAGING | Age: 72
DRG: 516 | End: 2022-10-11
Payer: MEDICARE

## 2022-10-11 DIAGNOSIS — S32.000A: ICD-10-CM
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
## 2022-10-11 DIAGNOSIS — Z98.890 S/P KYPHOPLASTY: ICD-10-CM

## 2022-10-11 DIAGNOSIS — S32.010A CLOSED COMPRESSION FRACTURE OF BODY OF L1 VERTEBRA (HCC): Primary | ICD-10-CM

## 2022-10-11 DIAGNOSIS — G89.11 ACUTE PAIN DUE TO TRAUMA: ICD-10-CM

## 2022-10-11 DIAGNOSIS — G89.18 ACUTE POST-OPERATIVE PAIN: ICD-10-CM

## 2022-10-11 PROBLEM — M48.56XA: Status: ACTIVE | Noted: 2022-10-11

## 2022-10-11 PROCEDURE — 96372 THER/PROPH/DIAG INJ SC/IM: CPT

## 2022-10-11 PROCEDURE — 6830039000 HC L3 TRAUMA ALERT

## 2022-10-11 PROCEDURE — 1210000000 HC MED SURG R&B

## 2022-10-11 PROCEDURE — 85025 COMPLETE CBC W/AUTO DIFF WBC: CPT

## 2022-10-11 PROCEDURE — 6360000002 HC RX W HCPCS: Performed by: EMERGENCY MEDICINE

## 2022-10-11 PROCEDURE — 72131 CT LUMBAR SPINE W/O DYE: CPT

## 2022-10-11 PROCEDURE — 99223 1ST HOSP IP/OBS HIGH 75: CPT | Performed by: SURGERY

## 2022-10-11 PROCEDURE — 80053 COMPREHEN METABOLIC PANEL: CPT

## 2022-10-11 PROCEDURE — 99285 EMERGENCY DEPT VISIT HI MDM: CPT

## 2022-10-11 PROCEDURE — 36415 COLL VENOUS BLD VENIPUNCTURE: CPT

## 2022-10-11 PROCEDURE — 70450 CT HEAD/BRAIN W/O DYE: CPT

## 2022-10-11 RX ORDER — FENTANYL CITRATE 50 UG/ML
50 INJECTION, SOLUTION INTRAMUSCULAR; INTRAVENOUS ONCE
Status: COMPLETED | OUTPATIENT
Start: 2022-10-11 | End: 2022-10-12

## 2022-10-11 RX ORDER — KETOROLAC TROMETHAMINE 30 MG/ML
30 INJECTION, SOLUTION INTRAMUSCULAR; INTRAVENOUS ONCE
Status: COMPLETED | OUTPATIENT
Start: 2022-10-11 | End: 2022-10-11

## 2022-10-11 RX ADMIN — KETOROLAC TROMETHAMINE 30 MG: 30 INJECTION, SOLUTION INTRAMUSCULAR at 21:56

## 2022-10-11 ASSESSMENT — PAIN DESCRIPTION - LOCATION
LOCATION: BACK
LOCATION: BACK

## 2022-10-11 ASSESSMENT — ENCOUNTER SYMPTOMS: BACK PAIN: 1

## 2022-10-11 ASSESSMENT — PAIN DESCRIPTION - ORIENTATION
ORIENTATION: LOWER
ORIENTATION: LOWER

## 2022-10-11 ASSESSMENT — PAIN SCALES - GENERAL
PAINLEVEL_OUTOF10: 7
PAINLEVEL_OUTOF10: 10

## 2022-10-11 ASSESSMENT — PAIN - FUNCTIONAL ASSESSMENT: PAIN_FUNCTIONAL_ASSESSMENT: 0-10

## 2022-10-12 ENCOUNTER — APPOINTMENT (OUTPATIENT)
Dept: GENERAL RADIOLOGY | Age: 72
DRG: 516 | End: 2022-10-12
Payer: MEDICARE

## 2022-10-12 PROBLEM — M80.08XA VERTEBRAL FRACTURE, OSTEOPOROTIC, INITIAL ENCOUNTER (HCC): Status: ACTIVE | Noted: 2022-10-12

## 2022-10-12 PROBLEM — S32.010A CLOSED COMPRESSION FRACTURE OF BODY OF L1 VERTEBRA (HCC): Status: ACTIVE | Noted: 2022-10-12

## 2022-10-12 LAB
ALBUMIN SERPL-MCNC: 3.3 G/DL (ref 3.5–4.6)
ALP BLD-CCNC: 70 U/L (ref 40–130)
ALT SERPL-CCNC: 15 U/L (ref 0–33)
ANION GAP SERPL CALCULATED.3IONS-SCNC: 10 MEQ/L (ref 9–15)
AST SERPL-CCNC: 23 U/L (ref 0–35)
BASOPHILS ABSOLUTE: 0.1 K/UL (ref 0–0.2)
BASOPHILS RELATIVE PERCENT: 0.6 %
BILIRUB SERPL-MCNC: <0.2 MG/DL (ref 0.2–0.7)
BUN BLDV-MCNC: 27 MG/DL (ref 8–23)
CALCIUM SERPL-MCNC: 9 MG/DL (ref 8.5–9.9)
CHLORIDE BLD-SCNC: 107 MEQ/L (ref 95–107)
CO2: 22 MEQ/L (ref 20–31)
CREAT SERPL-MCNC: 0.86 MG/DL (ref 0.5–0.9)
EOSINOPHILS ABSOLUTE: 0.2 K/UL (ref 0–0.7)
EOSINOPHILS RELATIVE PERCENT: 1.9 %
GFR AFRICAN AMERICAN: >60
GFR NON-AFRICAN AMERICAN: >60
GLOBULIN: 3.1 G/DL (ref 2.3–3.5)
GLUCOSE BLD-MCNC: 104 MG/DL (ref 70–99)
HCT VFR BLD CALC: 31.6 % (ref 37–47)
HEMOGLOBIN: 10 G/DL (ref 12–16)
LYMPHOCYTES ABSOLUTE: 2.2 K/UL (ref 1–4.8)
LYMPHOCYTES RELATIVE PERCENT: 23.5 %
MCH RBC QN AUTO: 27.5 PG (ref 27–31.3)
MCHC RBC AUTO-ENTMCNC: 31.8 % (ref 33–37)
MCV RBC AUTO: 86.6 FL (ref 82–100)
MONOCYTES ABSOLUTE: 0.6 K/UL (ref 0.2–0.8)
MONOCYTES RELATIVE PERCENT: 6.6 %
NEUTROPHILS ABSOLUTE: 6.2 K/UL (ref 1.4–6.5)
NEUTROPHILS RELATIVE PERCENT: 67.4 %
PDW BLD-RTO: 15.1 % (ref 11.5–14.5)
PLATELET # BLD: 245 K/UL (ref 130–400)
POTASSIUM SERPL-SCNC: 4.1 MEQ/L (ref 3.4–4.9)
RBC # BLD: 3.65 M/UL (ref 4.2–5.4)
SODIUM BLD-SCNC: 139 MEQ/L (ref 135–144)
TOTAL PROTEIN: 6.4 G/DL (ref 6.3–8)
WBC # BLD: 9.2 K/UL (ref 4.8–10.8)

## 2022-10-12 PROCEDURE — 6360000002 HC RX W HCPCS: Performed by: SURGERY

## 2022-10-12 PROCEDURE — 6370000000 HC RX 637 (ALT 250 FOR IP): Performed by: SURGERY

## 2022-10-12 PROCEDURE — 92610 EVALUATE SWALLOWING FUNCTION: CPT

## 2022-10-12 PROCEDURE — 99221 1ST HOSP IP/OBS SF/LOW 40: CPT | Performed by: NEUROLOGICAL SURGERY

## 2022-10-12 PROCEDURE — 6360000002 HC RX W HCPCS: Performed by: EMERGENCY MEDICINE

## 2022-10-12 PROCEDURE — 94150 VITAL CAPACITY TEST: CPT

## 2022-10-12 PROCEDURE — 2580000003 HC RX 258: Performed by: SURGERY

## 2022-10-12 PROCEDURE — 6370000000 HC RX 637 (ALT 250 FOR IP): Performed by: PHYSICIAN ASSISTANT

## 2022-10-12 PROCEDURE — 1210000000 HC MED SURG R&B

## 2022-10-12 PROCEDURE — 72100 X-RAY EXAM L-S SPINE 2/3 VWS: CPT

## 2022-10-12 RX ORDER — SODIUM CHLORIDE 0.9 % (FLUSH) 0.9 %
5-40 SYRINGE (ML) INJECTION EVERY 12 HOURS SCHEDULED
Status: DISCONTINUED | OUTPATIENT
Start: 2022-10-12 | End: 2022-10-19 | Stop reason: HOSPADM

## 2022-10-12 RX ORDER — SODIUM CHLORIDE, SODIUM LACTATE, POTASSIUM CHLORIDE, CALCIUM CHLORIDE 600; 310; 30; 20 MG/100ML; MG/100ML; MG/100ML; MG/100ML
INJECTION, SOLUTION INTRAVENOUS CONTINUOUS
Status: DISCONTINUED | OUTPATIENT
Start: 2022-10-12 | End: 2022-10-12

## 2022-10-12 RX ORDER — ACETAMINOPHEN 325 MG/1
650 TABLET ORAL EVERY 4 HOURS PRN
Status: DISCONTINUED | OUTPATIENT
Start: 2022-10-12 | End: 2022-10-12

## 2022-10-12 RX ORDER — METHOCARBAMOL 500 MG/1
500 TABLET, FILM COATED ORAL 4 TIMES DAILY
Status: DISCONTINUED | OUTPATIENT
Start: 2022-10-12 | End: 2022-10-18

## 2022-10-12 RX ORDER — MAGNESIUM OXIDE 400 MG/1
400 TABLET ORAL DAILY
COMMUNITY

## 2022-10-12 RX ORDER — ATORVASTATIN CALCIUM 40 MG/1
40 TABLET, FILM COATED ORAL NIGHTLY
COMMUNITY

## 2022-10-12 RX ORDER — POLYETHYLENE GLYCOL 3350 17 G/17G
17 POWDER, FOR SOLUTION ORAL DAILY
Status: DISCONTINUED | OUTPATIENT
Start: 2022-10-12 | End: 2022-10-19 | Stop reason: HOSPADM

## 2022-10-12 RX ORDER — OXYCODONE HYDROCHLORIDE 5 MG/1
5 TABLET ORAL EVERY 4 HOURS PRN
Status: DISCONTINUED | OUTPATIENT
Start: 2022-10-12 | End: 2022-10-19 | Stop reason: HOSPADM

## 2022-10-12 RX ORDER — TRAZODONE HYDROCHLORIDE 50 MG/1
50 TABLET ORAL NIGHTLY
COMMUNITY

## 2022-10-12 RX ORDER — FUROSEMIDE 20 MG/1
20 TABLET ORAL DAILY
COMMUNITY

## 2022-10-12 RX ORDER — OXYCODONE HYDROCHLORIDE AND ACETAMINOPHEN 5; 325 MG/1; MG/1
1 TABLET ORAL EVERY 4 HOURS PRN
Status: DISCONTINUED | OUTPATIENT
Start: 2022-10-12 | End: 2022-10-12

## 2022-10-12 RX ORDER — DOFETILIDE 0.12 MG/1
125 CAPSULE ORAL 2 TIMES DAILY
COMMUNITY

## 2022-10-12 RX ORDER — BISACODYL 10 MG
10 SUPPOSITORY, RECTAL RECTAL DAILY PRN
Status: DISCONTINUED | OUTPATIENT
Start: 2022-10-12 | End: 2022-10-19 | Stop reason: HOSPADM

## 2022-10-12 RX ORDER — SODIUM CHLORIDE 9 MG/ML
INJECTION, SOLUTION INTRAVENOUS PRN
Status: DISCONTINUED | OUTPATIENT
Start: 2022-10-12 | End: 2022-10-19 | Stop reason: HOSPADM

## 2022-10-12 RX ORDER — ONDANSETRON 4 MG/1
4 TABLET, ORALLY DISINTEGRATING ORAL EVERY 8 HOURS PRN
Status: DISCONTINUED | OUTPATIENT
Start: 2022-10-12 | End: 2022-10-19 | Stop reason: HOSPADM

## 2022-10-12 RX ORDER — SODIUM CHLORIDE 0.9 % (FLUSH) 0.9 %
5-40 SYRINGE (ML) INJECTION PRN
Status: DISCONTINUED | OUTPATIENT
Start: 2022-10-12 | End: 2022-10-19 | Stop reason: HOSPADM

## 2022-10-12 RX ORDER — ENOXAPARIN SODIUM 100 MG/ML
40 INJECTION SUBCUTANEOUS DAILY
Status: DISCONTINUED | OUTPATIENT
Start: 2022-10-12 | End: 2022-10-18

## 2022-10-12 RX ORDER — AMLODIPINE BESYLATE 5 MG/1
5 TABLET ORAL DAILY
Status: DISCONTINUED | OUTPATIENT
Start: 2022-10-12 | End: 2022-10-13

## 2022-10-12 RX ORDER — ONDANSETRON 2 MG/ML
4 INJECTION INTRAMUSCULAR; INTRAVENOUS EVERY 6 HOURS PRN
Status: DISCONTINUED | OUTPATIENT
Start: 2022-10-12 | End: 2022-10-19 | Stop reason: HOSPADM

## 2022-10-12 RX ORDER — ACETAMINOPHEN 325 MG/1
650 TABLET ORAL EVERY 6 HOURS SCHEDULED
Status: DISCONTINUED | OUTPATIENT
Start: 2022-10-12 | End: 2022-10-19 | Stop reason: HOSPADM

## 2022-10-12 RX ORDER — PRASUGREL 10 MG/1
10 TABLET, FILM COATED ORAL DAILY
COMMUNITY

## 2022-10-12 RX ORDER — OXYCODONE HYDROCHLORIDE AND ACETAMINOPHEN 5; 325 MG/1; MG/1
2 TABLET ORAL EVERY 4 HOURS PRN
Status: DISCONTINUED | OUTPATIENT
Start: 2022-10-12 | End: 2022-10-12

## 2022-10-12 RX ORDER — TITANIUM DIOXIDE, OCTINOXATE, ZINC OXIDE 4.61; 1.6; .78 G/40ML; G/40ML; G/40ML
CREAM TOPICAL
COMMUNITY

## 2022-10-12 RX ORDER — METOPROLOL SUCCINATE 25 MG/1
25 TABLET, EXTENDED RELEASE ORAL DAILY
COMMUNITY

## 2022-10-12 RX ORDER — OXYCODONE HYDROCHLORIDE 5 MG/1
10 TABLET ORAL EVERY 4 HOURS PRN
Status: DISCONTINUED | OUTPATIENT
Start: 2022-10-12 | End: 2022-10-19 | Stop reason: HOSPADM

## 2022-10-12 RX ORDER — PANTOPRAZOLE SODIUM 40 MG/1
40 TABLET, DELAYED RELEASE ORAL
Status: DISCONTINUED | OUTPATIENT
Start: 2022-10-12 | End: 2022-10-19 | Stop reason: HOSPADM

## 2022-10-12 RX ADMIN — AMLODIPINE BESYLATE 5 MG: 5 TABLET ORAL at 09:04

## 2022-10-12 RX ADMIN — Medication 10 ML: at 21:43

## 2022-10-12 RX ADMIN — HYDROMORPHONE HYDROCHLORIDE 0.5 MG: 1 INJECTION, SOLUTION INTRAMUSCULAR; INTRAVENOUS; SUBCUTANEOUS at 08:46

## 2022-10-12 RX ADMIN — OXYCODONE 10 MG: 5 TABLET ORAL at 21:38

## 2022-10-12 RX ADMIN — SODIUM CHLORIDE, POTASSIUM CHLORIDE, SODIUM LACTATE AND CALCIUM CHLORIDE: 600; 310; 30; 20 INJECTION, SOLUTION INTRAVENOUS at 03:26

## 2022-10-12 RX ADMIN — FENTANYL CITRATE 50 MCG: 50 INJECTION, SOLUTION INTRAMUSCULAR; INTRAVENOUS at 00:18

## 2022-10-12 RX ADMIN — ACETAMINOPHEN 650 MG: 325 TABLET ORAL at 17:46

## 2022-10-12 RX ADMIN — Medication 10 ML: at 09:05

## 2022-10-12 RX ADMIN — OXYCODONE 10 MG: 5 TABLET ORAL at 16:35

## 2022-10-12 ASSESSMENT — PAIN SCALES - GENERAL
PAINLEVEL_OUTOF10: 8
PAINLEVEL_OUTOF10: 10
PAINLEVEL_OUTOF10: 8
PAINLEVEL_OUTOF10: 10
PAINLEVEL_OUTOF10: 8
PAINLEVEL_OUTOF10: 5

## 2022-10-12 ASSESSMENT — PAIN DESCRIPTION - LOCATION
LOCATION: BACK

## 2022-10-12 ASSESSMENT — ENCOUNTER SYMPTOMS
SHORTNESS OF BREATH: 0
NAUSEA: 0
EYE PAIN: 0
BACK PAIN: 1

## 2022-10-12 ASSESSMENT — PAIN - FUNCTIONAL ASSESSMENT: PAIN_FUNCTIONAL_ASSESSMENT: PREVENTS OR INTERFERES SOME ACTIVE ACTIVITIES AND ADLS

## 2022-10-12 ASSESSMENT — PAIN DESCRIPTION - ORIENTATION
ORIENTATION: LOWER
ORIENTATION: LOWER

## 2022-10-12 ASSESSMENT — PAIN DESCRIPTION - DESCRIPTORS
DESCRIPTORS: ACHING
DESCRIPTORS: DULL;STABBING
DESCRIPTORS: ACHING

## 2022-10-12 NOTE — PROGRESS NOTES
[facility]   Facility/Department: [facility]  Speech Language Pathology  Clinical Bedside Swallow Evaluation    NAME:Ashleigh Capellan  : 1950 (74 y.o.)   [x]   confirmed    MRN: [id number]  [facility]  ADMISSION DATE: 10/11/2022  PATIENT DIAGNOSIS(ES): Compression fracture of lumbar vertebrae, non-traumatic, initial encounter [facility]) Christiano Moneta  Fall, initial encounter [W19. XXXA]  Closed compression fracture of body of L1 vertebra ([address] [S32.010A]  Chief Complaint   Patient presents with    Fall     Patient Active Problem List    Diagnosis Date Noted    Closed compression fracture of body of L1 vertebra ([address] 10/12/2022    Vertebral fracture, osteoporotic, initial encounter ([address] 10/12/2022    Compression fracture of lumbar vertebrae, non-traumatic, initial encounter ([address] 10/11/2022    Coagulopathy, on coumadin 2013    Bilateral leg weakness 2018    Lumbar stenosis with neurogenic claudication 2018    Lumbar stenosis 08/15/2018    Synovial cyst of lumbar facet joint 08/15/2018    PE (pulmonary thromboembolism) ([address] 08/15/2018    Right-sided low back pain with right-sided sciatica 2016    Wheezes 2015    History of chest x-ray, normal, 8/6/14 10/17/2014    History of PTCA 2, stent x 2 , , Clekelya Nicks 2013    Abnormal EKG, needs cardiac clearence 2013    Hiatal hernia 2013    Calculus of gallbladder 2013    Need for tetanus booster 10/10/2013    Epistaxis 2012    ACE-inhibitor cough 07/10/2012    Nephrolithiasis     Depression     Hyperlipidemia     OA (osteoarthritis)     Osteoporosis     Hypertension     Fibrocystic breast disease     Urge incontinence     Anemia     CAD (coronary artery disease) 2008     Past Medical History:   Diagnosis Date    Abnormal EKG, needs cardiac clearence 2013    ACE-inhibitor cough 7/10/2012    ARB     Anemia     CAD (coronary artery disease) 2008    cardia stents x 2    Cancer ([address] facial skin cancer excision    Coagulopathy ([facility] 10/4/2012    Coagulopathy, on coumadin 11/13/2013    Cough 5/29/2012    Cough, Neg w/u Dr Gerhardt Settles, ? Betablocker 9/22/2014    Depression     Depression 6/13/2017    Epistaxis 8/20/2012    Fibrocystic breast disease     Hiatal hernia 5/3/2016    History of chest x-ray, normal, 8/6/14 10/17/2014    History of PTCA 2, stent x 2 2009, 2012, [employer] 12/23/2013    Hx of blood clots 2012    PE s/p RTKR post op    Hyperlipidemia     meds > 10 yrs    Hypertension     meds > 10 yrs    Nephrolithiasis     OA (osteoarthritis)     Osteoporosis     Right-sided low back pain with right-sided sciatica 5/3/2016    Urge incontinence     Wheezes 4/20/2015     Past Surgical History:   Procedure Laterality Date    CARDIAC SURGERY      stent x 2    COLONOSCOPY      CORONARY ANGIOPLASTY WITH STENT PLACEMENT  3/12    [facility]    ENDOSCOPY, COLON, DIAGNOSTIC      EYE SURGERY      [place]aco with IOL OU    HERNIA REPAIR      left inguinal hernia    HYSTERECTOMY, TOTAL ABDOMINAL (CERVIX REMOVED)  1998    JOINT REPLACEMENT  2008    LTKR    JOINT REPLACEMENT  2017    RTSR    KNEE ARTHROSCOPY  11/11    R knee, [facility]    LITHOTRIPSY  2003 approx    [place] LAMINECTOMY,>2 SGMT,LUMBAR N/A 8/22/2018    SPINE L3-4, L4-5, L5-S1 LAMINECTOMY, L3-4, L4-5 POSTERIOR LATERAL FUSION, L5-S1 INSTRUMENTED POSTERIOR INTERBODY FUSION, PRONE, AXIS SPINE TABLE, ACTIFUSE, CELL SAVER, NEW MICROSCOPE, NEW C-ARM X2, PEDIGUARD, MISONIX BONE SCALPAL, PNEUMATIC KERRISONS, SPINAL CORD MONITORING, LUMBAR BRACE, GLOBUS, OPEN TLIF, (H.S.C.G.#[id number]) performed by Karl Swanson MD at [address]  2/2008    [address] Right     SKIN GRAFT  2/9/11    JORDAN AND BSO (CERVIX REMOVED)  1996 approx    TOTAL KNEE ARTHROPLASTY Left 08/2008     Allergies   Allergen Reactions    Tolterodine Diarrhea     Patient CANNOT tolerate this med. Causes diarrhea!  Detrol       DATE ONSET: 10/11/22    Date of Evaluation: 10/12/2022 Evaluating Therapist: MJ Gaytan    Dysphagia Diagnosis  Dysphagia Diagnosis: Swallow function appears WFL  Dysphagia Impression : Oral and pharyngeal swallow appear WFL with no overt s/s of aspiration. No skilled speech therapy services are warranted at this time. Recommended Diet     Diet Solids Recommendation: Regular  Liquid Consistency Recommendation: Thin  Recommended Form of Meds: Whole with water         Reason for Referral  Juarez Matthew was referred for a bedside swallow evaluation to assess the efficiency of her swallow function, identify signs and symptoms of aspiration, identify risk factors, and make recommendations regarding safe dietary consistencies, effective compensatory strategies, and safe eating environment. General  Chart Reviewed: Yes  Behavior/Cognition: Alert; Cooperative  Temperature Spikes Noted: No  Respiratory Status: Room air  O2 Device: None (Room air)  Communication Observation: Functional  Follows Directions: Simple  Dentition: Adequate  Patient Positioning: Upright in bed  Baseline Vocal Quality: Normal  Consistencies Administered: Regular; Soft and Bite-Sized; Thin    Vision and Hearing  Vision  Vision: Within Functional Limits  Hearing  Hearing: Within functional limits    Current Diet level  Current Diet : Regular  Current Liquid Diet : Thin    Oral Motor  Labial: No impairment  Dentition: Natural  Oral Hygiene: Moist;Clean  Lingual: No impairment  Velum: No Impairment  Mandible: No impairment    Oral/Pharyngeal Phase  Oral Phase - Comment: Oral phase WFL  Pharyngeal Phase: Pharyngeal phase WFL via observation and palpation with adequate and timely hyolaryngeal elevation and no overt s/s of aspiration. PO Trials  Neuromuscular Estim Used: No  Assessment Method(s): Observation;Palpation  Vocal Quality: No Impairment  Consistency Presented: Regular; Soft & Bite Sized; Thin  How Presented: Self-fed/presented  How much presented:  (2 of each consistency)  Bolus Acceptance: No impairment  Bolus Formation/Control: No impairment  Propulsion: No impairment  Oral Residue: None  Initiation of Swallow: No impairment  Laryngeal Elevation: Functional  Aspiration Signs/Symptoms: None  Pharyngeal Phase Characteristics: No impairment, issues, or problems                                  Dysphagia Diagnosis  Dysphagia Diagnosis: Swallow function appears WFL  Dysphagia Impression : Oral and pharyngeal swallow appear WFL with no overt s/s of aspiration. No skilled speech therapy services are warranted at this time. Dysphagia Outcome Severity Scale: Level 7: Normal in all situations     Recommendations  Requires SLP Intervention: No  D/C Recommendations: No follow up therapy recommended post discharge  Diet Solids Recommendation: Regular  Liquid Consistency Recommendation: Thin  Recommended Form of Meds: Whole with water  Duration of Treatment: No f/u  Frequency of Treatment: No f/u    Prognosis  Speech Therapy Prognosis  Prognosis: Good  Prognosis Considerations: Age;Previous Level of Function    Education  Individuals consulted  Consulted and agree with results and recommendations: Patient;RN  RN Name: Yasmin Hanna    Treatment/Goals       Paulton in place: Yes  Type of devices: All fall risk precautions in place    Pain Assessment  Patient does not c/o pain. Pain Re-assessment  Patient does not c/o pain.       Therapy Time   Time in: 1111  Time out: [address]  Minutes: 8              Signature: Electronically signed by MJ Almeida on 10/12/2022 at 12:01 PM

## 2022-10-12 NOTE — PROGRESS NOTES
[facility]   Facility/Department: [facility]  Speech Language Pathology    Alejandra Lima Enzo  1950  [id number]    Date: 10/12/2022      Speech Therapy attempted to see Laura Ohmartha on this date for a/an:    Clinical Bedside Swallow Evaluation    Pt was unable to be seen due to:   Nursing deferred: Spoke with Ramesh RN and patient is going to be going down for imaging. Hold on evaluations.          Electronically signed by Myrtie Nageotte, SLP on 10/12/22 at 8:48 AM EDT

## 2022-10-12 NOTE — PLAN OF CARE
Problem: Pain  Goal: Verbalizes/displays adequate comfort level or baseline comfort level  10/12/2022 1055 by Zoie Yeh RN  Outcome: Progressing  10/12/2022 0451 by Damian Matt RN  Outcome: Progressing     Problem: Safety - Adult  Goal: Free from fall injury  10/12/2022 1055 by Zoie Yeh RN  Outcome: Progressing  10/12/2022 0451 by Damian Matt RN  Outcome: Progressing

## 2022-10-12 NOTE — ED PROVIDER NOTES
[facility] ED  EMERGENCY DEPARTMENT ENCOUNTER      Pt Name: Kimberly Logan  MRN: [id number]  [place] 1950  Date of evaluation: 10/11/2022  Provider: Sean Ferris MD    [address] [place] [address]       Chief Complaint   Patient presents with    Fall         HISTORY OF PRESENT ILLNESS   (Location/Symptom, Timing/Onset, Context/Setting, Quality, Duration, Modifying Factors, Severity)  Note limiting factors. 75-year-old female presenting with low back pain. Patient states she tripped and fell while picking something up earlier today. Has been able to walk since then. Notes pain to the lower back. Did not hit her head. She is on Eliquis. Received fentanyl en route. Nursing Notes were reviewed. REVIEW OF SYSTEMS    (2-9 systems for level 4, 10 or more for level 5)     Review of Systems   Musculoskeletal:  Positive for back pain. All other systems reviewed and are negative. Except as noted above the remainder of the review of systems was reviewed and negative. PAST MEDICAL HISTORY     Past Medical History:   Diagnosis Date    Abnormal EKG, needs cardiac clearence 12/23/2013    ACE-inhibitor cough 7/10/2012    ARB     Anemia     CAD (coronary artery disease) 2/2008    cardia stents x 2    Cancer ([facility]     facial skin cancer excision    Coagulopathy ([facility] 10/4/2012    Coagulopathy, on coumadin 11/13/2013    Cough 5/29/2012    Cough, Neg w/u Dr Trista Petty, ?  Betablocker 9/22/2014    Depression     Depression 6/13/2017    Epistaxis 8/20/2012    Fibrocystic breast disease     Hiatal hernia 5/3/2016    History of chest x-ray, normal, 8/6/14 10/17/2014    History of PTCA 2, stent x 2 2009, 2012, Briana Dus 12/23/2013    Hx of blood clots 2012    PE s/p RTKR post op    Hyperlipidemia     meds > 10 yrs    Hypertension     meds > 10 yrs    Nephrolithiasis     OA (osteoarthritis)     Osteoporosis     Right-sided low back pain with right-sided sciatica 5/3/2016    Urge incontinence     Wheezes 4/20/2015 SURGICAL HISTORY       Past Surgical History:   Procedure Laterality Date    CARDIAC SURGERY      stent x 2    COLONOSCOPY      CORONARY ANGIOPLASTY WITH STENT PLACEMENT  3/12    [facility]    ENDOSCOPY, COLON, DIAGNOSTIC      EYE SURGERY      [place] with IOL OU    HERNIA REPAIR      left inguinal hernia    HYSTERECTOMY, TOTAL ABDOMINAL  1998    JOINT REPLACEMENT  2008    LTKR    JOINT REPLACEMENT  2017    RTSR    KNEE ARTHROSCOPY  11/11    R knee, [facility]    LITHOTRIPSY  2003 approx    [place] LAMINECTOMY,>2 SGMT,LUMBAR N/A 8/22/2018    SPINE L3-4, L4-5, L5-S1 LAMINECTOMY, L3-4, L4-5 POSTERIOR LATERAL FUSION, L5-S1 INSTRUMENTED POSTERIOR INTERBODY FUSION, PRONE, AXIS SPINE TABLE, ACTIFUSE, CELL SAVER, NEW MICROSCOPE, NEW C-ARM X2, PEDIGUARD, MISONIX BONE SCALPAL, PNEUMATIC KERRISONS, SPINAL CORD MONITORING, LUMBAR BRACE, GLOBUS, OPEN TLIF, (H.S.C.G.#[id number]) performed by Porsche Estevez MD at [address]  2/2008    [facility]    ROTATOR CUFF REPAIR Right     SKIN GRAFT  2/9/11    JORDAN AND BSO  1996 approx    TOTAL KNEE ARTHROPLASTY Left 08/2008         CURRENT MEDICATIONS       Previous Medications    ACETAMINOPHEN (TYLENOL) 325 MG TABLET    Take 2 tablets by mouth every 6 hours as needed for Pain    AMLODIPINE (NORVASC) 5 MG TABLET    Take 1 tablet by mouth daily    ASPIRIN 81 MG EC TABLET    Take 81 mg by mouth daily    CALCIUM-MAGNESIUM 500-250 MG TABS    Take 1 tablet by mouth 2 times daily     COENZYME Q10 (COQ10) 100 MG CAPS    Take 1 tablet by mouth daily     DABIGATRAN (PRADAXA) 75 MG CAPSULE    Take 1 capsule by mouth 2 times daily    DARIFENACIN (ENABLEX) 7.5 MG EXTENDED RELEASE TABLET    TAKE 2 TABLETS (15 MG) EVERY MORNING AND 1 TABLET (7.5 MG) EVERY EVENING    FERROUS SULFATE 325 (65 FE) MG TABLET    Take 325 mg by mouth daily (with breakfast).     FLUOXETINE (PROZAC) 20 MG CAPSULE    Take 60 mg by mouth daily    MULTIPLE VITAMIN (MULTI VITAMIN DAILY PO)    Take 1 tablet by mouth daily     OMEGA 3 340 MG CPDR Take 500 mg by mouth daily     PANTOPRAZOLE (PROTONIX) 40 MG TABLET    Take 1 tablet by mouth daily    SIMVASTATIN (ZOCOR) 40 MG TABLET    TAKE 1 TABLET EVERY EVENING    VITAMIN D (CHOLECALCIFEROL) 1000 UNITS CAPS CAPSULE    Take 1,000 Units by mouth 2 times daily     ZOLPIDEM (AMBIEN CR) 6.25 MG EXTENDED RELEASE TABLET    Take 12.5 mg by mouth nightly as needed for Sleep. ALLERGIES     Tolterodine    FAMILY HISTORY       Family History   Problem Relation Age of Onset    Diabetes Mother     High Blood Pressure Mother     Other Mother          of creutsfeld jocob disease / mad cow disease    COPD Father     Heart Disease Father     No Known Problems Sister     Atrial Fibrillation Brother     High Blood Pressure Brother     Other Brother         post polio    No Known Problems Daughter     High Cholesterol Son     Stroke Sister     Diabetes Brother     COPD Brother     Other Brother         suicide at age 77          SOCIAL HISTORY       Social History     Socioeconomic History    Marital status:    Tobacco Use    Smoking status: Never    Smokeless tobacco: Never   Vaping Use    Vaping Use: Never used   Substance and Sexual Activity    Alcohol use: Yes     Comment: occ    Drug use: No       SCREENINGS    Renan Coma Scale  Eye Opening: Spontaneous  Best Verbal Response: Oriented  Best Motor Response: Obeys commands  Stanley Coma Scale Score: 15          PHYSICAL EXAM    (up to 7 for level 4, 8 or more for level 5)     ED Triage Vitals [10/11/22 2043]   BP Temp Temp Source Heart Rate Resp SpO2 Height Weight   (!) 148/77 98.5 °F (36.9 °C) Oral (!) 105 16 94 % -- --       Physical Exam  Vitals and nursing note reviewed. Constitutional:       General: She is not in acute distress. Appearance: Normal appearance. She is well-developed. She is not ill-appearing. HENT:      Head: Normocephalic and atraumatic.       Mouth/Throat:      Mouth: Mucous membranes are moist.      Pharynx: Oropharynx is clear.   Eyes:      Extraocular Movements: Extraocular movements intact. Conjunctiva/sclera: Conjunctivae normal.   Cardiovascular:      Rate and Rhythm: Normal rate and regular rhythm. Pulmonary:      Effort: Pulmonary effort is normal.      Breath sounds: Normal breath sounds. Abdominal:      General: Bowel sounds are normal.      Palpations: Abdomen is soft. Tenderness: There is no abdominal tenderness. Comments: Pain to palpation of L spine, no step offs or deformities   Musculoskeletal:         General: No deformity. Normal range of motion. Cervical back: Normal range of motion and neck supple. Comments: 5/5 lower extremity strength b/l   Skin:     General: Skin is warm and dry. Capillary Refill: Capillary refill takes less than 2 seconds. Neurological:      General: No focal deficit present. Mental Status: She is alert and oriented to person, place, and time. Mental status is at baseline. Cranial Nerves: No cranial nerve deficit. Psychiatric:         Thought Content: Thought content normal.       DIAGNOSTIC RESULTS     EKG: All EKG's are interpreted by the Emergency Department Physician who either signs or Co-signs this chart in the absence of a cardiologist.    RADIOLOGY:   Non-plain film images such as CT, Ultrasound and MRI are read by the radiologist. Plain radiographic images are visualized and preliminarily interpreted by the emergency physician with the below findings:    Interpretation per the Radiologist below, if available at the time of this note:    CT Head WO Contrast    (Results Pending)   CT LUMBAR SPINE WO CONTRAST    (Results Pending)       LABS:  Labs Reviewed   COMPREHENSIVE METABOLIC PANEL   CBC WITH AUTO DIFFERENTIAL       All other labs were within normal range or not returned as of this dictation.     EMERGENCY DEPARTMENT COURSE and DIFFERENTIAL DIAGNOSIS/MDM:   Vitals:    Vitals:    10/11/22 2145 10/11/22 2200 10/11/22 2230 10/11/22 2330 BP:  (!) 164/109 (!) 170/97 (!) 177/109   Pulse: (!) 101  (!) 102    Resp:       Temp:       TempSrc:       SpO2: 94% 91% 91% 90%       MDM  Number of Diagnoses or Management Options  Closed compression fracture of body of L1 vertebra (HCC)  Fall, initial encounter  Diagnosis management comments: Noted L1 compression fracture. Patient still with significant pain and unable to ambulate after medication. Admitted to trauma service, Yareli Jewell. Otherwise stable throughout ED course. Procedures    CRITICAL CARE TIME   Total Critical Care time was 0 minutes, excluding separately reportable procedures. There was a high probability of clinically significant/life threatening deterioration in the patient's condition which required my urgent intervention. FINAL IMPRESSION      1. Closed compression fracture of body of L1 vertebra (HCC)    2.  Fall, initial encounter          DISPOSITION/PLAN   DISPOSITION Admitted 10/11/2022 11:45:59 PM      (Please note that portions of this note were completed with a voice recognition program.  Efforts were made to edit the dictations but occasionally words are mis-transcribed.)    Reji Moise MD (electronically signed)  Attending Emergency Physician        Reji Moise MD  10/11/22 8791

## 2022-10-12 NOTE — ED TRIAGE NOTES
Patient arrived to ED via EMS from home with c/o a fall while bending down which has caused lower back pain. She did hit her head on carpet, is on xerelto, and did not loss consciousness.

## 2022-10-12 NOTE — H&P
Trauma Consult / H & P Note    BASIC INJURY INFORMATION:  Level of activation: CAT 2  Mode of transport: EMS  Mechanism of injury: Fall from Standing  Complicating features: NA  Protective measures: NA    HISTORY OF PRESENT INJURY:   Ranjan Player is a 74 y.o. female s/p trip and fall who now has low back pain. Pain is moderate to severe and constant; sharp and worse with pressure. ED w/u revealed compression fx. PRIMARY SURVEY:  Airway: Intact  Breathing: Normal   Breath Sounds: Breath Sounds Equal Bilaterally  Circulation:    Pulses: Normal   Skin: Warm  Disability:   Pupils: PERRL   GCS:    Best Eyes: 4    Best Verbal: 5    Best Motor: 6    Total: 15    SECONDARY SURVEY:  Vitals:   Vitals:    10/11/22 2230 10/11/22 2330 10/12/22 0045 10/12/22 0047   BP: (!) 170/97 (!) 177/109 (!) 142/88    Pulse: (!) 102  (!) 101    Resp:   18    Temp:   98.5 °F (36.9 °C)    TempSrc:   Oral    SpO2: 91% 90% 90% 90%   Weight:    170 lb (77.1 kg)   Height:    5' 4\" (1.626 m)     Neurologic: Alert and Oriented, Appropriate  HEENT:   Head: No lacerations, bony step-offs, or abrasions   Eyes: EOM intact   Ears: Not Examined   Nose: No bloody discharge;    Throat: Not examined  Neck: No midline tenderness  Pulmonary: External exam: no crepitus or pain with palpation, no contusions or abrasions;  Cardiovascular:    Pulses: Bilateral radial, femoral, DP and PT pulses are normal;  Abdomen: Palpation: no tenderness   Rectal: Not performed  Pelvis/Perineum: Pelvis is stable to palpation;  Musculoskeletal:    Back/Spine: Additional findings: L spine tender   Extremities: No gross upper or lower extremity signs of trauma;    PAST MEDICAL HISTORY:  Past Medical History:   Diagnosis Date    Abnormal EKG, needs cardiac clearence 12/23/2013    ACE-inhibitor cough 7/10/2012    ARB     Anemia     CAD (coronary artery disease) 2/2008    cardia stents x 2    Cancer ([facility] [address]     facial skin cancer excision    Coagulopathy ([facility] [address] 10/4/2012 Coagulopathy, on coumadin 11/13/2013    Cough 5/29/2012    Cough, Neg w/u Dr Gerhardt Settles, ? Betablocker 9/22/2014    Depression     Depression 6/13/2017    Epistaxis 8/20/2012    Fibrocystic breast disease     Hiatal hernia 5/3/2016    History of chest x-ray, normal, 8/6/14 10/17/2014    History of PTCA 2, stent x 2 2009, 2012, [employer] 12/23/2013    Hx of blood clots 2012    PE s/p RTKR post op    Hyperlipidemia     meds > 10 yrs    Hypertension     meds > 10 yrs    Nephrolithiasis     OA (osteoarthritis)     Osteoporosis     Right-sided low back pain with right-sided sciatica 5/3/2016    Urge incontinence     Wheezes 4/20/2015       PAST SURGICAL HISTORY:  Past Surgical History:   Procedure Laterality Date    CARDIAC SURGERY      stent x 2    COLONOSCOPY      CORONARY ANGIOPLASTY WITH STENT PLACEMENT  3/12    [facility]    ENDOSCOPY, COLON, DIAGNOSTIC      EYE SURGERY      Phaco with IOL OU    HERNIA REPAIR      left inguinal hernia    HYSTERECTOMY, TOTAL ABDOMINAL  1998    JOINT REPLACEMENT  2008    LTKR    JOINT REPLACEMENT  2017    RTSR    KNEE ARTHROSCOPY  11/11    R knee, [facility]    LITHOTRIPSY  2003 approx    [place] LAMINECTOMY,>2 SGMT,LUMBAR N/A 8/22/2018    SPINE L3-4, L4-5, L5-S1 LAMINECTOMY, L3-4, L4-5 POSTERIOR LATERAL FUSION, L5-S1 INSTRUMENTED POSTERIOR INTERBODY FUSION, PRONE, AXIS SPINE TABLE, ACTIFUSE, CELL SAVER, NEW MICROSCOPE, NEW C-ARM X2, PEDIGUARD, MISONIX BONE SCALPAL, PNEUMATIC KERRISONS, SPINAL CORD MONITORING, LUMBAR BRACE, GLOBUS, OPEN TLIF, (H.S.C.G.#[id number]) performed by Karl Swanson MD at [facility]  2/2008    [address] Right     SKIN GRAFT  2/9/11    JORDAN AND BSO  1996 approx    TOTAL KNEE ARTHROPLASTY Left 08/2008       PRE-ADMISSION MEDICATIONS:   Prior to Admission medications    Medication Sig Start Date End Date Taking?  Authorizing Provider   simvastatin (ZOCOR) 40 MG tablet TAKE 1 TABLET EVERY EVENING 3/21/19   Jerald Vallecillo MD   amLODIPine (NORVASC) 5 MG tablet Take 1 tablet by mouth daily 10/25/18   Frederic Orts, DO   aspirin 81 MG EC tablet Take 81 mg by mouth daily    Historical Provider, MD   zolpidem (AMBIEN CR) 6.25 MG extended release tablet Take 12.5 mg by mouth nightly as needed for Sleep. Historical Provider, MD   acetaminophen (TYLENOL) 325 MG tablet Take 2 tablets by mouth every 6 hours as needed for Pain 8/28/18   ANA Sandoval - CNP   dabigatran (PRADAXA) 75 MG capsule Take 1 capsule by mouth 2 times daily  Patient not taking: Reported on 10/12/2022 8/27/18   Arelia ANA Braxton - CNP   FLUoxetine (PROZAC) 20 MG capsule Take 60 mg by mouth daily 8/22/18   Historical Provider, MD   pantoprazole (PROTONIX) 40 MG tablet Take 1 tablet by mouth daily 3/26/18   Reynaldo Dumont,    darifenacin (ENABLEX) 7.5 MG extended release tablet TAKE 2 TABLETS (15 MG) EVERY MORNING AND 1 TABLET (7.5 MG) EVERY EVENING 3/26/18   Frederic Orts, DO   Calcium-Magnesium 500-250 MG TABS Take 1 tablet by mouth 2 times daily     Historical Provider, MD   Multiple Vitamin (MULTI VITAMIN DAILY PO) Take 1 tablet by mouth daily     Historical Provider, MD   Vitamin D (CHOLECALCIFEROL) 1000 UNITS CAPS capsule Take 1,000 Units by mouth 2 times daily     Historical Provider, MD   OMEGA 3 340 MG CPDR Take 500 mg by mouth daily     Historical Provider, MD   ferrous sulfate 325 (65 FE) MG tablet Take 325 mg by mouth daily (with breakfast).     Historical Provider, MD   Coenzyme Q10 (COQ10) 100 MG CAPS Take 1 tablet by mouth daily     Historical Provider, MD       ALLERGIES:  Tolterodine    SOCIAL HISTORY:   Social History     Socioeconomic History    Marital status:    Tobacco Use    Smoking status: Never    Smokeless tobacco: Never   Vaping Use    Vaping Use: Never used   Substance and Sexual Activity    Alcohol use: Yes     Comment: occ    Drug use: No       FAMILY HISTORY:  Family History   Problem Relation Age of Onset    Diabetes Mother     High Blood Pressure Mother Other Mother          of creutsfeld jocob disease / mad cow disease    COPD Father     Heart Disease Father     No Known Problems Sister     Atrial Fibrillation Brother     High Blood Pressure Brother     Other Brother         post polio    No Known Problems Daughter     High Cholesterol Son     Stroke Sister     Diabetes Brother     COPD Brother     Other Brother         suicide at age 77           REVIEW OF SYSTEMS:      Review of Systems        Constitutional: Negative for  weight loss  HENT: Negative for congestion, facial swelling and bloody nose  Eyes: Negative for  vision changes  Respiratory: Negative for shortness of breath, difficulty breathing  Cardiovascular: Negative for chest wall pain. Gastrointestinal: Negative for abdominal distention, abdominal pain and vomiting. Genitourinary: Negative for  hematuria  Musculoskeletal: fall, back pain   Skin: Negative for bruising, abrasions  Neurological: Negative for dizziness, weakness and light-headedness. Hematological: Negative for easy bruising/bleeding  Psychiatric/Behavioral: Negative for behavioral problems. Except as noted above the remainder of the review of systems was reviewed and negative.          BASIC LABS:    CBC with Differential:    Lab Results   Component Value Date/Time    WBC 9.2 10/11/2022 11:45 PM    RBC 3.65 10/11/2022 11:45 PM    RBC 3.82 2012 10:53 AM    HGB 10.0 10/11/2022 11:45 PM    HCT 31.6 10/11/2022 11:45 PM     10/11/2022 11:45 PM    MCV 86.6 10/11/2022 11:45 PM    MCH 27.5 10/11/2022 11:45 PM    MCHC 31.8 10/11/2022 11:45 PM    RDW 15.1 10/11/2022 11:45 PM    LYMPHOPCT 23.5 10/11/2022 11:45 PM    MONOPCT 6.6 10/11/2022 11:45 PM    BASOPCT 0.6 10/11/2022 11:45 PM    MONOSABS 0.6 10/11/2022 11:45 PM    LYMPHSABS 2.2 10/11/2022 11:45 PM    EOSABS 0.2 10/11/2022 11:45 PM    BASOSABS 0.1 10/11/2022 11:45 PM     CMP:    Lab Results   Component Value Date/Time     10/11/2022 11:45 PM    K 4.1 10/11/2022 11:45 PM     10/11/2022 11:45 PM    CO2 22 10/11/2022 11:45 PM    BUN 27 10/11/2022 11:45 PM    CREATININE 0.86 10/11/2022 11:45 PM    GFRAA >60.0 10/11/2022 11:45 PM    LABGLOM >60.0 10/11/2022 11:45 PM    GLUCOSE 104 10/11/2022 11:45 PM    GLUCOSE 88 05/29/2012 10:53 AM    PROT 6.4 10/11/2022 11:45 PM    LABALBU 3.3 10/11/2022 11:45 PM    LABALBU 3.8 05/29/2012 10:53 AM    CALCIUM 9.0 10/11/2022 11:45 PM    BILITOT <0.2 10/11/2022 11:45 PM    ALKPHOS 70 10/11/2022 11:45 PM    AST 23 10/11/2022 11:45 PM    ALT 15 10/11/2022 11:45 PM     BMP:    Lab Results   Component Value Date/Time     10/11/2022 11:45 PM    K 4.1 10/11/2022 11:45 PM     10/11/2022 11:45 PM    CO2 22 10/11/2022 11:45 PM    BUN 27 10/11/2022 11:45 PM    LABALBU 3.3 10/11/2022 11:45 PM    LABALBU 3.8 05/29/2012 10:53 AM    CREATININE 0.86 10/11/2022 11:45 PM    CALCIUM 9.0 10/11/2022 11:45 PM    GFRAA >60.0 10/11/2022 11:45 PM    LABGLOM >60.0 10/11/2022 11:45 PM    GLUCOSE 104 10/11/2022 11:45 PM    GLUCOSE 88 05/29/2012 10:53 AM     Magnesium:  Lab Results   Component Value Date/Time    MG 1.9 08/23/2018 05:37 AM     Troponin:    Lab Results   Component Value Date/Time    TROPONINI <0.010 04/27/2019 01:45 AM     INR:  No results for input(s): INR in the last 72 hours.         RADIOLOGY:  Prelim imaging w/L1 compression fx    ASSESSMENT:  64yo F s/p fall from standing w/L1 compression fx      PLAN:  Admit, pain control, neurosurg consult for spine fx; PT/OT eval; f/u final reads      Jim Valle MD

## 2022-10-12 NOTE — PROGRESS NOTES
TRAUMA DAILY PROGRESS NOTE      Patient Name: Jaziel Wells  Admission Date 10/11/2022    Hospital Day: 1  Patient seen and examined on 10/12/2022    INTERVAL HISTORY/EVENTS     Background:  Jaziel Wells is a 74 y.o. female with a PMHx of HTN, HLD, OA, CAD s/p L4-5 fusion with instrumentation and depression presented to [address] [place] [address] s/p mechanical fall from standing. (-) head strike, (-) LOC, (+) Eliquis. Admitted to trauma. NSGY consulted. Trauma work up found L1 compression fracture. Hospital Course:  10/11/2022: Presented to ED with c/o low back pain s/p mechanical fall from standing. (-) head strike, (-) LOC, (+) Eliquis. Admitted to trauma. NSGY consulted. 24 Hour Events:  Admitted to our trauma service as above. NSGY with recommendations for MRI of spine. Patient denies LE weakness or numbness. Denies loss of bladder/bowel control. Reports pain well controlled. VSS on room air. PHYSICAL EXAM     Vitals Average, Min and Max for last 24 hours:  Temp: Temp: 98.2 °F (36.8 °C); Temp  Av.4 °F (36.9 °C)  Min: 98.2 °F (36.8 °C)  Max: 98.5 °F (36.9 °C)  Respirations: Resp  Av.7  Min: 16  Max: 18  Pulse: Pulse  Av  Min: 96  Max: 107  Blood Pressure: Systolic (44VCL), EVC:168 , Min:142 , ASY:968   ; Diastolic (04FDV), SXF:69, Min:77, Max:109  SpO2: SpO2  Av.5 %  Min: 90 %  Max: 94 %    24hr Intake/Output: No intake or output data in the 24 hours ending 10/12/22 1134    Vitals: BP (!) 149/98   Pulse (!) 107   Temp 98.2 °F (36.8 °C) (Oral)   Resp 16   Ht 5' 4\" (1.626 m)   Wt 170 lb (77.1 kg)   LMP 1998   SpO2 92%   BMI 29.18 kg/m²     Physical Exam:  Constitutional: Lying supine in bed. Alert and conversant. In good spirits. HEENT: Atraumatic normocephalic. Cardiovascular: Regular rate and rhythm. Pulmonary: Clear to ausculation bilaterally on room air. No wheezing, rhonchi or rales. Abdominal: Soft. Non-distended. Non-tender.   Musculoskeletal: Good ROM in all extremities. No edema. TTP to lumbar region. Neurological: Alert, awake, and orientated x 3. Motor and sensory grossly intact. No focal deficits. GCS of 15. LABORATORY RESULTS (LAST 24 HOURS)     CBC with Differential:    Lab Results   Component Value Date/Time    WBC 9.2 10/11/2022 11:45 PM    RBC 3.65 10/11/2022 11:45 PM    RBC 3.82 05/29/2012 10:53 AM    HGB 10.0 10/11/2022 11:45 PM    HCT 31.6 10/11/2022 11:45 PM     10/11/2022 11:45 PM    MCV 86.6 10/11/2022 11:45 PM    MCH 27.5 10/11/2022 11:45 PM    MCHC 31.8 10/11/2022 11:45 PM    RDW 15.1 10/11/2022 11:45 PM    LYMPHOPCT 23.5 10/11/2022 11:45 PM    MONOPCT 6.6 10/11/2022 11:45 PM    BASOPCT 0.6 10/11/2022 11:45 PM    MONOSABS 0.6 10/11/2022 11:45 PM    LYMPHSABS 2.2 10/11/2022 11:45 PM    EOSABS 0.2 10/11/2022 11:45 PM    BASOSABS 0.1 10/11/2022 11:45 PM     CMP:    Lab Results   Component Value Date/Time     10/11/2022 11:45 PM    K 4.1 10/11/2022 11:45 PM     10/11/2022 11:45 PM    CO2 22 10/11/2022 11:45 PM    BUN 27 10/11/2022 11:45 PM    CREATININE 0.86 10/11/2022 11:45 PM    GFRAA >60.0 10/11/2022 11:45 PM    LABGLOM >60.0 10/11/2022 11:45 PM    GLUCOSE 104 10/11/2022 11:45 PM    GLUCOSE 88 05/29/2012 10:53 AM    PROT 6.4 10/11/2022 11:45 PM    LABALBU 3.3 10/11/2022 11:45 PM    LABALBU 3.8 05/29/2012 10:53 AM    CALCIUM 9.0 10/11/2022 11:45 PM    BILITOT <0.2 10/11/2022 11:45 PM    ALKPHOS 70 10/11/2022 11:45 PM    AST 23 10/11/2022 11:45 PM    ALT 15 10/11/2022 11:45 PM     Magnesium:    Lab Results   Component Value Date/Time    MG 1.9 08/23/2018 05:37 AM     PT/INR:    Lab Results   Component Value Date/Time    PROTIME 13.2 07/30/2018 02:14 PM    PROTIME 26.0 07/28/2014 10:16 AM    INR 1.3 07/30/2018 02:14 PM     PTT:    Lab Results   Component Value Date/Time    APTT 45.1 07/30/2018 02:14 PM       IMAGING RESULTS (PERSONALLY REVIEWED)     All admission reviewed  MRI pending.     ASSESSMENT & PLAN     Diagnoses:  S/p mechanical fall from standing on 10/11/22  L1 compression fracture  Acute pain due to traumatic injury      PMHx: HTN, HLD, OA, CAD s/p L4-5 fusion with instrumentation and depression    Incidental Findings: None, JLR 10/12/22      ASSESSMENT/PLAN:  Neurological: Acute L1 compression fracture  - NSGY consulted with rec's for MRI  - Pain control with Tylenol 650mg q6hr scheduled, Oxycodone 5/10mg q4hrs prn moderate/severe pain and Robaxin 500mg four times daily. Cardiovascular:   - Continue home Norvasc  - Holding home anticoagulation until final recommendations from [address] [place] [address]. Respiratory: No acute issues  - Maintain O2 sats > 92%  - Encourage IS 10 x hourly     GI/Diet: No acute issues  - Ok for regular diet  - Continue bowel regimen     Renal/Electrolytes:   - HLIV  - BMP as needed     ID: No active infection. Remains afebrile, normotensive, and without leukocytosis. - No indication for Abx  - CBC as needed     Heme: HDS  - No indication for transfusion.  - CBC as needed    Endocrine:   - No acute issues. MSK: Acute L2 compression fracture  - Spines: Awaiting final NSGY recommendations as above  - Weight Bearing Restrictions: none  - Activity: bedrest until final NSGY recs  - PT/OT: Pending activity order from NSGY    Prophylaxis:   - SCDs for VTE PPx  - No GI PPx indicated  - Holding home anticoagulation until final recommendations from [address] [place] [address]. Lines/Tubes/Drains:  - Maintain PIVs  - No indication for collado catheter, monitor for urinary retention    Dispo: Remain on trauma service for MRI of spine and final recommendations from [address] [place] [address]. Accom Status: General Status      - Follow up with NSGY (Dr. Javad Cody) TBD.  - No follow up with Trauma Surgery needed. Please call for any questions or concerns.   Vibha Beatty Treatment Team Sticky Note for contact information]      Adalberto Blas PA-C  Trauma/Critical Care  [see Treatment Team Sticky Note for contact information]     This patient's plan of care was discussed and made in collaboration with Trauma Attending physician, Terrence Tee MD.

## 2022-10-12 NOTE — FLOWSHEET NOTE
Performed bedside swallow. Patient was able to swallow without difficulties. Cardiology order formed faxed [phone] to Dr. John Rangel office. Awaiting response. [id number]  has faxed formed to Dr John Rangel office for completion of cardiology to completed MRI. Spoke with office per  form has been faxed. MRI updated.

## 2022-10-12 NOTE — CARE COORDINATION
[facility] EMERGENCY [facility] AT [place] Case Management Initial Discharge Assessment    Met with Patient to discuss discharge plan. PCP: Awilda Arizmendi DO                                Date of Last Visit: ABOUT 6MO AGO    [facility] Patient: No        [facility] Notified: no    If no PCP, list provided? Yes    Discharge Planning    Living Arrangements: independently at home    Who do you live with? FRIEND    Who helps you with your care:  self    If lives at home:     Do you have any barriers navigating in your home? no    Patient can perform ADL? Yes    Current Services (outpatient and in home) :  None    Dialysis: No    Is transportation available to get to your appointments? Yes DRIVES    DME Equipment:  no    Respiratory equipment: None    Respiratory provider:  no     Pharmacy:  yes - [facility]    Consult with Medication Assistance Program?  No      Patient agreeable to [facility]? Yes, Company TBD    Patient agreeable to SNF/Rehab? Yes, Company TBD    Other discharge needs identified? N/A    Does Patient Have a High-Risk for Readmission Diagnosis (CHF, PN, MI, COPD)? No    Initial Discharge Plan? (Note: please see concurrent daily documentation for any updates after initial note). PLAN PENDING NEUROSURG, PT/OT EVALS. PT PROVIDED WITH SNF AND C LISTS FOR FUTURE PLANNING IF NEEDED. WILL CONTINUE TO FOLLOW.      Readmission Risk              Risk of Unplanned Readmission:  13         Electronically signed by Yokasta Moscoso RN on 10/12/2022 at 9:12 AM

## 2022-10-12 NOTE — PROGRESS NOTES
[facility] Occupational Therapy      Date: 10/12/2022  Patient Name: Ann-Marie Rollins        MRN: [id number]  Account: [de-identified]   : 1950  (74 y.o.)  Room: [place] [address]    Chart reviewed, attempted OT at  for eval. Patient not seen 2° to: Other: Pt currently still pending imaging and full neurosurgery plan. Will hold OT eval until pt cleared by neurosurgery for initiation of mobility. Spoke to [employer], RN aware. Will attempt again when able.     Electronically signed by KEN Duarte on 10/12/2022 at 10:18 AM

## 2022-10-12 NOTE — PROGRESS NOTES
Physical Therapy   Facility/Department: [facility] MED SURG W420/[id number]    NAME: Jose Parker    : 1950 (11 y.o.)  MRN: [id number]    Account: [de-identified]  Gender: female    PT evaluation and treatment orders received. Chart reviewed. PT eval attempted. Hold PT eval: awaiting imaging and neurosurgery consult. Will attempt PT evaluation again at earliest convenience.       Electronically signed by Sundar Cruz PT on 10/12/22 at 8:35 AM EDT

## 2022-10-12 NOTE — CONSULTS
Patient Name: Pina Brooks Date: 10/11/2022  8:43 PM  MR #: [id number]  : 1950    Attending Physician: Kimmie Parker MD  Reason for consult: Fracture L1 back pain    History of Presenting Illness and Review of Hari Hutchison is a 74 y.o. female on hospital day 1 . History Of Present Illness:  Emergency room admission after she fell picking something up with increase in low back pain. No head injury. Anticoagulation Eliquis. Status post L4-5 fusion with instrumentation    History:      Past Medical History:   Diagnosis Date    Abnormal EKG, needs cardiac clearence 2013    ACE-inhibitor cough 7/10/2012    ARB     Anemia     CAD (coronary artery disease) 2008    cardia stents x 2    Cancer ([facility] [address]     facial skin cancer excision    Coagulopathy ([facility] [address] 10/4/2012    Coagulopathy, on coumadin 2013    Cough 2012    Cough, Neg w/u Dr Gerda Palmer, ?  Betablocker 2014    Depression     Depression 2017    Epistaxis 2012    Fibrocystic breast disease     Hiatal hernia 5/3/2016    History of chest x-ray, normal, 8/6/14 10/17/2014    History of PTCA 2, stent x 2 , , Ina Mattson 2013    Hx of blood clots     PE s/p RTKR post op    Hyperlipidemia     meds > 10 yrs    Hypertension     meds > 10 yrs    Nephrolithiasis     OA (osteoarthritis)     Osteoporosis     Right-sided low back pain with right-sided sciatica 5/3/2016    Urge incontinence     Wheezes 2015     Past Surgical History:   Procedure Laterality Date    CARDIAC SURGERY      stent x 2    COLONOSCOPY      CORONARY ANGIOPLASTY WITH STENT PLACEMENT  3/12    [facility]    ENDOSCOPY, COLON, DIAGNOSTIC      EYE SURGERY      Phaco with IOL OU    HERNIA REPAIR      left inguinal hernia    HYSTERECTOMY, TOTAL ABDOMINAL  1998    JOINT REPLACEMENT      LTKR    JOINT REPLACEMENT  2017    RTSR    KNEE ARTHROSCOPY      R knee, [facility]    LITHOTRIPSY   approx    [place] LAMINECTOMY,>2 SGMT,LUMBAR N/A 2018    SPINE L3-4, L4-5, L5-S1 LAMINECTOMY, L3-4, L4-5 POSTERIOR LATERAL FUSION, L5-S1 INSTRUMENTED POSTERIOR INTERBODY FUSION, PRONE, AXIS SPINE TABLE, ACTIFUSE, CELL SAVER, NEW MICROSCOPE, NEW C-ARM X2, PEDIGUARD, MISONIX BONE SCALPAL, PNEUMATIC KERRISONS, SPINAL CORD MONITORING, LUMBAR BRACE, GLOBUS, OPEN TLIF, (H.S.C.G.#[id number]) performed by Ki Howard MD at [address]  2008    [address]     SKIN GRAFT  11    JORDAN AND BSO   approx    TOTAL KNEE ARTHROPLASTY Left 2008       Family History  Family History   Problem Relation Age of Onset    Diabetes Mother     High Blood Pressure Mother     Other Mother          of [place] jocob disease / mad cow disease    COPD Father     Heart Disease Father     No Known Problems Sister     Atrial Fibrillation Brother     High Blood Pressure Brother     Other Brother         post polio    No Known Problems Daughter     High Cholesterol Son     Stroke Sister     Diabetes Brother     COPD Brother     Other Brother         suicide at age 77     [] Unable to obtain due to ventilated and/ or neurologic status    Social History     Socioeconomic History    Marital status:      Spouse name: Not on file    Number of children: Not on file    Years of education: Not on file    Highest education level: Not on file   Occupational History    Not on file   Tobacco Use    Smoking status: Never    Smokeless tobacco: Never   Vaping Use    Vaping Use: Never used   Substance and Sexual Activity    Alcohol use: Yes     Comment: occ    Drug use: No    Sexual activity: Not on file   Other Topics Concern    Not on file   Social History Narrative    Not on file     Social Determinants of Health     Financial Resource Strain: Not on file   Food Insecurity: Not on file   Transportation Needs: Not on file   Physical Activity: Not on file   Stress: Not on file   Social Connections: Not on file   Intimate Partner Violence: Not on file Housing Stability: Not on file      [] Unable to obtain due to ventilated and/ or neurologic status    Home Medications:      Medications Prior to Admission: prasugrel (EFFIENT) 10 MG TABS, Take 10 mg by mouth daily  furosemide (LASIX) 20 MG tablet, Take 20 mg by mouth daily  metoprolol succinate (TOPROL XL) 25 MG extended release tablet, Take 25 mg by mouth daily  magnesium oxide (MAG-OX) 400 MG tablet, Take 400 mg by mouth daily  dofetilide (TIKOSYN) 125 MCG capsule, Take 125 mcg by mouth 2 times daily  rivaroxaban (XARELTO) 20 MG TABS tablet, Take 20 mg by mouth nightly  atorvastatin (LIPITOR) 40 MG tablet, Take 40 mg by mouth nightly  traZODone (DESYREL) 50 MG tablet, Take 50 mg by mouth nightly  Cranberry 400 MG CAPS, Take by mouth  simvastatin (ZOCOR) 40 MG tablet, TAKE 1 TABLET EVERY EVENING (Patient not taking: Reported on 10/12/2022)  amLODIPine (NORVASC) 5 MG tablet, Take 1 tablet by mouth daily (Patient not taking: Reported on 10/12/2022)  aspirin 81 MG EC tablet, Take 81 mg by mouth daily  zolpidem (AMBIEN CR) 6.25 MG extended release tablet, Take 12.5 mg by mouth nightly as needed for Sleep.   (Patient not taking: Reported on 10/12/2022)  acetaminophen (TYLENOL) 325 MG tablet, Take 2 tablets by mouth every 6 hours as needed for Pain  dabigatran (PRADAXA) 75 MG capsule, Take 1 capsule by mouth 2 times daily (Patient not taking: Reported on 10/12/2022)  FLUoxetine (PROZAC) 20 MG capsule, Take 60 mg by mouth daily  pantoprazole (PROTONIX) 40 MG tablet, Take 1 tablet by mouth daily  darifenacin (ENABLEX) 7.5 MG extended release tablet, TAKE 2 TABLETS (15 MG) EVERY MORNING AND 1 TABLET (7.5 MG) EVERY EVENING  Calcium-Magnesium 500-250 MG TABS, Take 1 tablet by mouth 2 times daily   Multiple Vitamin (MULTI VITAMIN DAILY PO), Take 1 tablet by mouth daily   Vitamin D (CHOLECALCIFEROL) 1000 UNITS CAPS capsule, Take 1,000 Units by mouth 2 times daily   OMEGA 3 340 MG CPDR, Take 500 mg by mouth daily   ferrous sulfate 325 (65 FE) MG tablet, Take 18 mg by mouth nightly  Coenzyme Q10 (COQ10) 100 MG CAPS, Take 1 tablet by mouth daily     Current Hospital Medications:     Scheduled Meds:   sodium chloride flush  5-40 mL IntraVENous 2 times per day    enoxaparin  40 mg SubCUTAneous Daily    sodium chloride flush  5-40 mL IntraVENous 2 times per day    polyethylene glycol  17 g Oral Daily    pantoprazole  40 mg Oral QAM AC    amLODIPine  5 mg Oral Daily     Continuous Infusions:   sodium chloride      sodium chloride      lactated ringers 75 mL/hr at 10/12/22 0326     PRN Meds:.sodium chloride flush, sodium chloride, acetaminophen, ondansetron **OR** ondansetron, oxyCODONE-acetaminophen **OR** oxyCODONE-acetaminophen, sodium chloride flush, sodium chloride, acetaminophen, oxyCODONE **OR** oxyCODONE, HYDROmorphone **OR** HYDROmorphone, bisacodyl  .   sodium chloride      sodium chloride      lactated ringers 75 mL/hr at 10/12/22 0326        Allergies: Allergies   Allergen Reactions    Tolterodine Diarrhea     Patient CANNOT tolerate this med. Causes diarrhea! Detrol          REVIEW OF SYSTEMS    (2-9 systems for level 4, 10 or more for level 5)     Review of Systems   Constitutional:  Negative for fever. HENT:  Negative for hearing loss. Eyes:  Negative for pain. Respiratory:  Negative for shortness of breath. Gastrointestinal:  Negative for nausea. Endocrine: Negative for heat intolerance. Genitourinary:  Negative for difficulty urinating. Musculoskeletal:  Positive for back pain. Negative for neck pain. Skin:  Negative for rash. Allergic/Immunologic: Negative for immunocompromised state. Neurological:  Negative for headaches. Psychiatric/Behavioral:  Negative for sleep disturbance. Except as noted above the remainder of the review of systems was reviewed and negative.      Physical Exam     BP (!) 142/88   Pulse (!) 101   Temp 98.5 °F (36.9 °C) (Oral)   Resp 18   Ht 5' 4\" (1.626 m)   Wt 170 lb (77.1 kg)   [facility] 05/29/1998   SpO2 90%   BMI 29.18 kg/m²   Body mass index is 29.18 kg/m². Physical Exam  Vitals and nursing note reviewed. Constitutional:       Appearance: Normal appearance. HENT:      Head: Normocephalic and atraumatic. Right Ear: External ear normal.      Left Ear: External ear normal.      Nose: Nose normal.      Mouth/Throat:      Pharynx: Oropharynx is clear. Eyes:      Extraocular Movements: Extraocular movements intact. Cardiovascular:      Pulses: Normal pulses. Comments: Pacemaker in place. Patient believes MRI compatible  Pulmonary:      Effort: Pulmonary effort is normal.   Abdominal:      Palpations: Abdomen is soft. Genitourinary:     Rectum: Normal.   Musculoskeletal:         General: Normal range of motion. Cervical back: Normal range of motion. Comments: Lumbar pain with palpation and movement. Maintains good strength sensation in the lower extremities. Skin:     General: Skin is warm. Neurological:      General: No focal deficit present. Psychiatric:         Mood and Affect: Mood normal.        I have reviewed all laboratory studies, reports, data, and pertinent images.     Objective Findings:     Vitals:   Vitals:    10/11/22 2230 10/11/22 2330 10/12/22 0045 10/12/22 0047   BP: (!) 170/97 (!) 177/109 (!) 142/88    Pulse: (!) 102  (!) 101    Resp:   18    Temp:   98.5 °F (36.9 °C)    TempSrc:   Oral    SpO2: 91% 90% 90% 90%   Weight:    170 lb (77.1 kg)   Height:    5' 4\" (1.626 m)        Laboratory, Microbiology, Pathology, Radiology, Cardiology, Medications and Transcriptions reviewed  Scheduled Meds:   sodium chloride flush  5-40 mL IntraVENous 2 times per day    enoxaparin  40 mg SubCUTAneous Daily    sodium chloride flush  5-40 mL IntraVENous 2 times per day    polyethylene glycol  17 g Oral Daily    pantoprazole  40 mg Oral QAM AC    amLODIPine  5 mg Oral Daily     Continuous Infusions:   sodium chloride      sodium chloride lactated ringers 75 mL/hr at 10/12/22 0326       Recent Labs     10/11/22  2345   WBC 9.2   HGB 10.0*   HCT 31.6*   MCV 86.6        Recent Labs     10/11/22  2345      K 4.1      CO2 22   BUN 27*   CREATININE 0.86     Recent Labs     10/11/22  2345   AST 23   ALT 15   BILITOT <0.2   ALKPHOS 70     No results for input(s): LIPASE, AMYLASE in the last 72 hours. Recent Labs     10/11/22  2345   PROT 6.4     CT Head WO Contrast    Result Date: 10/12/2022  EXAMINATION: CT OF THE HEAD WITHOUT CONTRAST  10/11/2022 9:52 pm TECHNIQUE: CT of the head was performed without the administration of intravenous contrast. Automated exposure control, iterative reconstruction, and/or weight based adjustment of the mA/kV was utilized to reduce the radiation dose to as low as reasonably achievable. COMPARISON: None. HISTORY: ORDERING SYSTEM PROVIDED HISTORY: fall TECHNOLOGIST PROVIDED HISTORY: Reason for exam:->fall Has a \"code stroke\" or \"stroke alert\" been called? ->No Decision Support Exception - unselect if not a suspected or confirmed emergency medical condition->Emergency Medical Condition (MA) What reading provider will be dictating this exam?->CRC FINDINGS: BRAIN/VENTRICLES: There is no acute intracranial hemorrhage, mass effect or midline shift. No abnormal extra-axial fluid collection. The gray-white differentiation is maintained without evidence of an acute infarct. There is no evidence of hydrocephalus. The ventricles, cisterns and sulci are prominent consistent with atrophy. There is decreased attenuation within the periventricular white matter consistent with periventricular leukomalacia. ORBITS: The visualized portion of the orbits demonstrate no acute abnormality. SINUSES: The visualized paranasal sinuses and mastoid air cells demonstrate no acute abnormality. SOFT TISSUES/SKULL:  No acute abnormality of the visualized skull or soft tissues. 1. There is no acute intracranial abnormality. Specifically, there is no intracranial hemorrhage. 2. Atrophy and periventricular leukomalacia,       10/11/2022 CT scan lumbarL1 mild compression fracture. L45 fusion       Impression:     L1 osteoporotic compression fracture  status post L4-5 fusion with instrumentation        Plan:   Mri for spine fractures as per trauma protocol. Patient has pacemaker in place. For trauma protocol obtain CT scans cervical and thoracic. When confirmed MRI compatible pacemaker proceed with MRI lumbar spine and x-rays.   X-ray lumbar spine to evaluate and follow-up known L1 fracture        Comments:       Neurosurgery will follow    Electronically signed by Josefa Rico MD on 10/12/2022 at 7:14 AM

## 2022-10-13 ENCOUNTER — APPOINTMENT (OUTPATIENT)
Dept: CT IMAGING | Age: 72
DRG: 516 | End: 2022-10-13
Payer: MEDICARE

## 2022-10-13 PROCEDURE — 93005 ELECTROCARDIOGRAM TRACING: CPT | Performed by: SURGERY

## 2022-10-13 PROCEDURE — 6370000000 HC RX 637 (ALT 250 FOR IP): Performed by: PHYSICIAN ASSISTANT

## 2022-10-13 PROCEDURE — 99232 SBSQ HOSP IP/OBS MODERATE 35: CPT | Performed by: NEUROLOGICAL SURGERY

## 2022-10-13 PROCEDURE — 6370000000 HC RX 637 (ALT 250 FOR IP): Performed by: SURGERY

## 2022-10-13 PROCEDURE — 6360000002 HC RX W HCPCS: Performed by: SURGERY

## 2022-10-13 PROCEDURE — 2580000003 HC RX 258: Performed by: SURGERY

## 2022-10-13 PROCEDURE — 1210000000 HC MED SURG R&B

## 2022-10-13 PROCEDURE — 93280 PM DEVICE PROGR EVAL DUAL: CPT

## 2022-10-13 PROCEDURE — 72128 CT CHEST SPINE W/O DYE: CPT

## 2022-10-13 PROCEDURE — 72125 CT NECK SPINE W/O DYE: CPT

## 2022-10-13 PROCEDURE — 97166 OT EVAL MOD COMPLEX 45 MIN: CPT

## 2022-10-13 PROCEDURE — 97162 PT EVAL MOD COMPLEX 30 MIN: CPT

## 2022-10-13 PROCEDURE — 97535 SELF CARE MNGMENT TRAINING: CPT

## 2022-10-13 RX ORDER — ATORVASTATIN CALCIUM 40 MG/1
40 TABLET, FILM COATED ORAL NIGHTLY
Status: DISCONTINUED | OUTPATIENT
Start: 2022-10-13 | End: 2022-10-19 | Stop reason: HOSPADM

## 2022-10-13 RX ORDER — ASPIRIN 81 MG/1
81 TABLET ORAL DAILY
Status: DISCONTINUED | OUTPATIENT
Start: 2022-10-14 | End: 2022-10-19 | Stop reason: HOSPADM

## 2022-10-13 RX ORDER — FUROSEMIDE 20 MG/1
20 TABLET ORAL DAILY
Status: DISCONTINUED | OUTPATIENT
Start: 2022-10-13 | End: 2022-10-19 | Stop reason: HOSPADM

## 2022-10-13 RX ORDER — LANOLIN ALCOHOL/MO/W.PET/CERES
400 CREAM (GRAM) TOPICAL DAILY
Status: DISCONTINUED | OUTPATIENT
Start: 2022-10-13 | End: 2022-10-19 | Stop reason: HOSPADM

## 2022-10-13 RX ORDER — VITAMIN B COMPLEX
1000 TABLET ORAL 2 TIMES DAILY
Status: DISCONTINUED | OUTPATIENT
Start: 2022-10-13 | End: 2022-10-19 | Stop reason: HOSPADM

## 2022-10-13 RX ORDER — TRAZODONE HYDROCHLORIDE 50 MG/1
50 TABLET ORAL NIGHTLY
Status: DISCONTINUED | OUTPATIENT
Start: 2022-10-13 | End: 2022-10-19 | Stop reason: HOSPADM

## 2022-10-13 RX ORDER — METOPROLOL SUCCINATE 25 MG/1
25 TABLET, EXTENDED RELEASE ORAL DAILY
Status: DISCONTINUED | OUTPATIENT
Start: 2022-10-13 | End: 2022-10-19 | Stop reason: HOSPADM

## 2022-10-13 RX ORDER — PANTOPRAZOLE SODIUM 40 MG/1
40 TABLET, DELAYED RELEASE ORAL DAILY
Status: DISCONTINUED | OUTPATIENT
Start: 2022-10-13 | End: 2022-10-13 | Stop reason: SDUPTHER

## 2022-10-13 RX ORDER — FLUOXETINE HYDROCHLORIDE 20 MG/1
60 CAPSULE ORAL DAILY
Status: DISCONTINUED | OUTPATIENT
Start: 2022-10-13 | End: 2022-10-19 | Stop reason: HOSPADM

## 2022-10-13 RX ORDER — UBIDECARENONE 100 MG
100 CAPSULE ORAL DAILY
Status: DISCONTINUED | OUTPATIENT
Start: 2022-10-13 | End: 2022-10-19 | Stop reason: HOSPADM

## 2022-10-13 RX ORDER — DOFETILIDE 0.12 MG/1
125 CAPSULE ORAL 2 TIMES DAILY
Status: DISCONTINUED | OUTPATIENT
Start: 2022-10-13 | End: 2022-10-19 | Stop reason: HOSPADM

## 2022-10-13 RX ADMIN — METHOCARBAMOL TABLETS 500 MG: 500 TABLET, COATED ORAL at 10:01

## 2022-10-13 RX ADMIN — Medication 100 MG: at 13:01

## 2022-10-13 RX ADMIN — ATORVASTATIN CALCIUM 40 MG: 40 TABLET, FILM COATED ORAL at 23:29

## 2022-10-13 RX ADMIN — Medication 1000 UNITS: at 23:29

## 2022-10-13 RX ADMIN — TRAZODONE HYDROCHLORIDE 50 MG: 50 TABLET ORAL at 23:28

## 2022-10-13 RX ADMIN — Medication 1000 UNITS: at 13:01

## 2022-10-13 RX ADMIN — OXYCODONE 10 MG: 5 TABLET ORAL at 16:19

## 2022-10-13 RX ADMIN — Medication 10 ML: at 10:05

## 2022-10-13 RX ADMIN — FUROSEMIDE 20 MG: 20 TABLET ORAL at 13:02

## 2022-10-13 RX ADMIN — OXYCODONE 10 MG: 5 TABLET ORAL at 23:34

## 2022-10-13 RX ADMIN — ENOXAPARIN SODIUM 40 MG: 100 INJECTION SUBCUTANEOUS at 10:00

## 2022-10-13 RX ADMIN — METOPROLOL SUCCINATE 25 MG: 25 TABLET, EXTENDED RELEASE ORAL at 13:01

## 2022-10-13 RX ADMIN — ACETAMINOPHEN 650 MG: 325 TABLET ORAL at 06:25

## 2022-10-13 RX ADMIN — FLUOXETINE HYDROCHLORIDE 60 MG: 20 CAPSULE ORAL at 13:01

## 2022-10-13 RX ADMIN — ACETAMINOPHEN 650 MG: 325 TABLET ORAL at 19:01

## 2022-10-13 RX ADMIN — ACETAMINOPHEN 650 MG: 325 TABLET ORAL at 13:02

## 2022-10-13 RX ADMIN — METHOCARBAMOL TABLETS 500 MG: 500 TABLET, COATED ORAL at 17:48

## 2022-10-13 RX ADMIN — OXYCODONE 10 MG: 5 TABLET ORAL at 06:36

## 2022-10-13 RX ADMIN — PANTOPRAZOLE SODIUM 40 MG: 40 TABLET, DELAYED RELEASE ORAL at 06:25

## 2022-10-13 RX ADMIN — METHOCARBAMOL TABLETS 500 MG: 500 TABLET, COATED ORAL at 13:01

## 2022-10-13 RX ADMIN — MAGNESIUM OXIDE 400 MG (241.3 MG MAGNESIUM) TABLET 400 MG: TABLET at 13:01

## 2022-10-13 ASSESSMENT — PAIN SCALES - GENERAL
PAINLEVEL_OUTOF10: 6
PAINLEVEL_OUTOF10: 7
PAINLEVEL_OUTOF10: 7
PAINLEVEL_OUTOF10: 6
PAINLEVEL_OUTOF10: 7
PAINLEVEL_OUTOF10: 7

## 2022-10-13 ASSESSMENT — PAIN DESCRIPTION - ORIENTATION: ORIENTATION: MID;LOWER

## 2022-10-13 ASSESSMENT — PAIN DESCRIPTION - LOCATION
LOCATION: BACK
LOCATION: BACK

## 2022-10-13 ASSESSMENT — PAIN DESCRIPTION - DESCRIPTORS: DESCRIPTORS: ACHING;DULL;STABBING

## 2022-10-13 NOTE — PROGRESS NOTES
[facility] OCCUPATIONAL THERAPY EVALUATION - ACUTE     NAME: Ann-Marie Rollins  : 1950 (40 y.o.)  MRN: [id number]  CODE STATUS: Full Code  Room: K711/M161-64    Date of Service: 10/13/2022    Patient Diagnosis(es): Compression fracture of lumbar vertebrae, non-traumatic, initial encounter [facility]) Ashly Louis  Fall, initial encounter [W19. XXXA]  Closed compression fracture of body of L1 vertebra ([address] [S32.010A]   Patient Active Problem List    Diagnosis Date Noted    Closed compression fracture of body of L1 vertebra ([address] 10/12/2022    Vertebral fracture, osteoporotic, initial encounter ([address] 10/12/2022    Compression fracture of lumbar vertebrae, non-traumatic, initial encounter ([address] 10/11/2022    Coagulopathy, on coumadin 2013    Bilateral leg weakness 2018    Lumbar stenosis with neurogenic claudication 2018    Lumbar stenosis 08/15/2018    Synovial cyst of lumbar facet joint 08/15/2018    PE (pulmonary thromboembolism) ([address] 08/15/2018    Right-sided low back pain with right-sided sciatica 2016    Wheezes 2015    History of chest x-ray, normal, 8/6/14 10/17/2014    History of PTCA 2, stent x 2 , , Jose G Patel 2013    Abnormal EKG, needs cardiac clearence 2013    Hiatal hernia 2013    Calculus of gallbladder 2013    Need for tetanus booster 10/10/2013    Epistaxis 2012    ACE-inhibitor cough 07/10/2012    Nephrolithiasis     Depression     Hyperlipidemia     OA (osteoarthritis)     Osteoporosis     Hypertension     Fibrocystic breast disease     Urge incontinence     Anemia     CAD (coronary artery disease) 2008        Past Medical History:   Diagnosis Date    Abnormal EKG, needs cardiac clearence 2013    ACE-inhibitor cough 7/10/2012    ARB     Anemia     CAD (coronary artery disease) 2008    cardia stents x 2    Cancer ([address]     facial skin cancer excision    Coagulopathy ([address] 10/4/2012    Coagulopathy, on coumadin 2013 Cough 5/29/2012    Cough, Neg w/u Dr Dang Blackburn, ? Betablocker 9/22/2014    Depression     Depression 6/13/2017    Epistaxis 8/20/2012    Fibrocystic breast disease     Hiatal hernia 5/3/2016    History of chest x-ray, normal, 8/6/14 10/17/2014    History of PTCA 2, stent x 2 2009, 2012, Cleotha Nicks 12/23/2013    Hx of blood clots 2012    PE s/p RTKR post op    Hyperlipidemia     meds > 10 yrs    Hypertension     meds > 10 yrs    Nephrolithiasis     OA (osteoarthritis)     Osteoporosis     Right-sided low back pain with right-sided sciatica 5/3/2016    Urge incontinence     Wheezes 4/20/2015     Past Surgical History:   Procedure Laterality Date    CARDIAC SURGERY      stent x 2    COLONOSCOPY      CORONARY ANGIOPLASTY WITH STENT PLACEMENT  3/12    [place]    ENDOSCOPY, COLON, DIAGNOSTIC      EYE SURGERY      [place] with IOL OU    HERNIA REPAIR      left inguinal hernia    HYSTERECTOMY, TOTAL ABDOMINAL (CERVIX REMOVED)  1998    JOINT REPLACEMENT  2008    LTKR    JOINT REPLACEMENT  2017    RTSR    KNEE ARTHROSCOPY  11/11    R knee, [facility]    LITHOTRIPSY  2003 approx    [place] LAMINECTOMY,>2 SGMT,LUMBAR N/A 8/22/2018    SPINE L3-4, L4-5, L5-S1 LAMINECTOMY, L3-4, L4-5 POSTERIOR LATERAL FUSION, L5-S1 INSTRUMENTED POSTERIOR INTERBODY FUSION, PRONE, AXIS SPINE TABLE, ACTIFUSE, CELL SAVER, NEW MICROSCOPE, NEW C-ARM X2, PEDIGUARD, MISONIX BONE SCALPAL, PNEUMATIC KERRISONS, SPINAL CORD MONITORING, LUMBAR BRACE, GLOBUS, OPEN TLIF, (H.S.C.G.#[id number]) performed by Lizette Molina MD at [address]  2/2008    [address]     SKIN GRAFT  2/9/11    JORDAN AND BSO (CERVIX REMOVED)  1996 approx    TOTAL KNEE ARTHROPLASTY Left 08/2008        Restrictions  Restrictions/Precautions: Fall Risk  Required Braces or Orthoses?: Yes         Other position/activity restrictions: per Trauma order: \"OK for OOB mobility if wearing TLSO brace. \"    Safety Devices: Safety Devices  Type of Devices:  All fall risk precautions in place;Call light within reach; Left in chair     Patient's date of birth confirmed: Yes    General:       Subjective:Pt pleasant and cooperative          Pain at start of treatment: Yes: 7/10    Pain at end of treatment: No    Location: back  Nursing notified: No  RN:   Intervention: Repositioned    Prior Level of Function:  Social/Functional History  Lives With: Significant other  Type of Home: House  Home Layout: One level  Home Access: Stairs to enter with rails  Entrance Stairs - Number of Steps: 2  Bathroom Shower/Tub: Walk-in shower  Bathroom Equipment: Built-in shower seat, Grab bars in [address]: Elaine Mowers, rolling, Alert Button  Has the patient had two or more falls in the past year or any fall with injury in the past year?: Yes  ADL Assistance: Independent  Homemaking Assistance: Independent  Ambulation Assistance: Independent (No AD)  Transfer Assistance: Independent  Additional Comments: Pt reports that her boyfriend was recently injured in a car accident and is healing at home    OBJECTIVE:     Orientation Status:  Orientation  Overall Orientation Status: Within Functional Limits  Orientation Level: Oriented X4    Observation:  Observation/Palpation  Posture: Good  Observation: back brace in place    Cognition Status:  Cognition  Overall Cognitive Status: WFL    Perception Status:  Perception  Overall Perceptual Status: WFL    Vision and Hearing Status:  Vision  Vision Exceptions: Wears glasses for reading (states that since admission, she is having new double vision with reading)  Hearing  Hearing: Within functional limits   Vision - Basic Assessment  Prior Vision: Wears glasses only for reading  Visual History: Macular degeneration  Patient Visual Report: Blurring of print when reading  Visual Field Cut: No  Oculo Motor Control:  WNL    GROSS ASSESSMENT AROM/PROM:  AROM: Within functional limits       ROM:   LUE AROM (degrees)  LUE AROM : WFL  Left Hand AROM (degrees)  Left Hand AROM: WFL  RUE AROM (degrees)  RUE AROM : WFL  Right Hand AROM (degrees)  Right Hand AROM: WFL    UE STRENGTH:  Strength: Within functional limits (4/5 bilateral UE's)    UE COORDINATION:  Coordination: Within functional limits    UE TONE:  Tone: Normal    UE SENSATION:  Sensation: Intact    Hand Dominance:  Hand Dominance  Hand Dominance: Right    ADL Status:  ADL  Feeding: Unable to assess(comment)  Grooming: Setup  UE Bathing: Stand by assistance; Increased time to complete  LE Bathing: Maximum assistance; Increased time to complete  UE Dressing: Stand by assistance; Increased time to complete  LE Dressing: Maximum assistance; Increased time to complete  Toileting: Unable to assess(comment)    Functional Mobility:    Transfers  Sit to stand: Minimal assistance  Stand to sit: Contact guard assistance    Patient ambulated to/from sink with WW at [facility] level.      Bed Mobility  Bed mobility  Rolling to Left: Contact guard assistance  Supine to Sit: Minimal assistance    Seated and Standing Balance:  Balance  Sitting: Intact  Standing:  (SBA/CGA)    Functional Endurance:  Activity Tolerance  Activity Tolerance: Treatment limited secondary to medical complications (free text)    D/C Recommendations:  OT D/C RECOMMENDATIONS  REQUIRES OT FOLLOW-UP: Yes    Equipment Recommendations:       OT Education:        OT Follow Up:   OT D/C RECOMMENDATIONS  REQUIRES OT FOLLOW-UP: Yes       Assessment/Discharge Disposition:     Performance deficits / Impairments: Decreased functional mobility , Decreased balance, Decreased ADL status, Decreased high-level IADLs, Decreased endurance, Decreased strength  Prognosis: Good  Discharge Recommendations: Continue to assess pending progress  Decision Making: Medium Complexity  History: multiple  Exam: 6 deficits  Assistance / Modification: Mod/Max A    AMPAC (Six Click) Self care Score    How much help for putting on and taking off regular lower body clothing?: A Lot  How much help for Bathing?: A Lot  How much help for Toileting?: None  How much help for putting on and taking off regular upper body clothing?: A Little  How much help for taking care of personal grooming?: None  How much help for eating meals?: None  AM-[facility] Inpatient Daily Activity Raw Score: 19  AM-[facility] Inpatient ADL T-Scale Score : 40.22  ADL Inpatient CMS 0-100% Score: 42.8    Therapy key for assistance levels -   Independent/Mod I = Pt. is able to perform task with no assistance but may require a device   Stand by assistance = Pt. does not perform task at an independent level but does not need physical assistance, requires verbal cues  Minimal, Moderate, Maximal Assistance = Pt. requires physical assistance (25%, 50%, 75% assist from helper) for task but is able to actively participate in task   Dependent = Pt. requires total assistance with task and is not able to actively participate with task completion     Plan:  Occupational Therapy Plan  Times Per Week: 1-4x/wk  Current Treatment Recommendations: Strengthening, Balance training, Functional mobility training, Neuromuscular re-education, Endurance training, Self-Care / ADL    Goals:   Patient will:    - Improve functional endurance to tolerate/complete 15-20 mins of ADL's  - Be independent in UB ADLs   - Be SBA in LB ADLs  - Be independent in ADL transfers without LOB  - Be independent in toileting tasks  - Improve bilateral UE strength and endurance to fair+ in order to participate in self-care activities as projected. Patient Goal: Patient goals :  To return to home when ready     Discussed and agreed upon: Yes Comments:       Therapy Time:   Individual   Time In 1540   Time Out 1605   Minutes 25          Eval: 25 minutes     Electronically signed by:    KEN Apple,   01/95/9221, 4:26 PM Electronically signed by KEN Apple on 36/21/46 at 4:29 PM EDT

## 2022-10-13 NOTE — PROGRESS NOTES
TRAUMA DAILY PROGRESS NOTE      Patient Name: Malik Jc  Admission Date 10/11/2022    Hospital Day: 2  Patient seen and examined on 10/13/2022    INTERVAL HISTORY/EVENTS     Background:  Malik Jc is a 74 y.o. female with a PMHx of HTN, HLD, OA, CAD s/p L4-5 fusion with instrumentation and depression presented to [address] [place] [address] s/p mechanical fall from standing. (-) head strike, (-) LOC, (+) Eliquis. Admitted to trauma. NSGY consulted. Trauma work up found L1 compression fracture. Hospital Course:  10/11/2022: Presented to ED with c/o low back pain s/p mechanical fall from standing. (-) head strike, (-) LOC, (+) Eliquis. Admitted to trauma. NSGY consulted. 10/12/2022: NSGY eval and rec's for MRI of L-spine. Pt with pacemaker and awaiting confirmation of MRI compatibility. 24 Hour Events:  No acute events overnight. Remains awaiting MRI of Lumbar spine and TLSO brace. Pt remains on bed rest until brace is at bedside. Reports pain well controlled currently. Denies LE weakness or numbness. PT/OT once TLSO brace is fitted. Tolerating adequate PO intake and voiding spontaneously. VSS on room air. Home medications reviewed with patient at bedside. PHYSICAL EXAM     Vitals Average, Min and Max for last 24 hours:  Temp: Temp: 98.4 °F (36.9 °C); Temp  Av.4 °F (36.9 °C)  Min: 98.4 °F (36.9 °C)  Max: 98.4 °F (36.9 °C)  Respirations: Resp  Av.3  Min: 18  Max: 20  Pulse: Pulse  Av  Min: 68  Max: 68  Blood Pressure: Systolic (56OLQ), XJE:558 , Min:138 , WFO:724   ; Diastolic (34SQR), NBC:96, Min:74, Max:74  SpO2: SpO2  Av %  Min: 91 %  Max: 91 %    24hr Intake/Output: No intake or output data in the 24 hours ending 10/13/22 1153    Vitals: /74   Pulse 68   Temp 98.4 °F (36.9 °C) (Oral)   Resp 20   Ht 5' 4\" (1.626 m)   Wt 170 lb (77.1 kg)   LMP 1998   SpO2 91%   BMI 29.18 kg/m²     Physical Exam:  Constitutional: Lying supine in bed.  Alert and conversant. In good spirits. HEENT: Atraumatic normocephalic. Cardiovascular: Regular rate and rhythm. Pulmonary: Clear to ausculation bilaterally on room air. No wheezing, rhonchi or rales. Abdominal: Soft. Non-distended. Non-tender. Musculoskeletal: Good ROM in all extremities. No edema. TTP to upper lumbar region. Non-tender along cervical and thoracic spine. Neurological: Alert, awake, and orientated x 3. Motor and sensory grossly intact. No focal deficits. GCS of 15.     LABORATORY RESULTS (LAST 24 HOURS)     CBC with Differential:    Lab Results   Component Value Date/Time    WBC 9.2 10/11/2022 11:45 PM    RBC 3.65 10/11/2022 11:45 PM    RBC 3.82 05/29/2012 10:53 AM    HGB 10.0 10/11/2022 11:45 PM    HCT 31.6 10/11/2022 11:45 PM     10/11/2022 11:45 PM    MCV 86.6 10/11/2022 11:45 PM    MCH 27.5 10/11/2022 11:45 PM    MCHC 31.8 10/11/2022 11:45 PM    RDW 15.1 10/11/2022 11:45 PM    LYMPHOPCT 23.5 10/11/2022 11:45 PM    MONOPCT 6.6 10/11/2022 11:45 PM    BASOPCT 0.6 10/11/2022 11:45 PM    MONOSABS 0.6 10/11/2022 11:45 PM    LYMPHSABS 2.2 10/11/2022 11:45 PM    EOSABS 0.2 10/11/2022 11:45 PM    BASOSABS 0.1 10/11/2022 11:45 PM     CMP:    Lab Results   Component Value Date/Time     10/11/2022 11:45 PM    K 4.1 10/11/2022 11:45 PM     10/11/2022 11:45 PM    CO2 22 10/11/2022 11:45 PM    BUN 27 10/11/2022 11:45 PM    CREATININE 0.86 10/11/2022 11:45 PM    GFRAA >60.0 10/11/2022 11:45 PM    LABGLOM >60.0 10/11/2022 11:45 PM    GLUCOSE 104 10/11/2022 11:45 PM    GLUCOSE 88 05/29/2012 10:53 AM    PROT 6.4 10/11/2022 11:45 PM    LABALBU 3.3 10/11/2022 11:45 PM    LABALBU 3.8 05/29/2012 10:53 AM    CALCIUM 9.0 10/11/2022 11:45 PM    BILITOT <0.2 10/11/2022 11:45 PM    ALKPHOS 70 10/11/2022 11:45 PM    AST 23 10/11/2022 11:45 PM    ALT 15 10/11/2022 11:45 PM     Magnesium:    Lab Results   Component Value Date/Time    MG 1.9 08/23/2018 05:37 AM     PT/INR:    Lab Results   Component Value Date/Time    PROTIME 13.2 07/30/2018 02:14 PM    PROTIME 26.0 07/28/2014 10:16 AM    INR 1.3 07/30/2018 02:14 PM     PTT:    Lab Results   Component Value Date/Time    APTT 45.1 07/30/2018 02:14 PM       IMAGING RESULTS (PERSONALLY REVIEWED)     All admission reviewed  MRI pending. ASSESSMENT & PLAN     Diagnoses:  S/p mechanical fall from standing on 10/11/22  L1 compression fracture  Acute pain due to traumatic injury      PMHx: HTN, HLD, OA, CAD s/p L4-5 fusion with instrumentation and depression    Incidental Findings: None, JLR 10/12/22      ASSESSMENT/PLAN:  Neurological: Acute L1 compression fracture  - NSGY consulted with rec's for MRI. Orders placed for c and t-spine CT.  - Continue pain control with:  - Tylenol 650mg q6hr scheduled  - Oxycodone 5/10mg q4hrs prn moderate/severe pain  - Robaxin 500mg four times daily  - Can consider Lidoderm patches  - Resume home Prozac 60mg daily  - Resume home Trazodone 50mg nightly    Cardiovascular:   - Discontinue Norvasc as this is no loner patient's home medication  - Resume home Lipitor  - Resume home Lasix 20mg daily  - Resume home Toprolol XL daily  - Resume home Tikosyn  - Holding home Xarelto and ASA until final recommendations from Sulema Smith. Respiratory: No acute issues  - Maintain O2 sats > 92%  - Encourage IS 10 x hourly     GI/Diet: No acute issues  - Ok for regular diet  - Continue bowel regimen     Renal/Electrolytes:   - HLIV  - BMP as needed     ID: No active infection. Remains afebrile, normotensive, and without leukocytosis. - No indication for Abx  - CBC as needed     Heme: HDS  - No indication for transfusion.  - CBC as needed    Endocrine:   - No acute issues. MSK: Acute L2 compression fracture  - Spines: Awaiting final NSGY recommendations as above. TLSO brace today. Ok for OOB once brace fitted. - Weight Bearing Restrictions: none  - Activity: TLSO brace today.  Ok for OOB once brace fitted.  - PT/OT: Eval pending TLSO brace    Prophylaxis:   - SCDs for VTE PPx  - Resume Protonix 40mg daily  - Holding home Xarelto and ASA until final recommendations from [address] [place] [address]. Lines/Tubes/Drains:  - Maintain PIVs  - No indication for collado catheter, monitor for urinary retention    Dispo: Remain on trauma service for MRI of spine and final recommendations from [address] [place] [address]. TSLO brace today. Once fitted ok for activity as tolerated and PT/OT eval with brace on. Accom Status: General Status      - Follow up with NSGY (Dr. Norberto Werner) TBD.  - No follow up with Trauma Surgery needed. Please call for any questions or concerns.   [facility] Treatment Team Sticky Note for contact information]      Brady Nava PA-C  Trauma/Critical Care  [see Treatment Team Sticky Note for contact information]     This patient's plan of care was discussed and made in collaboration with Trauma Attending physician, Lita Leavitt MD.

## 2022-10-13 NOTE — FLOWSHEET NOTE
Pt awake in bed. Accepted meds without problem. VSS. Respirations even and nonlabored. She went for CT this morning. PM card faxed to MRI.

## 2022-10-13 NOTE — PROGRESS NOTES
Spiritual Care Services     Summary of Visit:  Pt struggling with this time of waiting. Processed this with her. Bo Rush to be able to move forward to receive images and to have a plan of care for recovery. Able to recall having the strength for moving through difficult experiences in the past, and confident that she will have the resources she needs to do so once more. Spiritual Assessment/Intervention/Outcomes:    Encounter Summary  Encounter Overview/Reason : Initial Encounter  Service Provided For[de-identified] Patient  Referral/Consult From[de-identified] Rounding  Support System: Significant other, Children  Complexity of Encounter: Low  Begin Time: 0900  End Time : 0915  Total Time Calculated: 15 min  Encounter   Type: Initial Screen/Assessment     Spiritual/Emotional needs  Type: Spiritual Support                    Values / Beliefs  Do You Have Any Ethnic, Cultural, Sacramental, or Spiritual Zoroastrianism Needs You Would Like Us To Be Aware of While You Are in the Hospital : No    Care Plan:    Ongoing support. Spiritual Care Services   Electronically signed by Svitlana Almanzar on 10/13/22 at 10:35 AM EDT     To reach a  for emotional and spiritual support, place an [facility] consult request.   If a  is needed immediately, dial 0 and ask to page the on-call .

## 2022-10-13 NOTE — PROGRESS NOTES
[facility] Occupational Therapy      Date: 10/13/2022  Patient Name: Deepak Goodman        MRN: [id number]  Account: [de-identified]   : 1950  (74 y.o.)  Room: [place] [address]    Chart reviewed, attempted OT at 8126 for eval. Patient not seen 2° to: Other: Pt still pending imaging and final recommendations from neurosurgery. Will continue to hold OT eval until cleared to begin mobility. Will attempt again when able.     Electronically signed by KEN Marcial on 10/13/2022 at 8:08 AM

## 2022-10-13 NOTE — PROGRESS NOTES
Physical Therapy Med Surg Initial Assessment  Facility/Department: [facility]  Room: [place] [id number]       NAME: Laina Burciaga  : 1950 (13 y.o.)  MRN: [id number]  CODE STATUS: Full Code    Date of Service: 10/13/2022    Patient Diagnosis(es): Compression fracture of lumbar vertebrae, non-traumatic, initial encounter [facility]) Reuben Leung  Fall, initial encounter [W19. XXXA]  Closed compression fracture of body of L1 vertebra ([address] [S32.010A]   Chief Complaint   Patient presents with    Fall     Patient Active Problem List    Diagnosis Date Noted    Closed compression fracture of body of L1 vertebra ([address] 10/12/2022    Vertebral fracture, osteoporotic, initial encounter ([address] 10/12/2022    Compression fracture of lumbar vertebrae, non-traumatic, initial encounter ([address] 10/11/2022    Coagulopathy, on coumadin 2013    Bilateral leg weakness 2018    Lumbar stenosis with neurogenic claudication 2018    Lumbar stenosis 08/15/2018    Synovial cyst of lumbar facet joint 08/15/2018    PE (pulmonary thromboembolism) ([address] 08/15/2018    Right-sided low back pain with right-sided sciatica 2016    Wheezes 2015    History of chest x-ray, normal, 8/6/14 10/17/2014    History of PTCA 2, stent x 2 , , Frances Proffer 2013    Abnormal EKG, needs cardiac clearence 2013    Hiatal hernia 2013    Calculus of gallbladder 2013    Need for tetanus booster 10/10/2013    Epistaxis 2012    ACE-inhibitor cough 07/10/2012    Nephrolithiasis     Depression     Hyperlipidemia     OA (osteoarthritis)     Osteoporosis     Hypertension     Fibrocystic breast disease     Urge incontinence     Anemia     CAD (coronary artery disease) 2008        Past Medical History:   Diagnosis Date    Abnormal EKG, needs cardiac clearence 2013    ACE-inhibitor cough 7/10/2012    ARB     Anemia     CAD (coronary artery disease) 2008    cardia stents x 2    Cancer (HCC)     facial skin cancer excision    Coagulopathy ([facility] 10/4/2012    Coagulopathy, on coumadin 11/13/2013    Cough 5/29/2012    Cough, Neg w/u Dr Senthil Medina, ? [employer] 9/22/2014    Depression     Depression 6/13/2017    Epistaxis 8/20/2012    Fibrocystic breast disease     Hiatal hernia 5/3/2016    History of chest x-ray, normal, 8/6/14 10/17/2014    History of PTCA 2, stent x 2 2009, 2012, Thomes Nip 12/23/2013    Hx of blood clots 2012    PE s/p RTKR post op    Hyperlipidemia     meds > 10 yrs    Hypertension     meds > 10 yrs    Nephrolithiasis     OA (osteoarthritis)     Osteoporosis     Right-sided low back pain with right-sided sciatica 5/3/2016    Urge incontinence     Wheezes 4/20/2015     Past Surgical History:   Procedure Laterality Date    CARDIAC SURGERY      stent x 2    COLONOSCOPY      CORONARY ANGIOPLASTY WITH STENT PLACEMENT  3/12    Zarha    ENDOSCOPY, COLON, DIAGNOSTIC      EYE SURGERY      [place] with IOL OU    HERNIA REPAIR      left inguinal hernia    HYSTERECTOMY, TOTAL ABDOMINAL (CERVIX REMOVED)  1998    JOINT REPLACEMENT  2008    LTKR    JOINT REPLACEMENT  2017    RTSR    KNEE ARTHROSCOPY  11/11    R knee, [facility]    LITHOTRIPSY  2003 approx    [place] LAMINECTOMY,>2 SGMT,LUMBAR N/A 8/22/2018    SPINE L3-4, L4-5, L5-S1 LAMINECTOMY, L3-4, L4-5 POSTERIOR LATERAL FUSION, L5-S1 INSTRUMENTED POSTERIOR INTERBODY FUSION, PRONE, AXIS SPINE TABLE, ACTIFUSE, CELL SAVER, NEW MICROSCOPE, NEW C-ARM X2, PEDIGUARD, MISONIX BONE SCALPAL, PNEUMATIC KERRISONS, SPINAL CORD MONITORING, LUMBAR BRACE, GLOBUS, OPEN TLIF, (H.S.C.G.#[id number]) performed by Linda Pereira MD at [address]  2/2008    [address] Right     SKIN GRAFT  2/9/11    JORDAN AND BSO (CERVIX REMOVED)  1996 approx    TOTAL KNEE ARTHROPLASTY Left 08/2008       Chart Reviewed: Yes  Family / Caregiver Present: No  Diagnosis: Compression fracture of lumbar vertebrae, non-traumatic, initial encounter ([facility]  General Comment  Comments: Pt resting in bed.  Agreeable to PT evaluation    Restrictions:  Restrictions/Precautions: Fall Risk  Required Braces or Orthoses  Spinal: Thoracic Lumbar Sacral Orthotics  Position Activity Restriction  Other position/activity restrictions: per Trauma order: \"OK for OOB mobility if wearing TLSO brace. \"     SUBJECTIVE:   Subjective: \"I'm ready to get up and move. \"    Pain  Pain: 6/10 across Lower back. Repositioned for comfort. RN notified. Prior Level of Function:  Social/Functional History  Lives With: Significant other  Type of Home: House  Home Layout: One level  Home Access: Stairs to enter with rails  Entrance Stairs - Number of Steps: 2  Bathroom Shower/Tub: Walk-in shower  Bathroom Equipment: Built-in shower seat, Grab bars in shower  Home Equipment: NyGlide Feil, rolling, Alert Button  Has the patient had two or more falls in the past year or any fall with injury in the past year?: Yes  ADL Assistance: Independent  Homemaking Assistance: Independent  Ambulation Assistance: Independent (No AD)  Transfer Assistance: Independent    OBJECTIVE:   Vision  Vision Exceptions: Wears glasses for reading (states that since admission, she is having new double vision with reading. Smooth pursuits and visual field WFL. Mildly impaired conversion.)  Hearing: Within functional limits    Cognition:  Overall Orientation Status: Within Functional Limits  Orientation Level: Oriented X4  Follows Commands: Within Functional Limits  Overall Cognitive Status: WFL    Observation/Palpation  Observation: No acute distress noted. Pt pleasant and motivated. TLSO brace donned and fitted for pt size and comfort.     ROM:  RLE PROM: WFL  LLE PROM: WFL    Strength:  Strength RLE  Strength RLE: WFL  Comment: Grossly 2/5  Strength LLE  Strength LLE: WFL  Comment: Grossly 2/5  Strength Other  Other: Trunk strength grossly 2/5    Neuro:  Balance  Sitting - Static: Good  Sitting - Dynamic: Fair  Standing - Static: Fair  Standing - Dynamic: Fair  Comments: Limited by pain Tone: Normal    Sensation: Intact    Bed mobility  Rolling to Left: Contact guard assistance;Minimal assistance  Supine to Sit: Stand by assistance  Bed Mobility Comments: Limited by pain. heavy assist via bedrails. increased time. Educated on logroll technique    Transfers  Sit to Stand: Minimal Assistance  Stand to Sit: Contact guard assistance  Bed to Chair: Contact guard assistance  Comment: Demonstration provided on hip hinge. pt with pain limitations upon standing requiring multiple attempts and lifting assist.    Ambulation  Surface: Level tile  Device: Rolling Walker;Hand-Held Assist  Assistance: Minimal assistance;Stand by assistance  Quality of Gait: Jacki with HHA. pt feels unsafe and experiencing increased pain. Improved considerably with [employer]. pt with slow pacing and rigid posturing. Verbal cues for forward gaze and use of [employer]  Distance: 20ft with turn                   Activity Tolerance  Activity Tolerance: Patient tolerated treatment well  Activity Tolerance Comments: increased pain with movement    Patient Education  Education Given To: Patient  Education Provided: Plan of Care;Role of Therapy;Precautions  Education Provided Comments: use of TLSO; compression fxs; healing process  Education Method: Verbal;Demonstration  Education Outcome: Verbalized understanding;Continued education needed       ASSESSMENT:   Body Structures, Functions, Activity Limitations Requiring Skilled Therapeutic Intervention: Decreased functional mobility ; Decreased ADL status; Decreased ROM; Decreased body mechanics; Decreased strength; Increased pain;Decreased endurance;Decreased balance  Decision Making: Medium Complexity  History: High  Exam: Med  Clinical Presentation: Med    Therapy Prognosis: Good  Barriers to Learning: none         DISCHARGE RECOMMENDATIONS:  Discharge Recommendations: Continue to assess pending progress, Patient would benefit from continued therapy after discharge    Assessment: Continued PT indicated to progress mobility and facilitate DC at highest level of indep and safety. limited by pain. Requires PT Follow-Up: Yes      PLAN OF CARE:  Physcial Therapy Plan  General Plan: 1 time a day 3-6 times a week  Current Treatment Recommendations: Strengthening, ROM, Balance training, Functional mobility training, Transfer training, ADL/Self-care training, Endurance training, Gait training, Stair training, Neuromuscular re-education, Home exercise program, Safety education & training, Patient/Caregiver education & training, Equipment evaluation, education, & procurement, Modalities, Positioning, Manual Therapy - Soft Tissue Mobilization    Safety Devices  Type of Devices: All fall risk precautions in place, Call light within reach, Chair alarm in place, Left in chair  Restraints  Restraints Initially in Place: No    Goals:  Long Term Goals  Long Term Goal 1: Pt to complete bed mobility with indep  Long Term Goal 2: Pt to complete transfers with indep  Long Term Goal 3: Pt to ambulate 50-150ft with LRD and indep  Long Term Goal 4: Pt to complete 2 steps with HR and supervision  Long Term Goal 5: Pt to complete HEP with indep    AMPA (6 CLICK) BASIC MOBILITY  AM-PAC Inpatient Mobility Raw Score : 17     Therapy Time:   Individual   Time In 1540   Time Out 1605   Minutes 25   Timed Code Treatment Minutes: 8 Minutes (Transfers 5min; gait 3min)       Matthieu Felipe, PT, 10/13/22 at 4:31 PM         Definitions for assistance levels  Independent = pt does not require any physical supervision or assistance from another person for activity completion. Device may be needed.   Stand by assistance = pt requires verbal cues or instructions from another person, close to but not touching, to perform the activity  Minimal assistance= pt performs 75% or more of the activity; assistance is required to complete the activity  Moderate assistance= pt performs 50% of the activity; assistance is required to complete the activity  Maximal assistance = pt performs 25% of the activity; assistance is required to complete the activity  Dependent = pt requires total physical assistance to accomplish the task

## 2022-10-13 NOTE — PROGRESS NOTES
Imaging pending at this time. Orders placed for lumbar brace and should be work with activity and when [facility].

## 2022-10-13 NOTE — PROGRESS NOTES
Patient continues to have severe pain lumbar spine. No pain thoracic cervical.  pace maker in place which is MRI compatible waiting on confirmation. To complete trauma protocol obtain CT scans of the cervical thoracic spines. Await the MRI study of the lumbar spine. Discussed with the patient.

## 2022-10-13 NOTE — PROGRESS NOTES
PM check for MRI compatibility. Pt has an [place] XT PM, MRI compatible. Both leads are Medtronic 5076, both MRI compatible. Pt in AF, AF burden 39.5% since last check on 09/21/2022. Atrial Impedance 494 ohms  Ventricular Impedance 494 ohms. Unable to test RA capture threshold due to AF  RV capture threshold 0.75V @ 0.4ms. Not pacemaker dependent, presenting rhythm AF-VS.  Pt. Follows with Dr. Tran Read. Battery status OK. No changes made. Paper report faxed to W, MRI and to [employer] rep [employer].

## 2022-10-13 NOTE — PROGRESS NOTES
Physical Therapy Missed Treatment   Facility/Department: [place] [facility] [id number]    NAME: Barbara Escobar    : 1950 (74 y.o.)  MRN: [id number]    Account: [de-identified]  Gender: female      PT evaluation and treatment orders received. Chart reviewed. Pt still awaiting imaging. Will hold pending medical/surgical plan. Nursing staff notified. Will follow and attempt PT evaluation again at earliest availability.        Magdy Bravo, PT, 10/13/22 at 8:21 AM

## 2022-10-13 NOTE — CARE COORDINATION
Met with pt at bedside to discuss discharge planning. Pt is awaiting to have MRI and then PT/OT evals once cleared. Discussed HHC vs SNF or rehab and pt is open to either. D/C plan TBD at this time.

## 2022-10-14 ENCOUNTER — APPOINTMENT (OUTPATIENT)
Dept: MRI IMAGING | Age: 72
DRG: 516 | End: 2022-10-14
Payer: MEDICARE

## 2022-10-14 PROBLEM — G89.11 ACUTE PAIN DUE TO TRAUMA: Status: ACTIVE | Noted: 2022-10-14

## 2022-10-14 PROBLEM — I21.4 NSTEMI (NON-ST ELEVATED MYOCARDIAL INFARCTION) (HCC): Status: ACTIVE | Noted: 2021-05-21

## 2022-10-14 PROBLEM — Z74.09 IMPAIRED MOBILITY AND ACTIVITIES OF DAILY LIVING: Status: ACTIVE | Noted: 2022-10-14

## 2022-10-14 PROBLEM — M48.56XA: Status: ACTIVE | Noted: 2022-10-14

## 2022-10-14 PROBLEM — Z78.9 IMPAIRED MOBILITY AND ACTIVITIES OF DAILY LIVING: Status: ACTIVE | Noted: 2022-10-14
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
## 2022-10-14 PROCEDURE — 1210000000 HC MED SURG R&B

## 2022-10-14 PROCEDURE — 6370000000 HC RX 637 (ALT 250 FOR IP): Performed by: STUDENT IN AN ORGANIZED HEALTH CARE EDUCATION/TRAINING PROGRAM

## 2022-10-14 PROCEDURE — 6370000000 HC RX 637 (ALT 250 FOR IP): Performed by: PHYSICIAN ASSISTANT

## 2022-10-14 PROCEDURE — 99232 SBSQ HOSP IP/OBS MODERATE 35: CPT | Performed by: NEUROLOGICAL SURGERY

## 2022-10-14 PROCEDURE — 6370000000 HC RX 637 (ALT 250 FOR IP): Performed by: SURGERY

## 2022-10-14 PROCEDURE — 99232 SBSQ HOSP IP/OBS MODERATE 35: CPT | Performed by: STUDENT IN AN ORGANIZED HEALTH CARE EDUCATION/TRAINING PROGRAM

## 2022-10-14 PROCEDURE — 97116 GAIT TRAINING THERAPY: CPT

## 2022-10-14 PROCEDURE — 2580000003 HC RX 258: Performed by: SURGERY

## 2022-10-14 PROCEDURE — 6360000002 HC RX W HCPCS: Performed by: SURGERY

## 2022-10-14 PROCEDURE — 99221 1ST HOSP IP/OBS SF/LOW 40: CPT | Performed by: PHYSICAL MEDICINE & REHABILITATION

## 2022-10-14 PROCEDURE — 72148 MRI LUMBAR SPINE W/O DYE: CPT

## 2022-10-14 RX ORDER — SENNA AND DOCUSATE SODIUM 50; 8.6 MG/1; MG/1
1 TABLET, FILM COATED ORAL 2 TIMES DAILY
Status: DISCONTINUED | OUTPATIENT
Start: 2022-10-14 | End: 2022-10-19 | Stop reason: HOSPADM

## 2022-10-14 RX ADMIN — FLUOXETINE HYDROCHLORIDE 60 MG: 20 CAPSULE ORAL at 09:26

## 2022-10-14 RX ADMIN — ACETAMINOPHEN 650 MG: 325 TABLET ORAL at 17:19

## 2022-10-14 RX ADMIN — METHOCARBAMOL TABLETS 500 MG: 500 TABLET, COATED ORAL at 00:46

## 2022-10-14 RX ADMIN — DOFETILIDE 125 MCG: 0.12 CAPSULE ORAL at 09:26

## 2022-10-14 RX ADMIN — METHOCARBAMOL TABLETS 500 MG: 500 TABLET, COATED ORAL at 22:08

## 2022-10-14 RX ADMIN — METHOCARBAMOL TABLETS 500 MG: 500 TABLET, COATED ORAL at 09:26

## 2022-10-14 RX ADMIN — DOFETILIDE 125 MCG: 0.12 CAPSULE ORAL at 22:08

## 2022-10-14 RX ADMIN — Medication 1000 UNITS: at 22:08

## 2022-10-14 RX ADMIN — ACETAMINOPHEN 650 MG: 325 TABLET ORAL at 12:52

## 2022-10-14 RX ADMIN — Medication 1000 UNITS: at 09:26

## 2022-10-14 RX ADMIN — METOPROLOL SUCCINATE 25 MG: 25 TABLET, EXTENDED RELEASE ORAL at 09:26

## 2022-10-14 RX ADMIN — MAGNESIUM OXIDE 400 MG (241.3 MG MAGNESIUM) TABLET 400 MG: TABLET at 09:26

## 2022-10-14 RX ADMIN — FUROSEMIDE 20 MG: 20 TABLET ORAL at 09:26

## 2022-10-14 RX ADMIN — SENNOSIDES AND DOCUSATE SODIUM 1 TABLET: 50; 8.6 TABLET ORAL at 17:19

## 2022-10-14 RX ADMIN — OXYCODONE 10 MG: 5 TABLET ORAL at 12:51

## 2022-10-14 RX ADMIN — Medication 10 ML: at 13:05

## 2022-10-14 RX ADMIN — POLYETHYLENE GLYCOL 3350 17 G: 17 POWDER, FOR SOLUTION ORAL at 09:26

## 2022-10-14 RX ADMIN — METHOCARBAMOL TABLETS 500 MG: 500 TABLET, COATED ORAL at 17:19

## 2022-10-14 RX ADMIN — Medication 10 ML: at 22:09

## 2022-10-14 RX ADMIN — OXYCODONE 10 MG: 5 TABLET ORAL at 22:08

## 2022-10-14 RX ADMIN — PANTOPRAZOLE SODIUM 40 MG: 40 TABLET, DELAYED RELEASE ORAL at 05:39

## 2022-10-14 RX ADMIN — ACETAMINOPHEN 650 MG: 325 TABLET ORAL at 05:39

## 2022-10-14 RX ADMIN — ENOXAPARIN SODIUM 40 MG: 100 INJECTION SUBCUTANEOUS at 09:26

## 2022-10-14 RX ADMIN — ATORVASTATIN CALCIUM 40 MG: 40 TABLET, FILM COATED ORAL at 22:08

## 2022-10-14 RX ADMIN — ACETAMINOPHEN 650 MG: 325 TABLET ORAL at 00:45

## 2022-10-14 RX ADMIN — Medication 100 MG: at 09:26

## 2022-10-14 RX ADMIN — TRAZODONE HYDROCHLORIDE 50 MG: 50 TABLET ORAL at 22:08

## 2022-10-14 ASSESSMENT — PAIN SCALES - GENERAL
PAINLEVEL_OUTOF10: 5
PAINLEVEL_OUTOF10: 5
PAINLEVEL_OUTOF10: 8
PAINLEVEL_OUTOF10: 5
PAINLEVEL_OUTOF10: 8

## 2022-10-14 ASSESSMENT — ENCOUNTER SYMPTOMS
BLOOD IN STOOL: 0
ABDOMINAL PAIN: 0
CONSTIPATION: 1
BACK PAIN: 1
COUGH: 0
SORE THROAT: 0
SHORTNESS OF BREATH: 1
EYE PAIN: 0
EYE REDNESS: 0
PHOTOPHOBIA: 0
STRIDOR: 0
VOMITING: 0
WHEEZING: 0
DIARRHEA: 0
NAUSEA: 0

## 2022-10-14 ASSESSMENT — PAIN DESCRIPTION - LOCATION
LOCATION: BACK

## 2022-10-14 ASSESSMENT — PAIN DESCRIPTION - DESCRIPTORS: DESCRIPTORS: ACHING

## 2022-10-14 ASSESSMENT — PAIN DESCRIPTION - ORIENTATION: ORIENTATION: MID

## 2022-10-14 NOTE — PROGRESS NOTES
Pt alert and oriented X3, calm and cooperative. Assessment complete see flowsheet. Medications given per orders see mar. Pt complaining of 8/10 pain to back medicated with prn roxicodone see mar. Pt resting in bed at this time, bed alarm on, call light in reach.

## 2022-10-14 NOTE — PROGRESS NOTES
Physical Therapy Med Surg Daily Treatment Note  [facility]/Department: [facility]  [facility]       NAME: Daniel Diaz  : 1950 (13 y.o.)  MRN: [id number]  CODE STATUS: Full Code    Date of Service: 10/14/2022    Patient Diagnosis(es): Compression fracture of lumbar vertebrae, non-traumatic, initial encounter [facility]) Amaury Armstrongt  Fall, initial encounter [W19. XXXA]  Closed compression fracture of body of L1 vertebra ([address] [S32.010A]   Chief Complaint   Patient presents with    Fall     Patient Active Problem List    Diagnosis Date Noted    Closed compression fracture of body of L1 vertebra ([address] 10/12/2022    Vertebral fracture, osteoporotic, initial encounter ([address] 10/12/2022    Compression fracture of lumbar vertebrae, non-traumatic, initial encounter ([address] 10/11/2022    Coagulopathy, on coumadin 2013    Bilateral leg weakness 2018    Lumbar stenosis with neurogenic claudication 2018    Lumbar stenosis 08/15/2018    Synovial cyst of lumbar facet joint 08/15/2018    PE (pulmonary thromboembolism) ([address] 08/15/2018    Right-sided low back pain with right-sided sciatica 2016    Wheezes 2015    History of chest x-ray, normal, 8/6/14 10/17/2014    History of PTCA 2, stent x 2 , , Richard Mash 2013    Abnormal EKG, needs cardiac clearence 2013    Hiatal hernia 2013    Calculus of gallbladder 2013    Need for tetanus booster 10/10/2013    Epistaxis 2012    ACE-inhibitor cough 07/10/2012    Nephrolithiasis     Depression     Hyperlipidemia     OA (osteoarthritis)     Osteoporosis     Hypertension     Fibrocystic breast disease     Urge incontinence     Anemia     CAD (coronary artery disease) 2008        Past Medical History:   Diagnosis Date    Abnormal EKG, needs cardiac clearence 2013    ACE-inhibitor cough 7/10/2012    ARB     Anemia     CAD (coronary artery disease) 2008    cardia stents x 2    Cancer (HCC)     facial skin cancer excision    Coagulopathy ([facility] 10/4/2012    Coagulopathy, on coumadin 11/13/2013    Cough 5/29/2012    Cough, Neg w/u Dr Sharen Aschoff, ?  Betablocker 9/22/2014    Depression     Depression 6/13/2017    Epistaxis 8/20/2012    Fibrocystic breast disease     Hiatal hernia 5/3/2016    History of chest x-ray, normal, 8/6/14 10/17/2014    History of PTCA 2, stent x 2 2009, 2012, Geofm Perking 12/23/2013    Hx of blood clots 2012    PE s/p RTKR post op    Hyperlipidemia     meds > 10 yrs    Hypertension     meds > 10 yrs    Nephrolithiasis     OA (osteoarthritis)     Osteoporosis     Right-sided low back pain with right-sided sciatica 5/3/2016    Urge incontinence     Wheezes 4/20/2015     Past Surgical History:   Procedure Laterality Date    CARDIAC SURGERY      stent x 2    COLONOSCOPY      CORONARY ANGIOPLASTY WITH STENT PLACEMENT  3/12    Zarha    ENDOSCOPY, COLON, DIAGNOSTIC      EYE SURGERY      [place] with IOL OU    HERNIA REPAIR      left inguinal hernia    HYSTERECTOMY, TOTAL ABDOMINAL (CERVIX REMOVED)  1998    JOINT REPLACEMENT  2008    LTKR    JOINT REPLACEMENT  2017    RTSR    KNEE ARTHROSCOPY  11/11    R knee, [facility]    LITHOTRIPSY  2003 approx    DE LAMINECTOMY,>2 SGMT,LUMBAR N/A 8/22/2018    SPINE L3-4, L4-5, L5-S1 LAMINECTOMY, L3-4, L4-5 POSTERIOR LATERAL FUSION, L5-S1 INSTRUMENTED POSTERIOR INTERBODY FUSION, PRONE, AXIS SPINE TABLE, ACTIFUSE, CELL SAVER, NEW MICROSCOPE, NEW C-ARM X2, PEDIGUARD, MISONIX BONE SCALPAL, PNEUMATIC KERRISONS, SPINAL CORD MONITORING, LUMBAR BRACE, GLOBUS, OPEN TLIF, (H.S.C.G.#[id number]) performed by Gabby Meza MD at [address]  2/2008    [address] Right     SKIN GRAFT  2/9/11    JORDAN AND BSO (CERVIX REMOVED)  1996 approx    TOTAL KNEE ARTHROPLASTY Left 08/2008            Restrictions:  Restrictions/Precautions: Fall Risk  Required Braces or Orthoses  Spinal: Thoracic Lumbar Sacral Orthotics  Position Activity Restriction  Other position/activity restrictions: per Trauma order: \"OK for OOB mobility if wearing TLSO brace. \"    SUBJECTIVE:   Subjective: I guess they're just waiting for MRI results in order to see if I can go to rehab    Pain  Pain: 6/10 LBP Dull and constant, but when I move it's sharp and \"biting\". Declines intervention. Pain without change pre to post session. OBJECTIVE:        Bed Mobility Training  Bed Mobility Training: Yes  Interventions: Verbal cues  Rolling: Stand-by assistance  Supine to Sit: Stand-by assistance  Sit to Supine: Stand-by assistance  Scooting: Stand-by assistance    Transfer Training  Transfer Training: Yes  Sit to Stand: Contact-guard assistance  Stand to Sit: Contact-guard assistance  Bed to Chair: Contact-guard assistance    Gait Training: Yes  Overall Level of Assistance: Contact-guard assistance  Distance (ft): 100 Feet  Assistive Device: Brace/splint; Walker, rolling  Interventions: Verbal cues; Visual cues  Base of Support: Center of gravity altered  Speed/Milena: Delayed  Swing Pattern: Left symmetrical;Right symmetrical  Gait Abnormalities: Other (comment) (Over correction of posture with mild shoulder posterior retro lean.)  Right Side Weight Bearing: As tolerated  Left Side Weight Bearing: As tolerated                              Activity Tolerance  Activity Tolerance: Patient tolerated treatment well          ASSESSMENT   Assessment: Pt demonstrating improved bed mobiity, transfers and gait since evaluation yesterday. Pt utilizing walker for gait, but is not WB through UE;s and should be able to progress to without AD. Gait training to breathe and not focus on pain to decrease guarding and pain increasing with good carry over. Pt educated on over correction of posture with gait and how that may be straining lower back vs letting abdominals and Lat Dorsi stabilize pt. Pt stated she understood and was unaware of doing this during gait, \"but that makes sense now that I think about it. \"     Discharge Recommendations:  Continue to assess pending progress, Patient would benefit from continued therapy after discharge         Goals  Long Term Goals  Long Term Goal 1: Pt to complete bed mobility with indep  Long Term Goal 2: Pt to complete transfers with indep  Long Term Goal 3: Pt to ambulate 50-150ft with LRD and indep  Long Term Goal 4: Pt to complete 2 steps with HR and supervision  Long Term Goal 5: Pt to complete HEP with indep    PLAN    General Plan: 1 time a day 3-6 times a week        AMPAC (6 CLICK) BASIC MOBILITY  AM-PAC Inpatient Mobility Raw Score : 18     Therapy Time   Individual   Time In 1021   Time Out 1038   Minutes 17     Minutes: 17  Bed mobility: 7  Gait: [address] [facility], \[facility] [place] [facility]\"", 10/14/22 at 11:20 AM         Definitions for assistance levels  Independent = pt does not require any physical supervision or assistance from another person for activity completion. Device may be needed.   Stand by assistance = pt requires verbal cues or instructions from another person, close to but not touching, to perform the activity  Minimal assistance= pt performs 75% or more of the activity; assistance is required to complete the activity  Moderate assistance= pt performs 50% of the activity; assistance is required to complete the activity  Maximal assistance = pt performs 25% of the activity; assistance is required to complete the activity  Dependent = pt requires total physical assistance to accomplish the task

## 2022-10-14 NOTE — PROGRESS NOTES
Patient in MRI. [employer] set pacemaker remotely at DOO 85. Patient taken into the MRI suite for testing. Patient attached to monitors. Emotional support given. 1405 Patient in MRI testing suite. Vital signs stable. Patient tolerating well. 1415 MRI testing continues. Patient tolerating well. 1419 Patient testing complete. Patient will be brought out of MRI testing suite and [employer] will remotely set her pacemaker back to its normal pacemaking mode. Patient tolerated well.

## 2022-10-14 NOTE — PROGRESS NOTES
TRAUMA DAILY PROGRESS NOTE      Patient Name: Allen Wright  Admission Date 10/11/2022    Hospital Day: 3  Patient seen and examined on 10/14/2022    INTERVAL HISTORY/EVENTS     Background:  Allen Wright is a 74 y.o. female with a PMHx of HTN, HLD, OA, CAD s/p L4-5 fusion with instrumentation and depression presented to [facility] s/p mechanical fall from standing. (-) head strike, (-) LOC, (+) Eliquis. Admitted to trauma. NSGY consulted. Trauma work up found L1 compression fracture. Hospital Course:  10/11/2022: Presented to ED with c/o low back pain s/p mechanical fall from standing. (-) head strike, (-) LOC, (+) Eliquis. Admitted to trauma. NSGY consulted. 10/12/2022: NSGY eval and rec's for MRI of L-spine. Pt with pacemaker and awaiting confirmation of MRI compatibility. 10/13/2022: TLSO brace fitted. MRI of L spine pending. Home medications resumed. Pacemaker interrogated and OK for MRI      24 Hour Events: This is my first time meeting this patient. No acute events overnight per patient or nursing. Remains awaiting MRI of Lumbar spine. Pt reports pain to her back that she states is the same. Lying flat her pain is minimal. Reports pain well controlled on current regimen. Denies LE weakness or numbness. Tolerating adequate PO intake without nausea or vomiting. Voiding spontaneously, no bowel movement since admission. Vital signs reviewed. Afebrile, had one episode of tachycardia yesterday afternoon but has since had no tachycardia, normotensive, sating appropriately on RA. No lab work obtained today. UOP: 100mL  BM x 0 since admission (10/11)      PHYSICAL EXAM     Vitals Average, Min and Max for last 24 hours:  Temp: Temp: 97.3 °F (36.3 °C);  Temp  Av.4 °F (36.3 °C)  Min: 97.3 °F (36.3 °C)  Max: 97.5 °F (36.4 °C)  Respirations: Resp  Av.7  Min: 18  Max: 20  Pulse: Pulse  Av.5  Min: 60  Max: 122  Blood Pressure: Systolic (15LZE), FCN:389 , Min:110 , Max:144   ; Diastolic (92KTX), WSB:53, Min:56, Max:98  SpO2: SpO2  Av %  Min: 90 %  Max: 92 %    Vitals: BP (!) 139/56   Pulse 60   Temp 97.3 °F (36.3 °C) (Oral)   Resp 18   Ht 5' 4\" (1.626 m)   Wt 172 lb 1.6 oz (78.1 kg)   LMP 1998   SpO2 90%   BMI 29.54 kg/m²     Physical Exam:  Constitutional: Lying in bed, head mildly elevated. Alert and conversant. HEENT: Atraumatic normocephalic. Cardiovascular: Regular rate and rhythm. Bilateral radial and DP pulses intact   Pulmonary: Clear to ausculation bilaterally on room air. No wheezing, rhonchi or rales. Abdominal: Soft. Non-distended. Non-tender. Musculoskeletal: Moves all extremities to command. No edema   Neurological: Alert, awake, and orientated x 3. Motor and sensory grossly intact. GCS of 15.     LABORATORY RESULTS (LAST 24 HOURS)     CBC with Differential:    Lab Results   Component Value Date/Time    WBC 9.2 10/11/2022 11:45 PM    RBC 3.65 10/11/2022 11:45 PM    RBC 3.82 2012 10:53 AM    HGB 10.0 10/11/2022 11:45 PM    HCT 31.6 10/11/2022 11:45 PM     10/11/2022 11:45 PM    MCV 86.6 10/11/2022 11:45 PM    MCH 27.5 10/11/2022 11:45 PM    MCHC 31.8 10/11/2022 11:45 PM    RDW 15.1 10/11/2022 11:45 PM    LYMPHOPCT 23.5 10/11/2022 11:45 PM    MONOPCT 6.6 10/11/2022 11:45 PM    BASOPCT 0.6 10/11/2022 11:45 PM    MONOSABS 0.6 10/11/2022 11:45 PM    LYMPHSABS 2.2 10/11/2022 11:45 PM    EOSABS 0.2 10/11/2022 11:45 PM    BASOSABS 0.1 10/11/2022 11:45 PM     CMP:    Lab Results   Component Value Date/Time     10/11/2022 11:45 PM    K 4.1 10/11/2022 11:45 PM     10/11/2022 11:45 PM    CO2 22 10/11/2022 11:45 PM    BUN 27 10/11/2022 11:45 PM    CREATININE 0.86 10/11/2022 11:45 PM    GFRAA >60.0 10/11/2022 11:45 PM    LABGLOM >60.0 10/11/2022 11:45 PM    GLUCOSE 104 10/11/2022 11:45 PM    GLUCOSE 88 2012 10:53 AM    PROT 6.4 10/11/2022 11:45 PM    LABALBU 3.3 10/11/2022 11:45 PM    LABALBU 3.8 2012 10:53 AM    CALCIUM 9.0 10/11/2022 11:45 PM    BILITOT <0.2 10/11/2022 11:45 PM    ALKPHOS 70 10/11/2022 11:45 PM    AST 23 10/11/2022 11:45 PM    ALT 15 10/11/2022 11:45 PM     Magnesium:    Lab Results   Component Value Date/Time    MG 1.9 08/23/2018 05:37 AM     PT/INR:    Lab Results   Component Value Date/Time    PROTIME 13.2 07/30/2018 02:14 PM    PROTIME 26.0 07/28/2014 10:16 AM    INR 1.3 07/30/2018 02:14 PM     PTT:    Lab Results   Component Value Date/Time    APTT 45.1 07/30/2018 02:14 PM       IMAGING RESULTS (PERSONALLY REVIEWED)     All admission reviewed  MRI pending. ASSESSMENT & PLAN     Diagnoses:  S/p mechanical fall from standing on 10/11/22  L1 compression fracture (non-op, MRI pending)  Acute pain due to traumatic injury    PMHx: HTN, HLD, OA, CAD s/p L4-5 fusion with instrumentation and depression    Incidental Findings: None, JLR 10/12/22      ASSESSMENT/PLAN:  Neurological: Acute L1 compression fracture  - NSGY consulted with rec's for MRI, ordered and pending  - Continue pain control with:  - Tylenol 650mg q6hr scheduled  - Oxycodone 5/10mg q4hrs prn moderate/severe pain  - Robaxin 500mg four times daily  - Continue home Prozac 60mg daily, Trazodone 50mg nightly    Cardiovascular: Episode of tachycardia yesterday afternoon that has resolved. HX HTN, HLD, Afib   - Continue vital signs per unit protocol   - Continue home Lipitor 40mg daily  - Continue home Lasix 20mg daily  - Continue home Toprolol XL 25mg daily  - Continue home Tikosyn 125 mcg BID  - Holding home Xarelto and ASA until final recommendations from [address] [place] [address].      Respiratory: Sating appropriately on RA  - Maintain O2 sats > 92%  - Encourage IS 10 x hourly     GI/Diet: Tolerating regular diet, no bowel movement since admission on 10/11  - Continue regular diet   - Continue bowel regimen: Miralax daily, dulcolax PRN  - Add senokot BID today  - Continue home pantoprazole 40mg daily     Renal/Electrolytes: No electrolyte abnormalities or kidney dysfunction on last BMP  - HLIV  - BMP as needed     ID: Remains afebrile, normotensive, and without leukocytosis on last CBC.  - No indication for Abx  - CBC as needed     Heme: HDS, acute blood loss anemia   - No indication for transfusion  - CBC as needed    Endocrine:   - No acute issues. MSK: Acute L2 compression fracture  - Spines: Awaiting final NSGY recommendations pending MRI. - Weight Bearing Restrictions: none  - Activity: TLSO brace at all times when sitting up or walking. OK to have off if lying in bed  - PT/OT: Rec inpatient rehab  - PM&R consulted 10/13, pending recs    Prophylaxis:   - SCDs and lovenox 40mg daily  - Continue home Protonix 40mg daily  - Holding home Xarelto and ASA until final recommendations from [address] [place] [address]. Lines/Tubes/Drains:  - Maintain PIVs  - No indication for collado catheter, monitor for urinary retention    Dispo: Remain on trauma service for MRI of spine and final recommendations from [address] [place] [address]. D/C to home vs acute rehab on D/C. Accom Status: General Status      - Follow up with NSGY (Dr. Marilou Rodarte) TBD.  - No follow up with Trauma Surgery needed. Please call for any questions or concerns.   Francene Kayser Treatment Team Sticky Note for contact information]      Leo Etienne PA-C  Trauma/Critical Care  [see Treatment Team Sticky Note for contact information]     This patient's plan of care was discussed and made in collaboration with Trauma Attending physician, Jamee Charles MD.

## 2022-10-14 NOTE — PROGRESS NOTES
Progress Note  Patient: Maci Police  Unit/Bed: I494/G404-55  YOB: 1950  MRN: [id number]  Acct: [de-identified]   Admitting Diagnosis: Compression fracture of lumbar vertebrae, non-traumatic, initial encounter [facility]) Errol Khalil  Fall, initial encounter [W19. XXXA]  Closed compression fracture of body of L1 vertebra ([address] [S32.010A]  Date:  10/11/2022  Hospital Day: 3    Chief Complaint:  Continues with severe back pain inhibiting her ability to roll over in bed take of breath eat and ambulate. CT scan confirms L1 compression fracture and x-rays.   MRI scan pending      Physical Examination:    BP (!) 139/56   Pulse 60   Temp 97.3 °F (36.3 °C) (Oral)   Resp 16   Ht 5' 4\" (1.626 m)   Wt 172 lb 1.6 oz (78.1 kg)   LMP 05/29/1998   SpO2 90%   BMI 29.54 kg/m²    Physical Exam    LABS:  CBC:   Lab Results   Component Value Date/Time    WBC 9.2 10/11/2022 11:45 PM    RBC 3.65 10/11/2022 11:45 PM    RBC 3.82 05/29/2012 10:53 AM    HGB 10.0 10/11/2022 11:45 PM    HCT 31.6 10/11/2022 11:45 PM    MCV 86.6 10/11/2022 11:45 PM    MCH 27.5 10/11/2022 11:45 PM    MCHC 31.8 10/11/2022 11:45 PM    RDW 15.1 10/11/2022 11:45 PM     10/11/2022 11:45 PM    MPV 9.5 04/20/2015 10:18 AM     CBC with Differential:   Lab Results   Component Value Date/Time    WBC 9.2 10/11/2022 11:45 PM    RBC 3.65 10/11/2022 11:45 PM    RBC 3.82 05/29/2012 10:53 AM    HGB 10.0 10/11/2022 11:45 PM    HCT 31.6 10/11/2022 11:45 PM     10/11/2022 11:45 PM    MCV 86.6 10/11/2022 11:45 PM    MCH 27.5 10/11/2022 11:45 PM    MCHC 31.8 10/11/2022 11:45 PM    RDW 15.1 10/11/2022 11:45 PM    LYMPHOPCT 23.5 10/11/2022 11:45 PM    MONOPCT 6.6 10/11/2022 11:45 PM    BASOPCT 0.6 10/11/2022 11:45 PM    MONOSABS 0.6 10/11/2022 11:45 PM    LYMPHSABS 2.2 10/11/2022 11:45 PM    EOSABS 0.2 10/11/2022 11:45 PM    BASOSABS 0.1 10/11/2022 11:45 PM     CMP:    Lab Results   Component Value Date/Time     10/11/2022 11:45 PM    K 4.1 10/11/2022 11:45 PM     10/11/2022 11:45 PM    CO2 22 10/11/2022 11:45 PM    BUN 27 10/11/2022 11:45 PM    CREATININE 0.86 10/11/2022 11:45 PM    GFRAA >60.0 10/11/2022 11:45 PM    LABGLOM >60.0 10/11/2022 11:45 PM    GLUCOSE 104 10/11/2022 11:45 PM    GLUCOSE 88 05/29/2012 10:53 AM    PROT 6.4 10/11/2022 11:45 PM    LABALBU 3.3 10/11/2022 11:45 PM    LABALBU 3.8 05/29/2012 10:53 AM    CALCIUM 9.0 10/11/2022 11:45 PM    BILITOT <0.2 10/11/2022 11:45 PM    ALKPHOS 70 10/11/2022 11:45 PM    AST 23 10/11/2022 11:45 PM    ALT 15 10/11/2022 11:45 PM     BMP:    Lab Results   Component Value Date/Time     10/11/2022 11:45 PM    K 4.1 10/11/2022 11:45 PM     10/11/2022 11:45 PM    CO2 22 10/11/2022 11:45 PM    BUN 27 10/11/2022 11:45 PM    LABALBU 3.3 10/11/2022 11:45 PM    LABALBU 3.8 05/29/2012 10:53 AM    CREATININE 0.86 10/11/2022 11:45 PM    CALCIUM 9.0 10/11/2022 11:45 PM    GFRAA >60.0 10/11/2022 11:45 PM    LABGLOM >60.0 10/11/2022 11:45 PM    GLUCOSE 104 10/11/2022 11:45 PM    GLUCOSE 88 05/29/2012 10:53 AM     Magnesium:    Lab Results   Component Value Date/Time    MG 1.9 08/23/2018 05:37 AM         Assessment:    Active Hospital Problems    Diagnosis Date Noted    Acute pain due to trauma [G89.11] 10/14/2022     Priority: Medium    Fall from standing [W19. XXXA] 10/14/2022     Priority: Medium    Impaired mobility and activities of daily living dt Acute compression deformity of the superior endplate of L1  [Y58.88, Z78.9] 10/14/2022     Priority: Medium    Closed compression fracture of body of L1 vertebra ([address] [S32.010A] 10/12/2022     Priority: Medium    Vertebral fracture, osteoporotic, initial encounter [facility]) Mayito Rivero 10/12/2022     Priority: Medium    Iron deficiency anemia, unspecified [D50.9] 10/17/2012     Priority: Medium           Plan: L1 vertebral augmentation    The surgical/medical procedure, indications and risks have been discussed.  There is a low chance of having any type of risk, but risks are still present including serious risks as pain, bleeding, infection, death, paralysis, sensory loss, bladder bowel and sexual dysfunction, chance of recurrence and so forth. Surgery is not a guarantee of normalcy. We cannot undo any permanent damage already done. We cannot change the course of any of the other medical diseases. I have discussed alternative procedures, risks and benefits. I have answered all questions. There is understanding and agreement to proceed.        Electronically signedby Nayeli Monte MD on 10/14/2022 at 1:17 PM

## 2022-10-14 NOTE — CONSULTS
Physical Medicine & Rehabilitation  Consult Note      Admitting Physician: Fabio Mccallum MD    Primary Care Provider: Mychal Velazquez DO     Reason for Consult:  Asses rehab needs, promote physical and mental function, analyze level of care to determine rehab needs, improve ability to actively participate in the rehabilitation process, and decrease likelihood of re-admit to the hospital after discharge. History of Present Illness:    Kimberly Logan is a 74 y.o. female admitted to [facility] on 10/11/2022.      75-year-old female presented initially to ED  with low back pain. Patient states she tripped and fell while picking something up earlier that day. Has been able to walk since then. Notes pain to the lower back. Did not hit her head. She is on Eliquis. Has no pain when laying still. Back Pain  This is a new problem. The current episode started in the past 7 days. The problem occurs constantly. The pain is present in the lumbar spine. The quality of the pain is described as aching. The pain is at a severity of 9/10. The pain is severe. The symptoms are aggravated by bending and position. Associated symptoms include leg pain and weakness. Pertinent negatives include no abdominal pain, chest pain, dysuria, fever or headaches. Risk factors include sedentary lifestyle, history of osteoporosis and lack of exercise. She has tried heat, analgesics, home exercises, ice, muscle relaxant, NSAIDs and walking for the symptoms. The treatment provided mild relief. I reviewed recent nursing notes discussed care with acute care providers, \" Pt alert and oriented X3, calm and cooperative. Assessment complete see flowsheet. Medications given per orders see mar. Pt complaining of 8/10 pain to back medicated with prn roxicodone see mar. Pt resting in bed at this time, bed alarm on, call light in reach. \".   Events from the previous 24 hours reviewed    .       Their inpatient work up has included:    Imaging:  Imaging and other studies reviewed and discussed with patient and staff    XR LUMBAR SPINE  10/12/2022  The mild L1 anterior compression fracture is stable compared to yesterday's exam.  The L4 superior endplate compression deformity is also stable. Postsurgical changes consistent with posterior fusion at L4-5 are again seen. No significant spondylolisthesis is noted. No new fractures seen. Mild-to-moderate multilevel degenerative changes stable. Stable mild L1 anterior compression fracture. Stable L4 superior endplate compression deformity status post L4-5 fusion. CT Head  10/12/2022    BRAIN/VENTRICLES: There is no acute intracranial hemorrhage, mass effect or midline shift. No abnormal extra-axial fluid collection. The gray-white differentiation is maintained without evidence of an acute infarct. There is no evidence of hydrocephalus. The ventricles, cisterns and sulci are prominent consistent with atrophy. There is decreased attenuation within the periventricular white matter consistent with periventricular leukomalacia. ORBITS: The visualized portion of the orbits demonstrate no acute abnormality. SINUSES: The visualized paranasal sinuses and mastoid air cells demonstrate no acute abnormality. SOFT TISSUES/SKULL:  No acute abnormality of the visualized skull or soft tissues. 1. There is no acute intracranial abnormality. Specifically, there is no intracranial hemorrhage. 2. Atrophy and periventricular leukomalacia,     CT CERVICAL SPINE  10/13/2022   BONES/ALIGNMENT: There is no acute fracture or traumatic malalignment. DEGENERATIVE CHANGES: Degenerative disc disease noted from C2 through C7. Small posterior uncovertebral joint osteophytes at multiple levels. Mild bilateral foraminal narrowing at C5-6 due to small uncovertebral joint osteophytes and minimal facet arthropathy. SOFT TISSUES: There is no prevertebral soft tissue swelling.   Bilateral carotid calcifications noted. No acute abnormality of the cervical spine. CT THORACIC SPINE  10/13/2022   BONES/ALIGNMENT: Slight right convexity midthoracic curvature. .  The vertebral body heights are maintained. No osseous destructive lesion is seen. There is an acute superior endplate wedge compression fracture, incompletely visualized at L1. DEGENERATIVE CHANGES: Osteopenia with minimal multilevel disc degenerative disease changes. Right anterolateral non marginal osteophytes in the mid lower thoracic spine. SOFT TISSUES: No paraspinal mass is seen. Basilar atelectasis or minimal consolidation in the right and left lower chest.     1.  Superior endplate compression fracture at L1, incompletely visualized. 2.  Osteopenia, no displaced thoracic spine fractures. CT LUMBAR SPINE  10/12/2022   FINDINGS: BONES/ALIGNMENT: There is normal alignment of the spine. Irregularity identified of the superior endplate of L1 to suggest a fracture. There is minimal posterior retropulsion into the spinal canal with mild spinal stenosis. There is minimal loss of height of approximately 20% anteriorly. DEGENERATIVE CHANGES: Hardware posteriorly fusing the L4 and L5 levels. Chronic compression deformity identified of the superior endplate of L4. No hardware complication. Minimal degenerative changes seen within the sacroiliac joints. SOFT TISSUES/RETROPERITONEUM: No paraspinal mass is seen. Vascular calcifications seen within the abdominal aorta and iliac vessels. Acute compression deformity of the superior endplate of L1 with approximately 20% loss of height and minimal retropulsion of the posterior elements with mild spinal canal stenosis.                Labs:    Labs reviewed and discussed with patient and staff    No results found for: POCGLU  Lab Results   Component Value Date/Time     10/11/2022 11:45 PM    K 4.1 10/11/2022 11:45 PM     10/11/2022 11:45 PM    CO2 22 10/11/2022 11:45 PM BUN 27 10/11/2022 11:45 PM    CREATININE 0.86 10/11/2022 11:45 PM    CALCIUM 9.0 10/11/2022 11:45 PM    LABALBU 3.3 10/11/2022 11:45 PM    LABALBU 3.8 05/29/2012 10:53 AM    BILITOT <0.2 10/11/2022 11:45 PM    ALKPHOS 70 10/11/2022 11:45 PM    AST 23 10/11/2022 11:45 PM    ALT 15 10/11/2022 11:45 PM     Lab Results   Component Value Date/Time    WBC 9.2 10/11/2022 11:45 PM    RBC 3.65 10/11/2022 11:45 PM    RBC 3.82 05/29/2012 10:53 AM    HGB 10.0 10/11/2022 11:45 PM    HCT 31.6 10/11/2022 11:45 PM    MCV 86.6 10/11/2022 11:45 PM    MCH 27.5 10/11/2022 11:45 PM    MCHC 31.8 10/11/2022 11:45 PM    RDW 15.1 10/11/2022 11:45 PM     10/11/2022 11:45 PM    MPV 9.5 04/20/2015 10:18 AM     No results found for: VITD25  Lab Results   Component Value Date/Time    COLORU Yellow 04/27/2019 01:46 AM    NITRU Negative 04/27/2019 01:46 AM    GLUCOSEU Negative 04/27/2019 01:46 AM    KETUA Negative 04/27/2019 01:46 AM    UROBILINOGEN 0.2 04/27/2019 01:46 AM    BILIRUBINUR Negative 04/27/2019 01:46 AM    BILIRUBINUR - 07/30/2018 05:26 PM     Lab Results   Component Value Date/Time    PROTIME 13.2 07/30/2018 02:14 PM    PROTIME 26.0 07/28/2014 10:16 AM     Lab Results   Component Value Date/Time    INR 1.3 07/30/2018 02:14 PM         I discussed results with patient. Current Rehabilitation Assessments:    Rehabilitation:  Physical Therapy  Bed mobility:  Bed mobility  Rolling to Left: Contact guard assistance;Minimal assistance (10/13/22 1620)  Supine to Sit: Stand by assistance (10/13/22 1620)  Bed Mobility Comments: Limited by pain. heavy assist via bedrails. increased time.  Educated on logroll technique (10/13/22 1120)  Bed Mobility Training  Bed Mobility Training: Yes (10/14/22 1038)  Interventions: Verbal cues (10/14/22 1044)  Rolling: Stand-by assistance (10/14/22 1044)  Supine to Sit: Stand-by assistance (10/14/22 1044)  Sit to Supine: Stand-by assistance (10/14/22 1044)  Scooting: Stand-by assistance (10/14/22 1044)  Transfers:  Transfers  Sit to Stand: Minimal Assistance (10/13/22 1624)  Stand to Sit: Contact guard assistance (10/13/22 1624)  Bed to Chair: Contact guard assistance (10/13/22 1624)  Comment: Demonstration provided on hip hinge. pt with pain limitations upon standing requiring multiple attempts and lifting assist. (10/13/22 1624)  Transfer Training  Transfer Training: Yes (10/14/22 1044)  Sit to Stand: Contact-guard assistance (10/14/22 1044)  Stand to Sit: Contact-guard assistance (10/14/22 1044)  Bed to Chair: Contact-guard assistance (10/14/22 1044)  Gait:   Ambulation  Surface: Level tile (10/13/22 1624)  Device: Rolling Walker;Hand-Held Assist (10/13/22 1624)  Assistance: Minimal assistance;Stand by assistance (10/13/22 1624)  Quality of Gait: Jacki with HHA. pt feels unsafe and experiencing increased pain. Improved considerably with [employer]. pt with slow pacing and rigid posturing. Verbal cues for forward gaze and use of WW (10/13/22 1624)  Distance: 20ft with turn (10/13/22 1624)  Gait Training: Yes (10/14/22 1044)  Overall Level of Assistance: Contact-guard assistance (10/14/22 1044)  Distance (ft): 100 Feet (10/14/22 1044)  Assistive Device: Brace/splint; Walker, rolling (10/14/22 1044)  Interventions: Verbal cues; Visual cues (10/14/22 1044)  Base of Support: Center of gravity altered (10/14/22 1044)  Speed/Milena: Delayed (10/14/22 1044)  Swing Pattern: Left symmetrical;Right symmetrical (10/14/22 1044)  Gait Abnormalities: Other (comment) (Over correction of posture with mild shoulder posterior retro lean.) (10/14/22 1044)  Right Side Weight Bearing: As tolerated (10/14/22 1044)  Left Side Weight Bearing: As tolerated (10/14/22 1044)  Stairs:     W/C mobility:         Occupational therapy:   Hand Dominance: Right  ADL  Feeding: Unable to assess(comment) (10/13/22 1613)  Grooming: Setup (10/13/22 1613)  UE Bathing: Stand by assistance; Increased time to complete (10/13/22 1613)  LE Bathing: Maximum assistance; Increased time to complete (10/13/22 1613)  UE Dressing: Stand by assistance; Increased time to complete (10/13/22 1613)  LE Dressing: Maximum assistance; Increased time to complete (10/13/22 1613)  Toileting: Unable to assess(comment) (10/13/22 1613)               Speech therapy:            Diet/Swallow:        Dysphagia Outcome Severity Scale: Level 7: Normal in all situations                COGNITION  OT:    SP: Re-evals pending      Past Medical History:        Diagnosis Date    Abnormal EKG, needs cardiac clearence 12/23/2013    ACE-inhibitor cough 7/10/2012    ARB     Anemia     CAD (coronary artery disease) 2/2008    cardia stents x 2    Cancer ([facility] [address]     facial skin cancer excision    Coagulopathy ([facility] [address] 10/4/2012    Coagulopathy, on coumadin 11/13/2013    Cough 5/29/2012    Cough, Neg w/u Dr Lillie Thakkar, ?  [employer] 9/22/2014    Depression     Depression 6/13/2017    Epistaxis 8/20/2012    Fibrocystic breast disease     Hiatal hernia 5/3/2016    History of chest x-ray, normal, 8/6/14 10/17/2014    History of PTCA 2, stent x 2 2009, 2012, Radha Ayala 12/23/2013    Hx of blood clots 2012    PE s/p RTKR post op    Hyperlipidemia     meds > 10 yrs    Hypertension     meds > 10 yrs    Nephrolithiasis     OA (osteoarthritis)     Osteoporosis     Right-sided low back pain with right-sided sciatica 5/3/2016    Urge incontinence     Wheezes 4/20/2015         PastSurgical History:        Procedure Laterality Date    CARDIAC SURGERY      stent x 2    COLONOSCOPY      CORONARY ANGIOPLASTY WITH STENT PLACEMENT  3/12    [facility]    ENDOSCOPY, COLON, DIAGNOSTIC      EYE SURGERY      [place] with IOL OU    HERNIA REPAIR      left inguinal hernia    HYSTERECTOMY, TOTAL ABDOMINAL (CERVIX REMOVED)  1998    JOINT REPLACEMENT  2008    LTKR    JOINT REPLACEMENT  2017    RTSR    KNEE ARTHROSCOPY  11/11    R knee, [facility]    LITHOTRIPSY  2003 approx    SD LAMINECTOMY,>2 SGMT,LUMBAR N/A 8/22/2018    SPINE L3-4, L4-5, L5-S1 LAMINECTOMY, L3-4, L4-5 POSTERIOR LATERAL FUSION, L5-S1 INSTRUMENTED POSTERIOR INTERBODY FUSION, PRONE, AXIS SPINE TABLE, ACTIFUSE, CELL SAVER, NEW MICROSCOPE, NEW C-ARM X2, PEDIGUARD, MISONIX BONE SCALPAL, PNEUMATIC KERRISONS, SPINAL CORD MONITORING, LUMBAR BRACE, GLOBUS, OPEN TLIF, (H.S.C.G.#[id number]) performed by Constance Ervin MD at [address]  2/2008    Zarha    ROTATOR CUFF REPAIR Right     SKIN GRAFT  2/9/11    JORDAN AND BSO (CERVIX REMOVED)  1996 approx    TOTAL KNEE ARTHROPLASTY Left 08/2008         Allergies: Allergies   Allergen Reactions    Tolterodine Diarrhea     Patient CANNOT tolerate this med. Causes diarrhea!  Detrol          CurrentMedications:   Current Facility-Administered Medications: sennosides-docusate sodium (SENOKOT-S) 8.6-50 MG tablet 1 tablet, 1 tablet, Oral, BID  [Held by provider] aspirin EC tablet 81 mg, 81 mg, Oral, Daily  atorvastatin (LIPITOR) tablet 40 mg, 40 mg, Oral, Nightly  Calcium-Magnesium 500-250 MG TABS 1 tablet (Patient Supplied), 1 tablet, Oral, BID  coenzyme Q10 capsule 100 mg, 100 mg, Oral, Daily  FLUoxetine (PROZAC) capsule 60 mg, 60 mg, Oral, Daily  furosemide (LASIX) tablet 20 mg, 20 mg, Oral, Daily  magnesium oxide (MAG-OX) tablet 400 mg, 400 mg, Oral, Daily  metoprolol succinate (TOPROL XL) extended release tablet 25 mg, 25 mg, Oral, Daily  Vitamin D (CHOLECALCIFEROL) tablet 1,000 Units, 1,000 Units, Oral, BID  traZODone (DESYREL) tablet 50 mg, 50 mg, Oral, Nightly  [Held by provider] rivaroxaban (XARELTO) tablet 20 mg, 20 mg, Oral, Daily  dofetilide (TIKOSYN) capsule 125 mcg, 125 mcg, Oral, BID  sodium chloride flush 0.9 % injection 5-40 mL, 5-40 mL, IntraVENous, 2 times per day  sodium chloride flush 0.9 % injection 5-40 mL, 5-40 mL, IntraVENous, PRN  0.9 % sodium chloride infusion, , IntraVENous, PRN  enoxaparin (LOVENOX) injection 40 mg, 40 mg, SubCUTAneous, Daily  ondansetron (ZOFRAN-ODT) disintegrating tablet 4 mg, 4 mg, Oral, Q8H PRN **OR** ondansetron (ZOFRAN) injection 4 mg, 4 mg, IntraVENous, Q6H PRN  sodium chloride flush 0.9 % injection 5-40 mL, 5-40 mL, IntraVENous, 2 times per day  sodium chloride flush 0.9 % injection 5-40 mL, 5-40 mL, IntraVENous, PRN  0.9 % sodium chloride infusion, , IntraVENous, PRN  polyethylene glycol (GLYCOLAX) packet 17 g, 17 g, Oral, Daily  oxyCODONE (ROXICODONE) immediate release tablet 5 mg, 5 mg, Oral, Q4H PRN **OR** oxyCODONE (ROXICODONE) immediate release tablet 10 mg, 10 mg, Oral, Q4H PRN  bisacodyl (DULCOLAX) suppository 10 mg, 10 mg, Rectal, Daily PRN  pantoprazole (PROTONIX) tablet 40 mg, 40 mg, Oral, QAM AC  acetaminophen (TYLENOL) tablet 650 mg, 650 mg, Oral, 4 times per day  methocarbamol (ROBAXIN) tablet 500 mg, 500 mg, Oral, 4x Daily      Social History:  Social History     Socioeconomic History    Marital status:      Spouse name: Not on file    Number of children: Not on file    Years of education: Not on file    Highest education level: Not on file   Occupational History    Not on file   Tobacco Use    Smoking status: Never    Smokeless tobacco: Never   Vaping Use    Vaping Use: Never used   Substance and Sexual Activity    Alcohol use: Yes     Comment: occ    Drug use: No    Sexual activity: Not on file   Other Topics Concern    Not on file   Social History Narrative    Retired from [employer],    Lives With: Significant other    Type of Home: House in [address] [place] [address] [place]: One level    Home Access: Stairs to enter with rails - Number of Steps: 2    Bathroom Shower/Tub: Walk-in shower- Equipment: Built-in shower seat, Grab bars in [id number]: [place], rolling, Alert Button    Has the patient had two or more falls in the past year or any fall with injury in the past year?: Yes    ADL Assistance: Independent    Homemaking Assistance: Independent    Ambulation Assistance: Independent (No AD)    Transfer Assistance: Independent    Additional Comments: Pt reports that her boyfriend was recently injured in a car accident and is healing at home         Social Determinants of Health     Financial Resource Strain: Not on file   Food Insecurity: Not on file   Transportation Needs: Not on file   Physical Activity: Not on file   Stress: Not on file   Social Connections: Not on file   Intimate Partner Violence: Not on file   Housing Stability: Not on file        This history was obtained from family and caregivers and discussed to confirm. Family History:       Problem Relation Age of Onset    Diabetes Mother     High Blood Pressure Mother     Other Mother          of creutsfeld jocob disease / mad cow disease    COPD Father     Heart Disease Father     No Known Problems Sister     Atrial Fibrillation Brother     High Blood Pressure Brother     Other Brother         post polio    No Known Problems Daughter     High Cholesterol Son     Stroke Sister     Diabetes Brother     COPD Brother     Other Brother         suicide at age 77       Review of Systems:  Review of Systems   Constitutional:  Positive for activity change and fatigue. Negative for chills, diaphoresis and fever. HENT:  Negative for congestion, ear discharge, ear pain, hearing loss, nosebleeds, sore throat and tinnitus. Eyes:  Negative for photophobia, pain and redness. Respiratory:  Positive for shortness of breath. Negative for cough, wheezing and stridor. Shortness of breath on exertion   Cardiovascular:  Negative for chest pain, palpitations and leg swelling. Gastrointestinal:  Positive for constipation. Negative for abdominal pain, blood in stool, diarrhea, nausea and vomiting. Endocrine: Negative for polydipsia. Genitourinary:  Negative for dysuria, flank pain, frequency, hematuria and urgency. Musculoskeletal:  Positive for back pain, gait problem and myalgias. Negative for neck pain. Skin:  Negative for rash. Allergic/Immunologic: Positive for immunocompromised state.  Negative for environmental allergies. Neurological:  Positive for weakness. Negative for dizziness, tremors, seizures and headaches. Hematological:  Does not bruise/bleed easily. Psychiatric/Behavioral:  Negative for hallucinations and suicidal ideas. The patient is not nervous/anxious. Physical Exam:  BP (!) 139/56   Pulse 60   Temp 97.3 °F (36.3 °C) (Oral)   Resp 16   Ht 5' 4\" (1.626 m)   Wt 172 lb 1.6 oz (78.1 kg)   LMP 05/29/1998   SpO2 90%   BMI 29.54 kg/m²      Physical Exam  Vitals and nursing note reviewed. Constitutional:       General: She is not in acute distress. Appearance: Normal appearance. She is well-developed. She is not toxic-appearing or diaphoretic. HENT:      Head: Normocephalic and atraumatic. Not macrocephalic and not microcephalic. No raccoon eyes or Beatty's sign. Right Ear: External ear normal.      Left Ear: External ear normal.      Nose: Nose normal.      Mouth/Throat:      Pharynx: Oropharynx is clear. Eyes:      General: No scleral icterus. Right eye: No discharge. Left eye: No discharge. Extraocular Movements: Extraocular movements intact. Conjunctiva/sclera: Conjunctivae normal.      Pupils: Pupils are equal, round, and reactive to light. Neck:      Thyroid: No thyromegaly. Vascular: Normal carotid pulses. No carotid bruit, hepatojugular reflux or JVD. Trachea: No tracheal deviation. Cardiovascular:      Pulses: Normal pulses. Comments: Pacemaker in place. Patient believes MRI compatible  Pulmonary:      Effort: Pulmonary effort is normal.      Breath sounds: No stridor. Decreased breath sounds present. Abdominal:      Palpations: Abdomen is soft. Genitourinary:     Rectum: Normal.   Musculoskeletal:         General: Normal range of motion. Cervical back: Normal range of motion. Tenderness present. Spinous process tenderness and muscular tenderness present. Thoracic back: Tenderness present. Lumbar back: Tenderness present. Comments: Lumbar pain with palpation and movement. Maintains good strength sensation in the lower extremities. Skin:     General: Skin is warm and dry. Coloration: Skin is pale. Findings: Bruising present. Comments: Old IV sites   Neurological:      General: No focal deficit present. Sensory: Sensory deficit present. Coordination: Coordination abnormal.      Gait: Gait abnormal.   Psychiatric:         Attention and Perception: She is attentive. Mood and Affect: Mood normal. Mood is not anxious or depressed. Affect is not angry. Speech: Speech is not rapid and pressured. Behavior: Behavior is slowed. Behavior is not withdrawn. Thought Content: Thought content normal.         Cognition and Memory: Memory is not impaired. She exhibits impaired recent memory. She does not exhibit impaired remote memory. Judgment: Judgment is not impulsive or inappropriate. Ortho Exam  Neurologic Exam     Mental Status   Level of consciousness: alert  Knowledge: good. Able to name object. Able to read. Able to repeat. Cranial Nerves     CN III, IV, VI   Pupils are equal, round, and reactive to light. Diagnostics:    Recent Results (from the past 24 hour(s))   EKG 12 Lead    Collection Time: 10/13/22  7:58 PM   Result Value Ref Range    Ventricular Rate 60 BPM    Atrial Rate 60 BPM    P-R Interval 204 ms    QRS Duration 130 ms    Q-T Interval 474 ms    QTc Calculation (Bazett) 474 ms    P Axis 58 degrees    R Axis -33 degrees    T Axis -24 degrees              Impression:    Impaired mobility and ADLs due to Acute compression deformity of the superior endplate of L1   Factors favoring recovery include:  near independent premorbid function        Complex Active General Medical Issues that complicate care and require daily medical supervision: 1. Principal Problem:    Closed compression fracture of body of L1 vertebra [facility])  Active Problems:    Vertebral fracture, osteoporotic, initial encounter ([facility] [address]    Acute pain due to trauma    Fall from standing    Impaired mobility and activities of daily living dt Acute compression deformity of the superior endplate of L1     Iron deficiency anemia, unspecified  Resolved Problems:    * No resolved hospital problems. *            Recommendations:    Considering all of the factors above including the patient's current medical status, social status/home environment, their functional needs, and their ability to participate in a therapy program, I feel that they would best be served at:  Skilled vs  Sub- acute intensive comprehensive inpatient rehabilitation program.  Due to the diagnoses above the patient has had significant decline in function and is now requiring acute intensive therapy to optimize function. They are expected to achieve meaningful functional gains. Due to medical complexity as above, rehabilitation physician services is reasonable and necessary including face-to-face visits at least 3 days/week with anticipated need to treat, manage and modify the rehabilitation course of treatment including interdisciplinary team conferences. They will require rehab physician care to monitor for neurogenic bowel bladder, postoperative pain, titration of opiate and high risk medications,   blood pressure and blood sugar control. It is my opinion that they will be able to tolerate and benefit from 1-3 hours of therapy a day. I reviewed the various options re: levels of care with the patient and family. Please see pre-admission screen note for further details. I discussed acute rehab with the patient and verify that the patient is able and willing to participate in 3 hours of therapy a day. Rehab and Acute Care Case Management has also reinforced this expectation.   This patient requires multidisciplinary rehabilitation treatment, including daily care and management from a PM&R physician, 24-hour rehabilitation nursing, Physical Therapy, Occupational Therapy, rehabilitation psychology, consideration of speech and language pathology, recreational therapy, nutritional services, and a rehabilitation . I feel that it is reasonable to plan for a discharge to home setting after acute rehab. Specialized nursing care to focus on: Bowel and bladder issues-Monitor for urinary retention-check PVRs, bladder scan--cath if no void. Wound management re   -pressure relief protocols-side to side turns      Monitor endurance and if necessary spread therapy out over a 7-day window-adding scheduled rest breaks when needed. Focus on energy conservation. Monitor heart rate and   cardiac medications effects on heart rate and blood pressure before, during and after therapy. Progress toward endurance training with pulse ox monitoring for saturation and heart rate. continue to monitor closely for dehydration-- Improve hydration and nutrition by adding Vitamin B12 shot times one, adding Protein supplements and push PO fluids. Focus on higher-level balance and falls risk issues focusing on balance training and monitoring for orthostasis. Above recommendations are indicated to address medical complexity and need for appropriate rehab services. Will tailor individual care and rehab plan per individuals needs re scheduled pain meds. Focus of today's plan-  MRI L spine      Required Certification Data (potential inpatient rehabilitation facility patient's only)    Deficits:weakness, nutrition, mobility, impaired gait, high risk for falls, balance, ADL's, pain limiting function, and wound healing     Disability:mobility, locomotion, self care, and bowel/ bladder status    Potential barriers to progress/discharge:severe pain         It was my pleasure to evaluate Jax Fernandez today. Please call [phone] with questions.     Jese Do, DO

## 2022-10-14 NOTE — CARE COORDINATION
Inpatient Rehab referral received. Met with patient and explained [facility] Acute Inpatient Rehab program and requirements, including 3 hours of intense therapy daily, anticipated length of stay and goal of discharge to home. All questions answered and patient verbalized understanding. Patient interested in Rehab if needed and approved by insurance. Patient also open to discussing other options for DC needs but did state she has no transportation currently because she's not to drive and her SO was recently dc'd from hospital and not driving either. PM&R consult pending. MRI pending, scheduled for 130PM today. Informed patient that we would follow up.  Electronically signed by Lynette Robertson RN on 10/14/22 at 12:04 PM EDT

## 2022-10-14 NOTE — CARE COORDINATION
[facility] Pre-Admission Screening Document      Patient Name: Darrick Sinha       MRN: [id number]    : 1950    Age: 74 y.o. Gender: female   Payor: Payor: [address] / Plan: [facility] PPO / Product Type: Medicare /   MSSP: No    Admitted from: [facility] Floor: 2W  Attending Care Provider: Leighton Stewart MD  Inpatient Rehab Referring Care Provider: Gerda Campbell PA-C  Primary Care Provider: Aidee Harley DO  Inpatient Treatment Team including Consults: Treatment Team: Attending Provider: Leighton Stewart MD; : Shanel Rodriguez RN; Respiratory Therapist (Day): Charito Doan RCP; Registered Nurse: Xi Aguilar RN    Reason for Hospitalization:   1. Closed compression fracture of body of L1 vertebra (HCC)    2. Fall, initial encounter      Chief Complaint   Patient presents with    Fall     Isolation:No active isolations    Hospital Course:  Admit Date: 10/11/2022  8:43 PM  Inpatient Rehab Referral Date: 10/14  Narrative of hospital course/history of present illness: 74 yr old female presented to ED with c/o low back pain s/p she trip and fall while picking something up earlier today. XR revealed Stable mild L1 anterior compression fracture. Neurosurgery: L1 osteoporotic compression fracture. Lumbar brace to be worn with activity and when OOB. 10/14 MRI shows significant progression of her osteoporotic compression fracture at L1 to 60% compression, Plan vertebral augmentation. OR 10/17/22 with Dr Boykin Koyanagi: L1 vertebral augmentation kyphoplasty.  WBAT, Lumbar brace      Medical & Surgical History/Current Comorbidities:  Past Medical History:   Diagnosis Date    Abnormal EKG, needs cardiac clearence 2013    ACE-inhibitor cough 7/10/2012    ARB     Anemia     CAD (coronary artery disease) 2008    cardia stents x 2    Cancer ([address]     facial skin cancer excision    Coagulopathy ([facility] 10/4/2012 Coagulopathy, on coumadin 11/13/2013    Cough 5/29/2012    Cough, Neg w/u Dr Adriano Mcginnis, ?  [employer] 9/22/2014    Depression     Depression 6/13/2017    Epistaxis 8/20/2012    Fibrocystic breast disease     Hiatal hernia 5/3/2016    History of chest x-ray, normal, 8/6/14 10/17/2014    History of PTCA 2, stent x 2 2009, 2012, Venda [place] 12/23/2013    Hx of blood clots 2012    PE s/p RTKR post op    Hyperlipidemia     meds > 10 yrs    Hypertension     meds > 10 yrs    Nephrolithiasis     OA (osteoarthritis)     Osteoporosis     Right-sided low back pain with right-sided sciatica 5/3/2016    Urge incontinence     Wheezes 4/20/2015     Past Surgical History:   Procedure Laterality Date    CARDIAC SURGERY      stent x 2    COLONOSCOPY      CORONARY ANGIOPLASTY WITH STENT PLACEMENT  3/12    Zaa    ENDOSCOPY, COLON, DIAGNOSTIC      EYE SURGERY      Phaco with IOL OU    HERNIA REPAIR      left inguinal hernia    HYSTERECTOMY, TOTAL ABDOMINAL (CERVIX REMOVED)  1998    JOINT REPLACEMENT  2008    LTKR    JOINT REPLACEMENT  2017    RTSR    KNEE ARTHROSCOPY  11/11    R knee, [facility]    LITHOTRIPSY  2003 approx    CO LAMINECTOMY,>2 SGMT,LUMBAR N/A 8/22/2018    SPINE L3-4, L4-5, L5-S1 LAMINECTOMY, L3-4, L4-5 POSTERIOR LATERAL FUSION, L5-S1 INSTRUMENTED POSTERIOR INTERBODY FUSION, PRONE, AXIS SPINE TABLE, ACTIFUSE, CELL SAVER, NEW MICROSCOPE, NEW C-ARM X2, PEDIGUARD, MISONIX BONE SCALPAL, PNEUMATIC KERRISONS, SPINAL CORD MONITORING, LUMBAR BRACE, GLOBUS, OPEN TLIF, (H.S.C.G.#[id number]) performed by Duc Cerna MD at [facility]  2/2008    [address] Right     SKIN GRAFT  2/9/11    SPINE SURGERY N/A 10/17/2022    L1 KYPHOPLASTY performed by Doroteo Estevez MD at [facility] (CERVIX REMOVED)  1996 approx    TOTAL KNEE ARTHROPLASTY Left 08/2008       Advanced Directives:  Advance Directives       Power of  Living Will ACP-Advance Directive ACP-Power of     Not on File Not on File Not on File Not on File            Labs/Infection Control:  Recent Labs     10/17/22  0612 10/18/22  0444   WBC 5.8 5.5   HGB 10.6* 10.2*   HCT 33.2* 31.8*    262   BUN 21 18   CREATININE 0.75 0.64   GLUCOSE 84 90    139   K 4.3 4.3   INR 1.2  --    CALCIUM 9.1 8.9      Blood cultures:  No results for input(s): BC in the last 72 hours. Urinalysis/C&S:  No results for input(s): NITRITE, LABCAST, WBCUA, RBCUA, MUCUS, TRICHOMONAS, YEAST, BACTERIA, LEUKOCYTESUR, BLOODU, GLUCOSEU, KETUA, AMORPHOUS, LABURIN in the last 72 hours. Radiology:  MRI LUMBAR SPINE WO CONTRAST   Result Date: 10/14/2022  1. Acute L1 burst fracture demonstrating 60% loss of height, progressed from the previous CT scan from 10/10/2022. There is 5 mm of retropulsion of the posterior margin of the vertebral body into the spinal canal resulting in mild spinal canal stenosis at T12-L1. 2. Moderate-to-severe spinal canal stenosis, moderate right and mild left neural foraminal narrowing at L2-3 secondary to a disc bulge, facet arthropathy and thickening of the ligamentum flavum. 3. Severe left neural foraminal narrowing at L5-S1 secondary to facet   arthropathy. 4. Sequelae of bilateral laminectomies and posterior lumbar fusion at L4-5. XR LUMBAR SPINE (2-3 VIEWS)  Result Date: 10/12/2022  Stable mild L1 anterior compression fracture. Stable L4 superior endplate compression deformity status post L4-5 fusion. CT Head WO Contrast  Result Date: 10/12/2022  1. There is no acute intracranial abnormality. Specifically, there is no intracranial hemorrhage. 2. Atrophy and periventricular leukomalacia    CT CERVICAL SPINE WO CONTRAST  Result Date: 10/13/2022  No acute abnormality of the cervical spine. CT THORACIC SPINE WO CONTRAST  Result Date: 10/13/2022  1. Superior endplate compression fracture at L1, incompletely visualized. 2.  Osteopenia, no displaced thoracic spine fractures.      CT LUMBAR SPINE WO CONTRAST  Result Date: 10/12/2022  Acute compression deformity of the superior endplate of L1 with approximately 20% loss of height and minimal retropulsion of the posterior elements with mild spinal canal stenosis. RECOMMENDATIONS: Unavailable         Medications/IV's:  The patient is currently on lovenox for DVT prophylaxis. Scheduled:    methocarbamol, 750 mg, Oral, 4x Daily    rivaroxaban, 20 mg, Oral, Daily    sennosides-docusate sodium, 1 tablet, Oral, BID    aspirin, 81 mg, Oral, Daily    atorvastatin, 40 mg, Oral, Nightly    Calcium-Magnesium, 1 tablet, Oral, BID    coenzyme Q10, 100 mg, Oral, Daily    FLUoxetine, 60 mg, Oral, Daily    furosemide, 20 mg, Oral, Daily    magnesium oxide, 400 mg, Oral, Daily    metoprolol succinate, 25 mg, Oral, Daily    Vitamin D, 1,000 Units, Oral, BID    traZODone, 50 mg, Oral, Nightly    dofetilide, 125 mcg, Oral, BID    sodium chloride flush, 5-40 mL, IntraVENous, 2 times per day    sodium chloride flush, 5-40 mL, IntraVENous, 2 times per day    polyethylene glycol, 17 g, Oral, Daily    pantoprazole, 40 mg, Oral, QAM AC    acetaminophen, 650 mg, Oral, 4 times per day    PRN:  sodium chloride flush, sodium chloride, ondansetron **OR** ondansetron, sodium chloride flush, sodium chloride, oxyCODONE **OR** oxyCODONE, bisacodyl    Allergies: Allergies   Allergen Reactions    Tolterodine Diarrhea     Patient CANNOT tolerate this med. Causes diarrhea! Detrol         Most Recent Vitals, Height and Weight  BP (!) 144/56   Pulse 64   Temp 97.7 °F (36.5 °C) (Oral)   Resp 18   Ht 5' 4\" (1.626 m)   Wt 172 lb (78 kg)   LMP 05/29/1998   SpO2 93%   BMI 29.52 kg/m²     Weight Bearing Restrictions: wbat    Current Diet Order: ADULT DIET;  Regular    Skin: generalized bruising   Surgical incision lower medial back        Lungs: wdl         Cognition and Behavior:  Language Preference (if other than English):      Alertness/Behavior  Neuro (WDL): Within Defined Limits  Level of Consciousness: Alert (0)  History of Falling: Yes      History of Falling: Yes        Prior Level of Function and Living Arrangements:  Social/Functional History  Lives With: Significant other  Type of Home: House  Home Layout: One level  Home Access: Stairs to enter with rails  Entrance Stairs - Number of Steps: 2  Entrance Stairs - Rails: Right  Bathroom Shower/Tub: Walk-in shower  Bathroom Equipment: Built-in shower seat, Grab bars in shower  Home Equipment: Walker, rolling, Alert Button  Has the patient had two or more falls in the past year or any fall with injury in the past year?: Yes  ADL Assistance: [facility]: Independent  Ambulation Assistance: Independent (no AD)  Transfer Assistance: Independent  Active :  Yes  Additional Comments: Pt reports that her boyfriend was recently injured in a car accident and is healing at home  Living Arrangements: Spouse/Significant Other  Support Systems: Spouse/Significant Other  Type of Home Care Services: None  Dental Appliances: None  Vision - Corrective Lenses: None  Hearing Aid: None  Personal Equipment:   Dental Appliances: None  Vision - Corrective Lenses: None  Hearing Aid: None      CURRENT FUNCTIONAL LEVEL:  Physical Therapy  Bed mobility:  Bed mobility  Rolling to Left: Stand by assistance (10/18/22 1243)  Supine to Sit: Stand by assistance (10/18/22 1243)  Bed Mobility Comments: Educated on logroll techniqu (10/18/22 1243)  Bed Mobility Training  Bed Mobility Training: Yes (10/14/22 1038)  Interventions: Verbal cues (10/14/22 1044)  Rolling: Stand-by assistance (10/14/22 1044)  Supine to Sit: Stand-by assistance (10/14/22 1044)  Sit to Supine: Stand-by assistance (10/14/22 1044)  Scooting: Stand-by assistance (10/14/22 1044)  Transfers:  Transfers  Sit to Stand: Stand by assistance;Supervision (10/18/22 1244)  Stand to Sit: Stand by assistance;Supervision (10/18/22 1244)  Bed to Chair: Stand by assistance;Supervision (10/18/22 1244)  Comment: Slow to complete initially, however improves with subsequent trials. (10/18/22 1244)  Transfer Training  Transfer Training: Yes (10/14/22 1044)  Sit to Stand: Contact-guard assistance (10/14/22 1044)  Stand to Sit: Contact-guard assistance (10/14/22 1044)  Bed to Chair: Contact-guard assistance (10/14/22 1044)  Gait:   Ambulation  Surface: Level tile (10/18/22 1244)  Device: [place]; No Device (10/18/22 1244)  Assistance: Supervision;Stand by assistance;Contact guard assistance (10/18/22 1244)  Quality of Gait: Intermittent CGA with pt ambulating without AD. Feels most comfortable furniture surfing, however dependent upon the support of UEs for safe management of hosp environment. Improves with [employer] for support. Cues for widenening GERALD. (10/18/22 1244)  Distance: 80ft X 2 (10/18/22 1244)  Gait Training: Yes (10/14/22 1044)  Overall Level of Assistance: Contact-guard assistance (10/14/22 1044)  Distance (ft): 100 Feet (10/14/22 1044)  Assistive Device: Brace/splint; Walker, rolling (10/14/22 1044)  Interventions: Verbal cues; Visual cues (10/14/22 1044)  Base of Support: Center of gravity altered (10/14/22 1044)  Speed/Milena: Delayed (10/14/22 1044)  Swing Pattern: Left symmetrical;Right symmetrical (10/14/22 1044)  Gait Abnormalities: Other (comment) (Over correction of posture with mild shoulder posterior retro lean.) (10/14/22 1044)  Right Side Weight Bearing: As tolerated (10/14/22 1044)  Left Side Weight Bearing: As tolerated (10/14/22 1044)  Stairs:  Stairs/Curb  Stairs?: Yes (10/18/22 1244)  Stairs  # Steps : 3 (10/18/22 1244)  Stairs Height: 6\" (10/18/22 1244)  Rails: Right ascending (10/18/22 1244)  Assistance: Stand by assistance (10/18/22 1244)  Comment: Instructed on double  technique for additional support.  (10/18/22 3024)  W/C mobility:         Occupational Therapy  Hand Dominance: Right  ADL  Feeding: Independent (10/18/22 8996)  Grooming: Setup (10/18/22 0103)  UE Bathing: Setup (10/18/22 1724)  LE Bathing: Minimal assistance (10/18/22 1253)  UE Dressing: Setup (10/18/22 1253)  LE Dressing: Minimal assistance (10/18/22 1253)  Toileting: Contact guard assistance (10/18/22 1253)  Additional Comments: Simulated ADls as above. Limited d/t pain but was able to complete figure 4 to don socks. Educated on easiest pants and socks to wear. (10/18/22 1253)  Toilet Transfers  Toilet Transfer: Unable to assess (10/18/22 1257)  Toilet Transfers Comments: Declined need, anticipate SBA (10/18/22 1257)          Speech Language Pathology   Diet/Swallow:  Dysphagia Outcome Severity Scale: Level 7: Normal in all situations            Rehab evaluation plan: Patient requesting home with [facility] after working with therapies.  Discharged 10/18  Reviewer's Signature: Electronically signed by Edwin Benitez RN on 10/19/22 at 8:27 AM EDT

## 2022-10-15 LAB
EKG ATRIAL RATE: 60 BPM
EKG P AXIS: 58 DEGREES
EKG P-R INTERVAL: 204 MS
EKG Q-T INTERVAL: 474 MS
EKG QRS DURATION: 130 MS
EKG QTC CALCULATION (BAZETT): 474 MS
EKG R AXIS: -33 DEGREES
EKG T AXIS: -24 DEGREES
EKG VENTRICULAR RATE: 60 BPM

## 2022-10-15 PROCEDURE — 99232 SBSQ HOSP IP/OBS MODERATE 35: CPT | Performed by: NEUROLOGICAL SURGERY

## 2022-10-15 PROCEDURE — 6370000000 HC RX 637 (ALT 250 FOR IP): Performed by: PHYSICIAN ASSISTANT

## 2022-10-15 PROCEDURE — 99232 SBSQ HOSP IP/OBS MODERATE 35: CPT | Performed by: PHYSICAL MEDICINE & REHABILITATION

## 2022-10-15 PROCEDURE — 6370000000 HC RX 637 (ALT 250 FOR IP): Performed by: STUDENT IN AN ORGANIZED HEALTH CARE EDUCATION/TRAINING PROGRAM

## 2022-10-15 PROCEDURE — 1210000000 HC MED SURG R&B

## 2022-10-15 PROCEDURE — 2580000003 HC RX 258: Performed by: SURGERY

## 2022-10-15 PROCEDURE — 2700000000 HC OXYGEN THERAPY PER DAY

## 2022-10-15 PROCEDURE — 99231 SBSQ HOSP IP/OBS SF/LOW 25: CPT | Performed by: STUDENT IN AN ORGANIZED HEALTH CARE EDUCATION/TRAINING PROGRAM

## 2022-10-15 PROCEDURE — 93010 ELECTROCARDIOGRAM REPORT: CPT | Performed by: INTERNAL MEDICINE

## 2022-10-15 PROCEDURE — 6360000002 HC RX W HCPCS: Performed by: SURGERY

## 2022-10-15 PROCEDURE — 6370000000 HC RX 637 (ALT 250 FOR IP): Performed by: SURGERY

## 2022-10-15 RX ADMIN — PANTOPRAZOLE SODIUM 40 MG: 40 TABLET, DELAYED RELEASE ORAL at 07:48

## 2022-10-15 RX ADMIN — ENOXAPARIN SODIUM 40 MG: 100 INJECTION SUBCUTANEOUS at 07:48

## 2022-10-15 RX ADMIN — OXYCODONE 10 MG: 5 TABLET ORAL at 07:14

## 2022-10-15 RX ADMIN — DOFETILIDE 125 MCG: 0.12 CAPSULE ORAL at 07:48

## 2022-10-15 RX ADMIN — METHOCARBAMOL TABLETS 500 MG: 500 TABLET, COATED ORAL at 22:17

## 2022-10-15 RX ADMIN — ACETAMINOPHEN 650 MG: 325 TABLET ORAL at 18:47

## 2022-10-15 RX ADMIN — MAGNESIUM OXIDE 400 MG (241.3 MG MAGNESIUM) TABLET 400 MG: TABLET at 07:48

## 2022-10-15 RX ADMIN — METOPROLOL SUCCINATE 25 MG: 25 TABLET, EXTENDED RELEASE ORAL at 07:49

## 2022-10-15 RX ADMIN — METHOCARBAMOL TABLETS 500 MG: 500 TABLET, COATED ORAL at 07:47

## 2022-10-15 RX ADMIN — SENNOSIDES AND DOCUSATE SODIUM 1 TABLET: 50; 8.6 TABLET ORAL at 22:17

## 2022-10-15 RX ADMIN — ATORVASTATIN CALCIUM 40 MG: 40 TABLET, FILM COATED ORAL at 22:16

## 2022-10-15 RX ADMIN — FUROSEMIDE 20 MG: 20 TABLET ORAL at 07:49

## 2022-10-15 RX ADMIN — DOFETILIDE 125 MCG: 0.12 CAPSULE ORAL at 22:17

## 2022-10-15 RX ADMIN — OXYCODONE 10 MG: 5 TABLET ORAL at 17:52

## 2022-10-15 RX ADMIN — ACETAMINOPHEN 650 MG: 325 TABLET ORAL at 07:14

## 2022-10-15 RX ADMIN — Medication 10 ML: at 22:17

## 2022-10-15 RX ADMIN — TRAZODONE HYDROCHLORIDE 50 MG: 50 TABLET ORAL at 22:17

## 2022-10-15 RX ADMIN — Medication 10 ML: at 07:49

## 2022-10-15 RX ADMIN — METHOCARBAMOL TABLETS 500 MG: 500 TABLET, COATED ORAL at 17:54

## 2022-10-15 RX ADMIN — SENNOSIDES AND DOCUSATE SODIUM 1 TABLET: 50; 8.6 TABLET ORAL at 07:48

## 2022-10-15 RX ADMIN — Medication 1000 UNITS: at 07:49

## 2022-10-15 RX ADMIN — ACETAMINOPHEN 650 MG: 325 TABLET ORAL at 13:07

## 2022-10-15 RX ADMIN — Medication 100 MG: at 07:47

## 2022-10-15 RX ADMIN — FLUOXETINE HYDROCHLORIDE 60 MG: 20 CAPSULE ORAL at 07:48

## 2022-10-15 RX ADMIN — POLYETHYLENE GLYCOL 3350 17 G: 17 POWDER, FOR SOLUTION ORAL at 07:48

## 2022-10-15 RX ADMIN — OXYCODONE 10 MG: 5 TABLET ORAL at 22:16

## 2022-10-15 RX ADMIN — METHOCARBAMOL TABLETS 500 MG: 500 TABLET, COATED ORAL at 13:08

## 2022-10-15 RX ADMIN — Medication 1000 UNITS: at 22:17

## 2022-10-15 ASSESSMENT — PAIN SCALES - GENERAL
PAINLEVEL_OUTOF10: 9
PAINLEVEL_OUTOF10: 7
PAINLEVEL_OUTOF10: 6
PAINLEVEL_OUTOF10: 7

## 2022-10-15 NOTE — DISCHARGE INSTR - COC
Continuity of Care Form    Patient Name: Alex Soria   :  1950  MRN:  [id number]    Admit date:  10/11/2022  Discharge date:  10-    Code Status Order: Full Code   Advance Directives:     Admitting Physician:  Staci Camara MD  PCP: Kirt Marsh DO    Discharging Nurse: [facility] Unit/Room#: J413/M089-97  Discharging Unit Phone Number: [phone]    Emergency Contact:   Extended Emergency Contact Information  Primary Emergency Contact: [address] of [address] [place] [address] Phone: [phone]  Relation: Child    Past Surgical History:  Past Surgical History:   Procedure Laterality Date    CARDIAC SURGERY      stent x 2    COLONOSCOPY      CORONARY ANGIOPLASTY WITH STENT PLACEMENT  3/12    Za    ENDOSCOPY, COLON, DIAGNOSTIC      EYE SURGERY      Phaco with IOL OU    HERNIA REPAIR      left inguinal hernia    HYSTERECTOMY, TOTAL ABDOMINAL (CERVIX REMOVED)      JOINT REPLACEMENT      LTKR    JOINT REPLACEMENT      RTSR    KNEE ARTHROSCOPY      R knee, [facility]    LITHOTRIPSY   approx    NM LAMINECTOMY,>2 SGMT,LUMBAR N/A 2018    SPINE L3-4, L4-5, L5-S1 LAMINECTOMY, L3-4, L4-5 POSTERIOR LATERAL FUSION, L5-S1 INSTRUMENTED POSTERIOR INTERBODY FUSION, PRONE, AXIS SPINE TABLE, ACTIFUSE, CELL SAVER, NEW MICROSCOPE, NEW C-ARM X2, PEDIGUARD, MISONIX BONE SCALPAL, PNEUMATIC KERRISONS, SPINAL CORD MONITORING, LUMBAR BRACE, GLOBUS, OPEN TLIF, (H.S.C.G.#[id number]) performed by Duc Cerna MD at [facility]    PTCA  2008    Zarha    ROTATOR CUFF REPAIR Right     SKIN GRAFT  11    OJRDAN AND BSO (CERVIX REMOVED)   approx    TOTAL KNEE ARTHROPLASTY Left 2008       Immunization History:   Immunization History   Administered Date(s) Administered    COVID-19, PFIZER PURPLE top, DILUTE for use, (age 15 y+), 30mcg/0.3mL 2021, 2021, 10/08/2021    Influenza, High Dose (Fluzone 65 yrs and older) 10/07/2015    Pneumococcal Conjugate 13-valent Petrae Player) 06/13/2017    Tetanus 10/10/2013       Active Problems:  Patient Active Problem List   Diagnosis Code    Nephrolithiasis N20.0    Depression F32. A    Hyperlipidemia E78.5    OA (osteoarthritis) M19.90    Osteoporosis M81.0    Hypertension I10    Fibrocystic breast disease N60.19    CAD (coronary artery disease) I25.10    Urge incontinence N39.41    Anemia D64.9    ACE-inhibitor cough R05.8, T46.4X5A    Epistaxis R04.0    Need for tetanus booster Z23    Coagulopathy, on coumadin D68.9    Hiatal hernia K44.9    Calculus of gallbladder K80.20    History of PTCA 2, stent x 2 2009, 2012, Karissa Z98.61    Abnormal EKG, needs cardiac clearence R94.31    History of chest x-ray, normal, 8/6/14 Z92.89    Wheezes R06.2    Right-sided low back pain with right-sided sciatica M54.41    Lumbar stenosis M48.061    Synovial cyst of lumbar facet joint M71.38    PE (pulmonary thromboembolism) ([facility]) I26.99    Lumbar stenosis with neurogenic claudication M48.062    Bilateral leg weakness R29.898    Closed compression fracture of body of L1 vertebra ([facility]) S32.010A    Vertebral fracture, osteoporotic, initial encounter ([facility] M80.08XA    Acute pain due to trauma G89.11    Fall from standing W19. XXXA    Impaired mobility and activities of daily living dt Acute compression deformity of the superior endplate of L1  F96.49, Z78.9    Blurring of visual image H53.8    Chest pain R07.9    Iron deficiency anemia, unspecified D50.9    NSTEMI (non-ST elevated myocardial infarction) ([facility]) I21.4    Compression fracture of lumbar vertebrae, non-traumatic, initial encounter ([facility]) M48.56XA       Isolation/Infection:   Isolation            No Isolation          Patient Infection Status       None to display            Nurse Assessment:  Last Vital Signs: BP (!) 176/62   Pulse 61   Temp 97.7 °F (36.5 °C) (Oral)   Resp 18   Ht 5' 4\" (1.626 m)   Wt 172 lb 1.6 oz (78.1 kg)   LMP 05/29/1998   SpO2 94%   BMI 29.54 kg/m²     Last documented pain score (0-10 scale): Pain Level: 7  Last Weight:   Wt Readings from Last 1 Encounters:   10/14/22 172 lb 1.6 oz (78.1 kg)     Mental Status:  oriented and alert    IV Access:  - None    Nursing Mobility/ADLs:  Walking   Independent  Transfer  Independent  Bathing  Independent  Dressing  Independent  Toileting  Independent  Feeding  Independent  Med Admin  Independent  Med Delivery   whole    Wound Care Documentation and Therapy:  Negative Pressure Wound Therapy Back (Active)   Number of days: 6570       Incision 08/22/18 Back (Active)   Number of days: 2924        Elimination:  Continence: Bowel: Yes  Bladder: Yes  Urinary Catheter: None   Colostomy/Ileostomy/Ileal Conduit: No       Date of Last BM: 10-  No intake or output data in the 24 hours ending 10/15/22 0948  I/O last 3 completed shifts:  In: -   Out: 700 [Urine:700]    Safety Concerns:     History of Falls (last 30 days) and At Risk for Falls    Impairments/Disabilities:      None    Nutrition Therapy:  Current Nutrition Therapy:   - Oral Diet:  General    Routes of Feeding: Oral  Liquids: Thin Liquids  Daily Fluid Restriction: no  Last Modified Barium Swallow with Video (Video Swallowing Test): not done    Treatments at the Time of Hospital Discharge:   Respiratory Treatments: na  Oxygen Therapy:  is not on home oxygen therapy.   Ventilator:    - No ventilator support    Rehab Therapies: Physical Therapy and Occupational Therapy  Weight Bearing Status/Restrictions: No weight bearing restrictions  Other Medical Equipment (for information only, NOT a DME order):  walker  Other Treatments: see previous incision instructions    Patient's personal belongings (please select all that are sent with patient):  Dannie Dillon RN SIGNATURE:  Electronically signed by Gabby Shipman RN on 10/19/22 at 2:02 PM EDT    CASE MANAGEMENT/SOCIAL WORK SECTION    Inpatient Status Date: 10/11/22    Readmission Risk Assessment Score:  Readmission Risk Risk of Unplanned Readmission:  14           Discharging to Facility/ Agency   Name:   Address:  Phone:  Fax:    Dialysis Facility (if applicable)   Name:  Address:  Dialysis Schedule:  Phone:  Fax:    / signature: Electronically signed by DENIS Chan on 10/15/22 at 9:48 AM EDT    PHYSICIAN SECTION    Prognosis: {Prognosis:4808455905}    Condition at Discharge: [address] Patient Condition:[id number]}    Rehab Potential (if transferring to Rehab): {Prognosis:9185718517}    Recommended Labs or Other Treatments After Discharge: ***    Physician Certification: I certify the above information and transfer of Teri Best  is necessary for the continuing treatment of the diagnosis listed and that she requires {Admit to Appropriate Level of Care:92955} for {GREATER/LESS:360142150} 30 days.      Update Admission H&P: {CHP DME Changes in XVCMJ:448218049}    PHYSICIAN SIGNATURE:  {Esignature:[id number]}

## 2022-10-15 NOTE — CARE COORDINATION
LSW met with the pt to discuss her DC needs. Dr. Boykin Koyanagi was in the room with the pt and the plan is for a kyphoplasty on Monday and then to see if rehab or SNF will be needed. Pt is open to either one and she has a SNF list.  LSW to follow after procedure.

## 2022-10-15 NOTE — PROGRESS NOTES
Pt assessment complete. Pt is alert and oriented. Medicated PRN for pain to back. Denies nausea or chest pain. Strength equal to extremities with sensation. Call light in reach. Bed alarm on.

## 2022-10-15 NOTE — FLOWSHEET NOTE
Pt awake in bed. Accepted meds without problem. Medicated for pain per request. Breakfast here. Call light in reach.

## 2022-10-15 NOTE — PROGRESS NOTES
Progress Note  Patient: Kimberly Logan  Unit/Bed: U687/Y086-78  YOB: 1950  MRN: [id number]  Acct: [de-identified]   Admitting Diagnosis: Compression fracture of lumbar vertebrae, non-traumatic, initial encounter [facility]) Carmella Primas  Fall, initial encounter [W19. XXXA]  Closed compression fracture of body of L1 vertebra ([address] [S32.010A]  Date:  10/11/2022  Hospital Day: 4    Chief Complaint:  back pain      Physical Examination:    BP (!) 176/62   Pulse 61   Temp 97.7 °F (36.5 °C) (Oral)   Resp 18   Ht 5' 4\" (1.626 m)   Wt 172 lb 1.6 oz (78.1 kg)   LMP 05/29/1998   SpO2 94%   BMI 29.54 kg/m²    Physical Exam    LABS:  CBC:   Lab Results   Component Value Date/Time    WBC 9.2 10/11/2022 11:45 PM    RBC 3.65 10/11/2022 11:45 PM    RBC 3.82 05/29/2012 10:53 AM    HGB 10.0 10/11/2022 11:45 PM    HCT 31.6 10/11/2022 11:45 PM    MCV 86.6 10/11/2022 11:45 PM    MCH 27.5 10/11/2022 11:45 PM    MCHC 31.8 10/11/2022 11:45 PM    RDW 15.1 10/11/2022 11:45 PM     10/11/2022 11:45 PM    MPV 9.5 04/20/2015 10:18 AM     CBC with Differential:   Lab Results   Component Value Date/Time    WBC 9.2 10/11/2022 11:45 PM    RBC 3.65 10/11/2022 11:45 PM    RBC 3.82 05/29/2012 10:53 AM    HGB 10.0 10/11/2022 11:45 PM    HCT 31.6 10/11/2022 11:45 PM     10/11/2022 11:45 PM    MCV 86.6 10/11/2022 11:45 PM    MCH 27.5 10/11/2022 11:45 PM    MCHC 31.8 10/11/2022 11:45 PM    RDW 15.1 10/11/2022 11:45 PM    LYMPHOPCT 23.5 10/11/2022 11:45 PM    MONOPCT 6.6 10/11/2022 11:45 PM    BASOPCT 0.6 10/11/2022 11:45 PM    MONOSABS 0.6 10/11/2022 11:45 PM    LYMPHSABS 2.2 10/11/2022 11:45 PM    EOSABS 0.2 10/11/2022 11:45 PM    BASOSABS 0.1 10/11/2022 11:45 PM     CMP:    Lab Results   Component Value Date/Time     10/11/2022 11:45 PM    K 4.1 10/11/2022 11:45 PM     10/11/2022 11:45 PM    CO2 22 10/11/2022 11:45 PM    BUN 27 10/11/2022 11:45 PM    CREATININE 0.86 10/11/2022 11:45 PM    GFRAA >60.0 10/11/2022 11:45 PM    LABGLOM >60.0 10/11/2022 11:45 PM    GLUCOSE 104 10/11/2022 11:45 PM    GLUCOSE 88 05/29/2012 10:53 AM    PROT 6.4 10/11/2022 11:45 PM    LABALBU 3.3 10/11/2022 11:45 PM    LABALBU 3.8 05/29/2012 10:53 AM    CALCIUM 9.0 10/11/2022 11:45 PM    BILITOT <0.2 10/11/2022 11:45 PM    ALKPHOS 70 10/11/2022 11:45 PM    AST 23 10/11/2022 11:45 PM    ALT 15 10/11/2022 11:45 PM     BMP:    Lab Results   Component Value Date/Time     10/11/2022 11:45 PM    K 4.1 10/11/2022 11:45 PM     10/11/2022 11:45 PM    CO2 22 10/11/2022 11:45 PM    BUN 27 10/11/2022 11:45 PM    LABALBU 3.3 10/11/2022 11:45 PM    LABALBU 3.8 05/29/2012 10:53 AM    CREATININE 0.86 10/11/2022 11:45 PM    CALCIUM 9.0 10/11/2022 11:45 PM    GFRAA >60.0 10/11/2022 11:45 PM    LABGLOM >60.0 10/11/2022 11:45 PM    GLUCOSE 104 10/11/2022 11:45 PM    GLUCOSE 88 05/29/2012 10:53 AM     Magnesium:    Lab Results   Component Value Date/Time    MG 1.9 08/23/2018 05:37 AM         Assessment:    Active Hospital Problems    Diagnosis Date Noted    Acute pain due to trauma [G89.11] 10/14/2022     Priority: Medium    Fall from standing [W19. XXXA] 10/14/2022     Priority: Medium    Impaired mobility and activities of daily living dt Acute compression deformity of the superior endplate of L1  [K01.15, Z78.9] 10/14/2022     Priority: Medium    Compression fracture of lumbar vertebrae, non-traumatic, initial encounter [facility]) [B30.07TD] 10/14/2022     Priority: Medium    Closed compression fracture of body of L1 vertebra ([address] [S32.010A] 10/12/2022     Priority: Medium    Vertebral fracture, osteoporotic, [facility] Carrie Gordon 10/12/2022     Priority: Medium    Iron deficiency anemia, unspecified [D50.9] 10/17/2012     Priority: Medium     10/14/2022 MRI lumbar spine  EXAMINATION:   MRI OF THE LUMBAR SPINE WITHOUT CONTRAST, 10/14/2022 2:06 pm       TECHNIQUE:   Multiplanar multisequence MRI of the lumbar spine was performed without the administration of intravenous contrast.       COMPARISON:   CT lumbar spine 10/11/2022       HISTORY:   ORDERING SYSTEM PROVIDED HISTORY: L1 fracture pacemaker compatible   TECHNOLOGIST PROVIDED HISTORY:   Reason for exam:->L1 fracture pacemaker compatible   What is the sedation requirement?->None   What reading provider will be dictating this exam?->CRC       FINDINGS:   BONES/ALIGNMENT: There is an acute L1 burst fracture demonstrating 60% loss   of height, progressed from the recent CT scan. There is 5 mm of retropulsion   of the posterosuperior margin of the vertebral body into the spinal canal.   Bone marrow edema is noted throughout the vertebral body. A remote superior endplate compression fracture at L4 is noted. The sequelae   of posterior lumbar fusion at L4-5 are noted. There is disc desiccation and   disc space narrowing at L2-3 and L3-4. SPINAL CORD: The conus medullaris is normal in size and signal intensities   and terminates normally. SOFT TISSUES: There is no paraspinal mass identified. T12-L1: There is mild spinal canal stenosis secondary to 5 mm of retropulsion   of the posterosuperior margin of the L1 vertebral body into the spinal canal.   No neural foraminal narrowing is present. L1-L2: There is a 2 mm central disc protrusion. No significant spinal canal   stenosis or neural foraminal narrowing is present. L2-L3: There is a disc bulge, facet arthropathy and thickening of the   ligamentum flavum. There is moderate-to-severe spinal canal stenosis. There   is moderate right and mild left neural foraminal narrowing. L3-L4: Sequelae of bilateral laminectomies are noted. There is no disc bulge   or protrusion present. There is no significant spinal canal stenosis or   neural foraminal narrowing present. L4-L5: The sequelae of bilateral laminectomies are noted. There is no disc   bulge or protrusion present.   There is no significant spinal canal stenosis   or neural foraminal narrowing present. L5-S1: The sequelae of bilateral laminectomies are noted. There is facet   arthropathy resulting in severe left neural foraminal narrowing. No spinal   canal stenosis or right neural foraminal narrowing is present. Impression   1. Acute L1 burst fracture demonstrating 60% loss of height, progressed from   the previous CT scan from 10/10/2022. There is 5 mm of retropulsion of the   posterior margin of the vertebral body into the spinal canal resulting in   mild spinal canal stenosis at T12-L1. 2. Moderate-to-severe spinal canal stenosis, moderate right and mild left   neural foraminal narrowing at L2-3 secondary to a disc bulge, facet   arthropathy and thickening of the ligamentum flavum. 3. Severe left neural foraminal narrowing at L5-S1 secondary to facet   arthropathy. 4. Sequelae of bilateral laminectomies and posterior lumbar fusion at L4-5. The findings were sent to the Radiology Results [id number] at 3:14   pm on 10/14/2022 to be communicated to a licensed caregiver. Plan: 10/14/2022 MRI scan compared to 10/11/2022 CT scan lumbar spine x-ray lumbar shows fine shows significant progression of her osteoporotic compression fracture at L1 to 60% compression. Plan vertebral augmentation.       Electronically signedby Matilda Martinez MD on 10/15/2022 at 8:48 AM

## 2022-10-15 NOTE — PROGRESS NOTES
Subjective: The patient complains of severe acute on chronic progressive fatigue and L1 area LBP partially relieved by rest, PT, OT and meds  OxyIR and exacerbated by exertion and recent  L1 Fx-: TLSO brace fitted. MRI of L spine pending. I am concerned about patients medical complexities including:  Principal Problem:    Closed compression fracture of body of L1 vertebra (HCC)  Active Problems:    Vertebral fracture, osteoporotic, initial encounter ([facility]    Acute pain due to trauma    Fall from standing    Impaired mobility and activities of daily living dt Acute compression deformity of the superior endplate of L1     Iron deficiency anemia, unspecified    Compression fracture of lumbar vertebrae, non-traumatic, initial encounter ([facility]  Resolved Problems:    * No resolved hospital problems. *      .    Reviewed recent nursing note and discussed current status and planned care with acute care providers, \" Continue current poc. Odalis Mckeon Pt awake in bed. Accepted meds without problem. Medicated for pain per request. Breakfast here. Call light in reach. \". She is discussing surgical options with Dr Karan Tee. She is deciding and has decided to proceed with surgery with Dr. Karan Tee on Monday. She will be in bed until then. We discussed that she should do range of motion and ankle pumps throughout the weekend. We also discussed that she may benefit from OxyContin to control her pain as her oxycodone products are wearing off after 2-1/2 or 3 hours. ROS x10: The patient also complains of severely impaired mobility and activities of daily living.   Otherwise no new problems with vision, hearing, nose, mouth, throat, dermal, cardiovascular, GI, , pulmonary, musculoskeletal, psychiatric or neurological.        Vital signs:  BP (!) 176/62   Pulse 61   Temp 97.7 °F (36.5 °C) (Oral)   Resp 18   Ht 5' 4\" (1.626 m)   Wt 172 lb 1.6 oz (78.1 kg)   LMP 05/29/1998   SpO2 94%   BMI 29.54 kg/m²   I/O:   PO/Intake:    fair PO intake, continue to monitor closely for dehydration    Bowel/Bladder:   continent,    General:  Patient is well developed, adequately nourished, and    well kempt. HEENT:    PERRLA, hearing intact to loud voice, external inspection of ear and nose benign. Inspection of lips, tongue and gums benign  Musculoskeletal: No significant change in strength or tone. All joints stable. Inspection and palpation of digits and nails show no clubbing, cyanosis or inflammatory conditions. Neuro/Psychiatric: Affect: flat-  Alert and oriented to self and situation with no needed cues. No significant change in deep tendon reflexes or sensation  Lungs:  Diminished, CTA-B  . Respiration effort is normal at rest.   Heart:   S1 = S2,   RRR. Abdomen:  Soft, non-tender    Extremities:  Trace  lower extremity edema but no unusual tenderness. Skin:   BUE bruises dt blood draws      Rehabilitation:  Physical Therapy:   Bed mobility:  Bed mobility  Rolling to Left: Contact guard assistance;Minimal assistance (10/13/22 1620)  Supine to Sit: Stand by assistance (10/13/22 1620)  Bed Mobility Comments: Limited by pain. heavy assist via bedrails. increased time. Educated on logroll technique (10/13/22 1620)  Bed Mobility Training  Bed Mobility Training: Yes (10/14/22 1038)  Interventions: Verbal cues (10/14/22 1044)  Rolling: Stand-by assistance (10/14/22 1044)  Supine to Sit: Stand-by assistance (10/14/22 1044)  Sit to Supine: Stand-by assistance (10/14/22 1044)  Scooting: Stand-by assistance (10/14/22 1044)  Transfers:  Transfers  Sit to Stand: Minimal Assistance (10/13/22 1624)  Stand to Sit: Contact guard assistance (10/13/22 1624)  Bed to Chair: Contact guard assistance (10/13/22 1624)  Comment: Demonstration provided on hip hinge.  pt with pain limitations upon standing requiring multiple attempts and lifting assist. (10/13/22 1624)  Transfer Training  Transfer Training: Yes (10/14/22 1044)  Sit to Stand: Contact-guard assistance (10/14/22 1044)  Stand to Sit: Contact-guard assistance (10/14/22 1044)  Bed to Chair: Contact-guard assistance (10/14/22 1044)  Gait:   Ambulation  Surface: Level tile (10/13/22 1624)  Device: Rolling Walker;Hand-Held Assist (10/13/22 1624)  Assistance: Minimal assistance;Stand by assistance (10/13/22 1624)  Quality of Gait: Jacki with HHA. pt feels unsafe and experiencing increased pain. Improved considerably with [address]. pt with slow pacing and rigid posturing. Verbal cues for forward gaze and use of WW (10/13/22 1624)  Distance: 20ft with turn (10/13/22 1624)  Gait Training: Yes (10/14/22 1044)  Overall Level of Assistance: Contact-guard assistance (10/14/22 1044)  Distance (ft): 100 Feet (10/14/22 1044)  Assistive Device: Brace/splint; Walker, rolling (10/14/22 1044)  Interventions: Verbal cues; Visual cues (10/14/22 1044)  Base of Support: Center of gravity altered (10/14/22 1044)  Speed/Milena: Delayed (10/14/22 1044)  Swing Pattern: Left symmetrical;Right symmetrical (10/14/22 1044)  Gait Abnormalities: Other (comment) (Over correction of posture with mild shoulder posterior retro lean.) (10/14/22 1044)  Right Side Weight Bearing: As tolerated (10/14/22 1044)  Left Side Weight Bearing: As tolerated (10/14/22 1044)  Stairs:     W/C mobility:       Occupational Therapy:   Hand Dominance: Right  ADL  Feeding: Unable to assess(comment) (10/13/22 1613)  Grooming: Setup (10/13/22 1613)  UE Bathing: Stand by assistance; Increased time to complete (10/13/22 1613)  LE Bathing: Maximum assistance; Increased time to complete (10/13/22 1613)  UE Dressing: Stand by assistance; Increased time to complete (10/13/22 1613)  LE Dressing: Maximum assistance; Increased time to complete (10/13/22 1613)  Toileting: Unable to assess(comment) (10/13/22 1613)             Speech Therapy:            Diet/Swallow:        Dysphagia Outcome Severity Scale: Level 7: Normal in all situations          COGNITION  OT:    SP: Lab/X-ray studies reviewed, analyzed and discussed with patient and staff:     EXAMINATION:   MRI OF THE LUMBAR SPINE WITHOUT CONTRAST, 10/14/2022 2:06 pm       TECHNIQUE:   Multiplanar multisequence MRI of the lumbar spine was performed without the   administration of intravenous contrast.       COMPARISON:   CT lumbar spine 10/11/2022       HISTORY:   ORDERING SYSTEM PROVIDED HISTORY: L1 fracture pacemaker compatible   TECHNOLOGIST PROVIDED HISTORY:   Reason for exam:->L1 fracture pacemaker compatible   What is the sedation requirement?->None   What reading provider will be dictating this exam?->CRC       FINDINGS:   BONES/ALIGNMENT: There is an acute L1 burst fracture demonstrating 60% loss   of height, progressed from the recent CT scan. There is 5 mm of retropulsion   of the posterosuperior margin of the vertebral body into the spinal canal.   Bone marrow edema is noted throughout the vertebral body. A remote superior endplate compression fracture at L4 is noted. The sequelae   of posterior lumbar fusion at L4-5 are noted. There is disc desiccation and   disc space narrowing at L2-3 and L3-4. SPINAL CORD: The conus medullaris is normal in size and signal intensities   and terminates normally. SOFT TISSUES: There is no paraspinal mass identified. T12-L1: There is mild spinal canal stenosis secondary to 5 mm of retropulsion   of the posterosuperior margin of the L1 vertebral body into the spinal canal.   No neural foraminal narrowing is present. L1-L2: There is a 2 mm central disc protrusion. No significant spinal canal   stenosis or neural foraminal narrowing is present. L2-L3: There is a disc bulge, facet arthropathy and thickening of the   ligamentum flavum. There is moderate-to-severe spinal canal stenosis. There   is moderate right and mild left neural foraminal narrowing. L3-L4: Sequelae of bilateral laminectomies are noted.   There is no disc bulge   or protrusion present. There is no significant spinal canal stenosis or   neural foraminal narrowing present. L4-L5: The sequelae of bilateral laminectomies are noted. There is no disc   bulge or protrusion present. There is no significant spinal canal stenosis   or neural foraminal narrowing present. L5-S1: The sequelae of bilateral laminectomies are noted. There is facet   arthropathy resulting in severe left neural foraminal narrowing. No spinal   canal stenosis or right neural foraminal narrowing is present. Impression   1. Acute L1 burst fracture demonstrating 60% loss of height, progressed from   the previous CT scan from 10/10/2022. There is 5 mm of retropulsion of the   posterior margin of the vertebral body into the spinal canal resulting in   mild spinal canal stenosis at T12-L1. 2. Moderate-to-severe spinal canal stenosis, moderate right and mild left   neural foraminal narrowing at L2-3 secondary to a disc bulge, facet   arthropathy and thickening of the ligamentum flavum. 3. Severe left neural foraminal narrowing at L5-S1 secondary to facet   arthropathy. 4. Sequelae of bilateral laminectomies and posterior lumbar fusion at L4-5. The findings were sent to the Radiology Results [id number] at 3:14   pm on 10/14/2022 to be communicated to a licensed caregiver. No results found for this or any previous visit (from the past 24 hour(s)). Previous extensive, complex labs, notes and diagnostics reviewed and analyzed. ALLERGIES:    Allergies as of 10/11/2022 - Fully Reviewed 10/11/2022   Allergen Reaction Noted    Tolterodine Diarrhea 12/19/2014      (please also verify by checking MAR)     Complex Physical Medicine & Rehab Issues Assess & Plan:   Severe abnormality of gait and mobility and impaired self-care and ADL's secondary to L1 Fx : TLSO brace fitted. MRI of L spine pending .   Updated functional and medical status reassessed regarding patients ability to participate in therapies and patient found to be able to participate in:       acute intensive comprehensive inpatient rehabilitation program including PT/OT to improve balance, ambulation, ADLs, and to improve the P/AROM. It is my opinion that they will be able to tolerate 3 hours of therapy a day and benefit from it at an acute level. I again discussed acute rehab with the patient and verify that the patient is able and willing to participate in 3 hours of therapy a day. Rehab and Acute Care Case Management has also reinforced this expectation. Will continue to follow to attempt to get patient to the most efficient but most effective level of care will be in their best interest.  Continue to focus on energy conservation heart rate and blood pressure monitoring before during and after therapy endurance and consistency of function. Bowel constipation   and Bladder dysfunction   overactive, neurogenic bladder:  frequent toileting, ambulate to bathroom with assistance, check post void residuals. Check for C.difficile x1 if >2 loose stools in 24 hours, continue bowel & bladder program.  Monitor for UTI symptoms including lethargy and confusion    Severe L1 pain and generalized OA pain: reassess pain every shift and prior to and after each therapy session, give prn Tylenol OxyIR and consider scheduled Tylenol, modalities prn in therapy, consider Lidoderm, K-pad prn. Pt awake in bed. Accepted meds without problem. Medicated for pain per request.  I suggest OxyContin 10 mg every 8-12 hours. Skin healing    breakdown   risk:  continue pressure relief program.  Daily skin exams and reports from nursing. Severe fatigue due to immobility and nutritional deficits: continue to monitor closely for dehydration   Add vitamin B12 vitamin D and CoQ10 titrate dosing and add protein supplementation with low carb content. Complex discharge planning:   Discussed with care team-last 24 hour events noted. I will continue to follow along and reassess functional and medical status as we strive to improve patient's functional and medical outcomes progressing to the most efficient and lowest level of care. Complex Active General Medical Issues that complicate care:     1. Principal Problem:    Closed compression fracture of body of L1 vertebra (HCC)  Active Problems:    Vertebral fracture, osteoporotic, initial encounter ([facility]    Acute pain due to trauma    Fall from standing    Impaired mobility and activities of daily living dt Acute compression deformity of the superior endplate of L1     Iron deficiency anemia, unspecified    Compression fracture of lumbar vertebrae, non-traumatic, initial encounter ([facility]  Resolved Problems:    * No resolved hospital problems.  *          Events and functional changes in the past 24 hours reviewed continue to monitor closely re: functional status and participation in therapy      Focus of today's plan-  MRI -review and NS plans      Ish Alford D.O., PM&R     Attending    [facility] Chloé Archer

## 2022-10-16 PROCEDURE — 2580000003 HC RX 258: Performed by: SURGERY

## 2022-10-16 PROCEDURE — 6370000000 HC RX 637 (ALT 250 FOR IP): Performed by: SURGERY

## 2022-10-16 PROCEDURE — 6360000002 HC RX W HCPCS: Performed by: SURGERY

## 2022-10-16 PROCEDURE — 1210000000 HC MED SURG R&B

## 2022-10-16 PROCEDURE — 2580000003 HC RX 258: Performed by: STUDENT IN AN ORGANIZED HEALTH CARE EDUCATION/TRAINING PROGRAM

## 2022-10-16 PROCEDURE — 6370000000 HC RX 637 (ALT 250 FOR IP): Performed by: STUDENT IN AN ORGANIZED HEALTH CARE EDUCATION/TRAINING PROGRAM

## 2022-10-16 PROCEDURE — 99231 SBSQ HOSP IP/OBS SF/LOW 25: CPT | Performed by: STUDENT IN AN ORGANIZED HEALTH CARE EDUCATION/TRAINING PROGRAM

## 2022-10-16 PROCEDURE — 99231 SBSQ HOSP IP/OBS SF/LOW 25: CPT | Performed by: NEUROLOGICAL SURGERY

## 2022-10-16 PROCEDURE — 6370000000 HC RX 637 (ALT 250 FOR IP): Performed by: PHYSICIAN ASSISTANT

## 2022-10-16 RX ORDER — SODIUM CHLORIDE, SODIUM LACTATE, POTASSIUM CHLORIDE, CALCIUM CHLORIDE 600; 310; 30; 20 MG/100ML; MG/100ML; MG/100ML; MG/100ML
INJECTION, SOLUTION INTRAVENOUS CONTINUOUS
Status: DISCONTINUED | OUTPATIENT
Start: 2022-10-17 | End: 2022-10-18

## 2022-10-16 RX ADMIN — ACETAMINOPHEN 650 MG: 325 TABLET ORAL at 12:50

## 2022-10-16 RX ADMIN — METOPROLOL SUCCINATE 25 MG: 25 TABLET, EXTENDED RELEASE ORAL at 08:33

## 2022-10-16 RX ADMIN — METHOCARBAMOL TABLETS 500 MG: 500 TABLET, COATED ORAL at 23:00

## 2022-10-16 RX ADMIN — SENNOSIDES AND DOCUSATE SODIUM 1 TABLET: 50; 8.6 TABLET ORAL at 08:33

## 2022-10-16 RX ADMIN — DOFETILIDE 125 MCG: 0.12 CAPSULE ORAL at 08:32

## 2022-10-16 RX ADMIN — PANTOPRAZOLE SODIUM 40 MG: 40 TABLET, DELAYED RELEASE ORAL at 08:32

## 2022-10-16 RX ADMIN — Medication 1000 UNITS: at 08:33

## 2022-10-16 RX ADMIN — DOFETILIDE 125 MCG: 0.12 CAPSULE ORAL at 22:13

## 2022-10-16 RX ADMIN — TRAZODONE HYDROCHLORIDE 50 MG: 50 TABLET ORAL at 22:13

## 2022-10-16 RX ADMIN — FUROSEMIDE 20 MG: 20 TABLET ORAL at 08:33

## 2022-10-16 RX ADMIN — OXYCODONE 10 MG: 5 TABLET ORAL at 17:35

## 2022-10-16 RX ADMIN — Medication 1000 UNITS: at 22:13

## 2022-10-16 RX ADMIN — Medication 100 MG: at 08:32

## 2022-10-16 RX ADMIN — SENNOSIDES AND DOCUSATE SODIUM 1 TABLET: 50; 8.6 TABLET ORAL at 22:13

## 2022-10-16 RX ADMIN — SODIUM CHLORIDE, POTASSIUM CHLORIDE, SODIUM LACTATE AND CALCIUM CHLORIDE: 600; 310; 30; 20 INJECTION, SOLUTION INTRAVENOUS at 23:05

## 2022-10-16 RX ADMIN — ACETAMINOPHEN 650 MG: 325 TABLET ORAL at 18:44

## 2022-10-16 RX ADMIN — ACETAMINOPHEN 650 MG: 325 TABLET ORAL at 22:13

## 2022-10-16 RX ADMIN — ATORVASTATIN CALCIUM 40 MG: 40 TABLET, FILM COATED ORAL at 22:13

## 2022-10-16 RX ADMIN — Medication 10 ML: at 08:32

## 2022-10-16 RX ADMIN — METHOCARBAMOL TABLETS 500 MG: 500 TABLET, COATED ORAL at 12:50

## 2022-10-16 RX ADMIN — MAGNESIUM OXIDE 400 MG (241.3 MG MAGNESIUM) TABLET 400 MG: TABLET at 08:33

## 2022-10-16 RX ADMIN — FLUOXETINE HYDROCHLORIDE 60 MG: 20 CAPSULE ORAL at 08:32

## 2022-10-16 RX ADMIN — METHOCARBAMOL TABLETS 500 MG: 500 TABLET, COATED ORAL at 08:32

## 2022-10-16 RX ADMIN — Medication 10 ML: at 22:16

## 2022-10-16 RX ADMIN — METHOCARBAMOL TABLETS 500 MG: 500 TABLET, COATED ORAL at 17:35

## 2022-10-16 RX ADMIN — ENOXAPARIN SODIUM 40 MG: 100 INJECTION SUBCUTANEOUS at 08:33

## 2022-10-16 RX ADMIN — OXYCODONE 10 MG: 5 TABLET ORAL at 22:17

## 2022-10-16 ASSESSMENT — PAIN DESCRIPTION - ORIENTATION: ORIENTATION: LOWER

## 2022-10-16 ASSESSMENT — PAIN SCALES - GENERAL
PAINLEVEL_OUTOF10: 7
PAINLEVEL_OUTOF10: 6
PAINLEVEL_OUTOF10: 6
PAINLEVEL_OUTOF10: 7

## 2022-10-16 ASSESSMENT — PAIN DESCRIPTION - DESCRIPTORS: DESCRIPTORS: DULL;ACHING

## 2022-10-16 ASSESSMENT — PAIN DESCRIPTION - LOCATION: LOCATION: BACK

## 2022-10-16 NOTE — FLOWSHEET NOTE
Pt awake in bed. Ate good for breakfast. Accepted meds without problem. Dr. Deangelo Beasley was in to see her this morning.

## 2022-10-16 NOTE — PROGRESS NOTES
TRAUMA DAILY PROGRESS NOTE      Patient Name: Jose Parker  Admission Date 10/11/2022    Hospital Day: 5  Patient seen and examined on 10/16/2022    INTERVAL HISTORY/EVENTS     Background:  Jose Parker is a 74 y.o. female with a PMHx of HTN, HLD, OA, CAD s/p L4-5 fusion with instrumentation and depression presented to [facility] s/p mechanical fall from standing. (-) head strike, (-) LOC, (+) Eliquis. Admitted to trauma. NSGY consulted. Trauma work up found L1 compression fracture. Hospital Course:  10/11/2022: Presented to ED with c/o low back pain s/p mechanical fall from standing. (-) head strike, (-) LOC, (+) Eliquis. Admitted to trauma. NSGY consulted. 10/12/2022: NSGY eval and rec's for MRI of L-spine. Pt with pacemaker and awaiting confirmation of MRI compatibility. 10/13/2022: TLSO brace fitted. MRI of L spine pending. Home medications resumed. Pacemaker interrogated and OK for MRI  10/14/2022: MRI completed. Plan for OR on 10/17 for vertebral augmentation  10/15/2022: No acute events, waiting for OR 10/17      24 Hour Events:  No acute events overnight per patient or nursing. Pt reports pain to her back that she states is the same. Reports pain well controlled on current regimen. Denies LE weakness or numbness. Tolerating adequate PO intake without nausea or vomiting. Voiding spontaneously, no bowel movement since admission but passing flatus. Vital signs reviewed. Afebrile, not tachycardic, normotensive-mildly hypertensive, sating appropriately on RA. No lab work obtained today. UOP: 1200mL  BM x 0 since admission (10/11)      PHYSICAL EXAM     Vitals Average, Min and Max for last 24 hours:  Temp: Temp: 98.2 °F (36.8 °C);  Temp  Av.1 °F (36.7 °C)  Min: 97.7 °F (36.5 °C)  Max: 98.3 °F (36.8 °C)  Respirations: Resp  Av  Min: 16  Max: 18  Pulse: Pulse  Av.3  Min: 60  Max: 61  Blood Pressure: Systolic (22KEM), YXQ:680 , Min:118 , TVK:764   ; Diastolic (59AMO), Av, Min:49, Max:74  SpO2: SpO2  Av %  Min: 89 %  Max: 94 %    Vitals: BP (!) 164/61   Pulse 61   Temp 98.2 °F (36.8 °C) (Oral)   Resp 17   Ht 5' 4\" (1.626 m)   Wt 172 lb 1.6 oz (78.1 kg)   LMP 1998   SpO2 94%   BMI 29.54 kg/m²     Physical Exam:  Constitutional: Lying in bed, head mildly elevated. Alert and conversant. HEENT: Atraumatic normocephalic. Cardiovascular: Regular rate and rhythm. Bilateral radial and DP pulses intact   Pulmonary: Clear to ausculation bilaterally on room air. No wheezing, rhonchi or rales. Abdominal: Soft. Non-distended. Non-tender. Musculoskeletal: Moves all extremities to command. No edema   Neurological: Alert, awake, and orientated x 3. Motor and sensory grossly intact. GCS of 15.     LABORATORY RESULTS (LAST 24 HOURS)     CBC with Differential:    Lab Results   Component Value Date/Time    WBC 9.2 10/11/2022 11:45 PM    RBC 3.65 10/11/2022 11:45 PM    RBC 3.82 2012 10:53 AM    HGB 10.0 10/11/2022 11:45 PM    HCT 31.6 10/11/2022 11:45 PM     10/11/2022 11:45 PM    MCV 86.6 10/11/2022 11:45 PM    MCH 27.5 10/11/2022 11:45 PM    MCHC 31.8 10/11/2022 11:45 PM    RDW 15.1 10/11/2022 11:45 PM    LYMPHOPCT 23.5 10/11/2022 11:45 PM    MONOPCT 6.6 10/11/2022 11:45 PM    BASOPCT 0.6 10/11/2022 11:45 PM    MONOSABS 0.6 10/11/2022 11:45 PM    LYMPHSABS 2.2 10/11/2022 11:45 PM    EOSABS 0.2 10/11/2022 11:45 PM    BASOSABS 0.1 10/11/2022 11:45 PM     CMP:    Lab Results   Component Value Date/Time     10/11/2022 11:45 PM    K 4.1 10/11/2022 11:45 PM     10/11/2022 11:45 PM    CO2 22 10/11/2022 11:45 PM    BUN 27 10/11/2022 11:45 PM    CREATININE 0.86 10/11/2022 11:45 PM    GFRAA >60.0 10/11/2022 11:45 PM    LABGLOM >60.0 10/11/2022 11:45 PM    GLUCOSE 104 10/11/2022 11:45 PM    GLUCOSE 88 2012 10:53 AM    PROT 6.4 10/11/2022 11:45 PM    LABALBU 3.3 10/11/2022 11:45 PM    LABALBU 3.8 2012 10:53 AM    CALCIUM 9.0 10/11/2022 11:45 PM BILITOT <0.2 10/11/2022 11:45 PM    ALKPHOS 70 10/11/2022 11:45 PM    AST 23 10/11/2022 11:45 PM    ALT 15 10/11/2022 11:45 PM     Magnesium:    Lab Results   Component Value Date/Time    MG 1.9 08/23/2018 05:37 AM     PT/INR:    Lab Results   Component Value Date/Time    PROTIME 13.2 07/30/2018 02:14 PM    PROTIME 26.0 07/28/2014 10:16 AM    INR 1.3 07/30/2018 02:14 PM     PTT:    Lab Results   Component Value Date/Time    APTT 45.1 07/30/2018 02:14 PM       IMAGING RESULTS (PERSONALLY REVIEWED)     No new imaging to review today. ASSESSMENT & PLAN     Diagnoses:  S/p mechanical fall from standing on 10/11/22  L1 compression fracture (plan for OR 10/17)  Acute pain due to traumatic injury    PMHx: HTN, HLD, OA, CAD s/p L4-5 fusion with instrumentation and depression    Incidental Findings: None, JLR 10/12/22      ASSESSMENT/PLAN:  Neurological: Acute L1 compression fracture, acute pain due to trauma  - NSGY consulted, plan for vertebral augmentation on 10/17  - Continue pain control with:  - Tylenol 650mg q6hr scheduled  - Oxycodone 5/10mg q4hrs prn moderate/severe pain  - Robaxin 500mg four times daily  - Continue home Prozac 60mg daily, Trazodone 50mg nightly    Cardiovascular: Normotensive, not tachycardic. HX HTN, HLD, Afib   - Continue vital signs per unit protocol   - Continue home Lipitor 40mg daily  - Continue home Lasix 20mg daily  - Continue home Toprolol XL 25mg daily  - Continue home Tikosyn 125 mcg BID  - Holding home Xarelto and ASA until OK with NSGY post-op. Respiratory: Sating appropriately on RA  - Maintain O2 sats > 92%  - Encourage IS 10 x hourly     GI/Diet: Tolerating regular diet, no bowel movement since admission on 10/11  - Continue regular diet   - Continue bowel regimen: Miralax daily,  senokot BID today, dulcolax PRN  - Continue home pantoprazole 40mg daily     Renal/Electrolytes: No electrolyte abnormalities or kidney dysfunction on last BMP  - HLIV.  Will start LR @ 50mL/hr while NPO at midnight  - BMP, Mg in AM     ID: Remains afebrile, normotensive, and without leukocytosis on last CBC.  - No indication for Abx  - CBC in AM     Heme: HDS, acute blood loss anemia   - No indication for transfusion  - CBC, T&S, PT/INR in AM    Endocrine:   - No acute issues. MSK: Acute L2 compression fracture  - Plan for OR vertebral augmentation on 10/17.   - Weight Bearing Restrictions: none  - Activity: TLSO brace at all times when sitting up or walking. OK to have off if lying in bed  - PT/OT: Rec inpatient rehab. Will need updated PT/OT recs post-op  - PM&R consulted 10/13, recommending SNF    Prophylaxis:   - SCDs and lovenox 40mg daily  - Continue home Protonix 40mg daily  - Holding home Xarelto and ASA until final recommendations from [address] [place] [address]. Lines/Tubes/Drains:  - Maintain PIVs  - No indication for collado catheter, monitor for urinary retention    Dispo: Remain on trauma service and regular nursing floor in anticipation for OR on 10/17. Dispo pending PT/OT recommendations. Accom Status: General Status      - Follow up with NSGY (Dr. Juan Carlos Bower) TBD  - No follow up with Trauma Surgery needed. Please call for any questions or concerns.   [facility] Treatment Team Sticky Note for contact information]      Jerry Partida PA-C  Trauma/Critical Care  [see Treatment Team Sticky Note for contact information]     This patient's plan of care was discussed and made in collaboration with Trauma Attending physician, Andrea Salcedo MD.

## 2022-10-16 NOTE — PROGRESS NOTES
Discussed with patient procedure indications and risks of the surgery. She had additional questions all of which were answered.

## 2022-10-17 ENCOUNTER — ANESTHESIA (OUTPATIENT)
Dept: OPERATING ROOM | Age: 72
DRG: 516 | End: 2022-10-17
Payer: MEDICARE

## 2022-10-17 ENCOUNTER — APPOINTMENT (OUTPATIENT)
Dept: GENERAL RADIOLOGY | Age: 72
DRG: 516 | End: 2022-10-17
Payer: MEDICARE

## 2022-10-17 ENCOUNTER — ANESTHESIA EVENT (OUTPATIENT)
Dept: OPERATING ROOM | Age: 72
DRG: 516 | End: 2022-10-17
Payer: MEDICARE

## 2022-10-17 LAB
ABO/RH: NORMAL
ANION GAP SERPL CALCULATED.3IONS-SCNC: 10 MEQ/L (ref 9–15)
ANTIBODY SCREEN: NORMAL
BUN BLDV-MCNC: 21 MG/DL (ref 8–23)
CALCIUM SERPL-MCNC: 9.1 MG/DL (ref 8.5–9.9)
CHLORIDE BLD-SCNC: 107 MEQ/L (ref 95–107)
CO2: 24 MEQ/L (ref 20–31)
CREAT SERPL-MCNC: 0.75 MG/DL (ref 0.5–0.9)
GFR AFRICAN AMERICAN: >60
GFR NON-AFRICAN AMERICAN: >60
GLUCOSE BLD-MCNC: 84 MG/DL (ref 70–99)
HCT VFR BLD CALC: 33.2 % (ref 37–47)
HEMOGLOBIN: 10.6 G/DL (ref 12–16)
INR BLD: 1.2
MCH RBC QN AUTO: 28 PG (ref 27–31.3)
MCHC RBC AUTO-ENTMCNC: 32.1 % (ref 33–37)
MCV RBC AUTO: 87.2 FL (ref 82–100)
PDW BLD-RTO: 14.8 % (ref 11.5–14.5)
PLATELET # BLD: 250 K/UL (ref 130–400)
POTASSIUM REFLEX MAGNESIUM: 4.3 MEQ/L (ref 3.4–4.9)
PROTHROMBIN TIME: 15.4 SEC (ref 12.3–14.9)
RBC # BLD: 3.81 M/UL (ref 4.2–5.4)
SODIUM BLD-SCNC: 141 MEQ/L (ref 135–144)
WBC # BLD: 5.8 K/UL (ref 4.8–10.8)

## 2022-10-17 PROCEDURE — 85027 COMPLETE CBC AUTOMATED: CPT

## 2022-10-17 PROCEDURE — 6360000002 HC RX W HCPCS: Performed by: ANESTHESIOLOGY

## 2022-10-17 PROCEDURE — 3700000000 HC ANESTHESIA ATTENDED CARE: Performed by: NEUROLOGICAL SURGERY

## 2022-10-17 PROCEDURE — 2500000003 HC RX 250 WO HCPCS: Performed by: ANESTHESIOLOGY

## 2022-10-17 PROCEDURE — 7100000001 HC PACU RECOVERY - ADDTL 15 MIN: Performed by: NEUROLOGICAL SURGERY

## 2022-10-17 PROCEDURE — 1210000000 HC MED SURG R&B

## 2022-10-17 PROCEDURE — A4217 STERILE WATER/SALINE, 500 ML: HCPCS | Performed by: NEUROLOGICAL SURGERY

## 2022-10-17 PROCEDURE — 6360000002 HC RX W HCPCS: Performed by: NEUROLOGICAL SURGERY

## 2022-10-17 PROCEDURE — 6370000000 HC RX 637 (ALT 250 FOR IP): Performed by: STUDENT IN AN ORGANIZED HEALTH CARE EDUCATION/TRAINING PROGRAM

## 2022-10-17 PROCEDURE — 99231 SBSQ HOSP IP/OBS SF/LOW 25: CPT | Performed by: PHYSICAL MEDICINE & REHABILITATION

## 2022-10-17 PROCEDURE — 0QS03ZZ REPOSITION LUMBAR VERTEBRA, PERCUTANEOUS APPROACH: ICD-10-PCS | Performed by: NEUROLOGICAL SURGERY

## 2022-10-17 PROCEDURE — 3700000001 HC ADD 15 MINUTES (ANESTHESIA): Performed by: NEUROLOGICAL SURGERY

## 2022-10-17 PROCEDURE — 22514 PERQ VERTEBRAL AUGMENTATION: CPT | Performed by: NEUROLOGICAL SURGERY

## 2022-10-17 PROCEDURE — C1894 INTRO/SHEATH, NON-LASER: HCPCS | Performed by: NEUROLOGICAL SURGERY

## 2022-10-17 PROCEDURE — 80048 BASIC METABOLIC PNL TOTAL CA: CPT

## 2022-10-17 PROCEDURE — 86901 BLOOD TYPING SEROLOGIC RH(D): CPT

## 2022-10-17 PROCEDURE — 3209999900 FLUORO FOR SURGICAL PROCEDURES

## 2022-10-17 PROCEDURE — 3600000014 HC SURGERY LEVEL 4 ADDTL 15MIN: Performed by: NEUROLOGICAL SURGERY

## 2022-10-17 PROCEDURE — 76942 ECHO GUIDE FOR BIOPSY: CPT | Performed by: ANESTHESIOLOGY

## 2022-10-17 PROCEDURE — 6370000000 HC RX 637 (ALT 250 FOR IP): Performed by: SURGERY

## 2022-10-17 PROCEDURE — 86900 BLOOD TYPING SEROLOGIC ABO: CPT

## 2022-10-17 PROCEDURE — 7100000000 HC PACU RECOVERY - FIRST 15 MIN: Performed by: NEUROLOGICAL SURGERY

## 2022-10-17 PROCEDURE — 2709999900 HC NON-CHARGEABLE SUPPLY: Performed by: NEUROLOGICAL SURGERY

## 2022-10-17 PROCEDURE — 3600000004 HC SURGERY LEVEL 4 BASE: Performed by: NEUROLOGICAL SURGERY

## 2022-10-17 PROCEDURE — 6360000004 HC RX CONTRAST MEDICATION: Performed by: NEUROLOGICAL SURGERY

## 2022-10-17 PROCEDURE — 2720000010 HC SURG SUPPLY STERILE: Performed by: NEUROLOGICAL SURGERY

## 2022-10-17 PROCEDURE — 0QU03JZ SUPPLEMENT LUMBAR VERTEBRA WITH SYNTHETIC SUBSTITUTE, PERCUTANEOUS APPROACH: ICD-10-PCS | Performed by: NEUROLOGICAL SURGERY

## 2022-10-17 PROCEDURE — 6370000000 HC RX 637 (ALT 250 FOR IP): Performed by: PHYSICIAN ASSISTANT

## 2022-10-17 PROCEDURE — 2580000003 HC RX 258: Performed by: NEUROLOGICAL SURGERY

## 2022-10-17 PROCEDURE — 86850 RBC ANTIBODY SCREEN: CPT

## 2022-10-17 PROCEDURE — 36415 COLL VENOUS BLD VENIPUNCTURE: CPT

## 2022-10-17 PROCEDURE — 2500000003 HC RX 250 WO HCPCS: Performed by: NURSE ANESTHETIST, CERTIFIED REGISTERED

## 2022-10-17 PROCEDURE — 99232 SBSQ HOSP IP/OBS MODERATE 35: CPT | Performed by: PHYSICIAN ASSISTANT

## 2022-10-17 PROCEDURE — 85610 PROTHROMBIN TIME: CPT

## 2022-10-17 PROCEDURE — 2580000003 HC RX 258: Performed by: STUDENT IN AN ORGANIZED HEALTH CARE EDUCATION/TRAINING PROGRAM

## 2022-10-17 PROCEDURE — 6360000002 HC RX W HCPCS: Performed by: NURSE ANESTHETIST, CERTIFIED REGISTERED

## 2022-10-17 DEVICE — BONE CEMENT C01B HV-R WITH MIXER  US
Type: IMPLANTABLE DEVICE | Site: VERTEBRAE | Status: FUNCTIONAL
Brand: KYPHON® HV-R® BONE CEMENT AND KYPHON® MIXER PACK

## 2022-10-17 RX ORDER — DIPHENHYDRAMINE HYDROCHLORIDE 50 MG/ML
12.5 INJECTION INTRAMUSCULAR; INTRAVENOUS
Status: DISCONTINUED | OUTPATIENT
Start: 2022-10-17 | End: 2022-10-17 | Stop reason: HOSPADM

## 2022-10-17 RX ORDER — MAGNESIUM HYDROXIDE 1200 MG/15ML
LIQUID ORAL CONTINUOUS PRN
Status: DISCONTINUED | OUTPATIENT
Start: 2022-10-17 | End: 2022-10-17 | Stop reason: HOSPADM

## 2022-10-17 RX ORDER — MIDAZOLAM HYDROCHLORIDE 1 MG/ML
INJECTION INTRAMUSCULAR; INTRAVENOUS PRN
Status: DISCONTINUED | OUTPATIENT
Start: 2022-10-17 | End: 2022-10-17 | Stop reason: SDUPTHER

## 2022-10-17 RX ORDER — BUPIVACAINE HYDROCHLORIDE 5 MG/ML
INJECTION, SOLUTION EPIDURAL; INTRACAUDAL PRN
Status: DISCONTINUED | OUTPATIENT
Start: 2022-10-17 | End: 2022-10-17 | Stop reason: SDUPTHER

## 2022-10-17 RX ORDER — SODIUM CHLORIDE 0.9 % (FLUSH) 0.9 %
5-40 SYRINGE (ML) INJECTION EVERY 12 HOURS SCHEDULED
Status: DISCONTINUED | OUTPATIENT
Start: 2022-10-17 | End: 2022-10-17 | Stop reason: HOSPADM

## 2022-10-17 RX ORDER — METOCLOPRAMIDE HYDROCHLORIDE 5 MG/ML
10 INJECTION INTRAMUSCULAR; INTRAVENOUS
Status: DISCONTINUED | OUTPATIENT
Start: 2022-10-17 | End: 2022-10-17 | Stop reason: HOSPADM

## 2022-10-17 RX ORDER — HYDRALAZINE HYDROCHLORIDE 20 MG/ML
10 INJECTION INTRAMUSCULAR; INTRAVENOUS ONCE
Status: COMPLETED | OUTPATIENT
Start: 2022-10-17 | End: 2022-10-17

## 2022-10-17 RX ORDER — OXYCODONE HYDROCHLORIDE 5 MG/1
5 TABLET ORAL PRN
Status: DISCONTINUED | OUTPATIENT
Start: 2022-10-17 | End: 2022-10-17 | Stop reason: HOSPADM

## 2022-10-17 RX ORDER — OXYCODONE HYDROCHLORIDE 5 MG/1
10 TABLET ORAL PRN
Status: DISCONTINUED | OUTPATIENT
Start: 2022-10-17 | End: 2022-10-17 | Stop reason: HOSPADM

## 2022-10-17 RX ORDER — PROPOFOL 10 MG/ML
INJECTION, EMULSION INTRAVENOUS PRN
Status: DISCONTINUED | OUTPATIENT
Start: 2022-10-17 | End: 2022-10-17 | Stop reason: SDUPTHER

## 2022-10-17 RX ORDER — FENTANYL CITRATE 50 UG/ML
50 INJECTION, SOLUTION INTRAMUSCULAR; INTRAVENOUS EVERY 10 MIN PRN
Status: DISCONTINUED | OUTPATIENT
Start: 2022-10-17 | End: 2022-10-17 | Stop reason: HOSPADM

## 2022-10-17 RX ORDER — MEPERIDINE HYDROCHLORIDE 25 MG/ML
12.5 INJECTION INTRAMUSCULAR; INTRAVENOUS; SUBCUTANEOUS
Status: DISCONTINUED | OUTPATIENT
Start: 2022-10-17 | End: 2022-10-17 | Stop reason: HOSPADM

## 2022-10-17 RX ORDER — SODIUM CHLORIDE 0.9 % (FLUSH) 0.9 %
5-40 SYRINGE (ML) INJECTION PRN
Status: DISCONTINUED | OUTPATIENT
Start: 2022-10-17 | End: 2022-10-17 | Stop reason: HOSPADM

## 2022-10-17 RX ORDER — ASPIRIN 81 MG/1
81 TABLET ORAL DAILY
Status: CANCELLED | OUTPATIENT
Start: 2022-10-18

## 2022-10-17 RX ORDER — KETAMINE HYDROCHLORIDE 100 MG/ML
INJECTION, SOLUTION INTRAMUSCULAR; INTRAVENOUS PRN
Status: DISCONTINUED | OUTPATIENT
Start: 2022-10-17 | End: 2022-10-17 | Stop reason: SDUPTHER

## 2022-10-17 RX ORDER — SODIUM CHLORIDE 9 MG/ML
25 INJECTION, SOLUTION INTRAVENOUS PRN
Status: DISCONTINUED | OUTPATIENT
Start: 2022-10-17 | End: 2022-10-17 | Stop reason: HOSPADM

## 2022-10-17 RX ORDER — FENTANYL CITRATE 50 UG/ML
INJECTION, SOLUTION INTRAMUSCULAR; INTRAVENOUS PRN
Status: DISCONTINUED | OUTPATIENT
Start: 2022-10-17 | End: 2022-10-17 | Stop reason: SDUPTHER

## 2022-10-17 RX ORDER — ONDANSETRON 2 MG/ML
4 INJECTION INTRAMUSCULAR; INTRAVENOUS
Status: DISCONTINUED | OUTPATIENT
Start: 2022-10-17 | End: 2022-10-17 | Stop reason: HOSPADM

## 2022-10-17 RX ADMIN — DOFETILIDE 125 MCG: 0.12 CAPSULE ORAL at 20:49

## 2022-10-17 RX ADMIN — PHENYLEPHRINE HYDROCHLORIDE 100 MCG: 10 INJECTION INTRAVENOUS at 14:44

## 2022-10-17 RX ADMIN — Medication 1000 UNITS: at 20:50

## 2022-10-17 RX ADMIN — FENTANYL CITRATE 25 MCG: 50 INJECTION, SOLUTION INTRAMUSCULAR; INTRAVENOUS at 15:54

## 2022-10-17 RX ADMIN — SODIUM CHLORIDE, POTASSIUM CHLORIDE, SODIUM LACTATE AND CALCIUM CHLORIDE: 600; 310; 30; 20 INJECTION, SOLUTION INTRAVENOUS at 15:46

## 2022-10-17 RX ADMIN — SENNOSIDES AND DOCUSATE SODIUM 1 TABLET: 50; 8.6 TABLET ORAL at 08:46

## 2022-10-17 RX ADMIN — CEFAZOLIN 2000 MG: 10 INJECTION, POWDER, FOR SOLUTION INTRAVENOUS at 14:25

## 2022-10-17 RX ADMIN — METHOCARBAMOL TABLETS 500 MG: 500 TABLET, COATED ORAL at 20:50

## 2022-10-17 RX ADMIN — TRAZODONE HYDROCHLORIDE 50 MG: 50 TABLET ORAL at 20:50

## 2022-10-17 RX ADMIN — ATORVASTATIN CALCIUM 40 MG: 40 TABLET, FILM COATED ORAL at 20:50

## 2022-10-17 RX ADMIN — BUPIVACAINE HYDROCHLORIDE 10 ML: 5 INJECTION, SOLUTION EPIDURAL; INTRACAUDAL at 14:07

## 2022-10-17 RX ADMIN — FENTANYL CITRATE 25 MCG: 50 INJECTION, SOLUTION INTRAMUSCULAR; INTRAVENOUS at 14:35

## 2022-10-17 RX ADMIN — KETAMINE HYDROCHLORIDE 20 MG: 100 INJECTION INTRAMUSCULAR; INTRAVENOUS at 14:27

## 2022-10-17 RX ADMIN — FUROSEMIDE 20 MG: 20 TABLET ORAL at 08:46

## 2022-10-17 RX ADMIN — DOFETILIDE 125 MCG: 0.12 CAPSULE ORAL at 08:45

## 2022-10-17 RX ADMIN — HYDRALAZINE HYDROCHLORIDE 10 MG: 20 INJECTION INTRAMUSCULAR; INTRAVENOUS at 16:55

## 2022-10-17 RX ADMIN — Medication 100 MG: at 08:46

## 2022-10-17 RX ADMIN — METHOCARBAMOL TABLETS 500 MG: 500 TABLET, COATED ORAL at 12:32

## 2022-10-17 RX ADMIN — PHENYLEPHRINE HYDROCHLORIDE 100 MCG: 10 INJECTION INTRAVENOUS at 14:42

## 2022-10-17 RX ADMIN — SENNOSIDES AND DOCUSATE SODIUM 1 TABLET: 50; 8.6 TABLET ORAL at 20:50

## 2022-10-17 RX ADMIN — OXYCODONE 10 MG: 5 TABLET ORAL at 08:49

## 2022-10-17 RX ADMIN — FENTANYL CITRATE 50 MCG: 50 INJECTION, SOLUTION INTRAMUSCULAR; INTRAVENOUS at 15:31

## 2022-10-17 RX ADMIN — FENTANYL CITRATE 50 MCG: 50 INJECTION, SOLUTION INTRAMUSCULAR; INTRAVENOUS at 15:42

## 2022-10-17 RX ADMIN — LIDOCAINE HYDROCHLORIDE 20 ML: 10; .005 INJECTION, SOLUTION EPIDURAL; INFILTRATION; INTRACAUDAL; PERINEURAL at 14:07

## 2022-10-17 RX ADMIN — MAGNESIUM OXIDE 400 MG (241.3 MG MAGNESIUM) TABLET 400 MG: TABLET at 08:46

## 2022-10-17 RX ADMIN — MIDAZOLAM HYDROCHLORIDE 2 MG: 1 INJECTION, SOLUTION INTRAMUSCULAR; INTRAVENOUS at 14:03

## 2022-10-17 RX ADMIN — Medication 1000 UNITS: at 08:45

## 2022-10-17 RX ADMIN — METHOCARBAMOL TABLETS 500 MG: 500 TABLET, COATED ORAL at 08:46

## 2022-10-17 RX ADMIN — ACETAMINOPHEN 650 MG: 325 TABLET ORAL at 12:32

## 2022-10-17 RX ADMIN — PROPOFOL 300 MG: 10 INJECTION, EMULSION INTRAVENOUS at 14:26

## 2022-10-17 RX ADMIN — ACETAMINOPHEN 650 MG: 325 TABLET ORAL at 16:55

## 2022-10-17 RX ADMIN — METOPROLOL SUCCINATE 25 MG: 25 TABLET, EXTENDED RELEASE ORAL at 08:46

## 2022-10-17 RX ADMIN — FLUOXETINE HYDROCHLORIDE 60 MG: 20 CAPSULE ORAL at 08:45

## 2022-10-17 RX ADMIN — FENTANYL CITRATE 25 MCG: 50 INJECTION, SOLUTION INTRAMUSCULAR; INTRAVENOUS at 14:27

## 2022-10-17 ASSESSMENT — PAIN DESCRIPTION - LOCATION
LOCATION: BACK

## 2022-10-17 ASSESSMENT — PAIN - FUNCTIONAL ASSESSMENT
PAIN_FUNCTIONAL_ASSESSMENT: 0-10
PAIN_FUNCTIONAL_ASSESSMENT: ACTIVITIES ARE NOT PREVENTED

## 2022-10-17 ASSESSMENT — PAIN DESCRIPTION - DESCRIPTORS: DESCRIPTORS: ACHING

## 2022-10-17 ASSESSMENT — PAIN SCALES - GENERAL
PAINLEVEL_OUTOF10: 4
PAINLEVEL_OUTOF10: 5
PAINLEVEL_OUTOF10: 7
PAINLEVEL_OUTOF10: 6
PAINLEVEL_OUTOF10: 7
PAINLEVEL_OUTOF10: 4
PAINLEVEL_OUTOF10: 6

## 2022-10-17 NOTE — ANESTHESIA PROCEDURE NOTES
Peripheral Block    Patient location during procedure: pre-op  Reason for block: post-op pain management and at surgeon's request  Start time: 10/17/2022 2:04 PM  End time: 10/17/2022 2:10 PM  Staffing  Performed: anesthesiologist   Anesthesiologist: Ceci Grijalva MD  Preanesthetic Checklist  Completed: patient identified, IV checked, site marked, risks and benefits discussed, surgical/procedural consents, equipment checked, pre-op evaluation, timeout performed, anesthesia consent given, oxygen available and monitors applied/VS acknowledged  Peripheral Block   Patient position: sitting  Prep: ChloraPrep  Provider prep: mask and sterile gloves (Sterile probe cover)  Patient monitoring: cardiac monitor, continuous pulse ox, frequent blood pressure checks and IV access  Block type: Erector spinae  Laterality: bilateral  Injection technique: single-shot  Guidance: ultrasound guided  Local infiltration: bupivacaine and lidocaine  Infiltration strength: 0.5 %  Local infiltration: bupivacaine and lidocaine  Dose: 10 mLDose: 20 mL    Needle   Needle type: combined needle/nerve stimulator   Needle gauge: 21 G  Needle localization: anatomical landmarks and ultrasound guidance  Needle length: 10 cm  Assessment   Injection assessment: negative aspiration for heme, no paresthesia on injection and local visualized surrounding nerve on ultrasound  Paresthesia pain: immediately resolved  Slow fractionated injection: yes  Hemodynamics: stable  Real-time US image taken/store: yes    Additional Notes  Ultrasound image printed and saved in patient chart.     Sterile probe cover used

## 2022-10-17 NOTE — ANESTHESIA PRE PROCEDURE
Department of Anesthesiology  Preprocedure Note       Name:  Tori Vallejo   Age:  74 y.o.  :  1950                                          MRN:  [id number]         Date:  10/17/2022      Surgeon: Narendra Villasenor):  Miranda Vyas MD    Procedure: Procedure(s):  KYPHOPLASTY. ROOM 264    Medications prior to admission:   Prior to Admission medications    Medication Sig Start Date End Date Taking?  Authorizing Provider   prasugrel (EFFIENT) 10 MG TABS Take 10 mg by mouth daily   Yes Historical Provider, MD   furosemide (LASIX) 20 MG tablet Take 20 mg by mouth daily   Yes Historical Provider, MD   metoprolol succinate (TOPROL XL) 25 MG extended release tablet Take 25 mg by mouth daily   Yes Historical Provider, MD   magnesium oxide (MAG-OX) 400 MG tablet Take 400 mg by mouth daily   Yes Historical Provider, MD   dofetilide (TIKOSYN) 125 MCG capsule Take 125 mcg by mouth 2 times daily   Yes Historical Provider, MD   rivaroxaban (XARELTO) 20 MG TABS tablet Take 20 mg by mouth nightly   Yes Historical Provider, MD   atorvastatin (LIPITOR) 40 MG tablet Take 40 mg by mouth nightly   Yes Historical Provider, MD   traZODone (DESYREL) 50 MG tablet Take 50 mg by mouth nightly   Yes Historical Provider, MD   Cranberry 400 MG CAPS Take by mouth   Yes Historical Provider, MD   aspirin 81 MG EC tablet Take 81 mg by mouth daily   Yes Historical Provider, MD   FLUoxetine (PROZAC) 20 MG capsule Take 60 mg by mouth daily 18  Yes Historical Provider, MD   pantoprazole (PROTONIX) 40 MG tablet Take 1 tablet by mouth daily 3/26/18  Yes Eileen Shaver, DO   darifenacin (ENABLEX) 7.5 MG extended release tablet TAKE 2 TABLETS (15 MG) EVERY MORNING AND 1 TABLET (7.5 MG) EVERY EVENING 3/26/18  Yes Jameel Dumont,    Calcium-Magnesium 500-250 MG TABS Take 1 tablet by mouth 2 times daily    Yes Historical Provider, MD   Multiple Vitamin (MULTI VITAMIN DAILY PO) Take 1 tablet by mouth daily    Yes Historical Provider, MD   Vitamin D (CHOLECALCIFEROL) 1000 UNITS CAPS capsule Take 1,000 Units by mouth 2 times daily    Yes Historical Provider, MD   OMEGA 3 340 MG CPDR Take 500 mg by mouth daily    Yes Historical Provider, MD   ferrous sulfate 325 (65 FE) MG tablet Take 18 mg by mouth nightly   Yes Historical Provider, MD   Coenzyme Q10 (COQ10) 100 MG CAPS Take 1 tablet by mouth daily    Yes Historical Provider, MD       Current medications:    Current Facility-Administered Medications   Medication Dose Route Frequency Provider Last Rate Last Admin    ceFAZolin (ANCEF) 2000 mg in dextrose 5 % 100 mL IVPB  2,000 mg IntraVENous Once Ronni Prader, MD        lactated ringers infusion   IntraVENous Continuous Josefina Fields PA-C 75 mL/hr at 10/16/22 2305 New Bag at 10/16/22 2305    sennosides-docusate sodium (SENOKOT-S) 8.6-50 MG tablet 1 tablet  1 tablet Oral BID Josefina Fields PA-C   1 tablet at 10/17/22 0846    [Held by provider] aspirin EC tablet 81 mg  81 mg Oral Daily [place], [place]        atorvastatin (LIPITOR) tablet 40 mg  40 mg Oral Nightly [facility] PA-C   40 mg at 10/16/22 2213    Calcium-Magnesium 500-250 MG TABS 1 tablet (Patient Supplied)  1 tablet Oral  [place], [place]        coenzyme Q10 capsule 100 mg  100 mg Oral Daily [facility] MARCE MercedesC   100 mg at 10/17/22 0846    FLUoxetine (PROZAC) capsule 60 mg  60 mg Oral Daily [facility] PA-C   60 mg at 10/17/22 0845    furosemide (LASIX) tablet 20 mg  20 mg [facility] Daily [facility]   20 mg at 10/17/22 0846    magnesium oxide (MAG-OX) tablet 400 mg  400 mg Oral Daily [facility] MARCE MercedesC   400 mg at 10/17/22 4223    metoprolol succinate (TOPROL XL) extended release tablet 25 mg  25 mg Oral Daily [place], [place]   25 mg at 10/17/22 1558    Vitamin D (CHOLECALCIFEROL) tablet 1,000 Units  1,000 Units Oral BID [place], [place] 1,000 Units at 10/17/22 0845    traZODone (DESYREL) tablet 50 mg  50 mg Oral Nightly Sapna Duke   50 mg at 10/16/22 2213    [Held by provider] rivaroxaban (XARELTO) tablet 20 mg  20 mg Oral Daily Janny Box PA-C        [facility]) capsule 125 mcg  125 mcg Oral BID Sapna Duke   125 mcg at 10/17/22 0845    sodium chloride flush 0.9 % injection 5-40 mL  5-40 mL IntraVENous 2 times per day Hobson Babinski, MD        sodium chloride flush 0.9 % injection 5-40 mL  5-40 mL IntraVENous PRN Hobson Babinski, MD        0.9 % sodium chloride infusion   IntraVENous PRN Hobson Babinski, MD        enoxaparin (LOVENOX) injection 40 mg  40 mg SubCUTAneous Daily Hobson Babinski, MD   40 mg at 10/16/22 2423    ondansetron (ZOFRAN-ODT) disintegrating tablet 4 mg  4 mg Oral Q8H PRN Hobson Babinski, MD        Or    ondansetron [facility]) injection 4 mg  4 mg IntraVENous Q6H PRN Hobson Babinski, MD        sodium chloride flush 0.9 % injection 5-40 mL  5-40 mL IntraVENous 2 times per day Hobson Babinski, MD   10 mL at 10/16/22 2216    sodium chloride flush 0.9 % injection 5-40 mL  5-40 mL IntraVENous PRN Hobson Babinski, MD        0.9 % sodium chloride infusion   IntraVENous PRN Hobson Babinski, MD        polyethylene glycol [facility]) packet 17 g  17 g Oral Daily Hobson Babinski, MD   17 g at 10/15/22 0748    oxyCODONE (ROXICODONE) immediate release tablet 5 mg  5 mg Oral Q4H PRN Hobson Babinski, MD        Or    oxyCODONE (ROXICODONE) immediate release tablet 10 mg  10 mg Oral Q4H PRN Hobson Babinski, MD   10 mg at 10/17/22 0849    bisacodyl (DULCOLAX) suppository 10 mg  10 mg Rectal Daily PRN Hobson Babinski, MD        pantoprazole (PROTONIX) tablet 40 mg  40 mg Oral QAM AC Hobson Babinski, MD   40 mg at 10/16/22 4834    acetaminophen (TYLENOL) tablet 650 mg  650 mg Oral 4 times per day Janny Box PA-C   650 mg at 10/17/22 1232    methocarbamol (ROBAXIN) tablet 500 mg  500 mg Oral 4x Daily Pili AlcazarrioniLizz Miners   500 mg at 10/17/22 1232       Allergies: Allergies   Allergen Reactions    Tolterodine Diarrhea     Patient CANNOT tolerate this med. Causes diarrhea! Detrol       Problem List:    Patient Active Problem List   Diagnosis Code    Nephrolithiasis N20.0    Depression F32. A    Hyperlipidemia E78.5    OA (osteoarthritis) M19.90    Osteoporosis M81.0    Hypertension I10    Fibrocystic breast disease N60.19    CAD (coronary artery disease) I25.10    Urge incontinence N39.41    Anemia D64.9    ACE-inhibitor cough R05.8, T46.4X5A    Epistaxis R04.0    Need for tetanus booster Z23    Coagulopathy, on coumadin D68.9    Hiatal hernia K44.9    Calculus of gallbladder K80.20    History of PTCA 2, stent x 2 2009, 2012, Karissa Z98.61    Abnormal EKG, needs cardiac clearence R94.31    History of chest x-ray, normal, 8/6/14 Z92.89    Wheezes R06.2    Right-sided low back pain with right-sided sciatica M54.41    Lumbar stenosis M48.061    Synovial cyst of lumbar facet joint M71.38    PE (pulmonary thromboembolism) ([facility]) I26.99    Lumbar stenosis with neurogenic claudication M48.062    Bilateral leg weakness R29.898    Closed compression fracture of body of L1 vertebra ([facility]) S32.010A    Vertebral fracture, osteoporotic, initial encounter ([facility]) M80.08XA    Acute pain due to trauma G89.11    Fall from standing W19. Buchanan Kimball Impaired mobility and activities of daily living dt Acute compression deformity of the superior endplate of L1  U50.36, Z78.9    Blurring of visual image H53.8    Chest pain R07.9    Iron deficiency anemia, unspecified D50.9    NSTEMI (non-ST elevated myocardial infarction) ([facility]) I21.4    Compression fracture of lumbar vertebrae, non-traumatic, initial encounter [facility]) M48.56XA       Past Medical History:        Diagnosis Date    Abnormal EKG, needs cardiac clearence 12/23/2013    ACE-inhibitor cough 7/10/2012    ARB     Anemia     CAD (coronary artery disease) 2/2008    cardia stents x 2    Cancer (HCC)     facial skin cancer excision    Coagulopathy ([address] 10/4/2012    Coagulopathy, on coumadin 11/13/2013    Cough 5/29/2012    Cough, Neg w/u Dr Ashley Means, ?  Betablocker 9/22/2014    Depression     Depression 6/13/2017    Epistaxis 8/20/2012    Fibrocystic breast disease     Hiatal hernia 5/3/2016    History of chest x-ray, normal, 8/6/14 10/17/2014    History of PTCA 2, stent x 2 2009, 2012, Eppie Bloodgood 12/23/2013    Hx of blood clots 2012    PE s/p RTKR post op    Hyperlipidemia     meds > 10 yrs    Hypertension     meds > 10 yrs    Nephrolithiasis     OA (osteoarthritis)     Osteoporosis     Right-sided low back pain with right-sided sciatica 5/3/2016    Urge incontinence     Wheezes 4/20/2015       Past Surgical History:        Procedure Laterality Date    CARDIAC SURGERY      stent x 2    COLONOSCOPY      [facility]    ENDOSCOPY, COLON, DIAGNOSTIC      EYE SURGERY      [place] with IOL OU    HERNIA REPAIR      left inguinal hernia    HYSTERECTOMY, TOTAL ABDOMINAL (CERVIX REMOVED)  1998    JOINT REPLACEMENT  2008    LTKR    JOINT REPLACEMENT  2017    RTSR    KNEE ARTHROSCOPY  11/11    R knee, [facility]    LITHOTRIPSY  2003 approx    UT LAMINECTOMY,>2 SGMT,LUMBAR N/A 8/22/2018    SPINE L3-4, L4-5, L5-S1 LAMINECTOMY, L3-4, L4-5 POSTERIOR LATERAL FUSION, L5-S1 INSTRUMENTED POSTERIOR INTERBODY FUSION, PRONE, AXIS SPINE TABLE, ACTIFUSE, CELL SAVER, NEW MICROSCOPE, NEW C-ARM X2, PEDIGUARD, MISONIX BONE SCALPAL, PNEUMATIC KERRISONS, SPINAL CORD MONITORING, LUMBAR BRACE, GLOBUS, OPEN TLIF, (H.S.C.G.#[id number]) performed by Ce Lynch MD at [facility] PTCA  2/2008    [address] Right     SKIN GRAFT  2/9/11    JORDAN AND BSO (CERVIX REMOVED)  1996 approx    TOTAL KNEE ARTHROPLASTY Left 08/2008       Social History:    Social History Tobacco Use    Smoking status: Never    Smokeless tobacco: Never   Substance Use Topics    Alcohol use: Yes     Comment: occ                                Counseling given: Not Answered      Vital Signs (Current):   Vitals:    10/16/22 1924 10/17/22 0732 10/17/22 0919 10/17/22 1321   BP: (!) 150/58 (!) 179/68  (!) 179/86   Pulse: 63 59  60   Resp: 18 18 18 18   Temp: 97.9 °F (36.6 °C) 97.5 °F (36.4 °C)  98.9 °F (37.2 °C)   TempSrc: Oral Oral  Temporal   SpO2: 93% 95%  91%   Weight:    172 lb (78 kg)   Height:    5' 4\" (1.626 m)                                              BP Readings from Last 3 Encounters:   10/17/22 (!) 179/86   04/27/19 126/62   08/28/18 (!) 147/59       NPO Status:                                                                                 BMI:   Wt Readings from Last 3 Encounters:   10/17/22 172 lb (78 kg)   04/27/19 197 lb (89.4 kg)   08/26/18 204 lb 12.8 oz (92.9 kg)     Body mass index is 29.52 kg/m².     CBC:   Lab Results   Component Value Date/Time    WBC 5.8 10/17/2022 06:12 AM    RBC 3.81 10/17/2022 06:12 AM    RBC 3.82 05/29/2012 10:53 AM    HGB 10.6 10/17/2022 06:12 AM    HCT 33.2 10/17/2022 06:12 AM    MCV 87.2 10/17/2022 06:12 AM    RDW 14.8 10/17/2022 06:12 AM     10/17/2022 06:12 AM       CMP:   Lab Results   Component Value Date/Time     10/17/2022 06:12 AM    K 4.3 10/17/2022 06:12 AM     10/17/2022 06:12 AM    CO2 24 10/17/2022 06:12 AM    BUN 21 10/17/2022 06:12 AM    CREATININE 0.75 10/17/2022 06:12 AM    GFRAA >60.0 10/17/2022 06:12 AM    LABGLOM >60.0 10/17/2022 06:12 AM    GLUCOSE 84 10/17/2022 06:12 AM    GLUCOSE 88 05/29/2012 10:53 AM    PROT 6.4 10/11/2022 11:45 PM    CALCIUM 9.1 10/17/2022 06:12 AM    BILITOT <0.2 10/11/2022 11:45 PM    ALKPHOS 70 10/11/2022 11:45 PM    AST 23 10/11/2022 11:45 PM    ALT 15 10/11/2022 11:45 PM       POC Tests: No results for input(s): POCGLU, POCNA, POCK, POCCL, POCBUN, POCHEMO, POCHCT in the last 72 hours.    Coags:   Lab Results   Component Value Date/Time    PROTIME 15.4 10/17/2022 06:12 AM    PROTIME 26.0 07/28/2014 10:16 AM    INR 1.2 10/17/2022 06:12 AM    APTT 45.1 07/30/2018 02:14 PM       HCG (If Applicable): No results found for: PREGTESTUR, PREGSERUM, HCG, HCGQUANT     ABGs: No results found for: PHART, PO2ART, CUB8HVF, XUW1WKD, BEART, R1MXUPEF     Type & Screen (If Applicable):  No results found for: LABABO, LABRH    Drug/Infectious Status (If Applicable):  No results found for: HIV, HEPCAB    COVID-19 Screening (If Applicable): No results found for: COVID19        Anesthesia Evaluation    Airway: Mallampati: II  TM distance: >3 FB   Neck ROM: full  Mouth opening: > = 3 FB   Dental: normal exam         Pulmonary:Negative Pulmonary ROS breath sounds clear to auscultation                             Cardiovascular:    (+) hypertension:, pacemaker: pacemaker, CAD:, dysrhythmias: atrial fibrillation,       ECG reviewed  Rhythm: regular             Beta Blocker:  Dose within 24 Hrs         Neuro/Psych:   (+) neuromuscular disease:, psychiatric history:            GI/Hepatic/Renal:   (+) GERD:,           Endo/Other:                      ROS comment: xarelto stopped 1 week ago Abdominal:             Vascular: negative vascular ROS. Other Findings:           Anesthesia Plan      MAC and regional     ASA 3     (US guided MARSHALL block)  Induction: intravenous. MIPS: Postoperative opioids intended and Prophylactic antiemetics administered. Anesthetic plan and risks discussed with patient. Use of blood products discussed with patient whom consented to blood products.    Plan discussed with CRNA and surgical team.    Attending anesthesiologist reviewed and agrees with Preprocedure content      Post-op pain plan if not by surgeon: single peripheral nerve block            Mona Vasquez MD   10/17/2022

## 2022-10-17 NOTE — PROGRESS NOTES
TRAUMA DAILY PROGRESS NOTE      Patient Name: Darrick Sinha  Admission Date 10/11/2022    Hospital Day: 6  Patient seen and examined on 10/17/2022    INTERVAL HISTORY/EVENTS     Background:  Darrick Sinha is a 74 y.o. female with a PMHx of HTN, HLD, OA, CAD s/p L4-5 fusion with instrumentation and depression presented to [address] [place] [address] s/p mechanical fall from standing. (-) head strike, (-) LOC, (+) Eliquis. Admitted to trauma. NSGY consulted. Trauma work up found L1 compression fracture. Hospital Course:  10/11/2022: Presented to ED with c/o low back pain s/p mechanical fall from standing. (-) head strike, (-) LOC, (+) Eliquis. Admitted to trauma. NSGY consulted. 10/12/2022: NSGY eval and rec's for MRI of L-spine. Pt with pacemaker and awaiting confirmation of MRI compatibility. 10/13/2022: TLSO brace fitted. MRI of L spine pending. Home medications resumed. Pacemaker interrogated and OK for MRI  10/14/2022: MRI completed. Plan for OR on 10/17 for vertebral augmentation  10/15/2022: No acute events, waiting for OR 10/17  10/16/2022: no acute events, waiting OR 10/17      24 Hour Events:  No acute events overnight. Reports feeling anxious today for procedure. Has been NPO since midnight with plans for OR with Dr. Boykin Koyanagi today for kyphoplasty of L1 compression fracture. Reports pain well controlled on current regimen. Denies LE weakness or numbness. Voiding spontaneously, no bowel movement since admission (10/11) but passing flatus. VSS on room air. Pre-op labs reviewed today and without concerns. PHYSICAL EXAM     Vitals Average, Min and Max for last 24 hours:  Temp: Temp: 97.5 °F (36.4 °C);  Temp  Av.7 °F (36.5 °C)  Min: 97.5 °F (36.4 °C)  Max: 97.9 °F (36.6 °C)  Respirations: Resp  Av  Min: 18  Max: 18  Pulse: Pulse  Av  Min: 59  Max: 64  Blood Pressure: Systolic (43YPN), ZID:170 , Min:150 , XCQ:060   ; Diastolic (14LEN), TD, Min:58, Max:68  SpO2: SpO2  Av.7 %  Min: 93 %  Max: 95 %    Vitals: BP (!) 179/68   Pulse 59   Temp 97.5 °F (36.4 °C) (Oral)   Resp 18   Ht 5' 4\" (1.626 m)   Wt 172 lb 1.6 oz (78.1 kg)   LMP 05/29/1998   SpO2 95%   BMI 29.54 kg/m²     Physical Exam:  Constitutional: Lying in bed. Alert and conversant. HEENT: Atraumatic normocephalic. Cardiovascular: Regular rate and rhythm. Bilateral radial and DP pulses intact   Pulmonary: Clear to ausculation bilaterally on room air. No wheezing, rhonchi or rales. Abdominal: Soft. Non-distended. Non-tender. Musculoskeletal: Moves all extremities to command. No edema. No LE numbness or weakness. Neurological: Alert, awake, and orientated x 3. Motor and sensory grossly intact. GCS of 15.     LABORATORY RESULTS (LAST 24 HOURS)     CBC with Differential:    Lab Results   Component Value Date/Time    WBC 5.8 10/17/2022 06:12 AM    RBC 3.81 10/17/2022 06:12 AM    RBC 3.82 05/29/2012 10:53 AM    HGB 10.6 10/17/2022 06:12 AM    HCT 33.2 10/17/2022 06:12 AM     10/17/2022 06:12 AM    MCV 87.2 10/17/2022 06:12 AM    MCH 28.0 10/17/2022 06:12 AM    MCHC 32.1 10/17/2022 06:12 AM    RDW 14.8 10/17/2022 06:12 AM    LYMPHOPCT 23.5 10/11/2022 11:45 PM    MONOPCT 6.6 10/11/2022 11:45 PM    BASOPCT 0.6 10/11/2022 11:45 PM    MONOSABS 0.6 10/11/2022 11:45 PM    LYMPHSABS 2.2 10/11/2022 11:45 PM    EOSABS 0.2 10/11/2022 11:45 PM    BASOSABS 0.1 10/11/2022 11:45 PM     CMP:    Lab Results   Component Value Date/Time     10/17/2022 06:12 AM    K 4.3 10/17/2022 06:12 AM     10/17/2022 06:12 AM    CO2 24 10/17/2022 06:12 AM    BUN 21 10/17/2022 06:12 AM    CREATININE 0.75 10/17/2022 06:12 AM    GFRAA >60.0 10/17/2022 06:12 AM    LABGLOM >60.0 10/17/2022 06:12 AM    GLUCOSE 84 10/17/2022 06:12 AM    GLUCOSE 88 05/29/2012 10:53 AM    PROT 6.4 10/11/2022 11:45 PM    LABALBU 3.3 10/11/2022 11:45 PM    LABALBU 3.8 05/29/2012 10:53 AM    CALCIUM 9.1 10/17/2022 06:12 AM    BILITOT <0.2 10/11/2022 11:45 PM    ALKPHOS 70 10/11/2022 11:45 PM    AST 23 10/11/2022 11:45 PM    ALT 15 10/11/2022 11:45 PM     Magnesium:    Lab Results   Component Value Date/Time    MG 1.9 08/23/2018 05:37 AM     PT/INR:    Lab Results   Component Value Date/Time    PROTIME 15.4 10/17/2022 06:12 AM    PROTIME 26.0 07/28/2014 10:16 AM    INR 1.2 10/17/2022 06:12 AM     PTT:    Lab Results   Component Value Date/Time    APTT 45.1 07/30/2018 02:14 PM       IMAGING RESULTS (PERSONALLY REVIEWED)     No new imaging to review today. ASSESSMENT & PLAN     Diagnoses:  S/p mechanical fall from standing on 10/11/22  L1 compression fracture (plan for OR 10/17)  Acute pain due to traumatic injury    PMHx: HTN, HLD, OA, CAD s/p L4-5 fusion with instrumentation and depression    Incidental Findings: None, JLR 10/12/22      ASSESSMENT/PLAN:  Neurological: Acute L1 compression fracture, acute pain due to trauma  - NSGY consulted, plan for vertebral augmentation today  - Continue pain control with:  - Tylenol 650mg q6hr scheduled  - Oxycodone 5/10mg q4hrs prn moderate/severe pain  - Robaxin 500mg four times daily  - Continue home Prozac 60mg daily, Trazodone 50mg nightly    Cardiovascular: Normotensive, not tachycardic. HX HTN, HLD, Afib   - Continue vital signs per unit protocol   - Continue home Lipitor 40mg daily  - Continue home Lasix 20mg daily  - Continue home Toprolol XL 25mg daily  - Continue home Tikosyn 125 mcg BID  - Holding home Xarelto and ASA until OK with NSGY post-op.      Respiratory: Sating appropriately on RA  - Maintain O2 sats > 92%  - Encourage IS 10 x hourly     GI/Diet: Tolerating regular diet, no bowel movement since admission on 10/11  - Continue regular diet   - Continue bowel regimen: Miralax daily,  senokot BID today, dulcolax PRN  - Continue home pantoprazole 40mg daily     Renal/Electrolytes: No electrolyte abnormalities or kidney dysfunction on last BMP  - Continue LR @ 50mL/hr while NPO.  - OK for HLIV once post-op and adequate PO intake  - BMP, Mg in AM     ID: Remains afebrile, normotensive, and without leukocytosis on last CBC.  - No indication for Abx  - CBC in AM     Heme: HDS, acute blood loss anemia   - No indication for transfusion  - CBC, T&S, PT/INR in AM    Endocrine:   - No acute issues. MSK: Acute L2 compression fracture  - Plan for OR vertebral augmentation today. - Weight Bearing Restrictions: none  - Activity: TLSO brace at all times when sitting up or walking. OK to have off if lying in bed  - PT/OT: Rec inpatient rehab. Will need updated PT/OT recs post-op  - PM&R consulted 10/13, recommending SNF, will need to reorder once post-op. Prophylaxis:   - SCDs and lovenox 40mg daily  - Continue home Protonix 40mg daily  - Holding home Xarelto and ASA until final recommendations from [address] [place] [address]. Lines/Tubes/Drains:  - Maintain PIVs  - No indication for collado catheter, monitor for urinary retention    Dispo: Remain on trauma service and regular nursing floor in anticipation for OR today with Dr. Cait Beckwith. Dispo pending PT/OT recommendations. Accom Status: General Status      - Follow up with NSGY (Dr. Cait eBckwith) TBD  - No follow up with Trauma Surgery needed. Please call for any questions or concerns.   Krystle Ahn Treatment Team Sticky Note for contact information]      Autumn Bianchi PA-C  Trauma/Critical Care  [see Treatment Team Sticky Note for contact information]     This patient's plan of care was discussed and made in collaboration with Trauma Attending physician, Ila Li MD.

## 2022-10-17 NOTE — CARE COORDINATION
LSW met with the pt to see if she has reviewed the SNF list and chosen a place. Pt is unsure of what her needs will be following surgery and she prefers to wait until after surgery to make any decisions. LSW will follow.

## 2022-10-17 NOTE — PROGRESS NOTES
Subjective: The patient complains of severe acute on chronic progressive fatigue and L1 area LBP partially relieved by rest, PT, OT and meds  OxyIR and exacerbated by exertion and recent  L1 Fx-: TLSO brace fitted. MRI of L spine pending. I am concerned about patients medical complexities including:  Principal Problem:    Closed compression fracture of body of L1 vertebra (HCC)  Active Problems:    Vertebral fracture, osteoporotic, initial encounter ([facility]    Acute pain due to trauma    Fall from standing    Impaired mobility and activities of daily living dt Acute compression deformity of the superior endplate of L1     Iron deficiency anemia, unspecified    Compression fracture of lumbar vertebrae, non-traumatic, initial encounter ([facility]  Resolved Problems:    * No resolved hospital problems. *      .    Reviewed recent nursing note and discussed current status and planned care with acute care providers, \" Continue current poc. Tanda Bun Tanda Bun Pt awake in bed. Accepted meds without problem. Medicated for pain per request. Breakfast here. Call light in reach. \". She is discussing surgical options with Dr Greg Rich. She is deciding and has decided to proceed with surgery with Dr. Greg Rich on Monday. She will be in bed until then. We discussed that she should do range of motion and ankle pumps throughout the weekend. We also discussed that she may benefit from OxyContin to control her pain as her oxycodone products are wearing off after 2-1/2 or 3 hours. ROS x10: The patient also complains of severely impaired mobility and activities of daily living.   Otherwise no new problems with vision, hearing, nose, mouth, throat, dermal, cardiovascular, GI, , pulmonary, musculoskeletal, psychiatric or neurological.        Vital signs:  BP (!) 150/58   Pulse 63   Temp 97.9 °F (36.6 °C) (Oral)   Resp 18   Ht 5' 4\" (1.626 m)   Wt 172 lb 1.6 oz (78.1 kg)   LMP 05/29/1998   SpO2 93%   BMI 29.54 kg/m²   I/O:   PO/Intake:    fair PO intake, continue to monitor closely for dehydration    Bowel/Bladder:   continent,    General:  Patient is well developed, adequately nourished, and    well kempt. HEENT:    PERRLA, hearing intact to loud voice, external inspection of ear and nose benign. Inspection of lips, tongue and gums benign  Musculoskeletal: No significant change in strength or tone. All joints stable. Inspection and palpation of digits and nails show no clubbing, cyanosis or inflammatory conditions. Neuro/Psychiatric: Affect: flat-  Alert and oriented to self and situation with no needed cues. No significant change in deep tendon reflexes or sensation  Lungs:  Diminished, CTA-B  . Respiration effort is normal at rest.   Heart:   S1 = S2,   RRR. Abdomen:  Soft, non-tender    Extremities:  Trace  lower extremity edema but no unusual tenderness. Skin:   BUE bruises dt blood draws      Rehabilitation:  Physical Therapy:   Bed mobility:  Bed mobility  Rolling to Left: Contact guard assistance;Minimal assistance (10/13/22 1620)  Supine to Sit: Stand by assistance (10/13/22 1620)  Bed Mobility Comments: Limited by pain. heavy assist via bedrails. increased time. Educated on logroll technique (10/13/22 1620)  Bed Mobility Training  Bed Mobility Training: Yes (10/14/22 1038)  Interventions: Verbal cues (10/14/22 1044)  Rolling: Stand-by assistance (10/14/22 1044)  Supine to Sit: Stand-by assistance (10/14/22 1044)  Sit to Supine: Stand-by assistance (10/14/22 1044)  Scooting: Stand-by assistance (10/14/22 1044)  Transfers:  Transfers  Sit to Stand: Minimal Assistance (10/13/22 1624)  Stand to Sit: Contact guard assistance (10/13/22 1624)  Bed to Chair: Contact guard assistance (10/13/22 1624)  Comment: Demonstration provided on hip hinge.  pt with pain limitations upon standing requiring multiple attempts and lifting assist. (10/13/22 1624)  Transfer Training  Transfer Training: Yes (10/14/22 1044)  Sit to Stand: Contact-guard assistance (10/14/22 1044)  Stand to Sit: Contact-guard assistance (10/14/22 1044)  Bed to Chair: Contact-guard assistance (10/14/22 1044)  Gait:   Ambulation  Surface: Level tile (10/13/22 1624)  Device: Rolling Walker;Hand-Held Assist (10/13/22 1624)  Assistance: Minimal assistance;Stand by assistance (10/13/22 1624)  Quality of Gait: Jacki with HHA. pt feels unsafe and experiencing increased pain. Improved considerably with [employer]. pt with slow pacing and rigid posturing. Verbal cues for forward gaze and use of WW (10/13/22 1624)  Distance: 20ft with turn (10/13/22 1624)  Gait Training: Yes (10/14/22 1044)  Overall Level of Assistance: Contact-guard assistance (10/14/22 1044)  Distance (ft): 100 Feet (10/14/22 1044)  Assistive Device: Brace/splint; Walker, rolling (10/14/22 1044)  Interventions: Verbal cues; Visual cues (10/14/22 1044)  Base of Support: Center of gravity altered (10/14/22 1044)  Speed/Milena: Delayed (10/14/22 1044)  Swing Pattern: Left symmetrical;Right symmetrical (10/14/22 1044)  Gait Abnormalities: Other (comment) (Over correction of posture with mild shoulder posterior retro lean.) (10/14/22 1044)  Right Side Weight Bearing: As tolerated (10/14/22 1044)  Left Side Weight Bearing: As tolerated (10/14/22 1044)  Stairs:     W/C mobility:       Occupational Therapy:   Hand Dominance: Right  ADL  Feeding: Unable to assess(comment) (10/13/22 1613)  Grooming: Setup (10/13/22 1613)  UE Bathing: Stand by assistance; Increased time to complete (10/13/22 1613)  LE Bathing: Maximum assistance; Increased time to complete (10/13/22 1613)  UE Dressing: Stand by assistance; Increased time to complete (10/13/22 1613)  LE Dressing: Maximum assistance; Increased time to complete (10/13/22 1613)  Toileting: Unable to assess(comment) (10/13/22 1613)             Speech Therapy:            Diet/Swallow:        Dysphagia Outcome Severity Scale: Level 7: Normal in all situations          COGNITION  OT:    SP: protrusion present. There is no significant spinal canal stenosis or   neural foraminal narrowing present. L4-L5: The sequelae of bilateral laminectomies are noted. There is no disc   bulge or protrusion present. There is no significant spinal canal stenosis   or neural foraminal narrowing present. L5-S1: The sequelae of bilateral laminectomies are noted. There is facet   arthropathy resulting in severe left neural foraminal narrowing. No spinal   canal stenosis or right neural foraminal narrowing is present. Impression   1. Acute L1 burst fracture demonstrating 60% loss of height, progressed from   the previous CT scan from 10/10/2022. There is 5 mm of retropulsion of the   posterior margin of the vertebral body into the spinal canal resulting in   mild spinal canal stenosis at T12-L1. 2. Moderate-to-severe spinal canal stenosis, moderate right and mild left   neural foraminal narrowing at L2-3 secondary to a disc bulge, facet   arthropathy and thickening of the ligamentum flavum. 3. Severe left neural foraminal narrowing at L5-S1 secondary to facet   arthropathy. 4. Sequelae of bilateral laminectomies and posterior lumbar fusion at L4-5. The findings were sent to the Radiology Results [address] at 3:14   pm on 10/14/2022 to be communicated to a licensed caregiver. No results found for this or any previous visit (from the past 24 hour(s)). Previous extensive, complex labs, notes and diagnostics reviewed and analyzed. ALLERGIES:    Allergies as of 10/11/2022 - Fully Reviewed 10/11/2022   Allergen Reaction Noted    Tolterodine Diarrhea 12/19/2014      (please also verify by checking MAR)     Complex Physical Medicine & Rehab Issues Assess & Plan:   Severe abnormality of gait and mobility and impaired self-care and ADL's secondary to L1 Fx : TLSO brace fitted. MRI of L spine pending .   Updated functional and medical status reassessed regarding patients ability to participate in therapies and patient found to be able to participate in:       acute intensive comprehensive inpatient rehabilitation program including PT/OT to improve balance, ambulation, ADLs, and to improve the P/AROM. It is my opinion that they will be able to tolerate 3 hours of therapy a day and benefit from it at an acute level. I again discussed acute rehab with the patient and verify that the patient is able and willing to participate in 3 hours of therapy a day. Rehab and Acute Care Case Management has also reinforced this expectation. Will continue to follow to attempt to get patient to the most efficient but most effective level of care will be in their best interest.  Continue to focus on energy conservation heart rate and blood pressure monitoring before during and after therapy endurance and consistency of function. Bowel constipation   and Bladder dysfunction   overactive, neurogenic bladder:  frequent toileting, ambulate to bathroom with assistance, check post void residuals. Check for C.difficile x1 if >2 loose stools in 24 hours, continue bowel & bladder program.  Monitor for UTI symptoms including lethargy and confusion    Severe L1 pain and generalized OA pain: reassess pain every shift and prior to and after each therapy session, give prn Tylenol OxyIR and consider scheduled Tylenol, modalities prn in therapy, consider Lidoderm, K-pad prn. Pt awake in bed. Accepted meds without problem. Medicated for pain per request.  I suggest OxyContin 10 mg every 8-12 hours. Skin healing    breakdown   risk:  continue pressure relief program.  Daily skin exams and reports from nursing. Severe fatigue due to immobility and nutritional deficits: continue to monitor closely for dehydration   Add vitamin B12 vitamin D and CoQ10 titrate dosing and add protein supplementation with low carb content. Complex discharge planning:   Discussed with care team-last 24 hour events noted. I will continue to follow along and reassess functional and medical status as we strive to improve patient's functional and medical outcomes progressing to the most efficient and lowest level of care. Complex Active General Medical Issues that complicate care:     1. Principal Problem:    Closed compression fracture of body of L1 vertebra (HCC)  Active Problems:    Vertebral fracture, osteoporotic, initial encounter ([facility]    Acute pain due to trauma    Fall from standing    Impaired mobility and activities of daily living dt Acute compression deformity of the superior endplate of L1     Iron deficiency anemia, unspecified    Compression fracture of lumbar vertebrae, non-traumatic, initial encounter ([facility]  Resolved Problems:    * No resolved hospital problems.  *          Events and functional changes in the past 24 hours reviewed continue to monitor closely re: functional status and participation in therapy      Focus of today's plan-  MRI -review and NS plans  Await kyphoplasty in the morning  Acute Rehab versus SNF to address rehab needs    Amber Mohr D.O., PM&R     Attending    [facility] Chloé Archer

## 2022-10-17 NOTE — PROGRESS NOTES
Subjective: The patient complains of severe acute on chronic progressive fatigue and L1 area LBP partially relieved by rest, PT, OT and meds  OxyIR and exacerbated by exertion and recent  L1 Fx-: TLSO brace fitted. MRI of L spine pending. I am concerned about patients medical complexities including:  Principal Problem:    Closed compression fracture of body of L1 vertebra (HCC)  Active Problems:    Vertebral fracture, osteoporotic, initial encounter ([facility] [address]    Acute pain due to trauma    Fall from standing    Impaired mobility and activities of daily living dt Acute compression deformity of the superior endplate of L1     Iron deficiency anemia, unspecified    Compression fracture of lumbar vertebrae, non-traumatic, initial encounter ([facility] [address]  Resolved Problems:    * No resolved hospital problems. *      .    Reviewed recent nursing note and discussed current status and planned care with acute care providers, \" Continue current poc. Lucyann Push Lucyann Push Pt awake in bed. Accepted meds without problem. Medicated for pain per request. Breakfast here. Call light in reach. \". She is awaiting surgery with Dr. Mathias Po today should go down around 130 she is n.p.o. we will reevaluate her in therapy after surgery. ROS x10: The patient also complains of severely impaired mobility and activities of daily living. Otherwise no new problems with vision, hearing, nose, mouth, throat, dermal, cardiovascular, GI, , pulmonary, musculoskeletal, psychiatric or neurological.        Vital signs:  BP (!) 179/68   Pulse 59   Temp 97.5 °F (36.4 °C) (Oral)   Resp 18   Ht 5' 4\" (1.626 m)   Wt 172 lb 1.6 oz (78.1 kg)   LMP 05/29/1998   SpO2 95%   BMI 29.54 kg/m²   I/O:   PO/Intake:    fair PO intake, continue to monitor closely for dehydration    Bowel/Bladder:   continent,    General:  Patient is well developed, adequately nourished, and    well kempt. HEENT:    PERRLA, hearing intact to loud voice, external inspection of ear and nose benign. Inspection of lips, tongue and gums benign  Musculoskeletal: No significant change in strength or tone. All joints stable. Inspection and palpation of digits and nails show no clubbing, cyanosis or inflammatory conditions. Neuro/Psychiatric: Affect: flat-  Alert and oriented to self and situation with no needed cues. No significant change in deep tendon reflexes or sensation  Lungs:  Diminished, CTA-B  . Respiration effort is normal at rest.   Heart:   S1 = S2,   RRR. Abdomen:  Soft, non-tender    Extremities:  Trace  lower extremity edema but no unusual tenderness. Skin:   BUE bruises dt blood draws      Rehabilitation:  Physical Therapy:   Bed mobility:  Bed mobility  Rolling to Left: Contact guard assistance;Minimal assistance (10/13/22 1620)  Supine to Sit: Stand by assistance (10/13/22 1620)  Bed Mobility Comments: Limited by pain. heavy assist via bedrails. increased time. Educated on logroll technique (10/13/22 1620)  Bed Mobility Training  Bed Mobility Training: Yes (10/14/22 1038)  Interventions: Verbal cues (10/14/22 1044)  Rolling: Stand-by assistance (10/14/22 1044)  Supine to Sit: Stand-by assistance (10/14/22 1044)  Sit to Supine: Stand-by assistance (10/14/22 1044)  Scooting: Stand-by assistance (10/14/22 1044)  Transfers:  Transfers  Sit to Stand: Minimal Assistance (10/13/22 1624)  Stand to Sit: Contact guard assistance (10/13/22 1624)  Bed to Chair: Contact guard assistance (10/13/22 1624)  Comment: Demonstration provided on hip hinge.  pt with pain limitations upon standing requiring multiple attempts and lifting assist. (10/13/22 1624)  Transfer Training  Transfer Training: Yes (10/14/22 1044)  Sit to Stand: Contact-guard assistance (10/14/22 1044)  Stand to Sit: Contact-guard assistance (10/14/22 1044)  Bed to Chair: Contact-guard assistance (10/14/22 1044)  Gait:   Ambulation  Surface: Level tile (10/13/22 1624)  Device: Rolling Walker;Hand-Held Assist (10/13/22 1624)  Assistance: Minimal assistance;Stand by assistance (10/13/22 1624)  Quality of Gait: Jacki with HHA. pt feels unsafe and experiencing increased pain. Improved considerably with [employer]. pt with slow pacing and rigid posturing. Verbal cues for forward gaze and use of WW (10/13/22 1624)  Distance: 20ft with turn (10/13/22 1624)  Gait Training: Yes (10/14/22 1044)  Overall Level of Assistance: Contact-guard assistance (10/14/22 1044)  Distance (ft): 100 Feet (10/14/22 1044)  Assistive Device: Brace/splint; Walker, rolling (10/14/22 1044)  Interventions: Verbal cues; Visual cues (10/14/22 1044)  Base of Support: Center of gravity altered (10/14/22 1044)  Speed/Milena: Delayed (10/14/22 1044)  Swing Pattern: Left symmetrical;Right symmetrical (10/14/22 1044)  Gait Abnormalities: Other (comment) (Over correction of posture with mild shoulder posterior retro lean.) (10/14/22 1044)  Right Side Weight Bearing: As tolerated (10/14/22 1044)  Left Side Weight Bearing: As tolerated (10/14/22 1044)  Stairs:     W/C mobility:       Occupational Therapy:   Hand Dominance: Right  ADL  Feeding: Unable to assess(comment) (10/13/22 1613)  Grooming: Setup (10/13/22 1613)  UE Bathing: Stand by assistance; Increased time to complete (10/13/22 1613)  LE Bathing: Maximum assistance; Increased time to complete (10/13/22 1613)  UE Dressing: Stand by assistance; Increased time to complete (10/13/22 1613)  LE Dressing: Maximum assistance; Increased time to complete (10/13/22 1613)  Toileting: Unable to assess(comment) (10/13/22 1613)             Speech Therapy:            Diet/Swallow:        Dysphagia Outcome Severity Scale: Level 7: Normal in all situations          COGNITION  OT:    SP:           Lab/X-ray studies reviewed, analyzed and discussed with patient and staff:     EXAMINATION:   MRI OF THE LUMBAR SPINE WITHOUT CONTRAST, 10/14/2022 2:06 pm       TECHNIQUE:   Multiplanar multisequence MRI of the lumbar spine was performed without the administration of intravenous contrast.       COMPARISON:   CT lumbar spine 10/11/2022       HISTORY:   ORDERING SYSTEM PROVIDED HISTORY: L1 fracture pacemaker compatible   TECHNOLOGIST PROVIDED HISTORY:   Reason for exam:->L1 fracture pacemaker compatible   What is the sedation requirement?->None   What reading provider will be dictating this exam?->CRC       FINDINGS:   BONES/ALIGNMENT: There is an acute L1 burst fracture demonstrating 60% loss   of height, progressed from the recent CT scan. There is 5 mm of retropulsion   of the posterosuperior margin of the vertebral body into the spinal canal.   Bone marrow edema is noted throughout the vertebral body. A remote superior endplate compression fracture at L4 is noted. The sequelae   of posterior lumbar fusion at L4-5 are noted. There is disc desiccation and   disc space narrowing at L2-3 and L3-4. SPINAL CORD: The conus medullaris is normal in size and signal intensities   and terminates normally. SOFT TISSUES: There is no paraspinal mass identified. T12-L1: There is mild spinal canal stenosis secondary to 5 mm of retropulsion   of the posterosuperior margin of the L1 vertebral body into the spinal canal.   No neural foraminal narrowing is present. L1-L2: There is a 2 mm central disc protrusion. No significant spinal canal   stenosis or neural foraminal narrowing is present. L2-L3: There is a disc bulge, facet arthropathy and thickening of the   ligamentum flavum. There is moderate-to-severe spinal canal stenosis. There   is moderate right and mild left neural foraminal narrowing. L3-L4: Sequelae of bilateral laminectomies are noted. There is no disc bulge   or protrusion present. There is no significant spinal canal stenosis or   neural foraminal narrowing present. L4-L5: The sequelae of bilateral laminectomies are noted. There is no disc   bulge or protrusion present.   There is no significant spinal canal stenosis   or neural foraminal narrowing present. L5-S1: The sequelae of bilateral laminectomies are noted. There is facet   arthropathy resulting in severe left neural foraminal narrowing. No spinal   canal stenosis or right neural foraminal narrowing is present. Impression   1. Acute L1 burst fracture demonstrating 60% loss of height, progressed from   the previous CT scan from 10/10/2022. There is 5 mm of retropulsion of the   posterior margin of the vertebral body into the spinal canal resulting in   mild spinal canal stenosis at T12-L1. 2. Moderate-to-severe spinal canal stenosis, moderate right and mild left   neural foraminal narrowing at L2-3 secondary to a disc bulge, facet   arthropathy and thickening of the ligamentum flavum. 3. Severe left neural foraminal narrowing at L5-S1 secondary to facet   arthropathy. 4. Sequelae of bilateral laminectomies and posterior lumbar fusion at L4-5. The findings were sent to the Radiology Results [address] at 3:14   pm on 10/14/2022 to be communicated to a licensed caregiver.          Recent Results (from the past 24 hour(s))   CBC    Collection Time: 10/17/22  6:12 AM   Result Value Ref Range    WBC 5.8 4.8 - 10.8 K/uL    RBC 3.81 (L) 4.20 - 5.40 M/uL    Hemoglobin 10.6 (L) 12.0 - 16.0 g/dL    Hematocrit 33.2 (L) 37.0 - 47.0 %    MCV 87.2 82.0 - 100.0 fL    MCH 28.0 27.0 - 31.3 pg    MCHC 32.1 (L) 33.0 - 37.0 %    RDW 14.8 (H) 11.5 - 14.5 %    Platelets 385 398 - 125 K/uL   Basic Metabolic Panel w/ Reflex to MG    Collection Time: 10/17/22  6:12 AM   Result Value Ref Range    Sodium 141 135 - 144 mEq/L    Potassium reflex Magnesium 4.3 3.4 - 4.9 mEq/L    Chloride 107 95 - 107 mEq/L    CO2 24 20 - 31 mEq/L    Anion Gap 10 9 - 15 mEq/L    Glucose 84 70 - 99 mg/dL    BUN 21 8 - 23 mg/dL    Creatinine 0.75 0.50 - 0.90 mg/dL    GFR Non-African American >60.0 >60    GFR  >60.0 >60    Calcium 9.1 8.5 - 9.9 mg/dL Protime-INR    Collection Time: 10/17/22  6:12 AM   Result Value Ref Range    Protime 15.4 (H) 12.3 - 14.9 sec    INR 1.2      Previous extensive, complex labs, notes and diagnostics reviewed and analyzed. ALLERGIES:    Allergies as of 10/11/2022 - Fully Reviewed 10/11/2022   Allergen Reaction Noted    Tolterodine Diarrhea 12/19/2014      (please also verify by checking MAR)     Complex Physical Medicine & Rehab Issues Assess & Plan:   Severe abnormality of gait and mobility and impaired self-care and ADL's secondary to L1 Fx : TLSO brace fitted. MRI of L spine pending . Updated functional and medical status reassessed regarding patients ability to participate in therapies and patient found to be able to participate in:       acute intensive comprehensive inpatient rehabilitation program including PT/OT to improve balance, ambulation, ADLs, and to improve the P/AROM. It is my opinion that they will be able to tolerate 3 hours of therapy a day and benefit from it at an acute level. I again discussed acute rehab with the patient and verify that the patient is able and willing to participate in 3 hours of therapy a day. Rehab and Acute Care Case Management has also reinforced this expectation. Will continue to follow to attempt to get patient to the most efficient but most effective level of care will be in their best interest.  Continue to focus on energy conservation heart rate and blood pressure monitoring before during and after therapy endurance and consistency of function. Bowel constipation   and Bladder dysfunction   overactive, neurogenic bladder:  frequent toileting, ambulate to bathroom with assistance, check post void residuals.   Check for C.difficile x1 if >2 loose stools in 24 hours, continue bowel & bladder program.  Monitor for UTI symptoms including lethargy and confusion    Severe L1 pain and generalized OA pain: reassess pain every shift and prior to and after each therapy session, give prn Tylenol OxyIR and consider scheduled Tylenol, modalities prn in therapy, consider Lidoderm, K-pad prn. Pt awake in bed. Accepted meds without problem. Medicated for pain per request.  I suggest OxyContin 10 mg every 8-12 hours. Skin healing    breakdown   risk:  continue pressure relief program.  Daily skin exams and reports from nursing. Severe fatigue due to immobility and nutritional deficits: continue to monitor closely for dehydration   Add vitamin B12 vitamin D and CoQ10 titrate dosing and add protein supplementation with low carb content. Complex discharge planning:   Discussed with care team-last 24 hour events noted. I will continue to follow along and reassess functional and medical status as we strive to improve patient's functional and medical outcomes progressing to the most efficient and lowest level of care. Complex Active General Medical Issues that complicate care:     1. Principal Problem:    Closed compression fracture of body of L1 vertebra (HCC)  Active Problems:    Vertebral fracture, osteoporotic, initial encounter ([facility] [address]    Acute pain due to trauma    Fall from standing    Impaired mobility and activities of daily living dt Acute compression deformity of the superior endplate of L1     Iron deficiency anemia, unspecified    Compression fracture of lumbar vertebrae, non-traumatic, initial encounter ([facility] [address]  Resolved Problems:    * No resolved hospital problems.  *          Events and functional changes in the past 24 hours reviewed continue to monitor closely re: functional status and participation in therapy    Will reevaluate in therapy PT and OT status post kyphoplasty L1      Joseluis Johnson D.O., PM&R     Attending    [address]

## 2022-10-17 NOTE — ANESTHESIA POSTPROCEDURE EVALUATION
Department of Anesthesiology  Postprocedure Note    Patient: Madie Harada  MRN: [id number]  YOB: 1950  Date of evaluation: 10/17/2022      Procedure Summary     Date: 10/17/22 Room / Location: [facility] [place] [address] [place] [address]    Anesthesia Start: 2263 Anesthesia Stop: 1504    Procedure: L1 KYPHOPLASTY (Back) Diagnosis:       Closed compression fracture of L1 vertebra, initial encounter ([facility] [address]      (CLOSED COMPRESSION FRACTURE OF BODY OF L1 VERTEBRA)    Surgeons: Erik Quiñones MD Responsible Provider: Martín Acevedo MD    Anesthesia Type: MAC, regional ASA Status: 3          Anesthesia Type: No value filed.     Wandy Phase I: Wandy Score: 5    Wandy Phase II:        Anesthesia Post Evaluation    Patient location during evaluation: bedside  Patient participation: complete - patient participated  Level of consciousness: awake and awake and alert  Airway patency: patent  Nausea & Vomiting: no nausea and no vomiting  Complications: no  Cardiovascular status: blood pressure returned to baseline and hemodynamically stable  Respiratory status: acceptable  Hydration status: euvolemic

## 2022-10-17 NOTE — OP NOTE
[address]                      [address], [address] [facility]                                OPERATIVE REPORT    PATIENT NAME: Maile Wilson                 :        1950  MED REC NO:   [id number]                            ROOM:       W264  ACCOUNT NO:   [de-identified]                           ADMIT DATE: 10/11/2022  PROVIDER:     Keo Millard MD    DATE OF PROCEDURE:  10/17/2022    PREOPERATIVE DIAGNOSIS:  L1 osteoporotic compression fracture. POSTOPERATIVE DIAGNOSIS:  L1 osteoporotic compression fracture. OPERATION PERFORMED:  L1 vertebral augmentation kyphoplasty. INDICATIONS:  Severe back pain. DESCRIPTION OF PROCEDURE:  The patient was given anesthesia per  Anesthesiology with a spinal block. She was turned to prone position. AP and lateral C-arm fluoroscopy positioned under my direction at L1. The back was prepped and draped. Time-out, patient identified. I  measured about 5 cm to the left side of spinous process tip of L1. Spinal needle was used to obtain the correct entry point trajectory. A  small shellie was made, Jamshidi needle was placed, slightly extrapedicular  into the L1 vertebral body. Inner cannula removed, placing the  guidewire, removing the outer cannula. Over the guidewire, the inner  and outer working cannulas were placed removing the guidewire and inner  working cannula. Using the outer working cannula, the drill was used to  make a passageway for the balloon. The balloon was placed, inflated to  5 mL with pressures in the 60s. The balloon did allow the inferiorly  compress the plate of L1 to mobilize superiorly into the more normal  position regaining the vertebral body height. The balloon was deflated  and removed. The bone filling devices were used to place bone cement  into the L1 vertebral body with an excellent fill. The bone cement was  allowed to harden and hardware was removed and Band-Aid applied. The  patient tolerated. EBL less than 5 mL. No blood transfused.         Marshall Barrow MD    D: 10/17/2022 15:09:06       T: 10/17/2022 15:12:41     [email or web address]  Job#: [id number]     Doc#: [id number]    CC:

## 2022-10-17 NOTE — PROGRESS NOTES
Pt returned to unit from PACU s/p lumbar surgery. B/P elevated 503A systolic, IVP hydralazine given per MD orders, SBP decreased to 166. Pt on O2 2.5L NC Spo2 97%. Decreased to 2L Sating at 94% will continue to wean. Neuro assessment performed and intact equal and even strength to all extremities. C/o pain 4/10 routine APAP given at this time. Swallow eval completed without difficulty. In bed resting with eyes closed at this time, boyfriend is at bedside.

## 2022-10-17 NOTE — PROGRESS NOTES
Physical Therapy Missed Treatment   Facility/Department: [facility] MED SURG [facility]LO[facility] OR Pool/NONE    NAME: Alex Soria    : 1950 (74 y.o.)  MRN: [id number]    Account: [de-identified]  Gender: female    Chart reviewed, attempted PT at 26. Patient unavailable 2° to:    [] Hold per nsg request    [] Pt declined     [] Pt. . off floor for test/procedure. [x] Pt. Unavailable- Pt in OR at this time and not available for tx. Will return at earliest convenience. Will attempt PT treatment again at earliest convenience.       Electronically signed by Emerson Mendoza PTA on 10/17/22 at 2:47 PM EDT Detail Level: Simple Render Risk Assessment In Note?: no Additional Notes: Sample of Cetaphil cream given to patient today

## 2022-10-17 NOTE — PROGRESS NOTES
Reported elevated SBP >180 to Dr. Cynthia Alicia. Orders given for 10mg hydralazine IVP. 1600:  /74 after fentanyl doses. Will not give hydralazine at this time. Dr. Cynthia Alicia in agreement.

## 2022-10-18 LAB
ANION GAP SERPL CALCULATED.3IONS-SCNC: 11 MEQ/L (ref 9–15)
BASOPHILS ABSOLUTE: 0 K/UL (ref 0–0.2)
BASOPHILS RELATIVE PERCENT: 0.9 %
BUN BLDV-MCNC: 18 MG/DL (ref 8–23)
CALCIUM SERPL-MCNC: 8.9 MG/DL (ref 8.5–9.9)
CHLORIDE BLD-SCNC: 103 MEQ/L (ref 95–107)
CO2: 25 MEQ/L (ref 20–31)
CREAT SERPL-MCNC: 0.64 MG/DL (ref 0.5–0.9)
EOSINOPHILS ABSOLUTE: 0.2 K/UL (ref 0–0.7)
EOSINOPHILS RELATIVE PERCENT: 4.1 %
GFR SERPL CREATININE-BSD FRML MDRD: >60 ML/MIN/{1.73_M2}
GLUCOSE BLD-MCNC: 90 MG/DL (ref 70–99)
HCT VFR BLD CALC: 31.8 % (ref 37–47)
HEMOGLOBIN: 10.2 G/DL (ref 12–16)
LYMPHOCYTES ABSOLUTE: 1.4 K/UL (ref 1–4.8)
LYMPHOCYTES RELATIVE PERCENT: 25.6 %
MCH RBC QN AUTO: 27.5 PG (ref 27–31.3)
MCHC RBC AUTO-ENTMCNC: 32.2 % (ref 33–37)
MCV RBC AUTO: 85.4 FL (ref 82–100)
MONOCYTES ABSOLUTE: 0.5 K/UL (ref 0.2–0.8)
MONOCYTES RELATIVE PERCENT: 8.4 %
NEUTROPHILS ABSOLUTE: 3.3 K/UL (ref 1.4–6.5)
NEUTROPHILS RELATIVE PERCENT: 61 %
PDW BLD-RTO: 14.8 % (ref 11.5–14.5)
PLATELET # BLD: 262 K/UL (ref 130–400)
POTASSIUM REFLEX MAGNESIUM: 4.3 MEQ/L (ref 3.4–4.9)
RBC # BLD: 3.72 M/UL (ref 4.2–5.4)
SODIUM BLD-SCNC: 139 MEQ/L (ref 135–144)
WBC # BLD: 5.5 K/UL (ref 4.8–10.8)

## 2022-10-18 PROCEDURE — 6370000000 HC RX 637 (ALT 250 FOR IP): Performed by: STUDENT IN AN ORGANIZED HEALTH CARE EDUCATION/TRAINING PROGRAM

## 2022-10-18 PROCEDURE — 6370000000 HC RX 637 (ALT 250 FOR IP): Performed by: PHYSICIAN ASSISTANT

## 2022-10-18 PROCEDURE — 36415 COLL VENOUS BLD VENIPUNCTURE: CPT

## 2022-10-18 PROCEDURE — 2580000003 HC RX 258: Performed by: SURGERY

## 2022-10-18 PROCEDURE — 80048 BASIC METABOLIC PNL TOTAL CA: CPT

## 2022-10-18 PROCEDURE — 6370000000 HC RX 637 (ALT 250 FOR IP): Performed by: SURGERY

## 2022-10-18 PROCEDURE — 99231 SBSQ HOSP IP/OBS SF/LOW 25: CPT | Performed by: PHYSICAL MEDICINE & REHABILITATION

## 2022-10-18 PROCEDURE — 97162 PT EVAL MOD COMPLEX 30 MIN: CPT

## 2022-10-18 PROCEDURE — 85025 COMPLETE CBC W/AUTO DIFF WBC: CPT

## 2022-10-18 PROCEDURE — 1210000000 HC MED SURG R&B

## 2022-10-18 PROCEDURE — 97166 OT EVAL MOD COMPLEX 45 MIN: CPT

## 2022-10-18 PROCEDURE — 6360000002 HC RX W HCPCS: Performed by: SURGERY

## 2022-10-18 PROCEDURE — 2700000000 HC OXYGEN THERAPY PER DAY

## 2022-10-18 RX ORDER — METHOCARBAMOL 750 MG/1
750 TABLET, FILM COATED ORAL 4 TIMES DAILY
Status: DISCONTINUED | OUTPATIENT
Start: 2022-10-18 | End: 2022-10-19 | Stop reason: HOSPADM

## 2022-10-18 RX ADMIN — SENNOSIDES AND DOCUSATE SODIUM 1 TABLET: 50; 8.6 TABLET ORAL at 10:50

## 2022-10-18 RX ADMIN — ACETAMINOPHEN 650 MG: 325 TABLET ORAL at 06:14

## 2022-10-18 RX ADMIN — ENOXAPARIN SODIUM 40 MG: 100 INJECTION SUBCUTANEOUS at 10:55

## 2022-10-18 RX ADMIN — OXYCODONE 10 MG: 5 TABLET ORAL at 06:14

## 2022-10-18 RX ADMIN — FUROSEMIDE 20 MG: 20 TABLET ORAL at 10:57

## 2022-10-18 RX ADMIN — Medication 100 MG: at 10:50

## 2022-10-18 RX ADMIN — Medication 10 ML: at 10:50

## 2022-10-18 RX ADMIN — MAGNESIUM OXIDE 400 MG (241.3 MG MAGNESIUM) TABLET 400 MG: TABLET at 10:50

## 2022-10-18 RX ADMIN — SENNOSIDES AND DOCUSATE SODIUM 1 TABLET: 50; 8.6 TABLET ORAL at 21:08

## 2022-10-18 RX ADMIN — ACETAMINOPHEN 650 MG: 325 TABLET ORAL at 18:12

## 2022-10-18 RX ADMIN — ACETAMINOPHEN 650 MG: 325 TABLET ORAL at 00:20

## 2022-10-18 RX ADMIN — ATORVASTATIN CALCIUM 40 MG: 40 TABLET, FILM COATED ORAL at 21:08

## 2022-10-18 RX ADMIN — Medication 1000 UNITS: at 21:08

## 2022-10-18 RX ADMIN — OXYCODONE 5 MG: 5 TABLET ORAL at 00:20

## 2022-10-18 RX ADMIN — Medication 1000 UNITS: at 10:50

## 2022-10-18 RX ADMIN — ACETAMINOPHEN 650 MG: 325 TABLET ORAL at 12:45

## 2022-10-18 RX ADMIN — OXYCODONE 10 MG: 5 TABLET ORAL at 21:06

## 2022-10-18 RX ADMIN — TRAZODONE HYDROCHLORIDE 50 MG: 50 TABLET ORAL at 21:08

## 2022-10-18 RX ADMIN — OXYCODONE 5 MG: 5 TABLET ORAL at 10:58

## 2022-10-18 RX ADMIN — DOFETILIDE 125 MCG: 0.12 CAPSULE ORAL at 21:08

## 2022-10-18 RX ADMIN — Medication 10 ML: at 21:08

## 2022-10-18 RX ADMIN — METOPROLOL SUCCINATE 25 MG: 25 TABLET, EXTENDED RELEASE ORAL at 10:50

## 2022-10-18 RX ADMIN — METHOCARBAMOL TABLETS 500 MG: 500 TABLET, COATED ORAL at 10:51

## 2022-10-18 RX ADMIN — DOFETILIDE 125 MCG: 0.12 CAPSULE ORAL at 12:44

## 2022-10-18 RX ADMIN — FLUOXETINE HYDROCHLORIDE 60 MG: 20 CAPSULE ORAL at 10:49

## 2022-10-18 RX ADMIN — PANTOPRAZOLE SODIUM 40 MG: 40 TABLET, DELAYED RELEASE ORAL at 06:14

## 2022-10-18 ASSESSMENT — PAIN SCALES - GENERAL
PAINLEVEL_OUTOF10: 8
PAINLEVEL_OUTOF10: 8
PAINLEVEL_OUTOF10: 9
PAINLEVEL_OUTOF10: 9
PAINLEVEL_OUTOF10: 8

## 2022-10-18 ASSESSMENT — PAIN DESCRIPTION - DESCRIPTORS
DESCRIPTORS: ACHING
DESCRIPTORS: ACHING;TENDER
DESCRIPTORS: ACHING
DESCRIPTORS: ACHING

## 2022-10-18 ASSESSMENT — PAIN DESCRIPTION - ORIENTATION
ORIENTATION: LOWER
ORIENTATION: MID;LOWER
ORIENTATION: MID;LOWER
ORIENTATION: LOWER

## 2022-10-18 ASSESSMENT — PAIN DESCRIPTION - LOCATION
LOCATION: BACK

## 2022-10-18 ASSESSMENT — PAIN - FUNCTIONAL ASSESSMENT
PAIN_FUNCTIONAL_ASSESSMENT: ACTIVITIES ARE NOT PREVENTED
PAIN_FUNCTIONAL_ASSESSMENT: ACTIVITIES ARE NOT PREVENTED

## 2022-10-18 NOTE — PROGRESS NOTES
Physical Therapy Med Surg Initial Assessment  Facility/Department: [facility]  [facility]       NAME: Tori Vallejo  : 1950 (53 y.o.)  MRN: [id number]  CODE STATUS: Full Code    Date of Service: 10/18/2022    Patient Diagnosis(es): Compression fracture of lumbar vertebrae, non-traumatic, initial encounter [facility]) Leanheydi Deshpande  Fall, initial encounter [W19. XXXA]  Closed compression fracture of body of L1 vertebra ([facility] [address] [S32.010A]   Chief Complaint   Patient presents with    Fall     Patient Active Problem List    Diagnosis Date Noted    Acute pain due to trauma 10/14/2022    Fall from standing 10/14/2022    Impaired mobility and activities of daily living dt Acute compression deformity of the superior endplate of L1      Compression fracture of lumbar vertebrae, non-traumatic, initial encounter ([facility] [address] 10/14/2022    Closed compression fracture of body of L1 vertebra (HCC) 10/12/2022    Vertebral fracture, osteoporotic, initial encounter ([facility] [address] 10/12/2022    NSTEMI (non-ST elevated myocardial infarction) ([address] 2021    Blurring of visual image 2015    Iron deficiency anemia, unspecified 10/17/2012    Chest pain 2008    Coagulopathy, on coumadin 2013    Bilateral leg weakness 2018    Lumbar stenosis with neurogenic claudication 2018    Lumbar stenosis 08/15/2018    Synovial cyst of lumbar facet joint 08/15/2018    PE (pulmonary thromboembolism) ([facility] [address] 08/15/2018    Right-sided low back pain with right-sided sciatica 2016    Wheezes 2015    History of chest x-ray, normal, 8/6/14 10/17/2014    History of PTCA 2, stent x 2 , , [place] Foot 2013    Abnormal EKG, needs cardiac clearence 2013    Hiatal hernia 2013    Calculus of gallbladder 2013    Need for tetanus booster 10/10/2013    Epistaxis 2012    ACE-inhibitor cough 07/10/2012    Nephrolithiasis     Depression     Hyperlipidemia     OA (osteoarthritis) Osteoporosis     Hypertension     Fibrocystic breast disease     Urge incontinence     Anemia     CAD (coronary artery disease) 02/01/2008        Past Medical History:   Diagnosis Date    Abnormal EKG, needs cardiac clearence 12/23/2013    ACE-inhibitor cough 7/10/2012    ARB     Anemia     CAD (coronary artery disease) 2/2008    cardia stents x 2    Cancer (HCC)     facial skin cancer excision    Coagulopathy ([address] 10/4/2012    Coagulopathy, on coumadin 11/13/2013    Cough 5/29/2012    Cough, Neg w/u Dr Laron Francisco, ?  [employer] 9/22/2014    Depression     Depression 6/13/2017    Epistaxis 8/20/2012    Fibrocystic breast disease     Hiatal hernia 5/3/2016    History of chest x-ray, normal, 8/6/14 10/17/2014    History of PTCA 2, stent x 2 2009, 2012, Tova Keon 12/23/2013    Hx of blood clots 2012    PE s/p RTKR post op    Hyperlipidemia     meds > 10 yrs    Hypertension     meds > 10 yrs    Nephrolithiasis     OA (osteoarthritis)     Osteoporosis     Right-sided low back pain with right-sided sciatica 5/3/2016    Urge incontinence     Wheezes 4/20/2015     Past Surgical History:   Procedure Laterality Date    CARDIAC SURGERY      stent x 2    COLONOSCOPY      [facility]    ENDOSCOPY, COLON, DIAGNOSTIC      EYE SURGERY      [place] with IOL OU    HERNIA REPAIR      left inguinal hernia    HYSTERECTOMY, TOTAL ABDOMINAL (CERVIX REMOVED)  1998    JOINT REPLACEMENT  2008    LTKR    JOINT REPLACEMENT  2017    RTSR    KNEE ARTHROSCOPY  11/11    R knee, [facility]    LITHOTRIPSY  2003 approx    MA LAMINECTOMY,>2 SGMT,LUMBAR N/A 8/22/2018    SPINE L3-4, L4-5, L5-S1 LAMINECTOMY, L3-4, L4-5 POSTERIOR LATERAL FUSION, L5-S1 INSTRUMENTED POSTERIOR INTERBODY FUSION, PRONE, AXIS SPINE TABLE, ACTIFUSE, CELL SAVER, NEW MICROSCOPE, NEW C-ARM X2, PEDIGUARD, MISONIX BONE SCALPAL, PNEUMATIC KERRISONS, SPINAL CORD MONITORING, LUMBAR BRACE, GLOBUS, OPEN TLIF, (H.S.C.G.#[id number]) performed by Josi Lane MD at [address]  2/2008    [address] Right     SKIN GRAFT  2/9/11    JORDAN AND BSO (CERVIX REMOVED)  1996 approx    TOTAL KNEE ARTHROPLASTY Left 08/2008       Chart Reviewed: Yes  Family / Caregiver Present: No  Diagnosis: Compression fracture of lumbar vertebrae, non-traumatic, initial encounter ([facility] [address] s/p kyphoplasty 10/17/22  General Comment  Comments: Pt resting in bed. Agreeable to PT evaluation    Restrictions:  Restrictions/Precautions: Fall Risk  Required Braces or Orthoses  Spinal: Thoracic Lumbar Sacral Orthotics  Position Activity Restriction  Other position/activity restrictions: per Trauma order: \"OK for OOB mobility if wearing TLSO brace. \"     SUBJECTIVE:   Subjective: \"It's a different kind of pain now. \"    Pain  Pain: 8/10 Back pain. Pt states that she was recently medicated. Prior Level of Function:  Social/Functional History  Lives With: Significant other  Type of Home: House  Home Layout: One level  Home Access: Stairs to enter with rails  Entrance Stairs - Number of Steps: 2  Entrance Stairs - Rails: Right  Bathroom Shower/Tub: Walk-in shower  Bathroom Equipment: Built-in shower seat, Grab bars in shower  Home Equipment: Jax Star, rolling, Alert Button  Has the patient had two or more falls in the past year or any fall with injury in the past year?: Yes  ADL Assistance: Independent  Homemaking Assistance: Independent  Ambulation Assistance: Independent (no AD)  Transfer Assistance: Independent  Active : Yes    OBJECTIVE:   Vision  Vision Exceptions: Wears glasses for reading (states that she has been having new difficulty with reading book. Has macular degeneration)  Hearing: Within functional limits    Cognition:  Overall Orientation Status: Within Functional Limits  Orientation Level: Oriented X4  Follows Commands: Within Functional Limits  Overall Cognitive Status: WFL    Observation/Palpation  Observation: No acute distress noted. Pt pleasant and motivated.  TLSO brace donned and fitted for pt size and comfort. ROM:  RLE PROM: WFL  LLE PROM: WFL    Strength:  Strength RLE  Strength RLE: WFL  Comment: Grossly 3/5  Strength LLE  Strength LLE: WFL  Comment: Grossly 3/5  Strength Other  Other: Trunk strength grossly 3/5    Neuro:  Balance  Sitting - Static: Good  Sitting - Dynamic: Good  Standing - Static: Fair;+  Standing - Dynamic: Fair                      Tone: Normal    Sensation: Intact    Bed mobility  Rolling to Left: Stand by assistance  Supine to Sit: Stand by assistance  Bed Mobility Comments: Educated on logroll techniqu    Transfers  Sit to Stand: Stand by assistance;Supervision  Stand to Sit: Stand by assistance;Supervision  Bed to Chair: Stand by assistance;Supervision  Comment: Slow to complete initially, however improves with subsequent trials. Ambulation  Surface: Level tile  Device: Rolling Walker; No Device  Assistance: Supervision;Stand by assistance;Contact guard assistance  Quality of Gait: Intermittent CGA with pt ambulating without AD. Feels most comfortable furniture surfing, however dependent upon the support of UEs for safe management of hosp environment. Improves with [employer] for support. Cues for widenening GERALD. Distance: 80ft X 2    Stairs/Curb  Stairs?: Yes  Stairs  # Steps : 3  Stairs Height: 6\"  Rails: Right ascending  Assistance: Stand by assistance  Comment: Instructed on double  technique for additional support. Activity Tolerance  Activity Tolerance: Patient tolerated treatment well    Patient Education  Education Given To: Patient  Education Provided: Plan of Care;Role of Therapy;Precautions  Education Provided Comments: use of TLSO; healing process  Education Method: Verbal;Demonstration  Education Outcome: Verbalized understanding;Continued education needed       ASSESSMENT:   Body Structures, Functions, Activity Limitations Requiring Skilled Therapeutic Intervention: Decreased functional mobility ; Decreased ADL status; Decreased ROM; Decreased body mechanics; Decreased strength; Increased pain;Decreased endurance;Decreased balance  Decision Making: Medium Complexity  History: High  Exam: Med  Clinical Presentation: Med    Therapy Prognosis: Good  Barriers to Learning: none         DISCHARGE RECOMMENDATIONS:  Discharge Recommendations: Continue to assess pending progress, Patient would benefit from continued therapy after discharge    Assessment: Continued PT indicated to progress mobility and facilitate DC at highest level of indep and safety. rec use of WW for ambulation. Requires PT Follow-Up: Yes      PLAN OF CARE:  Physcial Therapy Plan  General Plan: 1 time a day 3-6 times a week  Current Treatment Recommendations: Strengthening, ROM, Balance training, Functional mobility training, Transfer training, ADL/Self-care training, Endurance training, Gait training, Stair training, Neuromuscular re-education, Home exercise program, Safety education & training, Patient/Caregiver education & training, Equipment evaluation, education, & procurement, Modalities, Positioning, Manual Therapy - Soft Tissue Mobilization    Safety Devices  Type of Devices:  All fall risk precautions in place, Call light within reach, Chair alarm in place, Left in chair  Restraints  Restraints Initially in Place: No    Goals:  Long Term Goals  Long Term Goal 1: Pt to complete bed mobility with indep  Long Term Goal 2: Pt to complete transfers with indep  Long Term Goal 3: Pt to ambulate 50-150ft with LRD and indep  Long Term Goal 4: Pt to complete 2 steps with HR and supervision  Long Term Goal 5: Pt to complete HEP with indep    AMPA (6 CLICK) BASIC MOBILITY  AM-PAC Inpatient Mobility Raw Score : 18     Therapy Time:   Individual   Time In 1110   Time Out 1128   Minutes Jaimie [address], 10/18/22 at 12:51 PM         Definitions for assistance levels  Independent = pt does not require any physical supervision or assistance from another person for activity completion. Device may be needed.   Stand by assistance = pt requires verbal cues or instructions from another person, close to but not touching, to perform the activity  Minimal assistance= pt performs 75% or more of the activity; assistance is required to complete the activity  Moderate assistance= pt performs 50% of the activity; assistance is required to complete the activity  Maximal assistance = pt performs 25% of the activity; assistance is required to complete the activity  Dependent = pt requires total physical assistance to accomplish the task

## 2022-10-18 NOTE — PROGRESS NOTES
Assessment completed, patient alert and oriented x 4, complaining of lower back pain 8/10, pain med given as ordered, lungs clear, heart rate regular, bowel sounds active, last bm 10-16, left lower ext.  With trace edema, denies any tingling or numbness in any extremities, lower back dressing changed, small amount of serosanguinous drainage noted on the old bandage, site cleansed with saline, sterile strips intact, new band-aid applied

## 2022-10-18 NOTE — CARE COORDINATION
LSW met with the pt to discuss her DC plan. She worked with therapy and they felt that she could go home with [facility]. Pt is in agreement to [facility] at [place] and referral was called to [facility] today. Pt will be going to her home in [place]. [phone] and her cell number is [phone].

## 2022-10-18 NOTE — PROGRESS NOTES
Postoperative day #1 L1 vertebral augmentation kyphoplasty. Patient reports a different type of pain but the severe preoperative pain is greatly improved. Plan follow-up 1 month.

## 2022-10-18 NOTE — PROGRESS NOTES
10/18/2022    POST OP DAY #1    From:HOME W/FRIEND, IND. Admit:BACK PAIN S/P FALL    PMH:CAD, HTN, HLD, CA    Anticipated Discharge Disposition:TBD- AWAITING NEUROSURG PLAN, PT/OT--PENDING PT AND OT, POST OP ORDERS WRITTEN. NEEDS SNF CHOICE    Patient Mobility or PT/OT ordered: ORDERED- WILL NEED POST OP EVALS  Consults: LUZ ELENA    Clinical: L1 COMP FX  10/17: L1 vertebral augmentation kyphoplasty.     Barriers to Discharge: MRI SPINE- ACUTE L1 FX  +LUMBAR BRACE  PLACEMENT ARRANGEMENTS    Assessments: CMI DONE

## 2022-10-18 NOTE — PROGRESS NOTES
Subjective: The patient complains of severe acute on chronic progressive fatigue and L1 area LBP partially relieved by rest, PT, OT and meds  OxyIR and exacerbated by exertion and recent  L1 Fx-: TLSO brace fitted. MRI of L spine pending. I am concerned about patients medical complexities including:  Principal Problem:    Closed compression fracture of body of L1 vertebra (HCC)  Active Problems:    Vertebral fracture, osteoporotic, initial encounter ([facility] [address]    Acute pain due to trauma    Fall from standing    Impaired mobility and activities of daily living dt Acute compression deformity of the superior endplate of L1     Iron deficiency anemia, unspecified    Compression fracture of lumbar vertebrae, non-traumatic, initial encounter ([facility] [address]  Resolved Problems:    * No resolved hospital problems. *      .    Reviewed recent nursing note and discussed current status and planned care with acute care providers, \" Pt returned to unit from PACU s/p lumbar surgery. B/P elevated 661D systolic, IVP hydralazine given per MD orders, SBP decreased to 166. Pt on O2 2.5L NC Spo2 97%. Decreased to 2L Sating at 94% will continue to wean. Neuro assessment performed and intact equal and even strength to all extremities. C/o pain 4/10 routine APAP given at this time. Swallow eval completed without difficulty. In bed resting with eyes closed at this time, boyfriend is at bedside\". Status post surgery with Dr. Leanna Cruz she seems to be feeling a lot better now. ROS x10: The patient also complains of severely impaired mobility and activities of daily living.   Otherwise no new problems with vision, hearing, nose, mouth, throat, dermal, cardiovascular, GI, , pulmonary, musculoskeletal, psychiatric or neurological.        Vital signs:  BP (!) 133/51   Pulse 60   Temp 98 °F (36.7 °C) (Temporal)   Resp 12   Ht 5' 4\" (1.626 m)   Wt 172 lb (78 kg)   LMP 05/29/1998   SpO2 94%   BMI 29.52 kg/m²   I/O:   PO/Intake:    fair PO intake, continue to monitor closely for dehydration    Bowel/Bladder:   continent,    General:  Patient is well developed, adequately nourished, and    well kempt. HEENT:    PERRLA, hearing intact to loud voice, external inspection of ear and nose benign. Inspection of lips, tongue and gums benign  Musculoskeletal: No significant change in strength or tone. All joints stable. Inspection and palpation of digits and nails show no clubbing, cyanosis or inflammatory conditions. Neuro/Psychiatric: Affect: flat-  Alert and oriented to self and situation with no needed cues. No significant change in deep tendon reflexes or sensation  Lungs:  Diminished, CTA-B  . Respiration effort is normal at rest.   Heart:   S1 = S2,   RRR. Abdomen:  Soft, non-tender    Extremities:  Trace  lower extremity edema but no unusual tenderness. Skin:   BUE bruises dt blood draws      Rehabilitation:  Physical Therapy:   Bed mobility:  Bed mobility  Rolling to Left: Contact guard assistance;Minimal assistance (10/13/22 1620)  Supine to Sit: Stand by assistance (10/13/22 1620)  Bed Mobility Comments: Limited by pain. heavy assist via bedrails. increased time. Educated on logroll technique (10/13/22 1620)  Bed Mobility Training  Bed Mobility Training: Yes (10/14/22 1038)  Interventions: Verbal cues (10/14/22 1044)  Rolling: Stand-by assistance (10/14/22 1044)  Supine to Sit: Stand-by assistance (10/14/22 1044)  Sit to Supine: Stand-by assistance (10/14/22 1044)  Scooting: Stand-by assistance (10/14/22 1044)  Transfers:  Transfers  Sit to Stand: Minimal Assistance (10/13/22 1624)  Stand to Sit: Contact guard assistance (10/13/22 1624)  Bed to Chair: Contact guard assistance (10/13/22 1624)  Comment: Demonstration provided on hip hinge.  pt with pain limitations upon standing requiring multiple attempts and lifting assist. (10/13/22 1624)  Transfer Training  Transfer Training: Yes (10/14/22 1044)  Sit to Stand: Contact-guard assistance (10/14/22 1044)  Stand to Sit: Contact-guard assistance (10/14/22 1044)  Bed to Chair: Contact-guard assistance (10/14/22 1044)  Gait:   Ambulation  Surface: Level tile (10/13/22 1624)  Device: Rolling Walker;Hand-Held Assist (10/13/22 1624)  Assistance: Minimal assistance;Stand by assistance (10/13/22 1624)  Quality of Gait: Jacki with HHA. pt feels unsafe and experiencing increased pain. Improved considerably with [facility]. pt with slow pacing and rigid posturing. Verbal cues for forward gaze and use of WW (10/13/22 1624)  Distance: 20ft with turn (10/13/22 1624)  Gait Training: Yes (10/14/22 1044)  Overall Level of Assistance: Contact-guard assistance (10/14/22 1044)  Distance (ft): 100 Feet (10/14/22 1044)  Assistive Device: Brace/splint; Walker, rolling (10/14/22 1044)  Interventions: Verbal cues; Visual cues (10/14/22 1044)  Base of Support: Center of gravity altered (10/14/22 1044)  Speed/Milena: Delayed (10/14/22 1044)  Swing Pattern: Left symmetrical;Right symmetrical (10/14/22 1044)  Gait Abnormalities: Other (comment) (Over correction of posture with mild shoulder posterior retro lean.) (10/14/22 1044)  Right Side Weight Bearing: As tolerated (10/14/22 1044)  Left Side Weight Bearing: As tolerated (10/14/22 1044)  Stairs:     W/C mobility:       Occupational Therapy:   Hand Dominance: Right  ADL  Feeding: Unable to assess(comment) (10/13/22 1613)  Grooming: Setup (10/13/22 1613)  UE Bathing: Stand by assistance; Increased time to complete (10/13/22 1613)  LE Bathing: Maximum assistance; Increased time to complete (10/13/22 1613)  UE Dressing: Stand by assistance; Increased time to complete (10/13/22 1613)  LE Dressing: Maximum assistance; Increased time to complete (10/13/22 1613)  Toileting: Unable to assess(comment) (10/13/22 1613)             Speech Therapy:            Diet/Swallow:        Dysphagia Outcome Severity Scale: Level 7: Normal in all situations          COGNITION  OT:    SP: Lab/X-ray studies reviewed, analyzed and discussed with patient and staff:     EXAMINATION:   MRI OF THE LUMBAR SPINE WITHOUT CONTRAST, 10/14/2022 2:06 pm       TECHNIQUE:   Multiplanar multisequence MRI of the lumbar spine was performed without the   administration of intravenous contrast.       COMPARISON:   CT lumbar spine 10/11/2022       HISTORY:   ORDERING SYSTEM PROVIDED HISTORY: L1 fracture pacemaker compatible   TECHNOLOGIST PROVIDED HISTORY:   Reason for exam:->L1 fracture pacemaker compatible   What is the sedation requirement?->None   What reading provider will be dictating this exam?->CRC       FINDINGS:   BONES/ALIGNMENT: There is an acute L1 burst fracture demonstrating 60% loss   of height, progressed from the recent CT scan. There is 5 mm of retropulsion   of the posterosuperior margin of the vertebral body into the spinal canal.   Bone marrow edema is noted throughout the vertebral body. A remote superior endplate compression fracture at L4 is noted. The sequelae   of posterior lumbar fusion at L4-5 are noted. There is disc desiccation and   disc space narrowing at L2-3 and L3-4. SPINAL CORD: The conus medullaris is normal in size and signal intensities   and terminates normally. SOFT TISSUES: There is no paraspinal mass identified. T12-L1: There is mild spinal canal stenosis secondary to 5 mm of retropulsion   of the posterosuperior margin of the L1 vertebral body into the spinal canal.   No neural foraminal narrowing is present. L1-L2: There is a 2 mm central disc protrusion. No significant spinal canal   stenosis or neural foraminal narrowing is present. L2-L3: There is a disc bulge, facet arthropathy and thickening of the   ligamentum flavum. There is moderate-to-severe spinal canal stenosis. There   is moderate right and mild left neural foraminal narrowing. L3-L4: Sequelae of bilateral laminectomies are noted.   There is no disc bulge   or protrusion present. There is no significant spinal canal stenosis or   neural foraminal narrowing present. L4-L5: The sequelae of bilateral laminectomies are noted. There is no disc   bulge or protrusion present. There is no significant spinal canal stenosis   or neural foraminal narrowing present. L5-S1: The sequelae of bilateral laminectomies are noted. There is facet   arthropathy resulting in severe left neural foraminal narrowing. No spinal   canal stenosis or right neural foraminal narrowing is present. Impression   1. Acute L1 burst fracture demonstrating 60% loss of height, progressed from   the previous CT scan from 10/10/2022. There is 5 mm of retropulsion of the   posterior margin of the vertebral body into the spinal canal resulting in   mild spinal canal stenosis at T12-L1. 2. Moderate-to-severe spinal canal stenosis, moderate right and mild left   neural foraminal narrowing at L2-3 secondary to a disc bulge, facet   arthropathy and thickening of the ligamentum flavum. 3. Severe left neural foraminal narrowing at L5-S1 secondary to facet   arthropathy. 4. Sequelae of bilateral laminectomies and posterior lumbar fusion at L4-5. The findings were sent to the Radiology Results [address] at 3:14   pm on 10/14/2022 to be communicated to a licensed caregiver.          Recent Results (from the past 24 hour(s))   CBC with Auto Differential    Collection Time: 10/18/22  4:44 AM   Result Value Ref Range    WBC 5.5 4.8 - 10.8 K/uL    RBC 3.72 (L) 4.20 - 5.40 M/uL    Hemoglobin 10.2 (L) 12.0 - 16.0 g/dL    Hematocrit 31.8 (L) 37.0 - 47.0 %    MCV 85.4 82.0 - 100.0 fL    MCH 27.5 27.0 - 31.3 pg    MCHC 32.2 (L) 33.0 - 37.0 %    RDW 14.8 (H) 11.5 - 14.5 %    Platelets 590 164 - 741 K/uL    Neutrophils % 61.0 %    Lymphocytes % 25.6 %    Monocytes % 8.4 %    Eosinophils % 4.1 %    Basophils % 0.9 %    Neutrophils Absolute 3.3 1.4 - 6.5 K/uL    Lymphocytes Absolute 1.4 1.0 - 4.8 K/uL    Monocytes Absolute 0.5 0.2 - 0.8 K/uL    Eosinophils Absolute 0.2 0.0 - 0.7 K/uL    Basophils Absolute 0.0 0.0 - 0.2 K/uL   Basic Metabolic Panel w/ Reflex to MG    Collection Time: 10/18/22  4:44 AM   Result Value Ref Range    Sodium 139 135 - 144 mEq/L    Potassium reflex Magnesium 4.3 3.4 - 4.9 mEq/L    Chloride 103 95 - 107 mEq/L    CO2 25 20 - 31 mEq/L    Anion Gap 11 9 - 15 mEq/L    Glucose 90 70 - 99 mg/dL    BUN 18 8 - 23 mg/dL    Creatinine 0.64 0.50 - 0.90 mg/dL    Est, Glom Filt Rate >60.0 >60    Calcium 8.9 8.5 - 9.9 mg/dL     Previous extensive, complex labs, notes and diagnostics reviewed and analyzed. ALLERGIES:    Allergies as of 10/11/2022 - Fully Reviewed 10/11/2022   Allergen Reaction Noted    Tolterodine Diarrhea 12/19/2014      (please also verify by checking MAR)     Complex Physical Medicine & Rehab Issues Assess & Plan:   Severe abnormality of gait and mobility and impaired self-care and ADL's secondary to L1 Fx : TLSO brace fitted. MRI of L spine pending . Updated functional and medical status reassessed regarding patients ability to participate in therapies and patient found to be able to participate in: Acute rehab versus home health care though I feel like she is probably to do pretty well today and will have physical therapy see her for sputum assuming she will be able to go home. Will continue to follow to attempt to get patient to the most efficient but most effective level of care will be in their best interest.  Continue to focus on energy conservation heart rate and blood pressure monitoring before during and after therapy endurance and consistency of function. Bowel constipation   and Bladder dysfunction   overactive, neurogenic bladder:  frequent toileting, ambulate to bathroom with assistance, check post void residuals.   Check for C.difficile x1 if >2 loose stools in 24 hours, continue bowel & bladder program.  Monitor for UTI symptoms including lethargy and confusion    Severe L1 pain and generalized OA pain: reassess pain every shift and prior to and after each therapy session, give prn Tylenol OxyIR and consider scheduled Tylenol, modalities prn in therapy, consider Lidoderm, K-pad prn. Pt awake in bed. Accepted meds without problem. Medicated for pain per request.  I suggest OxyContin 10 mg every 8-12 hours. Skin healing    breakdown   risk:  continue pressure relief program.  Daily skin exams and reports from nursing. Severe fatigue due to immobility and nutritional deficits: continue to monitor closely for dehydration   Add vitamin B12 vitamin D and CoQ10 titrate dosing and add protein supplementation with low carb content. Complex discharge planning:   Discussed with care team-last 24 hour events noted. I will continue to follow along and reassess functional and medical status as we strive to improve patient's functional and medical outcomes progressing to the most efficient and lowest level of care. Complex Active General Medical Issues that complicate care:     1. Principal Problem:    Closed compression fracture of body of L1 vertebra (HCC)  Active Problems:    Vertebral fracture, osteoporotic, initial encounter ([facility] [address]    Acute pain due to trauma    Fall from standing    Impaired mobility and activities of daily living dt Acute compression deformity of the superior endplate of L1     Iron deficiency anemia, unspecified    Compression fracture of lumbar vertebrae, non-traumatic, initial encounter ([facility] [address]  Resolved Problems:    * No resolved hospital problems.  *         Will reevaluate in therapy PT and OT status post kyphoplasty L1      Gregory Rangel D.O., PM&R     Attending    [address]

## 2022-10-18 NOTE — PROGRESS NOTES
[facility] OCCUPATIONAL THERAPY EVALUATION - ACUTE     NAME: Jonah Larios  : 1950 (43 y.o.)  MRN: [id number]  CODE STATUS: Full Code  Room: H969Z307-94    Date of Service: 10/18/2022    Patient Diagnosis(es): Compression fracture of lumbar vertebrae, non-traumatic, initial encounter [facility]) Steve Oiler  Fall, initial encounter [W19. XXXA]  Closed compression fracture of body of L1 vertebra ([facility] [address] [S32.010A]   Patient Active Problem List    Diagnosis Date Noted    Acute pain due to trauma 10/14/2022    Fall from standing 10/14/2022    Impaired mobility and activities of daily living dt Acute compression deformity of the superior endplate of L1  15/49/5570    Compression fracture of lumbar vertebrae, non-traumatic, initial encounter ([address] 10/14/2022    Closed compression fracture of body of L1 vertebra (HCC) 10/12/2022    Vertebral fracture, osteoporotic, initial encounter ([facility] [address] 10/12/2022    NSTEMI (non-ST elevated myocardial infarction) ([address] 2021    Blurring of visual image 2015    Iron deficiency anemia, unspecified 10/17/2012    Chest pain 2008    Coagulopathy, on coumadin 2013    Bilateral leg weakness 2018    Lumbar stenosis with neurogenic claudication 2018    Lumbar stenosis 08/15/2018    Synovial cyst of lumbar facet joint 08/15/2018    PE (pulmonary thromboembolism) ([address] 08/15/2018    Right-sided low back pain with right-sided sciatica 2016    Wheezes 2015    History of chest x-ray, normal, 8/6/14 10/17/2014    History of PTCA 2, stent x 2 , , Thomes Nip 2013    Abnormal EKG, needs cardiac clearence 2013    Hiatal hernia 2013    Calculus of gallbladder 2013    Need for tetanus booster 10/10/2013    Epistaxis 2012    ACE-inhibitor cough 07/10/2012    Nephrolithiasis     Depression     Hyperlipidemia     OA (osteoarthritis)     Osteoporosis     Hypertension     Fibrocystic breast disease     Urge incontinence Anemia     CAD (coronary artery disease) 02/01/2008        Past Medical History:   Diagnosis Date    Abnormal EKG, needs cardiac clearence 12/23/2013    ACE-inhibitor cough 7/10/2012    ARB     Anemia     CAD (coronary artery disease) 2/2008    cardia stents x 2    Cancer ([facility] [address]     facial skin cancer excision    Coagulopathy ([facility] [address] 10/4/2012    Coagulopathy, on coumadin 11/13/2013    Cough 5/29/2012    Cough, Neg w/u Dr Laron Francisco, ?  [employer] 9/22/2014    Depression     Depression 6/13/2017    Epistaxis 8/20/2012    Fibrocystic breast disease     Hiatal hernia 5/3/2016    History of chest x-ray, normal, 8/6/14 10/17/2014    History of PTCA 2, stent x 2 2009, 2012, Tova Herbert 12/23/2013    Hx of blood clots 2012    PE s/p RTKR post op    Hyperlipidemia     meds > 10 yrs    Hypertension     meds > 10 yrs    Nephrolithiasis     OA (osteoarthritis)     Osteoporosis     Right-sided low back pain with right-sided sciatica 5/3/2016    Urge incontinence     Wheezes 4/20/2015     Past Surgical History:   Procedure Laterality Date    CARDIAC SURGERY      stent x 2    COLONOSCOPY      CORONARY ANGIOPLASTY WITH STENT PLACEMENT  3/12    [facility]    ENDOSCOPY, COLON, DIAGNOSTIC      EYE SURGERY      [place] with IOL OU    HERNIA REPAIR      left inguinal hernia    HYSTERECTOMY, TOTAL ABDOMINAL (CERVIX REMOVED)  1998    JOINT REPLACEMENT  2008    LTKR    JOINT REPLACEMENT  2017    RTSR    KNEE ARTHROSCOPY  11/11    R knee, [facility]    LITHOTRIPSY  2003 approx    CT LAMINECTOMY,>2 SGMT,LUMBAR N/A 8/22/2018    SPINE L3-4, L4-5, L5-S1 LAMINECTOMY, L3-4, L4-5 POSTERIOR LATERAL FUSION, L5-S1 INSTRUMENTED POSTERIOR INTERBODY FUSION, PRONE, AXIS SPINE TABLE, ACTIFUSE, CELL SAVER, NEW MICROSCOPE, NEW C-ARM X2, PEDIGUARD, MISONIX BONE SCALPAL, PNEUMATIC KERRISONS, SPINAL CORD MONITORING, LUMBAR BRACE, GLOBUS, OPEN TLIF, (H.S.C.G.#[id number]) performed by Josi Lane MD at [address]  2/2008    [address] Right     SKIN GRAFT 2/9/11    JORDAN AND BSO (CERVIX REMOVED)  1996 approx    TOTAL KNEE ARTHROPLASTY Left 08/2008        Restrictions  Restrictions/Precautions: Fall Risk  Required Braces or Orthoses?: Yes         Other position/activity restrictions: per Trauma order: \"OK for OOB mobility if wearing TLSO brace. \"    Safety Devices: Safety Devices  Type of Devices: All fall risk precautions in place;Call light within reach; Chair alarm in place; Left in chair     Patient's date of birth confirmed: Yes    General:  Chart Reviewed: Yes  Patient assessed for rehabilitation services?: Yes    Subjective  Subjective: \"I feel so much better since my surgery. \"       Pain at start of treatment: Yes: 8/10    Pain at end of treatment: Yes: 8/10    Location: Low back  Nursing notified: Declined  RN:   Intervention: Repositioned Other: Pt states she was recently medicated    Prior Level of Function:  Social/Functional History  Lives With: Significant other  Type of Home: House  Home Layout: One level  Home Access: Stairs to enter with rails  Entrance Stairs - Number of Steps: 2  Entrance Stairs - Rails: Right  Bathroom Shower/Tub: Walk-in shower  Bathroom Equipment: Built-in shower seat, Grab bars in [address] [place]: Corbin Adriana, rolling, Alert Button  Has the patient had two or more falls in the past year or any fall with injury in the past year?: Yes  ADL Assistance: Independent  Homemaking Assistance: Independent  Ambulation Assistance: Independent (no AD)  Transfer Assistance: Independent  Active : Yes  Additional Comments: Pt reports that her boyfriend was recently injured in a car accident and is healing at home    OBJECTIVE:     Orientation Status:  Orientation  Orientation Level: Oriented X4    Observation:  Observation/Palpation  Posture: Good  Observation: No acute distress noted. Pt pleasant and motivated. TLSO brace donned and fitted for pt size and comfort.  Pt educated on technique for donning    Cognition Status:  Cognition  Overall Cognitive Status: WFL    Perception Status:  Perception  Overall Perceptual Status: WFL    Vision and Hearing Status:  Vision  Vision Exceptions: Wears glasses for reading (states that she has been having new difficulty with reading book. Has macular degeneration)  Hearing  Hearing: Within functional limits   Vision - Basic Assessment  Prior Vision: Wears glasses only for reading  Visual History: Macular degeneration  Patient Visual Report: Blurring of print when reading  Visual Field Cut: No  Oculo Motor Control: WNL    GROSS ASSESSMENT AROM/PROM:  AROM: Within functional limits       ROM:   LUE AROM (degrees)  LUE AROM : WFL  Left Hand AROM (degrees)  Left Hand AROM: WFL  RUE AROM (degrees)  RUE AROM : WFL  Right Hand AROM (degrees)  Right Hand AROM: WFL    UE STRENGTH:  Strength: Within functional limits (4/5 bilateral UE's)    UE COORDINATION:  Coordination: Within functional limits    UE TONE:  Tone: Normal    UE SENSATION:  Sensation: Intact    Hand Dominance:  Hand Dominance  Hand Dominance: Right    ADL Status:  ADL  Feeding: Independent  Grooming: Setup  UE Bathing: Setup  LE Bathing: Minimal assistance  UE Dressing: Setup  LE Dressing: Minimal assistance  Toileting: Contact guard assistance  Additional Comments: Simulated ADls as above. Limited d/t pain but was able to complete figure 4 to don socks. Educated on easiest pants and socks to wear. Toilet Transfers  Toilet Transfer: Unable to assess  Toilet Transfers Comments: Declined need, anticipate SBA    Functional Mobility:    Transfers  Sit to stand: Supervision, Stand by assistance  Stand to sit: Stand by assistance    Patient ambulated household distance in hallway with [employer] at Supervision level. Pt initially SBA to CGA, reaching for walls when attempting to ambulate without device.  Improves in gait pattern and steadiness when using [employer]. Educated on walker basket and carrying items with [employer].     Bed Mobility  Bed mobility  Rolling to Left: Stand by assistance  Supine to Sit: Stand by assistance  Bed Mobility Comments: Educated on logroll techniqu    Seated and Standing Balance:  Balance  Sitting: Intact  Standing: Impaired  Standing - Static: Good  Standing - Dynamic: Fair    Functional Endurance:  Activity Tolerance  Activity Tolerance: Patient Tolerated treatment well    D/C Recommendations:  OT D/C RECOMMENDATIONS  REQUIRES OT FOLLOW-UP: Yes    Equipment Recommendations:  OT Equipment Recommendations  Other: Continue to assess    OT Education:   Patient Education  Education Given To: Patient  Education Provided: Role of Therapy;Plan of Care  Education Method: Verbal  Barriers to Learning: None  Education Outcome: Verbalized understanding    OT Follow Up:   OT D/C RECOMMENDATIONS  REQUIRES OT FOLLOW-UP: Yes       Assessment/Discharge Disposition:  Assessment: Pt is a  74year old woman from home who presents to [address] [facility] with L1 compression fx. Pt. demonstrates difficulty reaching feet and decreased dynamic balance but reports improvement in overall mobility compared to presurgery. Pt. tolerates mobility well and would benefit from continued OT to maximize independence and safety with ADL tasks.   Performance deficits / Impairments: Decreased functional mobility , Decreased balance, Decreased ADL status, Decreased high-level IADLs, Decreased endurance, Decreased strength  Prognosis: Good  Discharge Recommendations: Continue to assess pending progress  Decision Making: Medium Complexity  History: Pt's medical history is moderately complex  Exam: Pt. has 6 performance deficits  Assistance / Modification: Pt requires min A    AMPAC (Six Click) Self care Score   How much help for putting on and taking off regular lower body clothing?: A Little  How much help for Bathing?: A Little  How much help for Toileting?: A Little  How much help for putting on and taking off regular upper body clothing?: A Little  How much help for taking care of personal grooming?: A Little  How much help for eating meals?: None  AM-PAC Inpatient Daily Activity Raw Score: 19  AM-PAC Inpatient ADL T-Scale Score : 40.22  ADL Inpatient CMS 0-100% Score: 42.8    Therapy key for assistance levels -   Independent/Mod I = Pt. is able to perform task with no assistance but may require a device   Stand by assistance = Pt. does not perform task at an independent level but does not need physical assistance, requires verbal cues  Minimal, Moderate, Maximal Assistance = Pt. requires physical assistance (25%, 50%, 75% assist from helper) for task but is able to actively participate in task   Dependent = Pt. requires total assistance with task and is not able to actively participate with task completion     Plan:  Occupational Therapy Plan  Times Per Week: 1-4x/wk  Therapy Duration:  (Length of acute stay)  Current Treatment Recommendations: Strengthening, Balance training, Functional mobility training, Neuromuscular re-education, Endurance training, Self-Care / ADL, Safety education & training, Equipment evaluation, education, & procurement, Patient/Caregiver education & training, Pain management    Goals:   Patient will:    - Improve functional endurance to tolerate/complete 30 mins of ADL's  - Be Mod I in LB ADLs  - Be Mod I in ADL transfers without LOB  - Be Mod I in toileting tasks  - Improve B UE strength and endurance to 4/5 in order to participate in self-care activities as projected. - Access appropriate D/C site with as few architectural barriers as possible. - Sequence self-care tasks with no verbal cues for safety    Patient Goal: Patient goals : \"I want to get home\"     Discussed and agreed upon: Yes Comments:       Therapy Time:   Individual   Time In 1110   Time Out 1128   Minutes 18     Eval: 18 minutes     Electronically signed by:     KEN Leonard,   10/18/2022, 1:01 PM

## 2022-10-18 NOTE — PROGRESS NOTES
TRAUMA DAILY PROGRESS NOTE      Patient Name: Armando Browning  Admission Date 10/11/2022    Hospital Day: 7  Patient seen and examined on 10/18/2022    INTERVAL HISTORY/EVENTS     Background:  Armando Browning is a 74 y.o. female with a PMHx of HTN, HLD, OA, CAD s/p L4-5 fusion with instrumentation and depression presented to [facility] s/p mechanical fall from standing. (-) head strike, (-) LOC, (+) Eliquis. Admitted to trauma. NSGY consulted. Trauma work up found L1 compression fracture. Hospital Course:  10/11/2022: Presented to ED with c/o low back pain s/p mechanical fall from standing. (-) head strike, (-) LOC, (+) Eliquis. Admitted to trauma. NSGY consulted. 10/12/2022: NSGY eval and rec's for MRI of L-spine. Pt with pacemaker and awaiting confirmation of MRI compatibility. 10/13/2022: TLSO brace fitted. MRI of L spine pending. Home medications resumed. Pacemaker interrogated and OK for MRI  10/14/2022: MRI completed. Plan for OR on 10/17 for vertebral augmentation  10/15/2022: No acute events, waiting for OR 10/17  10/16/2022: no acute events, waiting OR 10/17  10/17/2022: s/p L1 vertebral augmentation kyphoplasty with Dr. Hernandez Garcia      24 Hour Events: This is my first time meeting the patient. Shabnam Aguilar. HDS on RA overnight. Upon evaluation patient up to chair and had breakfast.  Tolerating diet and beginning to pass gas. Voiding spontaneously. PTOT evaluated patient with recs for home with Yamile Moulton. Pain moderately controlled, plan for multimodal pain medication adjustment today and will likely be medically cleared for discharge tomorrow. Clarified TLSO brace use post op and now okay for PRN use. Also okay to resume anticoagulation. Labs reviewed, lytes and Cr acceptable. Hgb stable and no leukocytosis. All other labs acceptable. UOP: 1950 cc and 1 missed occurrence  1 BM this AM  PHYSICAL EXAM     Vitals Average, Min and Max for last 24 hours:  Temp: Temp: 97.5 °F (36.4 °C);  Temp Av.5 °F (36.4 °C)  Min: 97.5 °F (36.4 °C)  Max: 97.5 °F (36.4 °C)  Respirations: Resp  Av  Min: 18  Max: 18  Pulse: Pulse  Av  Min: 59  Max: 63  Blood Pressure: Systolic (12XUX), ZHA:447 , Min:131 , PHH:635   ; Diastolic (79RKJ), XOD:62, Min:48, Max:72  SpO2: SpO2  Av.5 %  Min: 96 %  Max: 97 %    Vitals: BP (!) 131/48   Pulse 63   Temp 97.5 °F (36.4 °C) (Oral)   Resp 18   Ht 5' 4\" (1.626 m)   Wt 172 lb (78 kg)   LMP 1998   SpO2 96%   BMI 29.52 kg/m²     Physical Exam:  Constitutional: Sitting in chair in NAD, Pleasant and conversant. HEENT: Atraumatic normocephalic. Cardiovascular: Regular rate and rhythm. Bilateral radial and DP pulses intact   Pulmonary: Clear to ausculation bilaterally on room air. No wheezing, rhonchi or rales. Abdominal: Soft. Non-distended. Non-tender. Musculoskeletal: Moves all extremities to command. No edema. No LE numbness or weakness. TLSO brace in place  Neurological: Alert, awake, and orientated x 3. Motor and sensory grossly intact. GCS of 15.     LABORATORY RESULTS (LAST 24 HOURS)     CBC with Differential:    Lab Results   Component Value Date/Time    WBC 5.5 10/18/2022 04:44 AM    RBC 3.72 10/18/2022 04:44 AM    RBC 3.82 2012 10:53 AM    HGB 10.2 10/18/2022 04:44 AM    HCT 31.8 10/18/2022 04:44 AM     10/18/2022 04:44 AM    MCV 85.4 10/18/2022 04:44 AM    MCH 27.5 10/18/2022 04:44 AM    MCHC 32.2 10/18/2022 04:44 AM    RDW 14.8 10/18/2022 04:44 AM    LYMPHOPCT 25.6 10/18/2022 04:44 AM    MONOPCT 8.4 10/18/2022 04:44 AM    BASOPCT 0.9 10/18/2022 04:44 AM    MONOSABS 0.5 10/18/2022 04:44 AM    LYMPHSABS 1.4 10/18/2022 04:44 AM    EOSABS 0.2 10/18/2022 04:44 AM    BASOSABS 0.0 10/18/2022 04:44 AM     CMP:    Lab Results   Component Value Date/Time     10/18/2022 04:44 AM    K 4.3 10/18/2022 04:44 AM     10/18/2022 04:44 AM    CO2 25 10/18/2022 04:44 AM    BUN 18 10/18/2022 04:44 AM    CREATININE 0.64 10/18/2022 04:44 AM GFRAA >60.0 10/17/2022 06:12 AM    LABGLOM >60.0 10/18/2022 04:44 AM    GLUCOSE 90 10/18/2022 04:44 AM    GLUCOSE 88 05/29/2012 10:53 AM    PROT 6.4 10/11/2022 11:45 PM    LABALBU 3.3 10/11/2022 11:45 PM    LABALBU 3.8 05/29/2012 10:53 AM    CALCIUM 8.9 10/18/2022 04:44 AM    BILITOT <0.2 10/11/2022 11:45 PM    ALKPHOS 70 10/11/2022 11:45 PM    AST 23 10/11/2022 11:45 PM    ALT 15 10/11/2022 11:45 PM     Magnesium:    Lab Results   Component Value Date/Time    MG 1.9 08/23/2018 05:37 AM     PT/INR:    Lab Results   Component Value Date/Time    PROTIME 15.4 10/17/2022 06:12 AM    PROTIME 26.0 07/28/2014 10:16 AM    INR 1.2 10/17/2022 06:12 AM     PTT:    Lab Results   Component Value Date/Time    APTT 45.1 07/30/2018 02:14 PM       IMAGING RESULTS (PERSONALLY REVIEWED)     No new imaging to review today. ASSESSMENT & PLAN     Diagnoses:  S/p mechanical fall from standing on 10/11/22  L1 compression fracture (plan for OR 10/17)  Acute pain due to traumatic injury  S/p L1 vertebral augmentation kyphoplasty Allena Agent)  Acute post op pain    PMHx: HTN, HLD, OA, CAD s/p L4-5 fusion with instrumentation and depression    Incidental Findings: None, JLR 10/12/22      ASSESSMENT/PLAN:  Neurological: Acute L1 compression fracture s/p  L1 vertebral augmentation kyphoplasty 10/17/22 with Dr. Frances Alvarez, acute pain due to trauma, acute post op pain  - Continue pain control with:  - Tylenol 650mg q6hr scheduled  - Oxycodone 5/10mg q4hrs prn moderate/severe pain  - Increase Robaxin to 750mg four times daily for improved pain control  - Continue home Prozac 60mg daily, Trazodone 50mg nightly    Cardiovascular: Normotensive, not tachycardic.  Hx HTN, HLD, Afib   - Continue vital signs per unit protocol   - Continue home Lipitor 40mg daily, Lasix 20mg daily, Toprolol XL 25mg daily, Tikosyn 125 mcg BID  - Resume home Xarelto and ASA in AM (okay with NSGY)     Respiratory: Sating appropriately on RA  - Maintain O2 sats > 92%  - Encourage IS 10 x hourly     GI/Diet: Tolerating regular diet, +BM this AM.  Hx of GERD  - Continue regular diet   - Continue bowel regimen: Miralax daily,  senokot-S BID, dulcolax PRN  - Continue home pantoprazole 40mg daily     Renal/Electrolytes: Cr, lytes and UOP acceptable. - Continue LR @ 50mL/hr while NPO.  - OK for HLIV once post-op and adequate PO intake  - BMP PRN     ID: Remains afebrile, normotensive, and without leukocytosis on last CBC.  - No indication for Abx     Heme: HDS, acute blood loss anemia stable post op  - No indication for transfusion  - CBC PRN    Endocrine:   - No acute issues. MSK: Acute L2 compression fracture s/p L1 vertebral augmentation kyphoplasty 10/17 with Dr. Ellen Sánchez  - Weight Bearing Restrictions: none  - Activity: TLSO PRN post op  - PT/OT: post op eval completed and recs for home with [employer] (ordered)    Prophylaxis:   - SCDs and lovenox 40mg dc'd as home anticoagulation to be resumed in AM  - Continue home Protonix 40mg daily    Lines/Tubes/Drains:  - Maintain PIVs  - No indication for collado catheter, monitor for urinary retention    Dispo: Remain on RNF under trauma for post op pain control, anticipate medical clearance for home going with [address] tomorrow. Accom Status: General Status      - Follow up with NSGY (Dr. Ellen Sánchez) in 1 month for post op check  - No follow up with Trauma Surgery needed. Please call for any questions or concerns.   Gisselle Gradyence Treatment Team Sticky Note for contact information]      ANA López - NP  Trauma/Critical Care  [see Treatment Team Sticky Note for contact information]     This patient's plan of care was discussed and made in collaboration with Trauma Attending physician, Sebastien North MD.

## 2022-10-19 VITALS
SYSTOLIC BLOOD PRESSURE: 130 MMHG | WEIGHT: 172 LBS | HEART RATE: 60 BPM | RESPIRATION RATE: 18 BRPM | DIASTOLIC BLOOD PRESSURE: 55 MMHG | OXYGEN SATURATION: 93 % | HEIGHT: 64 IN | TEMPERATURE: 97.7 F | BODY MASS INDEX: 29.37 KG/M2

## 2022-10-19 PROBLEM — G89.18 ACUTE POST-OPERATIVE PAIN: Status: ACTIVE | Noted: 2022-10-12

## 2022-10-19 PROBLEM — S32.000A: Status: ACTIVE | Noted: 2022-10-14

## 2022-10-19 PROBLEM — Z98.890 S/P KYPHOPLASTY: Status: ACTIVE | Noted: 2022-10-19

## 2022-10-19 PROCEDURE — 99239 HOSP IP/OBS DSCHRG MGMT >30: CPT | Performed by: NURSE PRACTITIONER

## 2022-10-19 PROCEDURE — 2580000003 HC RX 258: Performed by: SURGERY

## 2022-10-19 PROCEDURE — 6370000000 HC RX 637 (ALT 250 FOR IP): Performed by: SURGERY

## 2022-10-19 PROCEDURE — 6370000000 HC RX 637 (ALT 250 FOR IP): Performed by: NURSE PRACTITIONER

## 2022-10-19 PROCEDURE — 6370000000 HC RX 637 (ALT 250 FOR IP): Performed by: STUDENT IN AN ORGANIZED HEALTH CARE EDUCATION/TRAINING PROGRAM

## 2022-10-19 PROCEDURE — 6370000000 HC RX 637 (ALT 250 FOR IP): Performed by: PHYSICIAN ASSISTANT

## 2022-10-19 RX ORDER — METHOCARBAMOL 750 MG/1
750 TABLET, FILM COATED ORAL 4 TIMES DAILY PRN
Qty: 40 TABLET | Refills: 0 | Status: SHIPPED | OUTPATIENT
Start: 2022-10-19 | End: 2022-10-29

## 2022-10-19 RX ORDER — ACETAMINOPHEN 325 MG/1
650 TABLET ORAL EVERY 6 HOURS PRN
Qty: 80 TABLET | Refills: 0 | Status: SHIPPED | OUTPATIENT
Start: 2022-10-19 | End: 2022-10-29

## 2022-10-19 RX ORDER — SENNA AND DOCUSATE SODIUM 50; 8.6 MG/1; MG/1
1 TABLET, FILM COATED ORAL DAILY PRN
Qty: 30 TABLET | Refills: 0 | Status: SHIPPED | OUTPATIENT
Start: 2022-10-19 | End: 2022-11-18

## 2022-10-19 RX ORDER — OXYCODONE HYDROCHLORIDE 5 MG/1
5 TABLET ORAL EVERY 6 HOURS PRN
Qty: 20 TABLET | Refills: 0 | Status: SHIPPED | OUTPATIENT
Start: 2022-10-19 | End: 2022-11-01 | Stop reason: SDUPTHER

## 2022-10-19 RX ADMIN — METHOCARBAMOL 750 MG: 750 TABLET ORAL at 10:01

## 2022-10-19 RX ADMIN — Medication 1000 UNITS: at 10:01

## 2022-10-19 RX ADMIN — ASPIRIN 81 MG: 81 TABLET, COATED ORAL at 10:01

## 2022-10-19 RX ADMIN — FLUOXETINE HYDROCHLORIDE 60 MG: 20 CAPSULE ORAL at 10:00

## 2022-10-19 RX ADMIN — OXYCODONE 10 MG: 5 TABLET ORAL at 10:03

## 2022-10-19 RX ADMIN — METOPROLOL SUCCINATE 25 MG: 25 TABLET, EXTENDED RELEASE ORAL at 10:06

## 2022-10-19 RX ADMIN — OXYCODONE 10 MG: 5 TABLET ORAL at 14:20

## 2022-10-19 RX ADMIN — PANTOPRAZOLE SODIUM 40 MG: 40 TABLET, DELAYED RELEASE ORAL at 10:00

## 2022-10-19 RX ADMIN — METHOCARBAMOL 750 MG: 750 TABLET ORAL at 14:19

## 2022-10-19 RX ADMIN — FUROSEMIDE 20 MG: 20 TABLET ORAL at 10:01

## 2022-10-19 RX ADMIN — Medication 100 MG: at 10:01

## 2022-10-19 RX ADMIN — Medication 10 ML: at 10:12

## 2022-10-19 RX ADMIN — SENNOSIDES AND DOCUSATE SODIUM 1 TABLET: 50; 8.6 TABLET ORAL at 10:01

## 2022-10-19 RX ADMIN — DOFETILIDE 125 MCG: 0.12 CAPSULE ORAL at 10:02

## 2022-10-19 RX ADMIN — MAGNESIUM OXIDE 400 MG (241.3 MG MAGNESIUM) TABLET 400 MG: TABLET at 10:00

## 2022-10-19 RX ADMIN — ACETAMINOPHEN 650 MG: 325 TABLET ORAL at 14:19

## 2022-10-19 ASSESSMENT — PAIN DESCRIPTION - LOCATION
LOCATION: INCISION;BACK
LOCATION: BACK

## 2022-10-19 ASSESSMENT — PAIN DESCRIPTION - DESCRIPTORS
DESCRIPTORS: ACHING
DESCRIPTORS: DULL;ACHING

## 2022-10-19 ASSESSMENT — PAIN SCALES - GENERAL
PAINLEVEL_OUTOF10: 6
PAINLEVEL_OUTOF10: 7

## 2022-10-19 ASSESSMENT — PAIN DESCRIPTION - ORIENTATION
ORIENTATION: LOWER
ORIENTATION: LOWER;RIGHT;LEFT;MID

## 2022-10-19 NOTE — CARE COORDINATION
Met with pt at bedside. D/C plan remains home with [facility]. Second IMM reviewed and pt verbalized understanding and agreement. Pt states her son or friend will pick her up at d/c. Call to [facility] with update of potential discharge today. 1107 Updated traum PA to please add Diagnosis for HHC order.

## 2022-10-19 NOTE — DISCHARGE INSTR - DIET

## 2022-10-19 NOTE — DISCHARGE INSTRUCTIONS
Discharge Instructions      You are being discharged to Home with [address]    Follow up:  - Please call to schedule your follow-up appointments - information provided separately. - You will need to follow up with:  - Dr. Hernandez Garcia in 1 month for post operative check  - See below for information regarding contacting your primary care physician or establishing care with [facility]) if you do not already have one. Restrictions:  - Full weight-bearing  - Do not lift, push, pull, or drag anything greater than 10-15 lbs  - Maintain these restrictions until you are cleared by your provider at your follow up appointment. Pain control:  - Take Tylenol 325 mg, 1-2 tablets every 4 hours as needed for mild to moderate pain. Mild to moderate pain is characterized by pain 1-6 out of 10 on a pain scale. - Take Oxycodone 5 mg, 1 tablet every 6 hours for as needed for severe pain . Severe pain is characterized as pain of 7-10 out of 10 on a pain scale. - It is okay to take Oxycodone and Tylenol together if needed. If taking Robaxin and Oxycodone, space them out by 1 hour. =  - Please begin to wean off of your pain medications as soon as possible. - To do this, start taking Oxycodone every 8 hours instead of every 6, then every 12 hours, then only once per day if needed. Then stop. - Your pain medication may be adjusted or discontinued in follow-up appointments, or by the supervising physician in an inpatient rehab setting.   - Please do not drive within 24 hours of taking Oxycodone or any Opiate medication. Wound Care and Showering/Bathing:  -  Okay to shower daily -- allow soap and water to run down any incisions sites. Do not scrub the area. Do not submerge the area until cleared by Dr. Hernandez Garcia  - If you cannot maintain your balance in the shower, then you should not shower.  Sponge baths are the best way to maintain hygiene while your are healing.  - To sponge bath, wet a washcloth with soapy water and gently wash body with the washcloth. Then use a dry washcloth to wipe off.   -  If there is an Aquacel dressing in place to your incision, it should remain in place for 7 days then they can be taken off.  - If you notice any increased redness swelling or drainage from your wound call the Neurosurgery office immediately. - You may ice your injured or operative site, which is especially useful to minimize swelling. Make sure that the ice is not in direct contact with your skin, and that the ice does not leak out of it's bag. Double-bagging ice is an effective technique. -  If you begin to experience progressive and rapidly increasing pain that seems out of proportion to what you normally have been experiencing from your baseline pain after surgery/injury, or if your fingers  become numb and/or turn blue and cold - you NEED TO CALL US IMMEDIATELY. Alternatively, you may come into the [facility] Emergency Department IMMEDIATELY for an emergent evaluation by the Trauma team.      Update your primary care physician or establish care:  Please see your primary care physician at the next available appointment for follow up. Please call either on the day of your discharge, or the day after, to make the appointment. If you are followed by a managed care company or if your insurance requires, call your physician for authorization to be seen in a specialty clinic. If you do not have a primary physician please call [phone] for guidance on finding a [facility]) provider.      If you have questions or concerns, if your condition worsens or you  develop new symptoms please call the [address] [facility] at [phone].    ---------------------------------------------------------------------------------------------------------------------

## 2022-10-19 NOTE — PROGRESS NOTES
Physical Therapy Missed Treatment   Facility/Department: [facility] MED SURG R045/Q388-22    NAME: Jose Parker    : 1950 (74 y.o.)  MRN: [id number]    Account: [de-identified]  Gender: female    Chart reviewed, attempted PT at 1504. Patient unavailable 2° to:    [] Hold per ns request    [x] Pt declined- pt sitting EOB upon arrival with clothes and personal items gathered. Pt declines therapy and states she is awaiting discharge today after getting dressing changed. [] Pt. . off floor for test/procedure. [] Pt. Unavailable       Will attempt PT treatment again at earliest convenience.       Electronically signed by Anthony Werner PTA on 10/19/22 at 3:08 PM EDT

## 2022-10-19 NOTE — DISCHARGE SUMMARY
[address]  [address]  [place], [address] [place]  MRN: [id number]  YOB: 1950  74 y. o.female      Attending  Kimmie Parker MD ?   Date of Admission  10/11/2022 Date of Discharge  1019/2022      DIAGNOSES:  Principal Problem:    Closed traumatic compression fracture of lumbar vertebra, initial encounter [facility])  Active Problems:    Acute post-operative pain    Acute pain due to trauma    Fall from standing    Impaired mobility and activities of daily living dt Acute compression deformity of the superior endplate of L1     S/P kyphoplasty  Resolved Problems:    * No resolved hospital problems. *    PROCEDURES:  10/17/2022 Dr. Dasilva Dus: L1 vertebral augmentation kyphoplasty. ?    DISCHARGE MEDICATIONS:   Current Discharge Medication List             Details   oxyCODONE (ROXICODONE) 5 MG immediate release tablet Take 1 tablet by mouth every 6 hours as needed for Pain (severe pain) for up to 5 days. Qty: 20 tablet, Refills: 0    Comments: Reduce doses taken as pain becomes manageable  Associated Diagnoses: S/P kyphoplasty; Closed traumatic compression fracture of lumbar vertebra, initial encounter ([facility] [address] Acute pain due to trauma;  Acute post-operative pain      sennosides-docusate sodium (SENOKOT-S) 8.6-50 MG tablet Take 1 tablet by mouth daily as needed for Constipation  Qty: 30 tablet, Refills: 0      methocarbamol (ROBAXIN) 750 MG tablet Take 1 tablet by mouth 4 times daily as needed (muscle spasms)  Qty: 40 tablet, Refills: 0                Details   acetaminophen (TYLENOL) 325 MG tablet Take 2 tablets by mouth every 6 hours as needed for Pain  Qty: 80 tablet, Refills: 0                Details   prasugrel (EFFIENT) 10 MG TABS Take 10 mg by mouth daily      furosemide (LASIX) 20 MG tablet Take 20 mg by mouth daily      metoprolol succinate (TOPROL XL) 25 MG extended release tablet Take 25 mg by mouth daily      magnesium oxide (MAG-OX) 400 MG tablet Take 400 mg by mouth daily      dofetilide (TIKOSYN) 125 MCG capsule Take 125 mcg by mouth 2 times daily      rivaroxaban (XARELTO) 20 MG TABS tablet Take 20 mg by mouth nightly      atorvastatin (LIPITOR) 40 MG tablet Take 40 mg by mouth nightly      traZODone (DESYREL) 50 MG tablet Take 50 mg by mouth nightly      Cranberry 400 MG CAPS Take by mouth      aspirin 81 MG EC tablet Take 81 mg by mouth daily      FLUoxetine (PROZAC) 20 MG capsule Take 60 mg by mouth daily      pantoprazole (PROTONIX) 40 MG tablet Take 1 tablet by mouth daily  Qty: 90 tablet, Refills: 3      darifenacin (ENABLEX) 7.5 MG extended release tablet TAKE 2 TABLETS (15 MG) EVERY MORNING AND 1 TABLET (7.5 MG) EVERY EVENING  Qty: 14 tablet, Refills: 2      Calcium-Magnesium 500-250 MG TABS Take 1 tablet by mouth 2 times daily       Multiple Vitamin (MULTI VITAMIN DAILY PO) Take 1 tablet by mouth daily       Vitamin D (CHOLECALCIFEROL) 1000 UNITS CAPS capsule Take 1,000 Units by mouth 2 times daily     Associated Diagnoses: Kidney stone; Hematuria      OMEGA 3 340 MG CPDR Take 500 mg by mouth daily     Associated Diagnoses: Kidney stone; Hematuria      ferrous sulfate 325 (65 FE) MG tablet Take 18 mg by mouth nightly    Associated Diagnoses: Kidney stone; Hematuria      Coenzyme Q10 (COQ10) 100 MG CAPS Take 1 tablet by mouth daily     Associated Diagnoses: Kidney stone; Hematuria             REASON FOR HOSPITALIZATION:  Ruben Murphy is a 74 y.o. female with a PMHx of depression, hiatal hernia, HTN, HLD, OA, CAD s/p stents, coagulopathy with prior PE (on Xarelto), and PSHx of hernia repair, hyster, knee replacement, prior L4-5 fusion with instrumentation who presented to [facility] [place] on 10/11/2022 s/p mechanical fall from standing. (-) head strike, (-) LOC, (+) Eliquis. Admitted to trauma. NSGY consulted. Trauma work up found L1 compression fracture. The patient was admitted to the [facility] under Trauma for further management.     SIGNIFICANT FINDINGS:  Catalog of Injuries:   S/p mechanical fall from standing on 10/11/22  L1 compression fracture (plan for OR 10/17)  Acute pain due to traumatic injury  S/p L1 vertebral augmentation kyphoplasty Osmin Temple)  Acute post op pain  Impaired mobility     Incidental Findings: None, JLR 10/12/22       HOSPITAL COURSE:  10/11/2022: Presented to ED with c/o low back pain s/p mechanical fall from standing. (-) head strike, (-) LOC, (+) Eliquis. Admitted to trauma. NSGY consulted. 10/12/2022: NSGY eval and rec's for MRI of L-spine. Pt with pacemaker and awaiting confirmation of MRI compatibility. 10/13/2022: TLSO brace fitted. MRI of L spine pending. Home medications resumed. Pacemaker interrogated and OK for MRI  10/14/2022: MRI completed. Plan for OR on 10/17 for vertebral augmentation  10/15/2022: No acute events, waiting for OR 10/17  10/16/2022: no acute events, waiting OR 10/17  10/17/2022: s/p L1 vertebral augmentation kyphoplasty with Dr. Raudel Valdez  10/18/2022: Multimodals adjusted for optimal post op pain control. PTOT eval completed and pt cleared for home with [facility]. 10/19/2022: Durenda Skains. HDS on RA, afebrile overnight. Patient feeling well this AM and pain well controlled. [facility] [place] orders placed. Tolerating diet, voiding and passing gas. Medically cleared for discharge home. At time of discharge, Levy Livingston was tolerating a regular diet, having bowel movements, and had adequate analgesia on oral pain medications. Pt's activity level was OOB ad ralph with brace PRN for comfort. The patient had no signs or symptoms of complications. Patient was determined stable for discharge to: Home with [address] [place] [address] Way    The patient was seen and examined on the day of discharge with the following findings:    Constitutional: Sitting on side of bed in NAD, Pleasant and conversant. HEENT: Atraumatic normocephalic. Cardiovascular: Regular rate and rhythm.  Bilateral radial and DP pulses intact   Pulmonary: Clear to ausculation bilaterally on room air. No wheezing, rhonchi or rales. Abdominal: Soft. Non-distended. Non-tender. Musculoskeletal: Moves all extremities to command. No edema. No LE numbness or weakness. TLSO brace in place, unable to visualize back incision  Neurological: Alert, awake, and orientated x 3. Motor and sensory grossly intact. GCS of 15. ANTICIPATED FOLLOW UP:  Future Appointments   Date Time Provider Naida Ott   11/1/2022  4:00 PM ANA Biswas CNP [facility]   11/15/2022  4:00 PM ANA Biswas CNP [facility] [address] [facility]     No discharge procedures on file. Other indicated follow up and instructions for scheduling:  - Follow up with NSGY (Dr. Greg Rich) in 1 month for post op check  - No follow up with Trauma Surgery needed. VTE RISK AT DISCHARGE:  Per trauma program protocol, the patient does not require post-discharge VTE prophylaxis due to: NWB single LE or BLE fractures limiting mobility. ANA Tobias NP  Trauma/Critical Care  Emergency General Surgery    [see treatment team sticky note for contact information]      35 minutes were spent on the discharge of this patient including final examination of the patient, discussion of the hospital stay, instructions for continuing care to all relevant caregivers, preparation of discharge records, prescriptions and referral forms, and clear identification of reasons to return to clinic or to emergency room.

## 2022-10-19 NOTE — PROGRESS NOTES
Physician Progress Note      PATIENT:               Gail Maciel  CSN #:                  [id number]  :                       1950  ADMIT DATE:       10/11/2022 8:43 PM  DISCH DATE:  RESPONDING  PROVIDER #:        Kathryn Blank          QUERY TEXT:    Pt admitted with L1 compression fracture noted as \"L1 osteoporotic compression   fracture\". by Dr. Torey Anthony on 10/12, 10/15 and op note. If possible, please   document in progress notes and discharge summary:    The medical record reflects the following:  Risk Factors: fall, osteoporosis  Clinical Indicators: 10/15 Dr. Torey Anthony \"significant progression of her   osteoporotic compression fracture at L1 to 60% compression. \"  Treatment: vertebra; augmentation kyphoplasty, neurosurgery consult, pain   control    Thank you, Freddy Cole RN BSN [facility]  [phone]  Options provided:  -- L1 osteoporotic compression fracture confirmed present on admission  -- Defer to neurosurgery consultant documentation regarding L1 osteoporotic   compression fracture  -- Other - I will add my own diagnosis  -- Disagree - Not applicable / Not valid  -- Disagree - Clinically unable to determine / Unknown  -- Refer to Clinical Documentation Reviewer    PROVIDER RESPONSE TEXT:    I defer to neurosurgery consultant regarding documentation of L1 osteoporotic   compression fracture.     Query created by: Delvin Mohan on 10/18/2022 10:57 AM      Electronically signed by:  Kathryn Blank 10/19/2022 9:10 AM

## 2022-10-20 NOTE — PROGRESS NOTES
Physical Therapy  Facility/Department: [facility] MED SURG [id number]  Physical Therapy Discharge      NAME: Zuhair Rodriguez    : 1950 (53 y.o.)  MRN: [id number]    Account: [de-identified]  Gender: female      Patient has been discharged from acute care hospital. DC patient from current PT program.      Electronically signed by Young Quinones PT on 10/20/22 at 4:49 PM EDT

## 2022-11-01 ENCOUNTER — TELEPHONE (OUTPATIENT)
Dept: PAIN MANAGEMENT | Age: 72
End: 2022-11-01

## 2022-11-01 ENCOUNTER — OFFICE VISIT (OUTPATIENT)
Dept: PAIN MANAGEMENT | Age: 72
End: 2022-11-01

## 2022-11-01 VITALS
HEIGHT: 64 IN | SYSTOLIC BLOOD PRESSURE: 128 MMHG | OXYGEN SATURATION: 97 % | BODY MASS INDEX: 28.68 KG/M2 | TEMPERATURE: 98.3 F | WEIGHT: 168 LBS | RESPIRATION RATE: 14 BRPM | DIASTOLIC BLOOD PRESSURE: 78 MMHG | HEART RATE: 67 BPM

## 2022-11-01 DIAGNOSIS — S32.000A: ICD-10-CM

## 2022-11-01 DIAGNOSIS — Z98.890 S/P KYPHOPLASTY: ICD-10-CM

## 2022-11-01 DIAGNOSIS — G89.18 ACUTE POST-OPERATIVE PAIN: ICD-10-CM

## 2022-11-01 DIAGNOSIS — G89.11 ACUTE PAIN DUE TO TRAUMA: ICD-10-CM

## 2022-11-01 PROCEDURE — 99024 POSTOP FOLLOW-UP VISIT: CPT | Performed by: NURSE PRACTITIONER

## 2022-11-01 RX ORDER — OXYCODONE HYDROCHLORIDE 5 MG/1
5 TABLET ORAL 2 TIMES DAILY
Qty: 28 TABLET | Refills: 0 | Status: SHIPPED | OUTPATIENT
Start: 2022-11-01 | End: 2022-11-15 | Stop reason: SDUPTHER

## 2022-11-01 NOTE — PROGRESS NOTES
Pt here today for post op f/u. Pt is s/p L1 KYPHOPLASTY with Chin Ross MD on 10/17/2022  . She is having pain and ran out of medication. Denies N/T or pain  down the legs. She is using a walker and back brace. Walks with walker and gets in and out of chair unassisted. Pain meds: Ran out of medications. Incision: Steri strip removed. Incision is not visible. No edema or redness. Pain level: 7-8/10    PLAN: Will continue oxycodone for 14 more days, she will use sparingly.   PT salomon.

## 2022-11-01 NOTE — TELEPHONE ENCOUNTER
----- Message from ANA Bailey CNP sent at 11/1/2022  4:51 PM EDT -----  Please call patient and inform that we heard back from Dr. Tye Acuna and he does not require that she be seen at the 1 month cory postop, unless she feels it is necessary. At that visit however she would see myself again. Patient is welcome to keep that appointment for the time being and, if needed. At the time of the appointment if she is doing well, she can cancel.

## 2022-11-15 ENCOUNTER — OFFICE VISIT (OUTPATIENT)
Dept: PAIN MANAGEMENT | Age: 72
End: 2022-11-15

## 2022-11-15 VITALS
DIASTOLIC BLOOD PRESSURE: 84 MMHG | BODY MASS INDEX: 28.34 KG/M2 | SYSTOLIC BLOOD PRESSURE: 122 MMHG | WEIGHT: 166 LBS | TEMPERATURE: 97.7 F | HEIGHT: 64 IN

## 2022-11-15 DIAGNOSIS — G89.11 ACUTE PAIN DUE TO TRAUMA: ICD-10-CM

## 2022-11-15 DIAGNOSIS — Z98.890 S/P KYPHOPLASTY: ICD-10-CM

## 2022-11-15 DIAGNOSIS — S32.000A: ICD-10-CM

## 2022-11-15 DIAGNOSIS — G89.18 ACUTE POST-OPERATIVE PAIN: ICD-10-CM

## 2022-11-15 PROCEDURE — 99024 POSTOP FOLLOW-UP VISIT: CPT | Performed by: NURSE PRACTITIONER

## 2022-11-15 RX ORDER — OXYCODONE HYDROCHLORIDE 5 MG/1
5 TABLET ORAL 2 TIMES DAILY
Qty: 28 TABLET | Refills: 0 | Status: SHIPPED | OUTPATIENT
Start: 2022-11-15 | End: 2022-11-29

## 2022-11-15 NOTE — PROGRESS NOTES
Pt here today for post op f/u. Pt is s/p L1 KYPHOPLASTY with Mary Ann Camara MD on 10/17/2022    . Overall she feels she is doing ok. Was seen 2 weeks ago and doing better since then. Uses a walker at home, but no assistive devices today. Wearing back brace today. No concerns regarding the incision. She has a history of prior back surgery. Using some oxycodone. Walks independently and gets in and out of chair unassisted. Pain meds: 5/10, oxycodone 5mg BID  Incision: well approximated, no redness or drainage. PLAN: Continue oxycodone 5mg BID x14 days. Patient doing very well overall. Does not feel she will need to schedule follow up for 2 weeks. Will continue back brace.

## 2022-11-17 ENCOUNTER — HOSPITAL ENCOUNTER (OUTPATIENT)
Dept: PHYSICAL THERAPY | Age: 72
Setting detail: THERAPIES SERIES
Discharge: HOME | End: 2022-11-17
Payer: MEDICARE

## 2022-11-17 PROCEDURE — 97162 PT EVAL MOD COMPLEX 30 MIN: CPT

## 2022-11-17 PROCEDURE — 97110 THERAPEUTIC EXERCISES: CPT

## 2022-11-17 ASSESSMENT — PAIN SCALES - GENERAL: PAINLEVEL_OUTOF10: 5

## 2022-11-17 ASSESSMENT — PAIN - FUNCTIONAL ASSESSMENT: PAIN_FUNCTIONAL_ASSESSMENT: PREVENTS OR INTERFERES WITH ALL ACTIVE AND SOME PASSIVE ACTIVITIES

## 2022-11-17 ASSESSMENT — PAIN DESCRIPTION - FREQUENCY: FREQUENCY: CONTINUOUS

## 2022-11-17 ASSESSMENT — PAIN DESCRIPTION - DESCRIPTORS: DESCRIPTORS: ACHING;THROBBING;STABBING

## 2022-11-17 ASSESSMENT — PAIN DESCRIPTION - LOCATION: LOCATION: BACK

## 2022-11-17 ASSESSMENT — PAIN DESCRIPTION - PAIN TYPE: TYPE: CHRONIC PAIN;SURGICAL PAIN

## 2022-11-17 NOTE — PROGRESS NOTES
[facility]) Physical Therapy-  [facility]   PHYSICAL THERAPY EVALUATION      Physical Therapy: Initial Evaluation    Patient: Jammie Gold (85 y.o.     female)   Examination Date:   Plan of Care Certification Period: 2022 to 01/15/23  Progress Note Counter: 1/10   :  1950 ;    Confirmed: Yes MRN: [id number]  CSN: [id number]   Insurance: Payor: Sean Reaves / Plan: Esa Soliman PPO / Product Type: Medicare /   Insurance ID: [id number] - (Medicare Managed) Secondary Insurance (if applicable):    Referring Physician: ANA Manley -*     PCP: Rakesh Harris DO Visits to Date/Visits Approved:   (based on medical necessity)    No Show/Cancelled Appts: 0 / 0     Medical Diagnosis: S/P kyphoplasty [Z98.890]  Closed traumatic compression fracture of lumbar vertebra, initial encounter ([facility] [S32.000A]  Acute pain due to trauma [G89.11]  Acute post-operative pain [G89.18]    Treatment Diagnosis: back pain, impaired mobility     PERTINENT MEDICAL HISTORY      Self reported health status[de-identified] Fair    Medical History: Chart Reviewed: Yes   Past Medical History:   Diagnosis Date    Abnormal EKG, needs cardiac clearence 2013    ACE-inhibitor cough 7/10/2012    ARB     Anemia     CAD (coronary artery disease) 2008    cardia stents x 2    Cancer ([facility]     facial skin cancer excision    Coagulopathy ([facility] 10/4/2012    Coagulopathy, on coumadin 2013    Cough 2012    Cough, Neg w/u Dr Alvarez Gaytan, ?  Betablocker 2014    Depression     Depression 2017    Epistaxis 2012    Fibrocystic breast disease     Hiatal hernia 5/3/2016    History of chest x-ray, normal, 8/6/14 10/17/2014    History of PTCA 2, stent x 2 , , Lulu Bishop 2013    Hx of blood clots     PE s/p RTKR post op    Hyperlipidemia     meds > 10 yrs    Hypertension     meds > 10 yrs    Nephrolithiasis     OA (osteoarthritis)     Osteoporosis     Right-sided low back pain with right-sided sciatica 5/3/2016    Urge incontinence     Wheezes 4/20/2015     Surgical History:   Past Surgical History:   Procedure Laterality Date    CARDIAC SURGERY      stent x 2    COLONOSCOPY      CORONARY ANGIOPLASTY WITH STENT PLACEMENT  3/12    [facility]    ENDOSCOPY, COLON, DIAGNOSTIC      EYE SURGERY      [place] with IOL OU    HERNIA REPAIR      left inguinal hernia    HYSTERECTOMY, TOTAL ABDOMINAL (CERVIX REMOVED)  1998    JOINT REPLACEMENT  2008    LTKR    JOINT REPLACEMENT  2017    RTSR    KNEE ARTHROSCOPY  11/11    R knee, [facility]    LITHOTRIPSY  2003 approx    [place] LAMINECTOMY,>2 SGMT,LUMBAR N/A 8/22/2018    SPINE L3-4, L4-5, L5-S1 LAMINECTOMY, L3-4, L4-5 POSTERIOR LATERAL FUSION, L5-S1 INSTRUMENTED POSTERIOR INTERBODY FUSION, PRONE, AXIS SPINE TABLE, ACTIFUSE, CELL SAVER, NEW MICROSCOPE, NEW C-ARM X2, PEDIGUARD, MISONIX BONE SCALPAL, PNEUMATIC KERRISONS, SPINAL CORD MONITORING, LUMBAR BRACE, GLOBUS, OPEN TLIF, (H.S.C.G.#[id number]) performed by Leticia Knott MD at [address]  2/2008    [facility]    ROTATOR CUFF REPAIR Right     SKIN GRAFT  2/9/11    SPINE SURGERY N/A 10/17/2022    L1 KYPHOPLASTY performed by Golden Domínguez MD at [address] (CERVIX REMOVED)  1996 approx    TOTAL KNEE ARTHROPLASTY Left 08/2008       Medications:   Current Outpatient Medications:     oxyCODONE (ROXICODONE) 5 MG immediate release tablet, Take 1 tablet by mouth in the morning and 1 tablet in the evening. Do all this for 14 days. , Disp: 28 tablet, Rfl: 0    sennosides-docusate sodium (SENOKOT-S) 8.6-50 MG tablet, Take 1 tablet by mouth daily as needed for Constipation, Disp: 30 tablet, Rfl: 0    acetaminophen (TYLENOL) 325 MG tablet, Take 2 tablets by mouth every 6 hours as needed for Pain, Disp: 80 tablet, Rfl: 0    prasugrel (EFFIENT) 10 MG TABS, Take 10 mg by mouth daily, Disp: , Rfl:     furosemide (LASIX) 20 MG tablet, Take 20 mg by mouth daily, Disp: , Rfl:     metoprolol succinate (TOPROL XL) 25 MG extended release tablet, Take 25 mg by mouth daily, Disp: , Rfl:     magnesium oxide (MAG-OX) 400 MG tablet, Take 400 mg by mouth daily, Disp: , Rfl:     dofetilide (TIKOSYN) 125 MCG capsule, Take 125 mcg by mouth 2 times daily, Disp: , Rfl:     rivaroxaban (XARELTO) 20 MG TABS tablet, Take 20 mg by mouth nightly, Disp: , Rfl:     atorvastatin (LIPITOR) 40 MG tablet, Take 40 mg by mouth nightly, Disp: , Rfl:     traZODone (DESYREL) 50 MG tablet, Take 50 mg by mouth nightly, Disp: , Rfl:     Cranberry 400 MG CAPS, Take by mouth, Disp: , Rfl:     aspirin 81 MG EC tablet, Take 81 mg by mouth daily, Disp: , Rfl:     FLUoxetine (PROZAC) 20 MG capsule, Take 60 mg by mouth daily, Disp: , Rfl:     pantoprazole (PROTONIX) 40 MG tablet, Take 1 tablet by mouth daily, Disp: 90 tablet, Rfl: 3    darifenacin (ENABLEX) 7.5 MG extended release tablet, TAKE 2 TABLETS (15 MG) EVERY MORNING AND 1 TABLET (7.5 MG) EVERY EVENING, Disp: 14 tablet, Rfl: 2    Calcium-Magnesium 500-250 MG TABS, Take 1 tablet by mouth 2 times daily , Disp: , Rfl:     Multiple Vitamin (MULTI VITAMIN DAILY PO), Take 1 tablet by mouth daily , Disp: , Rfl:     Vitamin D (CHOLECALCIFEROL) 1000 UNITS CAPS capsule, Take 1,000 Units by mouth 2 times daily , Disp: , Rfl:     OMEGA 3 340 MG CPDR, Take 500 mg by mouth daily , Disp: , Rfl:     ferrous sulfate 325 (65 FE) MG tablet, Take 18 mg by mouth nightly, Disp: , Rfl:     Coenzyme Q10 (COQ10) 100 MG CAPS, Take 1 tablet by mouth daily , Disp: , Rfl:   Allergies: Tolterodine      SUBJECTIVE EXAMINATION     History obtained from[de-identified] Patient, Chart Review,      Family/Caregiver Present: No    Subjective History: Onset Date: 10/17/22  Subjective: Pt with injury and fall after kneeling down for extended period of time 10/11/22. Pt with L1 osteoporotic compression fracture. , underwent L1 vertebral augmentation kyphoplasty due to severe back pain.  Pt states pain is not too bad first thing in the am and gets worse as the day goes on. Pt states pain is about the same since surgery, but movement is improving. Additional Pertinent Hx (if applicable): CAD, depression, HTN, OA, lumbar compression fx, kyphoplasty, PPM   Previous treatments prior to current episode?: Surgery      Learning/Language: Learning  Does the patient/guardian have any barriers to learning?: No barriers  Will there be a co-learner?: No  What is the preferred language of the patient/guardian?: English  Is an  required?: No  How does the patient/guardian prefer to learn new concepts?: Listening, Reading, Demonstration     Pain Screening    Pain Screening  Patient Currently in Pain: Yes  Pain Assessment: 0-10  Pain Level: 5  Best Pain Level: 2  Worst Pain Level: 10  Pain Type: Chronic pain, Surgical pain  Pain Location: Back  Pain Radiating Towards: primarily low back and into hips, denies radicular symptoms into legs, denies numbness or tingling  Pain Descriptors: Aching, Throbbing, Stabbing  Pain Frequency: Continuous  Functional Pain Assessment: Prevents or interferes with all active and some passive activities  Aggravating factors: Walking, Standing (worse as the day goes on)  Pain Management/Relieving Factors: Rest, Heat, Laying supine    Functional Status    Social History:    Social History  Lives With: Significant other  Type of Home: House  Home Layout: One level  Home Access: Stairs to enter with rails  Entrance Stairs - Rails: Right  Entrance Stairs - Number of Steps: 2  Bathroom Shower/Tub: Walk-in shower  Home Equipment: Walker, rolling, Alert Button    Occupation/Interests:   Occupation: Retired  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 States she is independent with most activities, but increased pain with increased rest breaks, especially with cooking.  Increased time to complete tasks.       ADL Assistance: Independent  Homemaking Assistance: Independent  Ambulation Assistance: Independent  Transfer Assistance: Independent  Active : Yes (currently limited distance)    Dominant Hand: : Right    OBJECTIVE EXAMINATION     Restrictions:         Position Activity Restriction  Spinal Precautions: No Bending, No Lifting, No Twisting (to pt tolerance)  Spinal Precautions: no vacuuming, wearing back brace as needed - currently 50%    Review of Systems:  Vision: Impaired  Visual Deficits: Wears glasses, Macular degeneration  Hearing: Within functional limits  Overall Orientation Status: Within Functional Limits  Follows Commands: Within Functional Limits    VBI Screening / Lumbar Screening:    Bowel/bladder disturbances: No  Saddle anesthesia: No  Unexplained weight loss: No  Severe motor weakness: No (decreased endurance with increased weakness end of day)  Stumbling or giving way while walking: No  Unrelenting pain at night: No    Observations:   General Observations  Description: no sig asymmetry, mild increased lumbar lordosis, mild fwd head and rounded shoulders; healed incision lumbar    Palpation:   Lumbar Spine Palpation: increased pain with palpation bilateral PSIS and Rt sciatic notch    Mobility:   Bed mobility  Rolling to Left: Independent  Rolling to Right: Independent  Supine to Sit: Independent  Sit to Supine: Independent  Bed Mobility Comments: VCs for technique to minimize back strain     Transfers  Sit to Stand: Independent  Stand to Sit: Independent  Comment: increased time and effort to complete, FTSTS: 21.7 seconds  Ambulation  Surface: Level tile, Carpet  Assistance: Independent  Gait Deviations: Slow Milena, Decreased step length, Decreased step height, Decreased arm swing, Deviated path    Left PROM  Right PROM         PROM LLE (degrees)  L SLR: 54    PROM RLE (degrees)  R SLR: 6`        Left Strength  Right Strength         Strength LLE  L Hip Flexion: 4/5  L Knee Flexion: 5/5  L Knee Extension: 5/5  L Ankle Dorsiflexion: 5/5    Strength RLE  R Hip Flexion: 4-/5  R Hip ABduction: 3+/5  R Hip ADduction: 3+/5  R Knee Flexion: 5/5  R Knee Extension: 4+/5  R Ankle Dorsiflexion: 5/5       Lumbar Assessment     AROM Lumbar Spine   Flexion: 34  Extension: 7          Special Tests:   Special Tests Lumbar Spine  DANII Test: R (-), L (-)  Crossed SLR: R (-), L (-)  Slump Test: R (+), L (+)  Pelvic Compression: (+)  Pelvic Distraction: (-)  Special Tests for Hip  DANII Test: R (-), L (-)  Scour (OA, Labrum): R (-), L (-)    Outcomes Score:  Exam: musculoskeletal, pain and sensory systems involved impacting strength, ROM, transfers, bed mob, gait; Oswestry: 26     Treatment:    Exercises:   Exercises  Exercise 1: TA isometrics 5sec/ 10  Exercise 2: pelvic tilts 5 sec 10  Exercise 3: SLR x10  Exercise 4: * bridges  Exercise 5: * 3 way SLR  Exercise 6: * Nu step  Exercise 7: * gait drills  Exercise 8: * sink exs  Exercise 9: * add step ups  Exercise 10: * add single knee to chest  Exercise 11: * add piriformis str as needed  Exercise 12: * body mechanics trg  Exercise 20: HEP: TA isometrics, pelvic tilts, SLR  Treatment Reasoning  Limitations addressed: Mobility, Strength, Coordination, Balance, Flexibility, Activity tolerance  Modalities:  Modalities:  (* HP PRN, no estim due to PPM)     Manual:  Manual Therapy  Soft Tissue Mobilizaton: * lumbar paraspinals  Other: * consider KT * lumbar  Treatment Reasoning  Limitations addressed: Joint motion, Tissue extensibility  *Indicates exercise,modality, or manual techniques to be initiated when appropriate       ASSESSMENT     Impression: Assessment: Pt presents with impaired functional mobility following L1 fracture during fall with subsequent surgery. Pt states pain has not changed since surgery, but improved overall mobility. Pt demonstrates mild LE weakness and difficulty with transfers.  discomfort with palpation bilateral PSIS and Rt sciatic area. Pt denies radicular pain or sensory changes. Pt would benefit from skilled PT to address deficits and return to previous function with minimal    Body Structures, Functions, Activity Limitations Requiring Skilled Therapeutic Intervention: Decreased functional mobility , Decreased ADL status, Decreased ROM, Decreased body mechanics, Decreased strength, Decreased endurance, Increased pain    Statement of Medical Necessity: Physical Therapy is both indicated and medically necessary as outlined in the POC to increase the likelihood of meeting the functionally related goals stated below.      Patient's Activity Tolerance: Patient tolerated evaluation without incident      Patient's rehabilitation potential/prognosis is considered to be: Good    Factors which may impact rehabilitation potential include: Age     Patient Education: Goals, PT Role, Plan of Care, Evaluative findings, Home Exercise Program, Functional Mobility Training      GOALS   Patient Goal(s): Patient Goals : decrease pain, return to 100%, cooking without rest breaks, increased energy    Short Term Goals Completed by 3 wks Goal Status   Independent with HEP New   report 25% reduction in pain with </= 6/10 in the morning New     Long Term Goals Completed by 6 wks Goal Status   Improve bilateral LE strength >/= 4+/5 to improve tolerance to cooking and walking New   Improve ease with transfers, five times sit to stand </= 17 seconds New   Oswestry </= 15 to demonstrate improved overall activity tolerance New   Pt will improve endurance to tolerate 20 min activity without rest break New        TREATMENT PLAN       Requires PT Follow-Up: Yes    Treatment may include any combination of the following: Strengthening, ROM, Balance training, Functional mobility training, Transfer training, Endurance training, Gait training, Manual Therapy - Soft Tissue Mobilization, Home exercise program, Safety education & training, Patient/Caregiver education & training, Equipment evaluation, education, & procurement, Modalities     Frequency / Duration:  Patient to be seen 2 times per week for 6 weeks  Plan Comment:               Eval Complexity:   Decision Making: Medium Complexity  History: Personal Factors and/or Comorbidities Impacting POC: Medium  History: personal factors: age, pain; contributing PMH: CAD, depression, HTN, OA, lumbar compression fx, kyphoplasty, PPM  Examination of body system(s) including body structures and functions, activity limitations, and/or participation restrictions: Medium  Exam: musculoskeletal, pain and sensory systems involved impacting strength, ROM, transfers, bed mob, gait; Oswestry: 26  Clinical Presentation: Medium  Clinical Presentation: complicated    POST-PAIN     Pain Rating (0-10 pain scale): 5  /10  Location and pain description same as pre-treatment unless indicated. Action: [] NA  [] Call Physician  [x] Perform HEP  [x] Meds as prescribed    Evaluation and patient rights have been reviewed and patient agrees with plan of care. Yes  [x]  No  []   Explain:     [place] Fall Risk Assessment  Risk Factor Scale  Score   History of Falls [] Yes  [x] No 25  0 0   Secondary Diagnosis [] Yes  [x] No 15  0 0   Ambulatory Aid [] Furniture  [] Crutches/cane/walker  [x] None/bedrest/wheelchair/nurse 30  15  0 0   IV/Heparin Lock [] Yes  [x] No 20  0 0   Gait/Transferring [] Impaired  [] Weak  [x] Normal/bedrest/immobile 20  10  0 0   Mental Status [] Forgets limitations  [x] Oriented to own ability 15  0 0      Total: 0     Based on the Assessment score: check the appropriate box.   []  No intervention needed   Low =   Score of 0-24  []  Use standard prevention interventions Moderate =  Score of 24-44   [] Discuss fall prevention strategies   [] Indicate moderate falls risk on eval  []  Use high risk prevention interventions High = Score of 45 and higher   [] Discuss fall prevention strategies   [] Provide supervision during treatment time      Minutes:  PT Individual Minutes  Time In: 1013  Time Out: 1102  Minutes: 49  Timed Code Treatment Minutes: 10 Minutes  Procedure Minutes: 39 eval      Timed Activity Minutes Units   Ther Ex 10 1     Electronically signed by Kulwinder London PT on 11/17/22 at 11:16 AM EST

## 2022-11-17 NOTE — PLAN OF CARE
[facility] [place]  PHYSICAL THERAPY PLAN OF CARE   [facility]      [address], [address] [facility], [address]     Ph: [phone] Fax: [phone]      [x] Certification  [] Recertification [x]  Plan of Care  [] Progress Note [] Discharge      Referring Provider: ANA Arteaga -*      From:  Wendie Munoz PT   Patient: Hernandez Ladd (56 y.o. female) : 1950 Date: 2022   Medical Diagnosis: S/P kyphoplasty [Z98.890]  Closed traumatic compression fracture of lumbar vertebra, initial encounter ([facility] [S32.000A]  Acute pain due to trauma [G89.11]  Acute post-operative pain [G89.18]    Treatment Diagnosis: back pain, impaired mobility    Plan of Care/Certification Expiration Date: : 01/15/23   Progress Report Period from:  2022  to 2022    Visits to Date: 1 No Show: 0 Cancelled Appts: 0    OBJECTIVE:   Short Term Goals - Time Frame for Short Term Goals: 3 wks    Goals Current/Discharge status  Status   Short Term Goal 1: Independent with HEP  Need for HEP and education  New   Short Term Goal 2: report 25% reduction in pain with </= 6/10 in the morning  C/o 10/10 pain at times, worse as the day goes on  New     Long Term Goals - Time Frame for Long Term Goals : 6 wks  Goals Current/ Discharge status Status   Long Term Goal 1: Improve bilateral LE strength >/= 4+/5 to improve tolerance to cooking and walking Strength RLE  R Hip Flexion: 4-/5  R Hip ABduction: 3+/5  R Hip ADduction: 3+/5  R Knee Flexion: 5/5  R Knee Extension: 4+/5  R Ankle Dorsiflexion: 5/5  Strength LLE  L Hip Flexion: 4/5  L Knee Flexion: 5/5  L Knee Extension: 5/5  L Ankle Dorsiflexion: 5/5    New   Long Term Goal 2: Improve ease with transfers, five times sit to stand </= 17 seconds 27.8 seconds  New   Long Term Goal 3: Oswestry </= 15 to demonstrate improved overall activity tolerance 26 New   Long Term Goal 4: Pt will improve endurance to tolerate 20 min activity without rest break C/o increased pain and decreased endurance, requiring multiple rest breaks during meal prep New     Body Structures, Functions, Activity Limitations Requiring Skilled Therapeutic Intervention: Decreased functional mobility , Decreased ADL status, Decreased ROM, Decreased body mechanics, Decreased strength, Decreased endurance, Increased pain  Assessment: Pt presents with impaired functional mobility following L1 fracture during fall with subsequent surgery. Pt states pain has not changed since surgery, but improved overall mobility. Pt demonstrates mild LE weakness and difficulty with transfers. discomfort with palpation bilateral PSIS and Rt sciatic area. Pt denies radicular pain or sensory changes. Pt would benefit from skilled PT to address deficits and return to previous function with minimal  Therapy Prognosis: Good      PT Education: Goals;PT Role;Plan of Care;Evaluative findings;Home Exercise Program;Functional Mobility Training    PLAN: [x] Evaluate and Treat  Frequency/Duration:  Plan Frequency: 2  Plan weeks: 6  Current Treatment Recommendations: Strengthening, ROM, Balance training, Functional mobility training, Transfer training, Endurance training, Gait training, Manual Therapy - Soft Tissue Mobilization, Home exercise program, Safety education & training, Patient/Caregiver education & training, Equipment evaluation, education, & procurement, Modalities     Precautions:         Spinal Precautions: No Bending; No Lifting; No Twisting (to pt tolerance)  Spinal Precautions: no vacuuming, wearing back brace as needed - currently 50%                  Patient Status:[x] Continue/ Initiate plan of Care    [] Discharge PT. Recommend pt continue with HEP. [] Additional visits requested, Please re-certify for additional visits:    [] Hold         Signature: Electronically signed by Mariusz Wilks PT on 11/17/22 at 4:47 PM EST    If you have any questions or concerns, please don't hesitate to call.   Thank you for your referral.

## 2022-11-23 ENCOUNTER — HOSPITAL ENCOUNTER (OUTPATIENT)
Dept: PHYSICAL THERAPY | Age: 72
Setting detail: THERAPIES SERIES
Discharge: HOME | End: 2022-11-23
Payer: MEDICARE

## 2022-11-23 PROCEDURE — 97110 THERAPEUTIC EXERCISES: CPT

## 2022-11-23 ASSESSMENT — PAIN DESCRIPTION - FREQUENCY: FREQUENCY: INTERMITTENT

## 2022-11-23 ASSESSMENT — PAIN SCALES - GENERAL: PAINLEVEL_OUTOF10: 5

## 2022-11-23 ASSESSMENT — PAIN DESCRIPTION - DESCRIPTORS: DESCRIPTORS: ACHING;THROBBING

## 2022-11-23 ASSESSMENT — PAIN DESCRIPTION - PAIN TYPE: TYPE: CHRONIC PAIN

## 2022-11-23 ASSESSMENT — PAIN DESCRIPTION - LOCATION: LOCATION: BACK;HIP

## 2022-11-23 ASSESSMENT — PAIN DESCRIPTION - ORIENTATION: ORIENTATION: RIGHT;LEFT

## 2022-11-23 NOTE — PROGRESS NOTES
[address] [facility]  Outpatient Physical Therapy    Treatment Note        Date: 2022  Patient: Manolo Monte  : 1950   Confirmed: Yes  MRN: [id number]  Referring Provider: ANA Delgado -*   Secondary Referring Provider (If applicable):     Medical Diagnosis: S/P kyphoplasty [Z98.890]  Closed traumatic compression fracture of lumbar [facility] [S32.000A]  Acute pain due to trauma [G89.11]  Acute post-operative pain [G89.18]    Treatment Diagnosis: back pain, impaired mobility    Visit Information:  Insurance: Payor: Tasha Reyna / Plan: Jesi Badillo PPO / Product Type: Medicare /   PT Visit Information  Onset Date: 10/17/22  PT Insurance Information: Sheela Stuart Medicare  Total # of Visits to Date: 2  Plan of Care/Certification Expiration Date: 01/15/23  No Show: 0  Progress Note Due Date: 22  Canceled Appointment: 0  Progress Note Counter: 2/10    Subjective Information:  Subjective: Pt with back brace on, stating shes been wearing off and on. Pt stating following back precautions as best as she can. Difficulty picking items up off the floor. HEP Compliance:  [x] Good [] Fair [] Poor [] Reports not doing due to:    Pain Screening  Patient Currently in Pain: Yes  Pain Assessment: 0-10  Pain Level: 5  Pain Type: Chronic pain  Pain Location: Back, Hip  Pain Orientation: Right, Left  Pain Descriptors: Aching, Throbbing  Pain Frequency: Intermittent    Treatment:  Exercises:  Exercises  Exercise 1: TA isometrics 5sec/ 10  Exercise 2: pelvic tilts 5 sec/ 10  Exercise 3: SLR x10  Exercise 4: bridges w/ PPT 5\"/10  Exercise 5: 3 way SLR x10 b'ly  Exercise 6: Nu step [id number]  Exercise 8: sink exs: hip flexion/AB/squats x10 B'ly  Exercise 10: SKC 3/20\" B'ly  Exercise 12: body mechanics trg  Exercise 20: HEP: TA isometrics, pelvic tilts, SLR, add SKC  Treatment Reasoning  Limitations addressed:  Mobility, Strength, Coordination, Balance, Flexibility, Activity tolerance    Modalities:  Moist Heat (CPT 99292)  Patient Position: Seated  Moist heat location: Low back  Post treatment skin assessment: Redness - no adverse reaction     Assessment: Body Structures, Functions, Activity Limitations Requiring Skilled Therapeutic Intervention: Decreased functional mobility , Decreased ADL status, Decreased ROM, Decreased body mechanics, Decreased strength, Decreased endurance, Increased pain  Assessment: Initiated POC this date to improve pain, mobility, and weakness. Pt educated on proper body mechanics to avoid excessive bending and twisting when picking up objects off the floor including using a reacher where appropriate. Pt tolerates all exercises without increase in LBP, but reports fatigue. Treatment concluded with MH to LB with pt reporting improvement in symptoms. Treatment Diagnosis: back pain, impaired mobility  Therapy Prognosis: Good      Post-Pain Assessment:       Pain Rating (0-10 pain scale):   4/10   Location and pain description same as pre-treatment unless indicated.    Action: [] NA   [x] Perform HEP  [] Meds as prescribed  [] Modalities as prescribed   [] Call Physician     GOALS   Patient Goal(s): Patient Goals : decrease pain, return to 100%, cooking without rest breaks, increased energy    Short Term Goals Completed by 3 wks Goal Status   STG 1 Independent with HEP In progress   STG 2 report 25% reduction in pain with </= 6/10 in the morning In progress       Long Term Goals Completed by 6 wks Goal Status   LTG 1 Improve bilateral LE strength >/= 4+/5 to improve tolerance to cooking and walking In progress   LTG 2 Improve ease with transfers, five times sit to stand </= 17 seconds In progress   LTG 3 Oswestry </= 15 to demonstrate improved overall activity tolerance In progress   LTG 4 Pt will improve endurance to tolerate 20 min activity without rest break In progress            Plan:  Frequency/Duration:  Plan  Plan Frequency: 2  Plan weeks: 6  Current Treatment Recommendations: Strengthening, ROM, Balance training, Functional mobility training, Transfer training, Endurance training, Gait training, Manual Therapy - Soft Tissue Mobilization, Home exercise program, Safety education & training, Patient/Caregiver education & training, Equipment evaluation, education, & procurement, Modalities  Pt to continue current HEP. See objective section for any therapeutic exercise changes, additions or modifications this date.     Therapy Time:      PT Individual Minutes  Time In: 7069  Time Out: 4507  Minutes: 48  Timed Code Treatment Minutes: 38 Minutes  Procedure Minutes: MH 10 minutes   Timed Activity Minutes Units   Ther Ex 38 3     Electronically signed by Anthony Rosales PTA on 11/23/22 at 10:48 AM EST

## 2022-11-29 ENCOUNTER — HOSPITAL ENCOUNTER (OUTPATIENT)
Dept: PHYSICAL THERAPY | Age: 72
Setting detail: THERAPIES SERIES
Discharge: HOME | End: 2022-11-29
Payer: MEDICARE

## 2022-11-29 NOTE — PROGRESS NOTES
Therapy                            Cancellation/No-show Note    Date: 2022  Patient: Barbara Escobar (12 y.o. female)  : 1950  MRN:  [id number]  Referring Physician: ANA Bray CNP    Medical Diagnosis: S/P kyphoplasty [J56.716]  Closed traumatic compression [facility] [S32.000A]  Acute pain due to trauma [G89.11]  Acute post-operative pain [G89.18]      Visit Information:  Insurance: Payor: Saturnino Hewitt / Plan: Alla Bledsoe PPO / Product Type: Medicare /   Visits to Date: 2   No Show/Cancelled Appts:       For today's appointment patient:  []  Cancelled  []  Rescheduled appointment  [x]  No-show   [x]  Called pt to remind of next appointment     Reason given by patient:  []  Patient ill  []  Conflicting appointment  []  No transportation    []  Conflict with work  []  No reason given  []  Other:      [x] Pt has future appointments scheduled, no follow up needed  [] Pt requests to be on hold.     Reason:   If > 2 weeks please discuss with therapist.  [] Therapist to call pt for follow up     Comments:       Signature: Electronically signed by Eileen Sal PTA on 22 at 10:34 AM EST

## 2022-12-05 ENCOUNTER — HOSPITAL ENCOUNTER (OUTPATIENT)
Dept: PHYSICAL THERAPY | Age: 72
Setting detail: THERAPIES SERIES
Discharge: HOME | End: 2022-12-05
Payer: MEDICARE

## 2022-12-05 PROCEDURE — 97110 THERAPEUTIC EXERCISES: CPT

## 2022-12-05 ASSESSMENT — PAIN DESCRIPTION - ORIENTATION: ORIENTATION: LEFT

## 2022-12-05 ASSESSMENT — PAIN DESCRIPTION - LOCATION: LOCATION: BACK;HIP

## 2022-12-05 ASSESSMENT — PAIN SCALES - GENERAL: PAINLEVEL_OUTOF10: 6

## 2022-12-05 ASSESSMENT — PAIN DESCRIPTION - FREQUENCY: FREQUENCY: INTERMITTENT

## 2022-12-05 ASSESSMENT — PAIN DESCRIPTION - PAIN TYPE: TYPE: CHRONIC PAIN

## 2022-12-05 NOTE — PROGRESS NOTES
[address] [facility]  Outpatient Physical Therapy    Treatment Note        Date: 2022  Patient: Laina Burciaga  : 1950   Confirmed: Yes  MRN: [id number]  Referring Provider: ANA Rojo -*   Secondary Referring Provider (If applicable):     Medical Diagnosis: S/P kyphoplasty [Z98.890]  Closed traumatic compression [facility] lumbar vertebra[facility] [S32.000A]  Acute pain due to trauma [G89.11]  Acute post-operative pain [G89.18]         Visit Information:  Insurance: Payor: Angelica Zendrive / Plan: Aliza Bloom PPO / Product Type: Medicare /   PT Visit Information  Onset Date: 10/17/22  PT Insurance Information: Yasir Reyna Medicare  Total # of Visits to Date: 3  Plan of Care/Certification Expiration Date: 01/15/23  No Show: 1  Progress Note Due Date: 22  Canceled Appointment: 0  Progress Note Counter: 3/10    Subjective Information:  Subjective: Pt states when getting up and walking around the room left back and hip starts hurting. Pt states wearing back brace 50% of the day and always when outside of the home. HEP Compliance:  [x] Good [] Fair [] Poor [] Reports not doing due to:    Pain Screening  Patient Currently in Pain: Yes  Pain Level: 6  Pain Type: Chronic pain  Pain Location: Back, Hip  Pain Orientation: Left  Pain Frequency: Intermittent    Treatment:  Exercises:  Exercises  Exercise 1: TA isometrics 5sec/ 10  Exercise 2: pelvic tilts 5 sec/ 10  Exercise 4: bridges w/ PPT 5\"/10  Exercise 5: 3 way SLR x10 b'ly  Exercise 6: Nu step [id number]  Exercise 7: gait drills F/R/L x2 laps each  Exercise 8: sink exs x10 B'ly  Exercise 10: SKC 3/20\" B'ly  Exercise 12: body mechanics trg  Exercise 20: HEP: continue current, add sink exercises  Treatment Reasoning  Limitations addressed:  Mobility, Strength, Coordination, Balance, Flexibility, Activity tolerance    Modalities:  Moist Heat (CPT 22731)  Patient Position: Seated  Moist heat location: Low back  Post treatment skin assessment: Redness - no adverse reaction         Assessment: Body Structures, Functions, Activity Limitations Requiring Skilled Therapeutic Intervention: Decreased functional mobility , Decreased ADL status, Decreased ROM, Decreased body mechanics, Decreased strength, Decreased endurance, Increased pain  Assessment: Pt reports decreased ability to perform activities for 20 minutes throughout the day. Pt requires frequent rest breaks due to fatigue and pain. Pt educated on proper body mechnaics during bed mobility to reduce pain with getting in and out of bed as well as changing positions in bed. Initiated gait drills with decreased stability during marching with 1 LOB requiring physical assist from this therapist to regain balance. Treatment concluded with MH to LB to reduce pain. Post-Pain Assessment:       Pain Rating (0-10 pain scale):   4/10   Location and pain description same as pre-treatment unless indicated.    Action: [] NA   [x] Perform HEP  [] Meds as prescribed  [] Modalities as prescribed   [] Call Physician     GOALS   Patient Goal(s): Patient Goals : decrease pain, return to 100%, cooking without rest breaks, increased energy    Short Term Goals Completed by 3 wks Goal Status   STG 1 Independent with HEP In progress   STG 2 report 25% reduction in pain with </= 6/10 in the morning In progress       Long Term Goals Completed by 6 wks Goal Status   LTG 1 Improve bilateral LE strength >/= 4+/5 to improve tolerance to cooking and walking In progress   LTG 2 Improve ease with transfers, five times sit to stand </= 17 seconds In progress   LTG 3 Oswestry </= 15 to demonstrate improved overall activity tolerance In progress   LTG 4 Pt will improve endurance to tolerate 20 min activity without rest break In progress            Plan:  Frequency/Duration:  Plan  Plan Frequency: 2  Plan weeks: 6  Current Treatment Recommendations: Strengthening, ROM, Balance training, Functional mobility training, Transfer training, Endurance training, Gait training, Manual, Home exercise program, Patient/Caregiver education & training, Modalities, Equipment evaluation, education, & procurement  Pt to continue current HEP. See objective section for any therapeutic exercise changes, additions or modifications this date.     Therapy Time:      PT Individual Minutes  Time In: 0332  Time Out: 9885  Minutes: 50  Timed Code Treatment Minutes: 50 Minutes  Procedure Minutes: [address] 10  Timed Activity Minutes Units   Ther Ex 40 3     Electronically signed by Terence Rodriguez PTA on 12/5/22 at 4:47 PM EST

## 2022-12-08 ENCOUNTER — HOSPITAL ENCOUNTER (OUTPATIENT)
Dept: PHYSICAL THERAPY | Age: 72
Setting detail: THERAPIES SERIES
Discharge: HOME | End: 2022-12-08
Payer: MEDICARE

## 2022-12-08 PROCEDURE — 97110 THERAPEUTIC EXERCISES: CPT

## 2022-12-08 PROCEDURE — 97140 MANUAL THERAPY 1/> REGIONS: CPT

## 2022-12-08 ASSESSMENT — PAIN DESCRIPTION - FREQUENCY: FREQUENCY: INTERMITTENT

## 2022-12-08 ASSESSMENT — PAIN DESCRIPTION - ORIENTATION: ORIENTATION: LEFT

## 2022-12-08 ASSESSMENT — PAIN SCALES - GENERAL: PAINLEVEL_OUTOF10: 8

## 2022-12-08 ASSESSMENT — PAIN DESCRIPTION - DESCRIPTORS: DESCRIPTORS: ACHING

## 2022-12-08 ASSESSMENT — PAIN DESCRIPTION - LOCATION: LOCATION: BACK

## 2022-12-08 ASSESSMENT — PAIN DESCRIPTION - PAIN TYPE: TYPE: CHRONIC PAIN

## 2022-12-08 NOTE — PROGRESS NOTES
[facility]  Outpatient Physical Therapy    Treatment Note        Date: 2022  Patient: Tessa Perez  : 1950   Confirmed: Yes  MRN: [id number]  Referring Provider: Ronette Sandhoff, APRN - CNP    Medical Diagnosis: S/P kyphoplasty [P25.412]  Closed traumatic compression fracture of lumbar vertebra, initial encounter ([facility] [S32.000A]  Acute pain due to trauma [G89.11]  Acute post-operative pain [G89.18]       Treatment Diagnosis: back pain, impaired mobility    Visit Information:  Insurance: Payor: Janice Gee / Plan: Woodford Kanner PPO / Product Type: Medicare /   PT Visit Information  Onset Date: 10/17/22  PT Insurance Information: Scarlet Watkins Medicare  Total # of Visits to Date: 4  Plan of Care/Certification Expiration Date: 01/15/23  No Show: 1  Progress Note Due Date: 22  Canceled Appointment: 0  Progress Note Counter: 4/10    Subjective Information:  Subjective: Pt presenting to appt wearing back brace and reporting inc pain/discomfort to Lt vs Rt of sx area. Pt states \"I took a couple Tylenol before I left the house. \"  HEP Compliance:  [x] Good [] Fair [] Poor [] Reports not doing due to:    Pain Screening  Patient Currently in Pain: Yes  Pain Assessment: 0-10  Pain Level: 8  Pain Type: Chronic pain  Pain Location: Back  Pain Orientation: Left  Pain Descriptors: Aching  Pain Frequency: Intermittent    Treatment:  Exercises:  Exercises  Exercise 1: TA isometrics 5sec/ 15  Exercise 2: pelvic tilts 5 sec/ 15  Exercise 3: Body mechanics trg: STS from mat (w/ ephasis on abd bracing and hip hinge) x8, FTSTS test NV*  Exercise 4: bridges w/ PPT 5\"/10  Exercise 5: 3 way SLR x10 b'ly  Exercise 6: Nu step [id number]  Exercise 7: gait drills F/R/L x2 laps each  Exercise 10: SKTC 3/20\" B'ly w/ towel  Exercise 12: H/L marches x10  Exercise 13: Seated HS stretch 3x30\" b/l  Exercise 20: HEP: seated HS stretch, S/L hip ABD  Treatment Reasoning  Limitations addressed:  Mobility, Strength, Coordination, Balance, Flexibility, Activity tolerance    Manual:   Manual Therapy  Soft Tissue Mobilizaton: lumbar paraspinals Lt>Rt, Lt piriformis/QL region  Other: * consider KT * lumbar     Modalities:  Moist Heat (CPT 18011)  Patient Position: Seated  Moist heat location: Low back  Post treatment skin assessment: Redness - no adverse reaction     *Indicates exercise, modality, or manual techniques to be initiated when appropriate    Objective Measures:       Strength: [x] NT  [] MMT completed:    ROM: [x] NT  [] ROM measurements:     Assessment: Body Structures, Functions, Activity Limitations Requiring Skilled Therapeutic Intervention: Decreased functional mobility , Decreased ADL status, Decreased ROM, Decreased body mechanics, Decreased strength, Decreased endurance, Increased pain  Assessment: Continuation of current ex program w/ addition of seated HS stretch and baseline core/hip strengthening for gradual inc of activity/functional mobility. Breakdown of body mechanics w/ STS placing emphasis on abd bracing and hip hinge to reduce strain on LB w/ good carryover. STM initiated to address Lt sided pain/tightness along paraspinals, good relief. Edu pt on [employer] message w/ assistance at home as needed for pain. Concluded w/ MH. Pain reducing from initial 8/10 to 4-5/10 post tx. Pt continues to wean from back brace in which she reports 6-7hrs of wear per day during times of increased pain. Treatment Diagnosis: back pain, impaired mobility  Therapy Prognosis: Good     Post-Pain Assessment:       Pain Rating (0-10 pain scale):   3-4/10   Location and pain description same as pre-treatment unless indicated.    Action: [] NA   [x] Perform HEP  [] Meds as prescribed  [x] Modalities as prescribed   [] Call Physician     GOALS   Patient Goal(s): Patient Goals : decrease pain, return to 100%, cooking without rest breaks, increased energy    Short Term Goals Completed by 3 wks Goal Status   STG 1 Independent with HEP In progress   STG 2 report 25% reduction in pain with </= 6/10 in the morning In progress     Long Term Goals Completed by 6 wks Goal Status   LTG 1 Improve bilateral LE strength >/= 4+/5 to improve tolerance to cooking and walking In progress   LTG 2 Improve ease with transfers, five times sit to stand </= 17 seconds In progress   LTG 3 Oswestry </= 15 to demonstrate improved overall activity tolerance In progress   LTG 4 Pt will improve endurance to tolerate 20 min activity without rest break In progress     Plan:  Frequency/Duration:  Plan  Plan Frequency: 2  Plan weeks: 6  Current Treatment Recommendations: Strengthening, ROM, Balance training, Functional mobility training, Transfer training, Endurance training, Gait training, Manual, Home exercise program, Patient/Caregiver education & training, Modalities, Equipment evaluation, education, & procurement  Pt to continue current HEP. See objective section for any therapeutic exercise changes, additions or modifications this date.     Therapy Time:      PT Individual Minutes  Time In: 3862  Time Out: 2418  Minutes: 60  Timed Code Treatment Minutes: 50 Minutes  Procedure Minutes: 10 min ()   Timed Activity Minutes Units   Ther Ex 35 2   Manual  15 1     Electronically signed by Angelika Murcia PTA on 12/8/22 at 11:47 AM EST

## 2022-12-12 ENCOUNTER — HOSPITAL ENCOUNTER (OUTPATIENT)
Dept: PHYSICAL THERAPY | Age: 72
Setting detail: THERAPIES SERIES
Discharge: HOME | End: 2022-12-12
Payer: MEDICARE

## 2022-12-12 NOTE — PROGRESS NOTES
Therapy                            Cancellation/No-show Note      Date: 2022  Patient: Lori Drew (39 y.o. female)  : 1950  MRN:  [id number]  Referring Physician: ANA Gupta CNP    Medical Diagnosis: S/P kyphoplasty [B93.762]  Closed traumatic compression fracture of lumbar vertebra, initial encounter [facility]) [S32.000A]  Acute pain due to trauma [G89.11]  Acute post-operative pain [G89.18]      Visit Information:  Visits to Date 4   No Show/Cancelled Appts:       For today's appointment patient:  []  Cancelled  []  Rescheduled appointment  [x]  No-show   []  Called pt to remind of next appointment     Reason given by patient:  []  Patient ill  []  Conflicting appointment  []  No transportation    []  Conflict with work  []  No reason given  []  Other:      [] Pt has future appointments scheduled, no follow up needed  [] Pt requests to be on hold. Reason:   If > 2 weeks please discuss with therapist.  [] Therapist to call pt for follow up     Comments:   Pt called 20 min after her scheduled appointment time and reported she had over slept and missed her appointment.      Signature: Electronically signed by Eve Schlatter, PTA on 22 at 9:50 AM EST

## 2022-12-14 ENCOUNTER — HOSPITAL ENCOUNTER (OUTPATIENT)
Dept: PHYSICAL THERAPY | Age: 72
Setting detail: THERAPIES SERIES
Discharge: HOME | End: 2022-12-14
Payer: MEDICARE

## 2022-12-14 PROCEDURE — 97140 MANUAL THERAPY 1/> REGIONS: CPT

## 2022-12-14 PROCEDURE — 97110 THERAPEUTIC EXERCISES: CPT

## 2022-12-14 ASSESSMENT — PAIN DESCRIPTION - PAIN TYPE: TYPE: CHRONIC PAIN

## 2022-12-14 ASSESSMENT — PAIN SCALES - GENERAL: PAINLEVEL_OUTOF10: 5

## 2022-12-14 ASSESSMENT — PAIN DESCRIPTION - ORIENTATION: ORIENTATION: LEFT

## 2022-12-14 ASSESSMENT — PAIN DESCRIPTION - LOCATION: LOCATION: BACK

## 2022-12-14 ASSESSMENT — PAIN DESCRIPTION - FREQUENCY: FREQUENCY: INTERMITTENT

## 2022-12-14 ASSESSMENT — PAIN DESCRIPTION - DESCRIPTORS: DESCRIPTORS: ACHING

## 2022-12-14 NOTE — PROGRESS NOTES
[facility]  PHYSICAL THERAPY PLAN OF CARE                                [] Certification  [] Recertification []  Plan of Care  [x] Progress Note [] Discharge      Referring Provider: ANA Hunt Arm, CNP     From:  Eber Rivera PT  Patient: Brendan Rivera (74 y.o. female) : 1950 Date: 2022  Medical Diagnosis: S/P kyphoplasty [Z98.890]  Closed traumatic compression [facility] lumbar [facility] [S32.000A]  Acute pain due to trauma [G89.11]  Acute post-operative pain [G89.18]       Treatment Diagnosis: back pain, impaired mobility    Plan of Care/Certification Expiration Date: : 01/15/23   Progress Report Period from:  2022  to 2022    Visits to Date: 5 No Show: 1 Cancelled Appts: 0    OBJECTIVE:   Short Term Goals - Time Frame for Short Term Goals: 3 wks    Goals Current/Discharge status  Status   Short Term Goal 1: Independent with HEP  Indep  Met   Short Term Goal 2: report 25% reduction in pain with </= 6/10 in the morning  Pain Screening  Patient Currently in Pain: Yes  Pain Assessment: 0-10  Pain Level: 5  Best Pain Level: 2  Worst Pain Level: 8  Pain Type: Chronic pain  Pain Location: Back  Pain Orientation: Left  Pain Descriptors: Aching  Pain Frequency: Intermittent  In progress     Long Term Goals - Time Frame for Long Term Goals : 6 wks  Goals Current/ Discharge status Status   Long Term Goal 1: Improve bilateral LE strength >/= 4+/5 to improve tolerance to cooking and walking Strength RLE  R Hip Flexion: 4/5  R Hip ABduction: 4/5  R Hip ADduction: 4-/5  R Knee Flexion: 5/5  R Knee Extension: 4+/5  R Ankle Dorsiflexion: 5/5  Strength LLE  Comment: ABD 4-/5  L Hip Flexion: 4/5  L Knee Flexion: 5/5  L Knee Extension: 5/5  L Ankle Dorsiflexion: 5/5 In progress   Long Term Goal 2: Improve ease with transfers, five times sit to stand </= 17 seconds Pt completes in 21.17 sec w/o use of UE's  In progress   Long Term Goal 3: Oswestry </= 15 to demonstrate improved overall activity tolerance 24/50 or 52% function  In progress   Long Term Goal 4: Pt will improve endurance to tolerate 20 min activity without rest break Pt reports 20 min standing/walking jasmeet  Met     Body Structures, Functions, Activity Limitations Requiring Skilled Therapeutic Intervention: Decreased functional mobility , Decreased ADL status, Decreased ROM, Decreased body mechanics, Decreased strength, Decreased endurance, Increased pain  Assessment: Progress note completed today per Medicare guidelines noting improvements in pain, LE strength, transfer ability and function per pt report. Pt reports 25% decreased pain in the AM \"I'm getting out of bed easier. \" Pt notes most difficulty still w/ bending to don socks/shoes and picking up objects from floor. Piriformis stretch introduced to ease this activity. Good jasmeet to gradual progression of mobility/strengh program w/ pain often times reduced by 50% following PT sessions. Pt continuing to wean from use of back brace. Therapy Prognosis: Good    PLAN:   Frequency/Duration:  Plan Frequency: 2  Plan weeks: 6  Current Treatment Recommendations: Strengthening, ROM, Balance training, Functional mobility training, Transfer training, Endurance training, Gait training, Manual, Home exercise program, Patient/Caregiver education & training, Modalities, Equipment evaluation, education, & procurement  Additional Comments: PN completed this date per Medicare             Patient Status:[x] Continue/ Initiate plan of Care    [] Discharge PT. Recommend pt continue with HEP. [] Additional visits requested, Please re-certify for additional visits:    [] Hold         Signature: Objective information by: Electronically signed by Vel Mcclain PTA on 12/14/22 at 3:55 PM EST    Electronically signed by John Cueva PT on 12/27/22 at 1:43 PM EST      If you have any questions or concerns, please don't hesitate to call.   Thank you for your referral.

## 2022-12-19 ENCOUNTER — HOSPITAL ENCOUNTER (OUTPATIENT)
Dept: PHYSICAL THERAPY | Age: 72
Setting detail: THERAPIES SERIES
Discharge: HOME | End: 2022-12-19
Payer: MEDICARE

## 2022-12-19 PROCEDURE — 97110 THERAPEUTIC EXERCISES: CPT

## 2022-12-19 ASSESSMENT — PAIN DESCRIPTION - ORIENTATION: ORIENTATION: LEFT

## 2022-12-19 ASSESSMENT — PAIN DESCRIPTION - LOCATION: LOCATION: BACK

## 2022-12-19 ASSESSMENT — PAIN SCALES - GENERAL: PAINLEVEL_OUTOF10: 5

## 2022-12-19 ASSESSMENT — PAIN DESCRIPTION - FREQUENCY: FREQUENCY: INTERMITTENT

## 2022-12-19 ASSESSMENT — PAIN DESCRIPTION - DESCRIPTORS: DESCRIPTORS: ACHING

## 2022-12-19 NOTE — PROGRESS NOTES
[facility]  Outpatient Physical Therapy    Treatment Note        Date: 2022  Patient: Tessa Perez  : 1950   Confirmed: Yes  MRN: [id number]  Referring Provider: Ronette Sandhoff, APRN - CNP    Medical Diagnosis: S/P kyphoplasty [A12.184]  Closed traumatic [facility] lumbar [facility] [S32.000A]  Acute pain due to trauma [G89.11]  Acute post-operative pain [G89.18]       Treatment Diagnosis: back pain, impaired mobility    Visit Information:  Insurance: Payor: Janice Gee / Plan: Woodford Kanner PPO / Product Type: Medicare /   PT Visit Information  Onset Date: 10/17/22  PT Insurance Information: Scarlet Watkins Medicare  Total # of Visits to Date: 5  Plan of Care/Certification Expiration Date: 01/15/23  No Show: 1  Progress Note Due Date: 23  Canceled Appointment: 0  Progress Note Counter: 5/10    Subjective Information:  Subjective: Pt states \"I was a little sore after last visit. I get up in mornings and it hurts. \" Pt does note therapy to be slowly helping. Pt wears back brace \"about half the day. \"  HEP Compliance:  [x] Good [] Fair [] Poor [] Reports not doing due to:    Pain Screening  Patient Currently in Pain: Yes  Pain Assessment: 0-10  Pain Level: 5  Pain Location: Back  Pain Orientation: Left  Pain Descriptors: Aching  Pain Frequency: Intermittent    Treatment:  Exercises:  Exercises  Exercise 4: bridges w/ PPT 5\"/12 (limited range)  Exercise 5: 2 way SLR x12-15 b'ly, standing hip ext x10 b/l  Exercise 6: Nu step L3 x5min  Exercise 8: Clams x15 b/l  Exercise 9: * add step ups  Exercise 10: SKTC 3/20\" B'ly w/ towel  Exercise 11: piriformis str 30\"x2 (alt knee push/pull)  Exercise 12: Dead bug x10  Exercise 13: Seated HS stretch 3x30\" b/l  Exercise 14: Seated hip hinges (w/ focus on bending mobility while maintaining neutral spine) x10  Exercise 20: HEP: dead bug, hip hinges, standing hip ext  Treatment Reasoning  Limitations addressed: Mobility, Strength, Coordination, Balance, Flexibility, Activity tolerance    Objective Measures:       Strength: [x] NT  [] MMT completed:     ROM: [x] NT  [] ROM measurements:     Assessment: Body Structures, Functions, Activity Limitations Requiring Skilled Therapeutic Intervention: Decreased functional mobility , Decreased ADL status, Decreased ROM, Decreased body mechanics, Decreased strength, Decreased endurance, Increased pain  Assessment: Cont'd to progress ex program within pt tolerance. Seated hip hinges added w/ focus on core/back strengthening and pain free lumbar flexion mobility to ease bending activity; ROM observed to improve w/ ea rep. Addition of sanding hip exts and DLS exs also tolerated well. Pt verbalizes 50% improvement in standing tolerance/meal preparation at home. Inc ease seen w/ change of positions on therapy mat today req dec time/effort to complete. Treatment Diagnosis: back pain, impaired mobility  Therapy Prognosis: Good     Post-Pain Assessment:       Pain Rating (0-10 pain scale):   4/10   Location and pain description same as pre-treatment unless indicated.    Action: [] NA   [x] Perform HEP  [] Meds as prescribed  [x] Modalities as prescribed   [] Call Physician     GOALS   Patient Goal(s): Patient Goals : decrease pain, return to 100%, cooking without rest breaks, increased energy    Short Term Goals Completed by 3 wks Goal Status   STG 1 Independent with HEP In progress   STG 2 report 25% reduction in pain with </= 6/10 in the morning In progress     Long Term Goals Completed by 6 wks Goal Status   LTG 1 Improve bilateral LE strength >/= 4+/5 to improve tolerance to cooking and walking In progress   LTG 2 Improve ease with transfers, five times sit to stand </= 17 seconds In progress   LTG 3 Oswestry </= 15 to demonstrate improved overall activity tolerance In progress   LTG 4 Pt will improve endurance to tolerate 20 min activity without rest break Met Plan:  Frequency/Duration:  Plan  Plan Frequency: 2  Plan weeks: 6  Current Treatment Recommendations: Strengthening, ROM, Balance training, Functional mobility training, Transfer training, Endurance training, Gait training, Manual, Home exercise program, Patient/Caregiver education & training, Modalities, Equipment evaluation, education, & procurement  Pt to continue current HEP. See objective section for any therapeutic exercise changes, additions or modifications this date.     Therapy Time:      PT Individual Minutes  Time In: 0930  Time Out: 1016  Minutes: 46  Timed Code Treatment Minutes: 46 Minutes  Procedure Minutes: N/A   Timed Activity Minutes Units   Ther Ex 46 3     Electronically signed by Ingris Tello PTA on 12/19/22 at 11:49 AM EST

## 2022-12-21 ENCOUNTER — HOSPITAL ENCOUNTER (OUTPATIENT)
Dept: PHYSICAL THERAPY | Age: 72
Setting detail: THERAPIES SERIES
Discharge: HOME | End: 2022-12-21
Payer: MEDICARE

## 2022-12-21 PROCEDURE — 97110 THERAPEUTIC EXERCISES: CPT

## 2022-12-21 ASSESSMENT — PAIN DESCRIPTION - ORIENTATION: ORIENTATION: LEFT

## 2022-12-21 ASSESSMENT — PAIN DESCRIPTION - DESCRIPTORS: DESCRIPTORS: ACHING;SHOOTING

## 2022-12-21 ASSESSMENT — PAIN SCALES - GENERAL: PAINLEVEL_OUTOF10: 5

## 2022-12-21 ASSESSMENT — PAIN DESCRIPTION - LOCATION: LOCATION: BACK

## 2022-12-21 ASSESSMENT — PAIN DESCRIPTION - PAIN TYPE: TYPE: CHRONIC PAIN

## 2022-12-21 ASSESSMENT — PAIN DESCRIPTION - FREQUENCY: FREQUENCY: INTERMITTENT

## 2022-12-21 NOTE — PROGRESS NOTES
[address] [facility]  Outpatient Physical Therapy    Treatment Note        Date: 2022  Patient: Jax Fernandez  : 1950   Confirmed: Yes  MRN: [id number]  Referring Provider: ANA Ley -*   Secondary Referring Provider (If applicable):     Medical Diagnosis: S/P kyphoplasty [Z98.890]  Closed traumatic [facility] [S32.000A]  Acute pain due to trauma [G89.11]  Acute post-operative pain [G89.18]    Treatment Diagnosis: back pain, impaired mobility    Visit Information:  Insurance: Payor: Mallory Space / Plan: Tess Siddiqui PPO / Product Type: Medicare /   PT Visit Information  Onset Date: 10/17/22  PT Insurance Information: Keren Watkins Medicare  Total # of Visits to Date: 7  Plan of Care/Certification Expiration Date: 01/15/23  No Show: 1  Progress Note Due Date: 23  Canceled Appointment: 0  Progress Note Counter: 7/10 (PN counter corrected)    Subjective Information:     HEP Compliance:  [x] Good [] Fair [] Poor [] Reports not doing due to:    Pain Screening  Patient Currently in Pain: Yes  Pain Assessment: 0-10  Pain Level: 5  Pain Type: Chronic pain  Pain Location: Back  Pain Orientation: Left  Pain Descriptors: Aching, Shooting  Pain Frequency: Intermittent    Treatment:  Exercises:  Exercises  Exercise 4: bridges w/ PPT 5\"/12 (limited range)  Exercise 5: 2 way SLR x12-15 b'ly, standing hip ext x10 b/l  Exercise 6: Nu step L3 x5min  Exercise 8: Clams x15 b/l  Exercise 9: * add step ups  Exercise 10: SKTC 3/20\" B'ly  Exercise 11: piriformis str 30\"x2 (alt knee push/pull)  Exercise 12: Modified dead bug x10  Exercise 13: Seated HS stretch 3x30\" b/l  Exercise 20: HEP: dead bug, hip hinges, standing hip ext  Treatment Reasoning  Limitations addressed: Mobility, Strength, Coordination, Balance, Flexibility, Activity tolerance      Assessment:    Body Structures, Functions, Activity Limitations Requiring Skilled Therapeutic Intervention: Decreased functional mobility , Decreased ADL status, Decreased ROM, Decreased body mechanics, Decreased strength, Decreased endurance, Increased pain  Assessment: Continued current ex program as tolerated by pt. Pt reporting increase in pain with scooting in bed Lt vs Rt. Educated pt on technqiue to decrease pain with scooting and bed mobility with good return demonstration. Frequent cues provided to avoid bouncing with static stretches to avoid further stress to musculature. Continue progressing pt as tolerated. Treatment Diagnosis: back pain, impaired mobility  Therapy Prognosis: Good          Post-Pain Assessment:       Pain Rating (0-10 pain scale):   5/10   Location and pain description same as pre-treatment unless indicated.    Action: [] NA   [x] Perform HEP  [] Meds as prescribed  [] Modalities as prescribed   [] Call Physician     GOALS   Patient Goal(s): Patient Goals : decrease pain, return to 100%, cooking without rest breaks, increased energy    Short Term Goals Completed by 3 wks Goal Status   STG 1 Independent with HEP In progress   STG 2 report 25% reduction in pain with </= 6/10 in the morning In progress       Long Term Goals Completed by 6 wks Goal Status   LTG 1 Improve bilateral LE strength >/= 4+/5 to improve tolerance to cooking and walking In progress   LTG 2 Improve ease with transfers, five times sit to stand </= 17 seconds In progress   LTG 3 Oswestry </= 15 to demonstrate improved overall activity tolerance In progress   LTG 4 Pt will improve endurance to tolerate 20 min activity without rest break Met            Plan:  Frequency/Duration:  Plan  Plan Frequency: 2  Plan weeks: 6  Current Treatment Recommendations: Strengthening, ROM, Balance training, Functional mobility training, Transfer training, Endurance training, Gait training, Manual, Home exercise program, Patient/Caregiver education & training, Modalities, Equipment evaluation, education, & procurement  Pt to continue current HEP. See objective section for any therapeutic exercise changes, additions or modifications this date.     Therapy Time:      PT Individual Minutes  Time In: 4271  Time Out: 1010  Minutes: 42  Timed Code Treatment Minutes: 42 Minutes  Procedure Minutes:0  Timed Activity Minutes Units   Ther Ex 42 3     Electronically signed by Dorian Sparrow PTA on 12/21/22 at 10:16 AM EST

## 2022-12-28 ENCOUNTER — HOSPITAL ENCOUNTER (OUTPATIENT)
Dept: PHYSICAL THERAPY | Age: 72
Setting detail: THERAPIES SERIES
Discharge: HOME | End: 2022-12-28
Payer: MEDICARE

## 2022-12-28 PROCEDURE — 97110 THERAPEUTIC EXERCISES: CPT

## 2022-12-28 ASSESSMENT — PAIN DESCRIPTION - DESCRIPTORS: DESCRIPTORS: ACHING

## 2022-12-28 ASSESSMENT — PAIN DESCRIPTION - LOCATION: LOCATION: BACK

## 2022-12-28 ASSESSMENT — PAIN DESCRIPTION - FREQUENCY: FREQUENCY: INTERMITTENT

## 2022-12-28 ASSESSMENT — PAIN DESCRIPTION - ORIENTATION: ORIENTATION: LEFT

## 2022-12-28 ASSESSMENT — PAIN SCALES - GENERAL: PAINLEVEL_OUTOF10: 4

## 2022-12-28 ASSESSMENT — PAIN DESCRIPTION - PAIN TYPE: TYPE: CHRONIC PAIN

## 2022-12-28 NOTE — PROGRESS NOTES
[address] [facility]  Outpatient Physical Therapy    Treatment Note        Date: 2022  Patient: Hernandez Ladd  : 1950   Confirmed: Yes  MRN: [id number]  Referring Provider: ANA Arteaga -*   Secondary Referring Provider (If applicable):     Medical Diagnosis: S/P kyphoplasty [Z98.890]  Closed traumatic compression fracture [facility] lumbar [facility] [S32.000A]  Acute pain due to trauma [G89.11]  Acute post-operative pain [G89.18]    Treatment Diagnosis: back pain, impaired mobility    Visit Information:  Insurance: Payor: Maia Sanderson / Plan: Rissa Rees PPO / Product Type: Medicare /   PT Visit Information  Onset Date: 10/17/22  PT Insurance Information: Alvin Garcia Medicare  Total # of Visits to Date: 8  Plan of Care/Certification Expiration Date: 01/15/23  No Show: 1  Progress Note Due Date: 23  Canceled Appointment: 0  Progress Note Counter: 8/10 (PN counter corrected)    Subjective Information:  Subjective: Pt states she feels like she is walking better. HEP Compliance:  [x] Good [] Fair [] Poor [] Reports not doing due to:    Pain Screening  Patient Currently in Pain: Yes  Pain Assessment: 0-10  Pain Level: 4  Pain Type: Chronic pain  Pain Location: Back  Pain Orientation: Left  Pain Descriptors: Aching  Pain Frequency: Intermittent    Treatment:  Exercises:  Exercises  Exercise 3: FTSTS test: 19.52 sec w/o use UE's  Exercise 4: bridges w/ PPT 5\"/12 (limited range)  Exercise 5: 2 way SLR x12-15 b'ly, standing hip ext x10 b/l  Exercise 6: Nu step L3 x5min  Exercise 8: Clams x15 b/l  Exercise 9: 4\" step ups F/L x10  Exercise 10: SKTC 3/20\" B'ly  Exercise 11: piriformis str 30\"x2 (alt knee push/pull)  Exercise 12: h/l walkouts x10  Exercise 13: Seated HS stretch 3x30\" b/l  Exercise 20: HEP: dead bug, hip hinges, standing hip ext  Treatment Reasoning  Limitations addressed:  Mobility, Strength, Coordination, Balance, Flexibility, Activity tolerance      Modalities:  Moist Heat (CPT 40037)  Patient Position: Seated  Moist heat location: Low back, Left  Post treatment skin assessment: Redness - no adverse reaction            Assessment: Body Structures, Functions, Activity Limitations Requiring Skilled Therapeutic Intervention: Decreased functional mobility , Decreased ADL status, Decreased ROM, Decreased body mechanics, Decreased strength, Decreased endurance, Increased pain  Assessment: Pt reporting overall improvement in mobility with functional tasks including getting in/out of bed. Progressed pt as tolerated to continue improving strength and mobility. Initated 4\" step ups with 1UE support to improve strength, pt denies increase in pain. Treatment concluded with MH to LB to decrease pain. Treatment Diagnosis: back pain, impaired mobility  Therapy Prognosis: Good          Post-Pain Assessment:       Pain Rating (0-10 pain scale):   6/10   Location and pain description same as pre-treatment unless indicated.    Action: [] NA   [x] Perform HEP  [x] Meds as prescribed  [] Modalities as prescribed   [] Call Physician     GOALS   Patient Goal(s): Patient Goals : decrease pain, return to 100%, cooking without rest breaks, increased energy    Short Term Goals Completed by 3 wks Goal Status   STG 1 Independent with HEP In progress   STG 2 report 25% reduction in pain with </= 6/10 in the morning In progress       Long Term Goals Completed by 6 wks Goal Status   LTG 1 Improve bilateral LE strength >/= 4+/5 to improve tolerance to cooking and walking In progress   LTG 2 Improve ease with transfers, five times sit to stand </= 17 seconds In progress   LTG 3 Oswestry </= 15 to demonstrate improved overall activity tolerance In progress   LTG 4 Pt will improve endurance to tolerate 20 min activity without rest break Met            Plan:  Frequency/Duration:  Plan  Plan Frequency: 2  Plan weeks: 6  Current Treatment Recommendations: Strengthening, ROM, Balance training, Functional mobility training, Transfer training, Endurance training, Gait training, Manual, Home exercise program, Patient/Caregiver education & training, Modalities, Equipment evaluation, education, & procurement  Pt to continue current HEP. See objective section for any therapeutic exercise changes, additions or modifications this date.     Therapy Time:      PT Individual Minutes  Time In: 5246  Time Out: 3948  Minutes: 50  Timed Code Treatment Minutes: 40 Minutes  Procedure Minutes:MH 10 min  Timed Activity Minutes Units   Ther Ex 40 3     Electronically signed by Lina Chilel PTA on 12/28/22 at 10:39 AM EST

## 2023-01-03 ENCOUNTER — HOSPITAL ENCOUNTER (OUTPATIENT)
Dept: PHYSICAL THERAPY | Age: 73
Setting detail: THERAPIES SERIES
Discharge: HOME | End: 2023-01-03
Payer: MEDICARE

## 2023-01-03 PROCEDURE — 97110 THERAPEUTIC EXERCISES: CPT

## 2023-01-03 ASSESSMENT — PAIN DESCRIPTION - LOCATION: LOCATION: BACK

## 2023-01-03 ASSESSMENT — PAIN SCALES - GENERAL: PAINLEVEL_OUTOF10: 4

## 2023-01-03 ASSESSMENT — PAIN DESCRIPTION - ORIENTATION: ORIENTATION: LEFT

## 2023-01-03 ASSESSMENT — PAIN DESCRIPTION - DESCRIPTORS: DESCRIPTORS: ACHING

## 2023-01-03 ASSESSMENT — PAIN DESCRIPTION - FREQUENCY: FREQUENCY: INTERMITTENT

## 2023-01-03 NOTE — PROGRESS NOTES
[address] [facility]  Outpatient Physical Therapy    Treatment Note        Date: 1/3/2023  Patient: Betty Ventura  : 1950   Confirmed: Yes  MRN: [id number]  Referring Provider: ANA Tate -*   Secondary Referring Provider (If applicable):     Medical Diagnosis: S/P kyphoplasty [Z98.890]  Closed traumatic compression fracture of lumbar vertebra[facility] [S32.000A]  Acute pain due to trauma [G89.11]  Acute post-operative pain [G89.18]    Treatment Diagnosis: back pain, impaired mobility    Visit Information:  Insurance: Payor: Kavon Khalil / Plan: [place] / Product Type: *No Product type* /   PT Visit Information  Onset Date: 10/17/22  PT Insurance Information: Andrey Morgan Medicare  Total # of Visits to Date: 9  Plan of Care/Certification Expiration Date: 01/15/23  No Show: 1  Progress Note Due Date: 23  Canceled Appointment: 0  Progress Note Counter: 9/10 (PN counter corrected)    Subjective Information:  Subjective: Pt reporting not much improvement with pain. HEP Compliance:  [x] Good [] Fair [] Poor [] Reports not doing due to:    Pain Screening  Patient Currently in Pain: Yes  Pain Assessment: 0-10  Pain Level: 4  Pain Location: Back  Pain Orientation: Left  Pain Descriptors: Aching  Pain Frequency: Intermittent    Treatment:  Exercises:  Exercises  Exercise 3: FTSTS test: 17.63 sec w/o use UE's  Exercise 4: bridges w/ PPT 5\"/12  Exercise 6: Nu step L3 x5min  Exercise 8: Clams x15 b/l  Exercise 9: 4\" step ups F/L x10  Exercise 10: SKTC 3/20\" B'ly  Exercise 11: piriformis str 30\"x2 (alt knee push/pull)  Exercise 12: h/l walkouts x10  Exercise 13: Seated HS stretch 3x30\" b/l  Exercise 20: HEP: continue current  Treatment Reasoning  Limitations addressed:  Mobility, Strength, Coordination, Balance, Flexibility, Activity tolerance    Modalities:  Moist Heat (CPT 69498)  Patient Position: Seated  Moist heat location: Low back, Left  Post treatment skin assessment: Redness - no adverse reaction       *Indicates exercise, modality, or manual techniques to be initiated when appropriate      Assessment: Body Structures, Functions, Activity Limitations Requiring Skilled Therapeutic Intervention: Decreased functional mobility , Decreased ADL status, Decreased ROM, Decreased body mechanics, Decreased strength, Decreased endurance, Increased pain  Assessment: Continued with current exercise program and progressed as able to address areas of deficits and reduce back pain. Attempted dead bug exercise for core strength, pt reporting increase in back pain with exercise, exercise discontinued. Pt able to complete all other DLS exercises without any increase in pain. FSTS 17.63 seconds making good improvement towards goal of 17 seconds. Concluded with MH to LB for pain relief with improvement in symptoms concluding treatment. Treatment Diagnosis: back pain, impaired mobility  Therapy Prognosis: Good          Post-Pain Assessment:       Pain Rating (0-10 pain scale):   3/10   Location and pain description same as pre-treatment unless indicated.    Action: [] NA   [x] Perform HEP  [] Meds as prescribed  [] Modalities as prescribed   [] Call Physician     GOALS   Patient Goal(s): Patient Goals : decrease pain, return to 100%, cooking without rest breaks, increased energy    Short Term Goals Completed by 3 wks Goal Status   STG 1 Independent with HEP In progress   STG 2 report 25% reduction in pain with </= 6/10 in the morning In progress       Long Term Goals Completed by 6 wks Goal Status   LTG 1 Improve bilateral LE strength >/= 4+/5 to improve tolerance to cooking and walking In progress   LTG 2 Improve ease with transfers, five times sit to stand </= 17 seconds In progress   LTG 3 Oswestry </= 15 to demonstrate improved overall activity tolerance In progress   LTG 4 Pt will improve endurance to tolerate 20 min activity without rest break Met Plan:  Frequency/Duration:  Plan  Plan Frequency: 2  Plan weeks: 6  Current Treatment Recommendations: Strengthening, ROM, Balance training, Functional mobility training, Transfer training, Endurance training, Gait training, Manual, Home exercise program, Patient/Caregiver education & training, Modalities, Equipment evaluation, education, & procurement  Pt to continue current HEP. See objective section for any therapeutic exercise changes, additions or modifications this date.     Therapy Time:      PT Individual Minutes  Time In: 4377  Time Out: 3656  Minutes: 51  Timed Code Treatment Minutes: 41 Minutes  Procedure Minutes: MH 10 min  Timed Activity Minutes Units   Ther Ex 41 3     Electronically signed by Lan Bautista PTA on 1/3/23 at 1:19 PM EST

## 2023-01-05 ENCOUNTER — HOSPITAL ENCOUNTER (OUTPATIENT)
Dept: PHYSICAL THERAPY | Age: 73
Setting detail: THERAPIES SERIES
Discharge: HOME | End: 2023-01-05
Payer: MEDICARE

## 2023-01-05 PROCEDURE — 97140 MANUAL THERAPY 1/> REGIONS: CPT

## 2023-01-05 PROCEDURE — 97110 THERAPEUTIC EXERCISES: CPT

## 2023-01-05 ASSESSMENT — PAIN DESCRIPTION - LOCATION: LOCATION: BACK

## 2023-01-05 ASSESSMENT — PAIN SCALES - GENERAL: PAINLEVEL_OUTOF10: 5

## 2023-01-05 ASSESSMENT — PAIN DESCRIPTION - DESCRIPTORS: DESCRIPTORS: ACHING

## 2023-01-05 NOTE — PROGRESS NOTES
[address] [facility]  Outpatient Physical Therapy    Treatment Note        Date: 2023  Patient: Mp Nino  : 1950   Confirmed: Yes  MRN: [id number]  Referring Provider: ANA Romero -*   Secondary Referring Provider (If applicable):     Medical Diagnosis: S/P kyphoplasty [Z98.890]  Closed traumatic compression fracture of lumbar vertebra[facility] [S32.[facility]A]  Acute pain due to trauma [G89.11]  Acute post-operative pain [G89.18]    Treatment Diagnosis: back pain, impaired mobility    Visit Information:  Insurance: Payor: Gage Barajas / Plan: SergiOink / Product Type: *No Product type* /   PT Visit Information  Onset Date: 10/17/22  PT Insurance Information: [employer] Medicare  Total # of Visits Approved:  (based on medical necessity)  Total # of Visits to Date: 10  Plan of Care/Certification Expiration Date: 01/15/23  No Show: 1  Progress Note Due Date: 23  Canceled Appointment: 0  Progress Note Counter: 10/10    Subjective Information:  Subjective: Pt states continued difficulty with prolonged standing in the kitchen and occasionally with walking.   HEP Compliance:  [x] Good [] Fair [] Poor [] Reports not doing due to:    Pain Screening  Pain Level: 5  Pain Location: Back  Pain Descriptors: Aching    Treatment:  Exercises:  Exercises  Exercise 6: Nu step L4 x6min  Exercise 11: seated piriformis str 30 sec/ 3 bilaterally  Exercise 13: standing hams str 30 sec/ 3  Exercise 15: single steps fwd, lat, diagonal with Rt hip stab x10 ea with initial instability  Exercise 20: HEP: seated or standing hams       Manual:   Manual Therapy  Soft Tissue Mobilizaton: lumbar paraspinals Lt>Rt, Lt piriformis/QL region     *Indicates exercise, modality, or manual techniques to be initiated when appropriate    Objective Measures:      Ambulation  Surface: Level tile, Carpet  Assistance: Independent  Gait Deviations: Slow Milena, Decreased step length, Decreased step height, Decreased arm swing, Deviated path  Comments: improving stability with Lt WB, but continued mild Trendelenburg, initiated single steps with stability to improve gait quality    Strength: [] NT  [] MMT completed:  Strength RLE  R Hip Flexion: 4+/5  R Knee Flexion: 5/5  R Knee Extension: 5/5  R Ankle Dorsiflexion: 5/5  Strength LLE  L Hip Flexion: 4+/5  L Knee Flexion: 5/5  L Knee Extension: 4+/5  L Ankle Dorsiflexion: 5/5     Assessment: Body Structures, Functions, Activity Limitations Requiring Skilled Therapeutic Intervention: Decreased functional mobility , Decreased ADL status, Decreased ROM, Decreased body mechanics, Decreased strength, Decreased endurance, Increased pain  Assessment: Pt progressing well with all functional mobility at home with improved ease with bed mobility and activity tolerance. Pt has met both STGs. She has shown improved overall activity tolerance, but reports continued increased pain with prolonged ambulation and standing activities, such as cooking. Pt would benefit from 2 additional weeks of PT to address remaining deficits, progress HEP, and maximize benefits of PT. Treatment Diagnosis: back pain, impaired mobility     Post-Pain Assessment:       Pain Rating (0-10 pain scale):   /10   Location and pain description same as pre-treatment unless indicated.    Action: [] NA   [] Perform HEP  [] Meds as prescribed  [] Modalities as prescribed   [] Call Physician     GOALS   Patient Goal(s): Patient Goals : decrease pain, return to 100%, cooking without rest breaks, increased energy    Short Term Goals Completed by 3 wks Goal Status   STG 1 Independent with HEP Met   STG 2 report 25% reduction in pain with </= 6/10 in the morning Met     Long Term Goals Completed by 6 wks Goal Status   LTG 1 Improve bilateral LE strength >/= 4+/5 to improve tolerance to cooking and walking In progress   LTG 2 Improve ease with transfers, five times sit to stand </= 17 seconds In progress   LTG 3 Oswestry </= 15 to demonstrate improved overall activity tolerance In progress   LTG 4 Pt will improve endurance to tolerate 20 min activity without rest break Met     Plan:  Frequency/Duration:  Plan  Plan Frequency: 2  Plan weeks: 6  Current Treatment Recommendations: Strengthening, ROM, Balance training, Functional mobility training, Transfer training, Endurance training, Gait training, Manual, Home exercise program, Patient/Caregiver education & training, Modalities, Equipment evaluation, education, & procurement  Additional Comments: PN completed this date per Medicare  Pt to continue current HEP. See objective section for any therapeutic exercise changes, additions or modifications this date.     Therapy Time:      PT Individual Minutes  Time In: 1030  Time Out: 1116  Minutes: 46  Timed Code Treatment Minutes: 46 Minutes  Procedure Minutes: 0    Timed Activity Minutes Units   Ther Ex 37 2   Manual  9 1     Electronically signed by Michael Posadas PT on 1/5/23 at 12:48 PM EST

## 2023-01-05 NOTE — PROGRESS NOTES
[facility]  PHYSICAL THERAPY PLAN OF CARE   [facility]      [address], [address] [facility], [address]     Ph: [phone] Fax: [phone]      [] Certification  [] Recertification []  Plan of Care  [x] Progress Note [] Discharge      Referring Provider: ANA Wallace -*      From:  Zahida Sanchez PT   Patient: Jerord Alcocer (74 y.o. female) : 1950 Date: 2023   Medical Diagnosis: S/P kyphoplasty [Z98.890]  Closed traumatic compression fracture of lumbar vertebra, initial encounter [facility]) [S32.000A]  Acute pain due to trauma [G89.11]  Acute post-operative pain [G89.18]    Treatment Diagnosis: back pain, impaired mobility    Plan of Care/Certification Expiration Date: : 23   Progress Report Period from:  22 to 2023    Visits to Date: 10 No Show: 1 Cancelled Appts: 0    OBJECTIVE:   Short Term Goals - Time Frame for Short Term Goals: 3 wks    Goals Current/Discharge status  Status   Short Term Goal 1: Independent with HEP  Independent with given HEP  Met   Short Term Goal 2: report 25% reduction in pain with </= 6/10 in the morning  Reports 50% improvement Met     Long Term Goals - Time Frame for Long Term Goals : 6 wks  Goals Current/ Discharge status Status   Long Term Goal 1: Improve bilateral LE strength >/= 4+/5 to improve tolerance to cooking and walking Strength RLE  R Hip Flexion: 4+/5  R Knee Flexion: 5/5  R Knee Extension: 5/5  R Ankle Dorsiflexion: 5/5  Strength LLE  L Hip Flexion: 4+/5  L Knee Flexion: 5/5  L Knee Extension: 4+/5  L Ankle Dorsiflexion: 5/5    Met   Long Term Goal 2: Improve ease with transfers, five times sit to stand </= 17 seconds 17.6 seconds, measured 1/3/23 In progress   Long Term Goal 3: Oswestry </= 15 to demonstrate improved overall activity tolerance 24 In progress   Long Term Goal 4: Pt will improve endurance to tolerate 20 min activity without rest break Pt reports able to tolerate up to 20 min activity, \"but that's about it\" Met     Body Structures, Functions, Activity Limitations Requiring Skilled Therapeutic Intervention: Decreased functional mobility , Decreased ADL status, Decreased ROM, Decreased body mechanics, Decreased strength, Decreased endurance, Increased pain  Assessment: Pt progressing well with all functional mobility at home with improved ease with bed mobility and activity tolerance. Pt has met both STGs. She has shown improved overall activity tolerance, but reports continued increased pain with prolonged ambulation and standing activities, such as cooking. Pt would benefit from 2 additional weeks of PT to address remaining deficits, progress HEP, and maximize benefits of PT. PLAN: [] Evaluate and Treat  Frequency/Duration:  Plan Frequency: 2  Plan weeks: 6  Current Treatment Recommendations: Strengthening, ROM, Balance training, Functional mobility training, Transfer training, Endurance training, Gait training, Manual, Home exercise program, Patient/Caregiver education & training, Modalities, Equipment evaluation, education, & procurement  Additional Comments: PN completed this date per Medicare     Precautions:                          Patient Status:[x] Continue/ Initiate plan of Care    [] Discharge PT. Recommend pt continue with HEP. [] Additional visits requested, Please re-certify for additional visits:    [] Hold         Signature: Electronically signed by Francisco J Irvin PT on 1/5/23 at 1:31 PM EST      If you have any questions or concerns, please don't hesitate to call.   Thank you for your referral.

## 2023-01-09 ENCOUNTER — HOSPITAL ENCOUNTER (OUTPATIENT)
Dept: PHYSICAL THERAPY | Age: 73
Setting detail: THERAPIES SERIES
Discharge: HOME | End: 2023-01-09
Payer: MEDICARE

## 2023-01-09 PROCEDURE — 97110 THERAPEUTIC EXERCISES: CPT

## 2023-01-09 NOTE — PROGRESS NOTES
[address] [facility]  Outpatient Physical Therapy    Treatment Note        Date: 2023  Patient: Norris Bose  : 1950   Confirmed: Yes  MRN: [id number]  Referring Provider: NAA Lewis -*   Secondary Referring Provider (If applicable):     Medical Diagnosis: S/P kyphoplasty [Z98.890]  Closed traumatic compression [facility] lumbar vertebra[facility] [S32.000A]  Acute pain due to trauma [G89.11]  Acute post-operative pain [G89.18]    Treatment Diagnosis: back pain, impaired mobility    Visit Information:  Insurance: Payor: [employer] / Plan: [place] / Product Type: *No Product type* /   PT Visit Information  Onset Date: 10/17/22  PT Insurance Information: Mitzi Flores Medicare  Total # of Visits Approved:  (based on medical necessity)  Total # of Visits to Date: [place]  Plan of Care/Certification Expiration Date: 23  No Show: 1  Progress Note Due Date: 23  Canceled Appointment: 0  Progress Note Counter:     Subjective Information:  Subjective: Pt states \"I'm just a little tight, that's about it. \"  HEP Compliance:  [x] Good [] Fair [] Poor [] Reports not doing due to:    Pain Screening  Patient Currently in Pain: Denies    Treatment:  Exercises:  Exercises  Exercise 6: Nu step L3 x5min  Exercise 7: Single arm rows (split stance) 2x10 RTB  Exercise 8: TB 3 way hip w/ YTB x10 ea b/l  Exercise 9: 6\" step ups F/L x15 ea, step downs x10 b/l  Exercise 11: piriformis str 30\"x2 (alt knee push/pull)  Exercise 13: Seated HS stretch 3x30\" b/l  Exercise 20: HEP: continue current  Treatment Reasoning  Limitations addressed: Mobility, Strength, Coordination, Balance, Flexibility, Activity tolerance    Objective Measures:      Strength: [x] NT  [] MMT completed:     ROM: [x] NT  [] ROM measurements:     Assessment:    Body Structures, Functions, Activity Limitations Requiring Skilled Therapeutic Intervention: Decreased functional mobility , Decreased ADL status, Decreased ROM, Decreased body mechanics, Decreased strength, Decreased endurance, Increased pain  Assessment: Pt notes ongoing concerns w/ steadiness during gait and negotiating 14 steps in condo. Cont'd to progress SL strengthening and stability for carryover into functional activities as pt approaches D/C in 2 wks. Select exs added to simulate vaccuming activity for edu on core stabilization and avoidence of \"twisting\" to reduce instance of LBP; good carryover. Good jasmeet to all exs w/o verbal complaint. Treatment Diagnosis: back pain, impaired mobility  Therapy Prognosis: Good     Post-Pain Assessment:       Pain Rating (0-10 pain scale):   3/10 \"just sore\"   Location and pain description same as pre-treatment unless indicated. Action: [] NA   [x] Perform HEP  [] Meds as prescribed  [x] Modalities as prescribed   [] Call Physician     GOALS   Patient Goal(s): Patient Goals : decrease pain, return to 100%, cooking without rest breaks, increased energy    Short Term Goals Completed by 3 wks Goal Status   STG 1 Independent with HEP Met   STG 2 report 25% reduction in pain with </= 6/10 in the morning Met     Long Term Goals Completed by 6 wks Goal Status   LTG 1 Improve bilateral LE strength >/= 4+/5 to improve tolerance to cooking and walking In progress   LTG 2 Improve ease with transfers, five times sit to stand </= 17 seconds In progress   LTG 3 Oswestry </= 15 to demonstrate improved overall activity tolerance In progress   LTG 4 Pt will improve endurance to tolerate 20 min activity without rest break Met     Plan:  Frequency/Duration:  Plan  Plan Frequency: 2  Plan weeks: 6  Current Treatment Recommendations: Strengthening, ROM, Balance training, Functional mobility training, Transfer training, Endurance training, Gait training, Manual, Home exercise program, Patient/Caregiver education & training, Modalities, Equipment evaluation, education, & procurement  Pt to continue current HEP. See objective section for any therapeutic exercise changes, additions or modifications this date.     Therapy Time:      PT Individual Minutes  Time In: 1120  Time Out: 8225  Minutes: 38  Timed Code Treatment Minutes: 38 Minutes  Procedure Minutes: N/A   Timed Activity Minutes Units   Ther Ex 38 3     Electronically signed by Angelika Murcia PTA on 1/9/23 at 11:52 AM EST

## 2023-01-11 ENCOUNTER — HOSPITAL ENCOUNTER (OUTPATIENT)
Dept: PHYSICAL THERAPY | Age: 73
Setting detail: THERAPIES SERIES
Discharge: HOME | End: 2023-01-11
Payer: MEDICARE

## 2023-01-11 PROCEDURE — 97110 THERAPEUTIC EXERCISES: CPT

## 2023-01-11 NOTE — PROGRESS NOTES
[facility]  PHYSICAL THERAPY PLAN OF CARE   [facility]      [address], [address] [facility], [address]     Ph: [phone] Fax: [phone]      [] Certification  [] Recertification []  Plan of Care  [] Progress Note [x] Discharge      Referring Provider: ANA Barrientos -*      From:  Pastor Verma PT  Patient: Darrick Sinha (85 y.o. female) : 1950 Date: 2023   Medical Diagnosis: S/P kyphoplasty [Z98.890]  Closed traumatic compression fracture of lumbar vertebra, initial encounter ([facility] [S32.000A]  Acute pain due to trauma [G89.11]  Acute post-operative pain [G89.18]    Treatment Diagnosis: back pain, impaired mobility    Plan of Care/Certification Expiration Date: : 23   Progress Report Period from:  22 to 2023    Visits to Date: 12 No Show: 1 Cancelled Appts: 0    OBJECTIVE:   Short Term Goals - Time Frame for Short Term Goals: 3 wks    Goals Current/Discharge status  Status   Short Term Goal 1: Independent with HEP  Independent with given HEP  Met   Short Term Goal 2: report 25% reduction in pain with </= 6/10 in the morning  Reports 50% improvement; pain ranging from 0-5/10 at most  Met     Long Term Goals - Time Frame for Long Term Goals : 6 wks  Goals Current/ Discharge status Status   Long Term Goal 1: Improve bilateral LE strength >/= 4+/5 to improve tolerance to cooking and walking Strength RLE  R Hip Flexion: 4+/5  R Knee Flexion: 5/5  R Knee Extension: 5/5  R Ankle Dorsiflexion: 5/5  Strength LLE  L Hip Flexion: 4+/5  L Knee Flexion: 5/5  L Knee Extension: 4+/5  L Ankle Dorsiflexion: 5/5  (Assessed on 23) Met   Long Term Goal 2: Improve ease with transfers, five times sit to stand </= 17 seconds 15.45 sec w/o UE support  Met   Long Term Goal 3: Oswestry </= 15 to demonstrate improved overall activity tolerance 24 Not met    Long Term Goal 4: Pt will improve endurance to tolerate 20 min activity without rest break Pt reports able to tolerate up to 20 min activity, \"but that's about it\" Met     Body Structures, Functions, Activity Limitations Requiring Skilled Therapeutic Intervention: Decreased functional mobility , Decreased ADL status, Decreased ROM, Decreased body mechanics, Decreased strength, Decreased endurance, Increased pain  Assessment: Pt requesting discharge from PT today while verbalizing 85-90% function w/ \"at least 75%\" reduction from original LBP. Pain now ranging from 0-5/10 versus initial 10/10 at most. Pt states \"it's still there but not nearly as bad. \" Pt reporting most relief w/ performing stretching/mobility routine regularly. Inc time spent reviewing HEP w/ few additions to progress hip/core strengthening; tolerated well. With exception of Oswestry score, pt meeting all LTG's. Therapy Prognosis: Good     PLAN:  Frequency/Duration:  Additional Comments: D/C this date              Patient Status:[] Continue/ Initiate plan of Care    [x] Discharge PT. Recommend pt continue with HEP. [] Additional visits requested, Please re-certify for additional visits:    [] Hold         Signature: Electronically signed by Jose Mc PT on 1/5/23 at 1:31 PM EST    If you have any questions or concerns, please don't hesitate to call.   Thank you for your referral.

## 2023-01-11 NOTE — PROGRESS NOTES
[address] [facility]  Outpatient Physical Therapy    Treatment Note        Date: 2023  Patient: Ranjan Player  : 1950   Confirmed: Yes  MRN: [id number]  Referring Provider: ANA Mac -*   Secondary Referring Provider (If applicable):     Medical Diagnosis: S/P kyphoplasty [Z98.890]  Closed traumatic compression fracture of lumbar vertebra, [facility] [S32.000A]  Acute pain due to trauma [G89.11]  Acute post-operative pain [G89.18]    Treatment Diagnosis: back pain, impaired mobility    Visit Information:  Insurance: Payor: Debbi [employer] / Plan: [place] / Product Type: *No Product type* /   PT Visit Information  Onset Date: 10/17/22  PT Insurance Information: [id number] Medicare  Total # of Visits Approved:  (based on medical necessity)  Total # of Visits to Date: 12  Plan of Care/Certification Expiration Date: 23  No Show: 1  Progress Note Due Date: 23  Canceled Appointment: 0  Progress Note Counter:     Subjective Information:  Subjective: Pt states \"The back is doing a little better but my stomach is bothering me today. \" Pt also reports having injection in Rt eye for macular degeneration. HEP Compliance:  [x] Good [] Fair [] Poor [] Reports not doing due to:    Pain Screening  Patient Currently in Pain: Denies  Best Pain Level: 0  Worst Pain Level: 5    Treatment:  Exercises:  Exercises  Exercise 5: FTSTS 15.45  Exercise 6: Nu step L3 x5min  Exercise 10: Monster walks F/L/R 10'x3 ea  Exercise 11: piriformis str 30\"x2 (alt knee push/pull)  Exercise 12: Paloff presses 2x10 b/l RTB  Exercise 13: Seated HS stretch 3x30\" b/l  Exercise 14: STS on foam w/ OH arm raises x10  Exercise 20: HEP: continue current+ paloff presses, monster walks  Treatment Reasoning  Limitations addressed:  Mobility, Strength, Coordination, Balance, Flexibility, Activity tolerance    Objective Measures:      Strength: [x] NT  [] MMT completed: ROM: [x] NT  [] ROM measurements:     Assessment: Body Structures, Functions, Activity Limitations Requiring Skilled Therapeutic Intervention: Decreased functional mobility , Decreased ADL status, Decreased ROM, Decreased body mechanics, Decreased strength, Decreased endurance, Increased pain  Assessment: Pt requesting discharge from PT today while verbalizing 85-90% function w/ \"at least 75%\" reduction from original LBP. Pain now ranging from 0-5/10 versus initial 10/10 at most. Pt states \"it's still there but not nearly as bad. \" Pt reporting most relief w/ performing stretching/mobility routine regularly. Inc time spent reviewing HEP w/ few additions to progress hip/core strengthening; tolerated well. Treatment Diagnosis: back pain, impaired mobility  Therapy Prognosis: Good     Post-Pain Assessment:       Pain Rating (0-10 pain scale):   0/10   Location and pain description same as pre-treatment unless indicated. Action: [x] NA   [] Perform HEP  [] Meds as prescribed  [] Modalities as prescribed   [] Call Physician     GOALS   Patient Goal(s): Patient Goals : decrease pain, return to 100%, cooking without rest breaks, increased energy    Short Term Goals Completed by 3 wks Goal Status   STG 1 Independent with HEP Met   STG 2 report 25% reduction in pain with </= 6/10 in the morning Met     Long Term Goals Completed by 6 wks Goal Status   LTG 1 Improve bilateral LE strength >/= 4+/5 to improve tolerance to cooking and walking In progress   LTG 2 Improve ease with transfers, five times sit to stand </= 17 seconds Met   LTG 3 Oswestry </= 15 to demonstrate improved overall activity tolerance In progress   LTG 4 Pt will improve endurance to tolerate 20 min activity without rest break Met     Plan:  Frequency/Duration:  Plan  Additional Comments: D/C this date  Pt to continue current HEP. See objective section for any therapeutic exercise changes, additions or modifications this date.     Therapy Time:      PT Individual Minutes  Time In: 1340  Time Out: 4947  Minutes: 30  Timed Code Treatment Minutes: 30 Minutes  Procedure Minutes: N/A   Timed Activity Minutes Units   Ther Ex 30 2     Electronically signed by Osvaldo Ayala PTA on 1/11/23 at 11:55 AM EST

## 2023-03-06 PROBLEM — I25.10 CAD (CORONARY ARTERY DISEASE): Status: ACTIVE | Noted: 2023-03-06

## 2023-03-06 PROBLEM — R53.83 FATIGUE: Status: ACTIVE | Noted: 2023-03-06

## 2023-03-06 PROBLEM — R19.5 PENCILLING OF STOOLS: Status: ACTIVE | Noted: 2023-03-06

## 2023-03-06 PROBLEM — R31.9 HEMATURIA: Status: ACTIVE | Noted: 2023-03-06

## 2023-03-06 PROBLEM — J98.11 ATELECTASIS: Status: ACTIVE | Noted: 2023-03-06

## 2023-03-06 PROBLEM — R06.00 DYSPNEA: Status: ACTIVE | Noted: 2023-03-06

## 2023-03-06 PROBLEM — F32.A DEPRESSION: Status: ACTIVE | Noted: 2023-03-06

## 2023-03-06 PROBLEM — R42 VERTIGO: Status: ACTIVE | Noted: 2023-03-06

## 2023-03-06 PROBLEM — R06.2 WHEEZING ON AUSCULTATION: Status: ACTIVE | Noted: 2023-03-06

## 2023-03-06 PROBLEM — R07.9 CHEST PAIN: Status: ACTIVE | Noted: 2023-03-06

## 2023-03-06 PROBLEM — E66.3 OVERWEIGHT WITH BODY MASS INDEX (BMI) OF 29 TO 29.9 IN ADULT: Status: ACTIVE | Noted: 2023-03-06

## 2023-03-06 PROBLEM — I10 HTN (HYPERTENSION): Status: ACTIVE | Noted: 2023-03-06

## 2023-03-06 PROBLEM — N39.0 UTI (URINARY TRACT INFECTION): Status: ACTIVE | Noted: 2023-03-06

## 2023-03-06 PROBLEM — E78.5 HYPERLIPIDEMIA: Status: ACTIVE | Noted: 2023-03-06

## 2023-03-06 PROBLEM — G47.00 INSOMNIA: Status: ACTIVE | Noted: 2023-03-06

## 2023-03-06 PROBLEM — E78.5 DYSLIPIDEMIA: Status: ACTIVE | Noted: 2023-03-06

## 2023-03-06 PROBLEM — R35.0 INCREASED URINARY FREQUENCY: Status: ACTIVE | Noted: 2023-03-06

## 2023-03-06 PROBLEM — J44.9 COPD (CHRONIC OBSTRUCTIVE PULMONARY DISEASE) (MULTI): Status: ACTIVE | Noted: 2023-03-06

## 2023-03-06 PROBLEM — R19.5 CHANGE IN STOOL CALIBER: Status: ACTIVE | Noted: 2023-03-06

## 2023-03-06 PROBLEM — R05.9 COUGH: Status: ACTIVE | Noted: 2023-03-06

## 2023-03-06 PROBLEM — R32 URINARY INCONTINENCE: Status: ACTIVE | Noted: 2023-03-06

## 2023-03-06 PROBLEM — N32.81 OVERACTIVE BLADDER: Status: ACTIVE | Noted: 2023-03-06

## 2023-03-06 PROBLEM — J40 BRONCHITIS: Status: ACTIVE | Noted: 2023-03-06

## 2023-03-06 PROBLEM — I31.4 CARDIAC TAMPONADE (HHS-HCC): Status: ACTIVE | Noted: 2023-03-06

## 2023-03-06 PROBLEM — R39.15 URGENCY OF URINATION: Status: ACTIVE | Noted: 2023-03-06

## 2023-03-06 PROBLEM — I26.99 PULMONARY EMBOLISM (MULTI): Status: ACTIVE | Noted: 2023-03-06

## 2023-03-06 PROBLEM — R00.1 SYMPTOMATIC BRADYCARDIA: Status: ACTIVE | Noted: 2023-03-06

## 2023-03-06 PROBLEM — J02.9 SORE THROAT: Status: ACTIVE | Noted: 2023-03-06

## 2023-03-06 PROBLEM — R39.9 UTI SYMPTOMS: Status: ACTIVE | Noted: 2023-03-06

## 2023-03-06 PROBLEM — N28.1 RENAL CYST: Status: ACTIVE | Noted: 2023-03-06

## 2023-03-06 PROBLEM — I31.39 PERICARDIAL EFFUSION (HHS-HCC): Status: ACTIVE | Noted: 2023-03-06

## 2023-03-06 PROBLEM — J90 PLEURAL EFFUSION: Status: ACTIVE | Noted: 2023-03-06

## 2023-03-06 PROBLEM — K21.9 GERD (GASTROESOPHAGEAL REFLUX DISEASE): Status: ACTIVE | Noted: 2023-03-06

## 2023-03-06 RX ORDER — CHOLECALCIFEROL (VITAMIN D3) 25 MCG
1 TABLET ORAL 2 TIMES DAILY
COMMUNITY

## 2023-03-06 RX ORDER — ATORVASTATIN CALCIUM 40 MG/1
TABLET, FILM COATED ORAL
COMMUNITY
Start: 2021-06-01

## 2023-03-06 RX ORDER — SODIUM FLUORIDE 5 MG/G
GEL, DENTIFRICE DENTAL
COMMUNITY
Start: 2021-07-30

## 2023-03-06 RX ORDER — FUROSEMIDE 20 MG/1
1 TABLET ORAL DAILY
COMMUNITY
Start: 2022-05-11 | End: 2023-03-16 | Stop reason: SDUPTHER

## 2023-03-06 RX ORDER — TITANIUM DIOXIDE, OCTINOXATE, ZINC OXIDE 4.61; 1.6; .78 G/40ML; G/40ML; G/40ML
CREAM TOPICAL
COMMUNITY
Start: 2022-05-11

## 2023-03-06 RX ORDER — TRAZODONE HYDROCHLORIDE 50 MG/1
TABLET ORAL
COMMUNITY
Start: 2021-01-26 | End: 2023-03-16 | Stop reason: SDUPTHER

## 2023-03-06 RX ORDER — FLUOXETINE HYDROCHLORIDE 60 MG/1
60 TABLET, FILM COATED ORAL; ORAL DAILY
COMMUNITY
End: 2024-01-25 | Stop reason: SDUPTHER

## 2023-03-06 RX ORDER — PANTOPRAZOLE SODIUM 40 MG/1
1 TABLET, DELAYED RELEASE ORAL DAILY
COMMUNITY
Start: 2019-05-15 | End: 2023-05-05

## 2023-03-06 RX ORDER — MULTIVITAMIN
1 TABLET ORAL DAILY
COMMUNITY

## 2023-03-06 RX ORDER — EPINEPHRINE 0.22MG
1 AEROSOL WITH ADAPTER (ML) INHALATION DAILY
COMMUNITY

## 2023-03-06 RX ORDER — PRASUGREL 10 MG/1
1 TABLET, FILM COATED ORAL DAILY
COMMUNITY
Start: 2021-06-01 | End: 2023-09-21 | Stop reason: WASHOUT

## 2023-03-06 RX ORDER — ASPIRIN 81 MG/1
1 TABLET ORAL DAILY
COMMUNITY

## 2023-03-06 RX ORDER — DARIFENACIN 15 MG/1
1 TABLET, EXTENDED RELEASE ORAL EVERY MORNING
COMMUNITY
Start: 2021-06-01 | End: 2023-03-16 | Stop reason: SDUPTHER

## 2023-03-06 RX ORDER — METOPROLOL SUCCINATE 25 MG/1
1 TABLET, EXTENDED RELEASE ORAL DAILY
COMMUNITY
Start: 2022-05-11 | End: 2024-04-25 | Stop reason: SDUPTHER

## 2023-03-06 RX ORDER — BUDESONIDE, GLYCOPYRROLATE, AND FORMOTEROL FUMARATE 160; 9; 4.8 UG/1; UG/1; UG/1
2 AEROSOL, METERED RESPIRATORY (INHALATION) 2 TIMES DAILY
COMMUNITY
Start: 2022-10-17

## 2023-03-16 ENCOUNTER — OFFICE VISIT (OUTPATIENT)
Dept: PRIMARY CARE | Facility: CLINIC | Age: 73
End: 2023-03-16
Payer: COMMERCIAL

## 2023-03-16 VITALS
WEIGHT: 175 LBS | HEIGHT: 64 IN | OXYGEN SATURATION: 98 % | HEART RATE: 64 BPM | DIASTOLIC BLOOD PRESSURE: 70 MMHG | BODY MASS INDEX: 29.88 KG/M2 | SYSTOLIC BLOOD PRESSURE: 118 MMHG

## 2023-03-16 DIAGNOSIS — Z12.31 ENCOUNTER FOR SCREENING MAMMOGRAM FOR MALIGNANT NEOPLASM OF BREAST: ICD-10-CM

## 2023-03-16 DIAGNOSIS — R32 URINARY INCONTINENCE, UNSPECIFIED TYPE: ICD-10-CM

## 2023-03-16 DIAGNOSIS — I25.10 CORONARY ARTERY DISEASE INVOLVING NATIVE CORONARY ARTERY OF NATIVE HEART WITHOUT ANGINA PECTORIS: ICD-10-CM

## 2023-03-16 DIAGNOSIS — R06.2 WHEEZING ON AUSCULTATION: Primary | ICD-10-CM

## 2023-03-16 DIAGNOSIS — I10 PRIMARY HYPERTENSION: ICD-10-CM

## 2023-03-16 DIAGNOSIS — G47.00 INSOMNIA, UNSPECIFIED TYPE: ICD-10-CM

## 2023-03-16 PROCEDURE — 99214 OFFICE O/P EST MOD 30 MIN: CPT | Performed by: FAMILY MEDICINE

## 2023-03-16 RX ORDER — TRAZODONE HYDROCHLORIDE 50 MG/1
100 TABLET ORAL NIGHTLY
Qty: 90 TABLET | Refills: 1 | Status: SHIPPED | OUTPATIENT
Start: 2023-03-16 | End: 2023-03-27

## 2023-03-16 RX ORDER — LANOLIN ALCOHOL/MO/W.PET/CERES
1 CREAM (GRAM) TOPICAL DAILY
Qty: 90 TABLET | Refills: 1 | Status: SHIPPED | OUTPATIENT
Start: 2023-03-16

## 2023-03-16 RX ORDER — LANOLIN ALCOHOL/MO/W.PET/CERES
1 CREAM (GRAM) TOPICAL DAILY
COMMUNITY
End: 2023-03-16 | Stop reason: SDUPTHER

## 2023-03-16 RX ORDER — DARIFENACIN 15 MG/1
15 TABLET, EXTENDED RELEASE ORAL EVERY MORNING
Qty: 90 TABLET | Refills: 1 | Status: SHIPPED | OUTPATIENT
Start: 2023-03-16 | End: 2023-07-31

## 2023-03-16 RX ORDER — FUROSEMIDE 20 MG/1
20 TABLET ORAL DAILY
Qty: 90 TABLET | Refills: 1 | Status: SHIPPED | OUTPATIENT
Start: 2023-03-16 | End: 2023-07-28

## 2023-03-16 RX ORDER — DOFETILIDE 0.12 MG/1
500 CAPSULE ORAL EVERY 12 HOURS
Qty: 90 CAPSULE | Refills: 1 | Status: SHIPPED | OUTPATIENT
Start: 2023-03-16 | End: 2023-06-14

## 2023-03-16 RX ORDER — DOFETILIDE 0.12 MG/1
CAPSULE ORAL EVERY 12 HOURS
COMMUNITY
Start: 2022-02-11 | End: 2023-03-16 | Stop reason: SDUPTHER

## 2023-03-16 RX ORDER — METHOCARBAMOL 750 MG/1
TABLET, FILM COATED ORAL
COMMUNITY
Start: 2022-10-19

## 2023-03-16 RX ORDER — MONTELUKAST SODIUM 10 MG/1
10 TABLET ORAL NIGHTLY
Qty: 90 TABLET | Refills: 3 | Status: SHIPPED | OUTPATIENT
Start: 2023-03-16 | End: 2024-01-05

## 2023-03-16 ASSESSMENT — ENCOUNTER SYMPTOMS
CONSTITUTIONAL NEGATIVE: 1
ENDOCRINE NEGATIVE: 1
CARDIOVASCULAR NEGATIVE: 1
GASTROINTESTINAL NEGATIVE: 1
ALLERGIC/IMMUNOLOGIC NEGATIVE: 1
MUSCULOSKELETAL NEGATIVE: 1
NEUROLOGICAL NEGATIVE: 1
OCCASIONAL FEELINGS OF UNSTEADINESS: 1
DEPRESSION: 1
HEMATOLOGIC/LYMPHATIC NEGATIVE: 1
EYES NEGATIVE: 1
LOSS OF SENSATION IN FEET: 0
RESPIRATORY NEGATIVE: 1
PSYCHIATRIC NEGATIVE: 1

## 2023-03-16 NOTE — PROGRESS NOTES
"Subjective   Patient ID: Luz Crain is a 72 y.o. female who presents for Annual Exam.    HPI   Patient presents today for routine check up.   Patient is requesting refills on medications today.     Patient denies having any other concerns today.    Last mammogram 6/2/2021  Last bone density 6/29/2020  Last colonoscopy 8/1/2019, due in 10 years     Review of Systems   Constitutional: Negative.    HENT: Negative.     Eyes: Negative.    Respiratory: Negative.     Cardiovascular: Negative.    Gastrointestinal: Negative.    Endocrine: Negative.    Genitourinary: Negative.    Musculoskeletal: Negative.    Skin: Negative.    Allergic/Immunologic: Negative.    Neurological: Negative.    Hematological: Negative.    Psychiatric/Behavioral: Negative.          Objective   /70   Pulse 64   Ht 1.626 m (5' 4\")   Wt 79.4 kg (175 lb)   SpO2 98%   BMI 30.04 kg/m²     Physical Exam CONSTITUTIONAL- NAD, Pt is A & O x3, Vital signs reviewed per chart.  General Appearance- normal , good hygiene,talks easily  EYES-PERRLA conjunctiva and lids- normal  EARS/NOSE-TM's normal, head normocephalic and atraumatic, naso pharynx-no nasal discharge, no trismus,  oropharynx- normal without exudate  NECK- supple, FROM, without mass  thyroid- NT, normal size, no nodule noted  LYMPH- WNL  CV- RRR without murmur, gallop, or other abnormality.  PULM- CTA bilaterally  Respiratory effort- normal respiratory effort , no retractions or nasal flaring  ABDOMEN- normoactive BS's , soft , NT, no hepatosplenomegaly palpated, or masses. No pulsatile masses noted  extremities no edema,NT  SKIN- no abnormal skin lesions on inspection or palpation  neuro- no focal deficits  PSYCH- pleasant, normal judgement and insight     Assessment/Plan   Problem List Items Addressed This Visit       CAD (coronary artery disease)    Relevant Medications    furosemide (Lasix) 20 mg tablet    magnesium oxide (Mag-Ox) 400 mg (241.3 mg magnesium) tablet    dofetilide " (Tikosyn) 125 mcg capsule    HTN (hypertension)    Insomnia    Relevant Medications    traZODone (Desyrel) 50 mg tablet    Wheezing on auscultation - Primary    Relevant Medications    montelukast (Singulair) 10 mg tablet    Urinary incontinence    Relevant Medications    darifenacin (Enablex) 15 mg 24 hr tablet     Other Visit Diagnoses       Encounter for screening mammogram for malignant neoplasm of breast        Relevant Orders    BI mammo bilateral screening tomosynthesis

## 2023-03-24 DIAGNOSIS — G47.00 INSOMNIA, UNSPECIFIED TYPE: ICD-10-CM

## 2023-03-27 RX ORDER — TRAZODONE HYDROCHLORIDE 50 MG/1
TABLET ORAL
Qty: 90 TABLET | Refills: 7 | Status: SHIPPED | OUTPATIENT
Start: 2023-03-27 | End: 2024-01-03

## 2023-04-15 ENCOUNTER — TELEPHONE (OUTPATIENT)
Dept: PRIMARY CARE | Facility: CLINIC | Age: 73
End: 2023-04-15

## 2023-04-15 NOTE — TELEPHONE ENCOUNTER
Patient of Dr. Kiran RIVAS submitted for budesonide   Waiting for approval or denial 1 to5 day response

## 2023-04-17 ENCOUNTER — TELEPHONE (OUTPATIENT)
Dept: PRIMARY CARE | Facility: CLINIC | Age: 73
End: 2023-04-17

## 2023-04-17 DIAGNOSIS — J44.9 CHRONIC OBSTRUCTIVE PULMONARY DISEASE, UNSPECIFIED COPD TYPE (MULTI): Primary | ICD-10-CM

## 2023-04-17 NOTE — TELEPHONE ENCOUNTER
Dr. Beck patient    Patient asking if she can have a disability placard     Please advise when ready to

## 2023-04-21 DIAGNOSIS — J44.9 CHRONIC OBSTRUCTIVE PULMONARY DISEASE, UNSPECIFIED COPD TYPE (MULTI): ICD-10-CM

## 2023-05-05 DIAGNOSIS — K21.9 GASTROESOPHAGEAL REFLUX DISEASE, UNSPECIFIED WHETHER ESOPHAGITIS PRESENT: ICD-10-CM

## 2023-05-05 RX ORDER — PANTOPRAZOLE SODIUM 40 MG/1
TABLET, DELAYED RELEASE ORAL
Qty: 90 TABLET | Refills: 3 | Status: SHIPPED | OUTPATIENT
Start: 2023-05-05 | End: 2024-03-31

## 2023-05-08 ENCOUNTER — OFFICE VISIT (OUTPATIENT)
Dept: FAMILY MEDICINE CLINIC | Age: 73
End: 2023-05-08
Payer: MEDICARE

## 2023-05-08 VITALS
TEMPERATURE: 98 F | DIASTOLIC BLOOD PRESSURE: 68 MMHG | OXYGEN SATURATION: 98 % | HEIGHT: 64 IN | WEIGHT: 175 LBS | BODY MASS INDEX: 29.88 KG/M2 | SYSTOLIC BLOOD PRESSURE: 124 MMHG | HEART RATE: 67 BPM | RESPIRATION RATE: 14 BRPM

## 2023-05-08 DIAGNOSIS — R05.1 ACUTE COUGH: ICD-10-CM

## 2023-05-08 DIAGNOSIS — B37.0 ORAL THRUSH: Primary | ICD-10-CM

## 2023-05-08 PROCEDURE — 99213 OFFICE O/P EST LOW 20 MIN: CPT | Performed by: NURSE PRACTITIONER

## 2023-05-08 PROCEDURE — G8399 PT W/DXA RESULTS DOCUMENT: HCPCS | Performed by: NURSE PRACTITIONER

## 2023-05-08 PROCEDURE — G8417 CALC BMI ABV UP PARAM F/U: HCPCS | Performed by: NURSE PRACTITIONER

## 2023-05-08 PROCEDURE — 1123F ACP DISCUSS/DSCN MKR DOCD: CPT | Performed by: NURSE PRACTITIONER

## 2023-05-08 PROCEDURE — 1036F TOBACCO NON-USER: CPT | Performed by: NURSE PRACTITIONER

## 2023-05-08 PROCEDURE — 3078F DIAST BP <80 MM HG: CPT | Performed by: NURSE PRACTITIONER

## 2023-05-08 PROCEDURE — 1090F PRES/ABSN URINE INCON ASSESS: CPT | Performed by: NURSE PRACTITIONER

## 2023-05-08 PROCEDURE — 3017F COLORECTAL CA SCREEN DOC REV: CPT | Performed by: NURSE PRACTITIONER

## 2023-05-08 PROCEDURE — 3074F SYST BP LT 130 MM HG: CPT | Performed by: NURSE PRACTITIONER

## 2023-05-08 PROCEDURE — G8427 DOCREV CUR MEDS BY ELIG CLIN: HCPCS | Performed by: NURSE PRACTITIONER

## 2023-05-08 RX ORDER — BENZONATATE 100 MG/1
100 CAPSULE ORAL 3 TIMES DAILY PRN
Qty: 21 CAPSULE | Refills: 0 | Status: SHIPPED | OUTPATIENT
Start: 2023-05-08 | End: 2023-05-15

## 2023-05-08 SDOH — ECONOMIC STABILITY: FOOD INSECURITY: WITHIN THE PAST 12 MONTHS, THE FOOD YOU BOUGHT JUST DIDN'T LAST AND YOU DIDN'T HAVE MONEY TO GET MORE.: NEVER TRUE

## 2023-05-08 SDOH — ECONOMIC STABILITY: FOOD INSECURITY: WITHIN THE PAST 12 MONTHS, YOU WORRIED THAT YOUR FOOD WOULD RUN OUT BEFORE YOU GOT MONEY TO BUY MORE.: NEVER TRUE

## 2023-05-08 SDOH — ECONOMIC STABILITY: INCOME INSECURITY: HOW HARD IS IT FOR YOU TO PAY FOR THE VERY BASICS LIKE FOOD, HOUSING, MEDICAL CARE, AND HEATING?: NOT HARD AT ALL

## 2023-05-08 SDOH — ECONOMIC STABILITY: HOUSING INSECURITY
IN THE LAST 12 MONTHS, WAS THERE A TIME WHEN YOU DID NOT HAVE A STEADY PLACE TO SLEEP OR SLEPT IN A SHELTER (INCLUDING NOW)?: NO

## 2023-05-08 ASSESSMENT — ENCOUNTER SYMPTOMS
EYE REDNESS: 0
COUGH: 1
EYE ITCHING: 0
WHEEZING: 0
SINUS PAIN: 0
APNEA: 0
CONSTIPATION: 0
SHORTNESS OF BREATH: 0

## 2023-05-08 NOTE — PROGRESS NOTES
Extraocular movements intact. Conjunctiva/sclera: Conjunctivae normal.      Pupils: Pupils are equal, round, and reactive to light. Cardiovascular:      Rate and Rhythm: Normal rate and regular rhythm. Pulses: Normal pulses. Heart sounds: Normal heart sounds. No murmur heard. Pulmonary:      Effort: Pulmonary effort is normal. No tachypnea, bradypnea, accessory muscle usage or respiratory distress. Breath sounds: Normal breath sounds and air entry. No stridor or transmitted upper airway sounds. No decreased breath sounds, wheezing or rhonchi. Comments: Lungs are clear on exam.   Chest:      Chest wall: No tenderness. Abdominal:      General: Bowel sounds are normal.      Palpations: Abdomen is soft. Tenderness: There is no abdominal tenderness. There is no left CVA tenderness, guarding or rebound. Musculoskeletal:         General: No deformity or signs of injury. Normal range of motion. Cervical back: Normal range of motion and neck supple. Lymphadenopathy:      Cervical: No cervical adenopathy. Right cervical: No superficial cervical adenopathy. Left cervical: No superficial cervical adenopathy. Skin:     General: Skin is warm and dry. Capillary Refill: Capillary refill takes less than 2 seconds. Findings: No bruising or erythema. Neurological:      General: No focal deficit present. Mental Status: She is alert and oriented to person, place, and time. Mental status is at baseline. Motor: No weakness. Deep Tendon Reflexes: Reflexes normal.   Psychiatric:         Attention and Perception: Attention and perception normal.         Mood and Affect: Mood and affect normal.         Speech: Speech normal.         Behavior: Behavior normal. Behavior is cooperative. Thought Content: Thought content normal.         Judgment: Judgment normal.       Assessment:       Diagnosis Orders   1.  Oral thrush  nystatin (MYCOSTATIN) [id number] UNIT/ML

## 2023-05-09 ASSESSMENT — ENCOUNTER SYMPTOMS
TROUBLE SWALLOWING: 0
CHEST TIGHTNESS: 0
NAUSEA: 0
RHINORRHEA: 1
DIARRHEA: 0
VOMITING: 0
ABDOMINAL DISTENTION: 0
COLOR CHANGE: 0
SORE THROAT: 0

## 2023-06-14 DIAGNOSIS — I25.10 CORONARY ARTERY DISEASE INVOLVING NATIVE CORONARY ARTERY OF NATIVE HEART WITHOUT ANGINA PECTORIS: ICD-10-CM

## 2023-06-14 RX ORDER — DOFETILIDE 0.12 MG/1
CAPSULE ORAL
Qty: 180 CAPSULE | Refills: 1 | Status: SHIPPED | OUTPATIENT
Start: 2023-06-14 | End: 2023-06-16

## 2023-06-15 DIAGNOSIS — I25.10 CORONARY ARTERY DISEASE INVOLVING NATIVE CORONARY ARTERY OF NATIVE HEART WITHOUT ANGINA PECTORIS: ICD-10-CM

## 2023-06-16 RX ORDER — DOFETILIDE 0.12 MG/1
CAPSULE ORAL
Qty: 180 CAPSULE | Refills: 1 | Status: SHIPPED | OUTPATIENT
Start: 2023-06-16 | End: 2023-06-28

## 2023-06-28 DIAGNOSIS — I25.10 CORONARY ARTERY DISEASE INVOLVING NATIVE CORONARY ARTERY OF NATIVE HEART WITHOUT ANGINA PECTORIS: ICD-10-CM

## 2023-06-28 RX ORDER — DOFETILIDE 0.12 MG/1
CAPSULE ORAL
Qty: 360 CAPSULE | Refills: 7 | Status: SHIPPED | OUTPATIENT
Start: 2023-06-28 | End: 2023-09-14 | Stop reason: SDUPTHER

## 2023-07-27 DIAGNOSIS — I25.10 CORONARY ARTERY DISEASE INVOLVING NATIVE CORONARY ARTERY OF NATIVE HEART WITHOUT ANGINA PECTORIS: ICD-10-CM

## 2023-07-28 RX ORDER — FUROSEMIDE 20 MG/1
20 TABLET ORAL DAILY
Qty: 90 TABLET | Refills: 3 | Status: SHIPPED | OUTPATIENT
Start: 2023-07-28 | End: 2024-04-25 | Stop reason: SDUPTHER

## 2023-07-29 DIAGNOSIS — R32 URINARY INCONTINENCE, UNSPECIFIED TYPE: ICD-10-CM

## 2023-07-31 RX ORDER — DARIFENACIN 15 MG/1
15 TABLET, EXTENDED RELEASE ORAL
Qty: 90 TABLET | Refills: 3 | Status: SHIPPED | OUTPATIENT
Start: 2023-07-31 | End: 2023-09-21 | Stop reason: SDUPTHER

## 2023-09-14 ENCOUNTER — OFFICE VISIT (OUTPATIENT)
Dept: PRIMARY CARE | Facility: CLINIC | Age: 73
End: 2023-09-14
Payer: MEDICARE

## 2023-09-14 VITALS
RESPIRATION RATE: 16 BRPM | WEIGHT: 174.4 LBS | OXYGEN SATURATION: 96 % | HEART RATE: 88 BPM | SYSTOLIC BLOOD PRESSURE: 160 MMHG | HEIGHT: 64 IN | TEMPERATURE: 98 F | DIASTOLIC BLOOD PRESSURE: 100 MMHG | BODY MASS INDEX: 29.78 KG/M2

## 2023-09-14 DIAGNOSIS — L30.9 DERMATITIS: Primary | ICD-10-CM

## 2023-09-14 DIAGNOSIS — I25.10 CORONARY ARTERY DISEASE INVOLVING NATIVE CORONARY ARTERY OF NATIVE HEART WITHOUT ANGINA PECTORIS: ICD-10-CM

## 2023-09-14 DIAGNOSIS — I10 PRIMARY HYPERTENSION: ICD-10-CM

## 2023-09-14 PROCEDURE — 99213 OFFICE O/P EST LOW 20 MIN: CPT | Performed by: NURSE PRACTITIONER

## 2023-09-14 RX ORDER — TRIAMCINOLONE ACETONIDE 1 MG/G
CREAM TOPICAL 2 TIMES DAILY
Qty: 30 G | Refills: 0 | Status: SHIPPED | OUTPATIENT
Start: 2023-09-14 | End: 2023-09-21 | Stop reason: SDUPTHER

## 2023-09-14 ASSESSMENT — PATIENT HEALTH QUESTIONNAIRE - PHQ9
10. IF YOU CHECKED OFF ANY PROBLEMS, HOW DIFFICULT HAVE THESE PROBLEMS MADE IT FOR YOU TO DO YOUR WORK, TAKE CARE OF THINGS AT HOME, OR GET ALONG WITH OTHER PEOPLE: VERY DIFFICULT
1. LITTLE INTEREST OR PLEASURE IN DOING THINGS: NOT AT ALL
2. FEELING DOWN, DEPRESSED OR HOPELESS: SEVERAL DAYS
SUM OF ALL RESPONSES TO PHQ9 QUESTIONS 1 AND 2: 1

## 2023-09-14 ASSESSMENT — ENCOUNTER SYMPTOMS
DIARRHEA: 0
ABDOMINAL PAIN: 0
SHORTNESS OF BREATH: 0
WEAKNESS: 0
HEADACHES: 0
FEVER: 0
DIZZINESS: 0
VOMITING: 0
CHILLS: 0
COUGH: 0
PALPITATIONS: 0

## 2023-09-14 NOTE — PROGRESS NOTES
"Subjective   Patient ID: Luz Crain is a 73 y.o. female who presents for spot on back.    Pt is in office with complaints of red spots on her back and some on chest. Pt claims it started 2 days ago. Pt claims she saw a doctor for a reg appointment the provider  provided some medication and they were samples, pt cannot remember the name. Pt denies itchiness or anything bothering her.    73-year-old female presents today complaining of a rash along her upper back that started 2 days ago.  She does feel that the rash is spreading.  She does have 2 spots on her chest as well.  She denies any itching or irritation from the rash.  She denies any new soaps, lotions or laundry detergent.  She did start on a new blood pressure medication from her cardiologist 2 days ago, but states that the rash started before she took her new blood pressure medication- she cannot recall the name of the medication, they were samples. She does admit to increased stress over the last few months. She feels this is affecting her blood pressure.     Review of Systems   Constitutional:  Negative for chills and fever.   Respiratory:  Negative for cough and shortness of breath.    Cardiovascular:  Negative for chest pain and palpitations.   Gastrointestinal:  Negative for abdominal pain, diarrhea and vomiting.   Skin:  Positive for rash.   Neurological:  Negative for dizziness, weakness and headaches.       Objective   BP (!) 160/100   Pulse 88   Temp 36.7 °C (98 °F) (Temporal)   Resp 16   Ht 1.626 m (5' 4\")   Wt 79.1 kg (174 lb 6.4 oz)   SpO2 96%   BMI 29.94 kg/m²     Physical Exam  Constitutional:       General: She is not in acute distress.     Appearance: Normal appearance.   Cardiovascular:      Rate and Rhythm: Normal rate and regular rhythm.   Pulmonary:      Effort: Pulmonary effort is normal.      Breath sounds: Normal breath sounds.   Skin:     Comments: Erythematous rash spread about upper back crossing midline.  No " pustules or drainage noted.  No tenderness over the area.   Neurological:      Mental Status: She is alert.   Psychiatric:         Mood and Affect: Mood normal.         Behavior: Behavior normal.       Assessment/Plan   Problem List Items Addressed This Visit       HTN (hypertension)     Other Visit Diagnoses       Dermatitis    -  Primary    Relevant Medications    triamcinolone (Kenalog) 0.1 % cream          Dermatitis: Reassured patient that rash is not shingles. Discussed with patient, rash may be due to stress. Trial of triamcinolone cream. Follow up with PCP in 5 days with any persisting symptoms, or sooner with any additional concerns.    HTN: BP elevated today. Cardiologist just started on new medication 2 days ago (after rash had already been present). Rest. Discussed trying to decrease stress levels. Follow up in the next week for a recheck of BP. If developing any chest pains or dyspnea, to ER.

## 2023-09-14 NOTE — TELEPHONE ENCOUNTER
REQUEST:   Disp Refills Start End    dofetilide (Tikosyn) 125 mcg capsule 360 capsule 7 6/28/2023     Sig: TAKE 4 CAPSULES BY MOUTH IN THE  MORNING AND 4 CAPSULES BY MOUTH  IN THE EVENING        SENT TO:  [facility]

## 2023-09-14 NOTE — PATIENT INSTRUCTIONS
Today you were seen for dermatitis.  Start steroid cream as needed.  Follow-up with PCP in 3-5 days with any persisting symptoms, or sooner with any additional concerns.  If any worsening symptoms, chest pain, trouble breathing, feeling that the throat is closing, go straight to emergency room.    Your blood pressure was also elevated today.  Please take blood pressure medications as prescribed by cardiology.  Follow-up in 1 week for recheck on your blood pressure, or sooner with any additional concerns.

## 2023-09-15 RX ORDER — DOFETILIDE 0.12 MG/1
500 CAPSULE ORAL EVERY 12 HOURS
Qty: 360 CAPSULE | Refills: 7 | Status: SHIPPED | OUTPATIENT
Start: 2023-09-15 | End: 2024-04-25 | Stop reason: SDUPTHER

## 2023-09-21 ENCOUNTER — OFFICE VISIT (OUTPATIENT)
Dept: PRIMARY CARE | Facility: CLINIC | Age: 73
End: 2023-09-21
Payer: MEDICARE

## 2023-09-21 VITALS
HEART RATE: 82 BPM | DIASTOLIC BLOOD PRESSURE: 86 MMHG | BODY MASS INDEX: 29.37 KG/M2 | RESPIRATION RATE: 16 BRPM | WEIGHT: 172 LBS | OXYGEN SATURATION: 98 % | SYSTOLIC BLOOD PRESSURE: 128 MMHG | HEIGHT: 64 IN

## 2023-09-21 DIAGNOSIS — L30.9 ECZEMA, UNSPECIFIED TYPE: ICD-10-CM

## 2023-09-21 DIAGNOSIS — N81.4 CYSTOCELE WITH PROLAPSE: ICD-10-CM

## 2023-09-21 DIAGNOSIS — L30.9 DERMATITIS: Primary | ICD-10-CM

## 2023-09-21 DIAGNOSIS — R32 URINARY INCONTINENCE, UNSPECIFIED TYPE: ICD-10-CM

## 2023-09-21 DIAGNOSIS — I10 HYPERTENSION, UNSPECIFIED TYPE: ICD-10-CM

## 2023-09-21 PROCEDURE — 99214 OFFICE O/P EST MOD 30 MIN: CPT | Performed by: FAMILY MEDICINE

## 2023-09-21 RX ORDER — TRIAMCINOLONE ACETONIDE 1 MG/G
CREAM TOPICAL 2 TIMES DAILY
Qty: 80 G | Refills: 2 | Status: SHIPPED | OUTPATIENT
Start: 2023-09-21 | End: 2023-11-27 | Stop reason: ALTCHOICE

## 2023-09-21 RX ORDER — DARIFENACIN 15 MG/1
15 TABLET, EXTENDED RELEASE ORAL
Qty: 180 TABLET | Refills: 3 | Status: SHIPPED | OUTPATIENT
Start: 2023-09-21 | End: 2023-11-27 | Stop reason: ALTCHOICE

## 2023-09-21 NOTE — PROGRESS NOTES
Subjective   Patient ID: Luz Crain is a 73 y.o. female who presents for a follow up on dermatitis.     HPI   Pt is following up from seeing Jasmyne SHANNON on 9/14/23. Pt was dx with dermatitis on her upper back and given triamcinolone cream to help. Pt states that the medication is helping and that it is starting to clear up.     Pt states that she has urinary incontinence. Pt takes enablex 15mg in the morning and 7.5 mg at night. Pt has been without the 7.5 mg at night because she has not had the script. Pt would like to know if that could possibly be increased to 15mg in the morning and at night.     Pt states that she has been under a lot of stress. Pt states that the man she was in relations with passed away 2 months ago. Pt states after his passing his children kicked her out of his home and became homeless. Pt now has a home.     Review of Systems 12 Systems have been reviewed as follows.  Constitutional: Fever, weight gain, weight loss, appetite change, night sweats, fatigue, chills.  Eyes : blurry, double vision, vision, loss, tearing, redness, pain, sensitivity to light, glaucoma.  Ears: nose, mouth, and throat: Hearing loss, ringing in the ears, ear pain, nasal congestion, nasal drainage, nosebleeds, mouth, throat, irritation tooth problem.  Cardiovascular: chest pain, pressure, heart racing, palpitations, sweating, leg swelling, high or low blood pressure  Pulmonary: Cough, yellow or green sputum, blood and sputum, shortness of breath, wheezing  Gastrointestinal: Nausea, vomiting, diarrhea, constipation, pain, blood in stool, or vomitus, heartburn, difficulty swallowing  Genitourinary: incontinence, abnormal bleeding, abnormal discharge, urinary frequency, urinary hesitancy, pain, impotence sexual problem, infection, urinary retention  Musculoskeletal: Pain, stiffness, joint, redness or warmth, arthritis, back pain, weakness, muscle wasting, sprain or fracture  Neuro: Weight weakness, dizziness,  "change in voice, change in taste change in vision, change in hearing, loss, or change of sensation, trouble walking, balance problems coordination problems, shaking, speech problem  Endocrine: cold or heat intolerance, blood sugar problem, weight gain or loss missed periods hot flashes, sweats, change in body hair, change in libido, increased thirst, increased urination  Heme/lymph: Swelling, bleeding, problem anemia, bruising, enlarged lymph nodes  Allergic/immunologic: H. plus nasal drip, watery itchy eyes, nasal drainage, immunosuppressed  The above were reviewed and noted negative except as noted in HPI and Problem List.      Objective   /86   Pulse 82   Resp 16   Ht 1.626 m (5' 4\")   Wt 78 kg (172 lb)   SpO2 98%   BMI 29.52 kg/m²     Physical Exam CONSTITUTIONAL- NAD, Pt is A & O x3, Vital signs reviewed per chart.  General Appearance- normal , good hygiene,talks easily  EYES-PERRLA conjunctiva and lids- normal  EARS/NOSE-TM's normal, head normocephalic and atraumatic, naso pharynx-no nasal discharge, no trismus,  oropharynx- normal without exudate  NECK- supple, FROM, without mass  thyroid- NT, normal size, no nodule noted  LYMPH- WNL  CV- RRR without murmur, gallop, or other abnormality.  PULM- CTA bilaterally  Respiratory effort- normal respiratory effort , no retractions or nasal flaring  ABDOMEN- normoactive BS's , soft , NT, no hepatosplenomegaly palpated, or masses. No pulsatile masses noted  extremities no edema,NT  SKIN- no abnormal skin lesions on inspection or palpation  neuro- no focal deficits  PSYCH- pleasant, normal judgement and insight      Assessment/Plan   Problem List Items Addressed This Visit       HTN (hypertension)    Urinary incontinence    Relevant Medications    darifenacin (Enablex) 15 mg 24 hr tablet    Other Relevant Orders    Referral to Urogynecology    Cystocele with prolapse    Relevant Orders    Referral to Urogynecology     Other Visit Diagnoses       Dermatitis   "  -  Primary    Relevant Medications    triamcinolone (Kenalog) 0.1 % cream    Eczema, unspecified type        Relevant Medications    triamcinolone (Kenalog) 0.1 % cream          Scribe Attestation  By signing my name below, I, Jesse Beck DO, Scribe   attest that this documentation has been prepared under the direction and in the presence of Jesse Beck DO.

## 2023-10-17 ENCOUNTER — OFFICE VISIT (OUTPATIENT)
Dept: OBSTETRICS AND GYNECOLOGY | Facility: CLINIC | Age: 73
End: 2023-10-17
Payer: MEDICARE

## 2023-10-17 VITALS
DIASTOLIC BLOOD PRESSURE: 74 MMHG | WEIGHT: 174 LBS | HEIGHT: 64 IN | BODY MASS INDEX: 29.71 KG/M2 | SYSTOLIC BLOOD PRESSURE: 124 MMHG

## 2023-10-17 DIAGNOSIS — N95.2 VAGINAL ATROPHY: ICD-10-CM

## 2023-10-17 DIAGNOSIS — R19.5 CHANGE IN STOOL CALIBER: Primary | ICD-10-CM

## 2023-10-17 DIAGNOSIS — R32 URINARY INCONTINENCE, UNSPECIFIED TYPE: ICD-10-CM

## 2023-10-17 DIAGNOSIS — N81.4 CYSTOCELE WITH PROLAPSE: ICD-10-CM

## 2023-10-17 LAB
POC APPEARANCE, URINE: CLEAR
POC BILIRUBIN, URINE: NEGATIVE
POC BLOOD, URINE: NEGATIVE
POC COLOR, URINE: YELLOW
POC GLUCOSE, URINE: NEGATIVE MG/DL
POC KETONES, URINE: NEGATIVE MG/DL
POC LEUKOCYTES, URINE: ABNORMAL
POC NITRITE,URINE: POSITIVE
POC PH, URINE: 7.5 PH
POC PROTEIN, URINE: NEGATIVE MG/DL
POC SPECIFIC GRAVITY, URINE: 1.01
POC UROBILINOGEN, URINE: 0.2 EU/DL

## 2023-10-17 PROCEDURE — 1159F MED LIST DOCD IN RCRD: CPT | Performed by: STUDENT IN AN ORGANIZED HEALTH CARE EDUCATION/TRAINING PROGRAM

## 2023-10-17 PROCEDURE — 1160F RVW MEDS BY RX/DR IN RCRD: CPT | Performed by: STUDENT IN AN ORGANIZED HEALTH CARE EDUCATION/TRAINING PROGRAM

## 2023-10-17 PROCEDURE — 3078F DIAST BP <80 MM HG: CPT | Performed by: STUDENT IN AN ORGANIZED HEALTH CARE EDUCATION/TRAINING PROGRAM

## 2023-10-17 PROCEDURE — 81003 URINALYSIS AUTO W/O SCOPE: CPT | Performed by: STUDENT IN AN ORGANIZED HEALTH CARE EDUCATION/TRAINING PROGRAM

## 2023-10-17 PROCEDURE — 1036F TOBACCO NON-USER: CPT | Performed by: STUDENT IN AN ORGANIZED HEALTH CARE EDUCATION/TRAINING PROGRAM

## 2023-10-17 PROCEDURE — 99204 OFFICE O/P NEW MOD 45 MIN: CPT | Performed by: STUDENT IN AN ORGANIZED HEALTH CARE EDUCATION/TRAINING PROGRAM

## 2023-10-17 PROCEDURE — 3074F SYST BP LT 130 MM HG: CPT | Performed by: STUDENT IN AN ORGANIZED HEALTH CARE EDUCATION/TRAINING PROGRAM

## 2023-10-17 RX ORDER — AMLODIPINE BESYLATE 5 MG/1
TABLET ORAL EVERY 24 HOURS
COMMUNITY
Start: 2023-10-16

## 2023-10-17 RX ORDER — ESTRADIOL 0.1 MG/G
CREAM VAGINAL
Qty: 42.5 G | Refills: 2 | Status: SHIPPED | OUTPATIENT
Start: 2023-10-17 | End: 2024-04-25 | Stop reason: SDUPTHER

## 2023-10-17 RX ORDER — MIRABEGRON 25 MG/1
25 TABLET, FILM COATED, EXTENDED RELEASE ORAL DAILY
Qty: 30 TABLET | Refills: 2 | Status: SHIPPED | OUTPATIENT
Start: 2023-10-17 | End: 2023-11-27

## 2023-10-17 NOTE — PATIENT INSTRUCTIONS
Fiber Therapy  Fiber acts in the colon (large bowel) to make the stool soft.  If the stool is too hard, the fiber draws water in and makes it softer.  If the stool is too watery, it acts like a 'sponge' and soaks up the liquid.    Many foods contain fiber. Fruits, vegetables, whole grains, and high fiber cereals all contain some fiber. Unfortunately, most of us don't eat enough and a fiber supplement is a good way to increase soluble fiber intake.    Supplemental fiber comes in many. You could choose from:   Powder  Tablets  Wafers/Cookies/Gummies  Some common brands include:  Benefiber (tasteless powder)  Metamucil (powder)  Konsyl (powder)  Citrucel (powder, may cause less gas)  Fiber-Con (tablet)  All of these products work in the same way, but some may work better than others for you.   Dosing:    One tablespoon of powder dissolves in 8-16 oz of water or juice OR  Two tablets with 8-16 oz water or juice OR  Two fiber wafers/cookies with 8-16 oz of water or juice  Take fiber in the morning. Start off using it once per day. You can then increase frequency if needed.   IF FIBER IS NOT TAKEN WITH ADEQUATE WATER/FLUID INTAKE, IT MAY BE CONSTIPATING.  DRINK 6-8 GLASSES OF WATER/LIQUIDS THROUGHOUT THE DAY WHILE TAKING FIBER SUPPLEMENTATION.  Fiber can cause gas/bloating, especially when first starting the treatment.  If this is the case, you can take simethicone (Gas-X) over the counter after meals and at bedtime as needed.   Summary:  1.  Remember: the most common cause of constipation is inadequate water intake during the day. Avoiding dehydration is usually the key to success with fiber supplementation.  2. Using fiber requires some experimentation- if one brand doesn't work or causes excessive gas, take another. Also, try varying the form of fiber- for example, if the powder is unpalatable; take the tablets or wafers instead.  3. You may need more than one dose per day- feel free to increase your dose to morning  and mid-day if that works better.   4. Results depend on consistency of usage- the more consistent you are in taking fiber, the better the results!

## 2023-10-17 NOTE — PROGRESS NOTES
Referred by: Dr. Beck     PCP  Jesse Beck DO         CHIEF COMPLAINT:  urinary incontinence         HISTORY OF PRESENT ILLNESS:  This is a  73 y.o. y.o. female who presents with ***    The following were reviewed to gain additional history:  External notes: Dr. Beck note from 9/21/23: urinary incontinence, rx for darifenacin 15 mg 24 hr tablet sent  Test results: Cr 1.20 3/15/23, urine cytology 3/15/23 negative for high grade urothelial carcinoma         Specifically, she describes the following pelvic floor symptoms:          Prolapse: {yes,no:21211}       - Splinting to urinate: {yes,no:21211}       - Splinting for bowel movement/stool trapping: {yes,no:21211}              Incontinence:  {yes,no:21211}             {types of incontinence:69978}              Urinary Symptoms:       - Frequency:  {yes,no:21211}             # Voids:         - Nocturia: {yes,no:21211}             # Voids:         - Urgency:  {yes,no:21211}       - Incomplete emptying:  {YES wildcard/NO:60}       - Hesitancy:  {YES wildcard/NO:60}       - Pain with voiding:  {YES wildcard/NO:60}       - Postvoid dribbling:  {YES wildcard/NO:60}       - Excessive fluid intake: {YES wildcard/NO:60}               History:       - Recurrent UTI:  {YES wildcard/NO:60}       - Hematuria:  {YES wildcard/NO:60}       - Stones:  {YES wildcard/NO:60}       - Kidney Disease:  {YES wildcard/NO:60}                  Bowel Symptoms:       - Regular: {yes,no:21211}       - Diarrhea:{yes,no:21211}       - Constipation: {yes,no:21211}       - Fecal Incontinence:  {yes,no:21211}       - Flatus Incontinence:  {yes,no:21211}       - Fecal urgency:   {yes,no:21211}    Past medical and surgical hx reviewed - pertinent for ***        Gyn History:  - Menopausal: {yes,no:21211}           Postmenopausal bleeding: {YES wildcard/NO:60}  - HRT: {YES wildcard/NO:60}  - Pap up to date: {YES wildcard/NO:60}   History of abnormal pap: {YES wildcard/NO:60}  - Sexually  active:  {yes,no:21211}  Dyspareunia: {yes,no:21211}   Other issues: ***  - Number of prior vaginal deliveries: ***   Number of prior operative deliveries ***   Prior OASI? ***  - Number of prior c-sections: ***    - Mammogram up to date: {YES (/NO:60}  - Colonoscopy up to date: {YES (/NO:60}    OB History    No obstetric history on file.               PHYSICAL EXAMINATION:  No LMP recorded.  There is no height or weight on file to calculate BMI.  There were no vitals taken for this visit.  General Appearance: well appearing  Neuro: Alert and oriented   HEENT: mucous membranes moist, neck supple  Resp: No respiratory distress, normal work of breathing  MSK: normal range of motion, gait appropriate    Pelvic:  Genitourinary: normal external genitalia, Barth[place]'s glands negative, [place]'s glands negative  Urethra: normal meatus, non-tender, no periurethral mass  Vaginal mucosa *** normal  Cervix surgically absent*** normal  Uterus surgically absent*** normal size, non-tender, mobile  Adnexae *** negative nontender, no masses  Atrophy {POSITIVE/NEGATIVE:49852}    CST {POSITIVE/NEGATIVE:71537}  Pelvic floor muscle contraction  ***/5    POP-Q (in supine position):       Aa ***     Ba ***     C ***              gh ***     pb ***     tvl ***              Ap ***     Bp ***     D ***    Rectal: no hemorrhoids, fissures or masses***    PVR (by Ultrasound): ***   Urine dip: No results found for this or any previous visit.         IMPRESSION AND PLAN:  Luz Crain is a 73 y.o. who presents with ***    ***    All questions and concerns were answered and addressed.  The patient expressed understanding and agrees with the plan.     10/17/2023

## 2023-10-17 NOTE — PROGRESS NOTES
Referred by: Dr. Beck     PCP  Jesse Beck DO         CHIEF COMPLAINT:  urinary incontinence         HISTORY OF PRESENT ILLNESS:  This is a  73 y.o. y.o. female who presents with a referral for a possible cystocele (referred by her PCP a month ago). Patient has been noticing this for 2 months and could feel something was wrong and saw a protrusion with a mirror. No pain with it, just incontinence, doesn't make it to the restroom the second she has to go, she goes.    The following were reviewed to gain additional history:  External notes: Dr. Beck note from 9/21/23: urinary incontinence, rx for darifenacin 15 mg 24 hr tablet sent  Test results: Cr 1.20 3/15/23, urine cytology 3/15/23 negative for high grade urothelial carcinoma         Specifically, she describes the following pelvic floor symptoms:          Prolapse: Yes       - Splinting to urinate: No, but shifting position to empty       - Splinting for bowel movement/stool trapping: No, but shifting position to empty              Incontinence:  Yes             Urge              Urinary Symptoms:       - Frequency:  Yes             # Voids:  5-6 now, it was less before       - Nocturia: Yes             # Voids:  2       - Urgency:  Yes       - Incomplete emptying:  Yes - with most urinations       - Hesitancy:  No       - Pain with voiding:  No       - Postvoid dribbling:  Yes - A little dribbling       - Excessive fluid intake: NoDrinks -4 glasses of water a day               History:       - Recurrent UTI:  No       - Hematuria:  No       - Stones:  Yes - Had 4-5 all together, most recent one was several years ago       - Kidney Disease:  No                  Bowel Symptoms:       - Regular: No, they occur every couple days, sometimes it comes out as little ribbons, unsure what opening it is coming out of, sometimes surprises her with blowouts after she didn't think she had to go       - Diarrhea:No       - Constipation: Yes       - Fecal  Incontinence:  Yes, wears a pad and thick pants, sometimes finds feces on the pad, has an oily look to it when she wipes, occurs every other day       - Flatus Incontinence:  No       - Fecal urgency:   Yes, can usually make it to the bathroom in time, 1-2 times per week can not make it to the bathroom in time    Past medical and surgical hx reviewed - pertinent for CAD with a stent placement, knee and shoulder surgery, lumbar fusion, pacemaker, HTN, PE, hysterectomy        Gyn History:  - Menopausal: Yes, hysterectomy at 46           Postmenopausal bleeding: No  - HRT: Yes for 1 year after hysterectomy  - Pap up to date: Yes    History of abnormal pap: No  - Sexually active:  No  Dyspareunia: No   Other issues:   - Number of prior vaginal deliveries: 3   Number of prior operative deliveries: Forceps assisted for at least one   Prior OASI? No  - Number of prior c-sections: 0    - Mammogram up to date: Yes - Due for one soon  - Colonoscopy up to date: Yes - Last one at [age]    OB History    No obstetric history on file.               PHYSICAL EXAMINATION:  No LMP recorded.  There is no height or weight on file to calculate BMI.  There were no vitals taken for this visit.  General Appearance: well appearing  Neuro: Alert and oriented   HEENT: mucous membranes moist, neck supple  Resp: No respiratory distress, normal work of breathing  MSK: normal range of motion, gait appropriate    Pelvic:  Genitourinary: normal external genitalia, Bartholin's glands negative, [place]'s glands negative  Urethra: normal meatus, non-tender, no periurethral mass, +caruncle  Vaginal mucosa  normal, atrophy  Cervix surgically absent  Uterus surgically absent  Adnexae  negative nontender, no masses  Atrophy positive significant    CST negative      POP-Q (in supine position):       Aa -2     Ba -2     C -7              gh 3     pb 3     tvl 9              Ap -1     Bp -1     D N/A    Rectal: no hemorrhoids, fissures or masses    PVR (by  Ultrasound): 5 ml   Urine dip: No results found for this or any previous visit.         IMPRESSION AND PLAN:  Luz Crain is a 73 y.o. who presents with OAB, urethral caruncle, vaginal atrophy, bowel symptoms    OAB  - discussed etiology of OAB and potential management options  - reviewed bladder health recommendations (fluid intake, avoiding bladder triggers)  - discussed treatment options: PFPT, medications, PTNS, intradetrusor botox, SNM  - discontinue darifenacin 15 mg BID (reviewed this is recommended once daily)  - rx for myrbetriq sent to pharmacy    Urethral caruncle   - reviewed pathophys in detail, reviewed images with patient and discussed treatment with vaginal estrogen  - reviewed no significant prolapse on exam today  - no indication for surgical intervention  - rx for vaginal estrogen sent to pharmacy    Change in stool consistency  - discussed referral to GI, placed today  - may also be stress-related given signficant life events recently (partner passed away)  - reviewed fiber recs and gave handout to patient  - if no improvement with fiber supplementation, advised GI workup given sudden change in consistency      All questions and concerns were answered and addressed.  The patient expressed understanding and agrees with the plan.     10/17/2023     Stephanie Nam MD

## 2023-11-02 ENCOUNTER — OFFICE VISIT (OUTPATIENT)
Dept: PRIMARY CARE | Facility: CLINIC | Age: 73
End: 2023-11-02
Payer: MEDICARE

## 2023-11-02 VITALS
WEIGHT: 175 LBS | DIASTOLIC BLOOD PRESSURE: 68 MMHG | SYSTOLIC BLOOD PRESSURE: 126 MMHG | HEIGHT: 64 IN | OXYGEN SATURATION: 98 % | HEART RATE: 74 BPM | RESPIRATION RATE: 14 BRPM | BODY MASS INDEX: 29.88 KG/M2

## 2023-11-02 DIAGNOSIS — H35.3211 EXUDATIVE AGE-RELATED MACULAR DEGENERATION, RIGHT EYE, WITH ACTIVE CHOROIDAL NEOVASCULARIZATION (MULTI): ICD-10-CM

## 2023-11-02 DIAGNOSIS — R32 URINARY INCONTINENCE, UNSPECIFIED TYPE: ICD-10-CM

## 2023-11-02 DIAGNOSIS — J44.9 CHRONIC OBSTRUCTIVE PULMONARY DISEASE, UNSPECIFIED COPD TYPE (MULTI): ICD-10-CM

## 2023-11-02 DIAGNOSIS — I26.99 PULMONARY EMBOLISM, OTHER, UNSPECIFIED CHRONICITY, UNSPECIFIED WHETHER ACUTE COR PULMONALE PRESENT (MULTI): ICD-10-CM

## 2023-11-02 DIAGNOSIS — I10 PRIMARY HYPERTENSION: Primary | ICD-10-CM

## 2023-11-02 DIAGNOSIS — I48.0 PAROXYSMAL ATRIAL FIBRILLATION (MULTI): ICD-10-CM

## 2023-11-02 PROBLEM — R05.9 COUGH: Status: RESOLVED | Noted: 2023-03-06 | Resolved: 2023-11-02

## 2023-11-02 PROBLEM — J02.9 SORE THROAT: Status: RESOLVED | Noted: 2023-03-06 | Resolved: 2023-11-02

## 2023-11-02 PROBLEM — R39.15 URGENCY OF URINATION: Status: RESOLVED | Noted: 2023-03-06 | Resolved: 2023-11-02

## 2023-11-02 PROBLEM — N39.0 UTI (URINARY TRACT INFECTION): Status: RESOLVED | Noted: 2023-03-06 | Resolved: 2023-11-02

## 2023-11-02 PROBLEM — R39.9 UTI SYMPTOMS: Status: RESOLVED | Noted: 2023-03-06 | Resolved: 2023-11-02

## 2023-11-02 PROBLEM — J40 BRONCHITIS: Status: RESOLVED | Noted: 2023-03-06 | Resolved: 2023-11-02

## 2023-11-02 PROCEDURE — 99214 OFFICE O/P EST MOD 30 MIN: CPT | Performed by: FAMILY MEDICINE

## 2023-11-02 RX ORDER — CIPROFLOXACIN 500 MG/1
500 TABLET ORAL 2 TIMES DAILY
Qty: 10 TABLET | Refills: 0 | Status: SHIPPED | OUTPATIENT
Start: 2023-11-02 | End: 2023-11-07

## 2023-11-02 NOTE — PROGRESS NOTES
Subjective   Patient ID: Luz Crain is a 73 y.o. female who presents for Referral, Urinary Incontinence, and Hypertension.    HPI   Pt states she was seen by Dr. Stephanie Nam and states she started her on Mybetriq 25 mg and estradiol cream. [facility], showed small leukocytes, was not placed on antibiotic. Pt states she did notice her urine is cloudy. Pt states she is having both fecal and urine incontinence. Pt states she was told she does not need surgery. Pt is following up with Dr. Nam on 11/27/23.     Review of Systems  12 Systems have been reviewed as follows.  Constitutional: Fever, weight gain, weight loss, appetite change, night sweats, fatigue, chills.  Eyes : blurry, double vision, vision, loss, tearing, redness, pain, sensitivity to light, glaucoma.  Ears: nose, mouth, and throat: Hearing loss, ringing in the ears, ear pain, nasal congestion, nasal drainage, nosebleeds, mouth, throat, irritation tooth problem.  Cardiovascular: chest pain, pressure, heart racing, palpitations, sweating, leg swelling, high or low blood pressure  Pulmonary: Cough, yellow or green sputum, blood and sputum, shortness of breath, wheezing  Gastrointestinal: Nausea, vomiting, diarrhea, constipation, pain, blood in stool, or vomitus, heartburn, difficulty swallowing  Genitourinary: incontinence, abnormal bleeding, abnormal discharge, urinary frequency, urinary hesitancy, pain, impotence sexual problem, infection, urinary retention  Musculoskeletal: Pain, stiffness, joint, redness or warmth, arthritis, back pain, weakness, muscle wasting, sprain or fracture  Neuro: Weight weakness, dizziness, change in voice, change in taste change in vision, change in hearing, loss, or change of sensation, trouble walking, balance problems coordination problems, shaking, speech problem  Endocrine: cold or heat intolerance, blood sugar problem, weight gain or loss missed periods hot flashes, sweats, change in body hair,  "change in libido, increased thirst, increased urination  Heme/lymph: Swelling, bleeding, problem anemia, bruising, enlarged lymph nodes  Allergic/immunologic: H. plus nasal drip, watery itchy eyes, nasal drainage, immunosuppressed  The above were reviewed and noted negative except as noted in HPI and Problem List.    Objective   /68 (BP Location: Left arm, Patient Position: Sitting, BP Cuff Size: Adult)   Pulse 74   Resp 14   Ht 1.626 m (5' 4\")   Wt 79.4 kg (175 lb)   SpO2 98%   BMI 30.04 kg/m²     Physical Exam  CONSTITUTIONAL- NAD, Pt is A & O x3, Vital signs reviewed per chart.  General Appearance- normal , good hygiene,talks easily  EYES-PERRLA conjunctiva and lids- normal  EARS/NOSE-TM's normal, head normocephalic and atraumatic, naso pharynx-no nasal discharge, no trismus,  oropharynx- normal without exudate  NECK- supple, FROM, without mass  thyroid- NT, normal size, no nodule noted  LYMPH- WNL  CV- RRR without murmur, gallop, or other abnormality.  PULM- CTA bilaterally  Respiratory effort- normal respiratory effort , no retractions or nasal flaring  ABDOMEN- normoactive BS's , soft , NT, no hepatosplenomegaly palpated, or masses. No pulsatile masses noted  extremities no edema,NT  SKIN- no abnormal skin lesions on inspection or palpation  neuro- no focal deficits  PSYCH- pleasant, normal judgement and insight    Assessment/Plan   Problem List Items Addressed This Visit             ICD-10-CM    COPD (chronic obstructive pulmonary disease) (CMS/HCC) J44.9    HTN (hypertension) - Primary I10    Relevant Orders    CBC and Auto Differential    Lipid Panel    Comprehensive Metabolic Panel    Thyroxine, Free    Thyroxine, Total    Thyroid Stimulating Hormone    Pulmonary embolism (CMS/HCC) I26.99    Relevant Orders    CBC and Auto Differential    Lipid Panel    Comprehensive Metabolic Panel    Thyroxine, Free    Thyroxine, Total    Thyroid Stimulating Hormone    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    Provider Attestation - Scribe documentation    All medical record entries made by the Scribe were at my direction and personally dictated by me. I have reviewed the chart and agree that the record accurately reflects my personal performance of the history, physical exam, discussion and plan.

## 2023-11-27 ENCOUNTER — OFFICE VISIT (OUTPATIENT)
Dept: OBSTETRICS AND GYNECOLOGY | Facility: CLINIC | Age: 73
End: 2023-11-27
Payer: MEDICARE

## 2023-11-27 VITALS
HEIGHT: 64 IN | SYSTOLIC BLOOD PRESSURE: 142 MMHG | DIASTOLIC BLOOD PRESSURE: 90 MMHG | BODY MASS INDEX: 30.73 KG/M2 | WEIGHT: 180 LBS

## 2023-11-27 DIAGNOSIS — N32.81 OVERACTIVE BLADDER: Primary | ICD-10-CM

## 2023-11-27 PROCEDURE — 1036F TOBACCO NON-USER: CPT | Performed by: NURSE PRACTITIONER

## 2023-11-27 PROCEDURE — 99214 OFFICE O/P EST MOD 30 MIN: CPT | Performed by: NURSE PRACTITIONER

## 2023-11-27 PROCEDURE — 3080F DIAST BP >= 90 MM HG: CPT | Performed by: NURSE PRACTITIONER

## 2023-11-27 PROCEDURE — 1159F MED LIST DOCD IN RCRD: CPT | Performed by: NURSE PRACTITIONER

## 2023-11-27 PROCEDURE — 1160F RVW MEDS BY RX/DR IN RCRD: CPT | Performed by: NURSE PRACTITIONER

## 2023-11-27 PROCEDURE — 1126F AMNT PAIN NOTED NONE PRSNT: CPT | Performed by: NURSE PRACTITIONER

## 2023-11-27 PROCEDURE — 3077F SYST BP >= 140 MM HG: CPT | Performed by: NURSE PRACTITIONER

## 2023-11-27 RX ORDER — TROSPIUM CHLORIDE 20 MG/1
20 TABLET, FILM COATED ORAL 2 TIMES DAILY
Qty: 60 TABLET | Refills: 11 | Status: SHIPPED | OUTPATIENT
Start: 2023-11-27 | End: 2024-11-26

## 2023-11-27 ASSESSMENT — PAIN SCALES - GENERAL: PAINLEVEL: 0-NO PAIN

## 2023-11-27 NOTE — PROGRESS NOTES
"HISTORY OF PRESENT ILLNESS:  Luz Crain is a 73 y.o. female, who presents in follow up for OAB, medication check.      During last encounter on 10/17/2023, reviewed and agreed to the following:  Her OAB sx include nocturia 2x per night and urinary urgency. Patient was started on Myrbetriq ER 25mg for OAB.     Today she reports   Urinary Symptoms:   - The patient feels that the Myrbetriq is not working at all to manage her OAB sx because she continues to endorse typically 4 UUI episodes within a 24 hour time period.   - Not bothered by urinary frequency during the day and is bothered by urgency with UUI related episodes.   - Nocturia 2-3x per night and denies nocturnal enuresis. She does drink fluids before bed to intentionally limit fluids to prevent nocturia and waking up with urgency. She often wakes up at night with nocturia and urgency to void.   - Reports occasional feeling of incomplete bladder emptying and has never tried splinting her prolapse.     Prolapse Symptoms:   - The patient reports feeling of a vaginal protrusion which she notices only sometimes when showering and described the tissue as being white colored in appearance.   - Her bulge sx are at times more noticeable than other times.  - Of note she did not have significant prolapse on prior exam but did have a urethral caruncle which is what she is likely describing.      Bowel Symptoms:  - The patient reports having new bowel symptoms of insensory fecal loss and wears pads for this.     Surgical History:  - Hysterectomy at age 46          PHYSICAL EXAMINATION:  No LMP recorded. Patient is postmenopausal.  Body mass index is 30.9 kg/m².  /90   Ht 1.626 m (5' 4\")   Wt 81.6 kg (180 lb)   BMI 30.90 kg/m²     General Appearance: well appearing  Neuro: Alert and oriented   HEENT: mucous membranes moist, neck supple  Resp: No respiratory distress, normal work of breathing  MSK: normal range of motion, gait " appropriate    Pelvic:  Chaperone for pelvic exam:   Genitourinary: normal external genitalia, Bartholin's glands, [place]'s glands negative, some fusion of the labia minus with the labia majus.   Urethra: urethral caruncle  Vaginal mucosa is hypoestrogenic/atrophic.   Cervix surgically absent.   Uterus is surgically absent.   Atrophy positive    POP-Q (in supine position):       Aa -2     Ba -2     C -7              gh 3     pb 3     tvl 9              Ap -1     Bp -1     D N/A    Rectal: no hemorrhoids, fissures or masses. Hard stool noted in the rectum.     Urine dip:  No results found for this or any previous visit.       IMPRESSION AND PLAN:  73 year old female with OAB/UUI, stage 1 rectocele, urethral caruncle, vaginal atrophy, and varying stool consistency with FI. Comorbidities include: CAD with cardiac stent and pacemaker in place, hx of PE, HTN, paroxysmal atrial fibrillation, GERD, and COPD.     Diagnoses:  #1 Overactive bladder  #2 Urge incontinence   #3 Fecal incontinence, insensory fecal loss  #4 Urethral prolapse   #5 Rectocele, stage 1    Plan:  1. OAB, UUI  - Previously failed Darifenacin 15mg which she was taking 2x/daily for several years.    - The patient has not noticed any improvement in her OAB sx since starting Myrbetriq ER 25mg daily as she continues to have around 4 UUI episodes in a 24 hour time period and nocturia. However, we reassured her that the Myrbetriq is not causing her insensory fecal loss as there are no beta receptors on the bowel and with Myrbetriq being a beta agonist the medication could not attach to the bowel to cause these symptoms.   - She is amenable to trying an alternative OAB medication (Trospium).    - Discontinue Myrbetriq ER 25mg as this has not improved her OAB sx.   - Sent Rx of Trospium 20mg to her preferred pharmacy with instructions to take 1 pill po BID. This medication will help improve bladder compliance by decreasing intensity of the bladder spasms thus  improving urinary urgency. Discussed the drug profile being an anticholinergic and possible medication side effects including dry mouth, dry eyes, and constipation.   - If she fails Trospium we recommend proceeding with urodynamics to better assess her bladder capacity/functioning and if there is +DO to consider third-line OAB therapies (I.e. intradetrusor Botox injections vs. PTNS vs. SNM).     2. Urethral prolapse, vaginal atrophy  - We discussed using tv Estradiol cream on the urethra 2x/week to help estrogenize the epithelium to resolve the caruncle. She can use E2 intravaginally to manage vaginal atrophy.     3. Constipation, stage 1 rectocele  - Counseled that the white tissue she is seeing protrude vaginally is a small rectocele and likely due to constipation and reassured her that this is not attributing to her OAB/UUI symptoms.     All questions and concerns were answered and addressed.  The patient expressed understanding and agrees with the plan.      Follow up in 4 weeks with Dr. Stephanie Nam for an OAB medication check. (Trospium)    Scribe Attestation  By signing my name below, John COYLE, Maude, attest that this documentation has been prepared under the direction and in the presence of WESTON Denis.    I, Pau Merchant, personally performed the services described in the documentation as scribed in my presence and confirm it is both complete and accurate.

## 2024-01-02 ENCOUNTER — LAB (OUTPATIENT)
Dept: LAB | Facility: LAB | Age: 74
End: 2024-01-02
Payer: MEDICARE

## 2024-01-02 DIAGNOSIS — I10 PRIMARY HYPERTENSION: ICD-10-CM

## 2024-01-02 DIAGNOSIS — I26.99 PULMONARY EMBOLISM, OTHER, UNSPECIFIED CHRONICITY, UNSPECIFIED WHETHER ACUTE COR PULMONALE PRESENT (MULTI): ICD-10-CM

## 2024-01-02 LAB
ALBUMIN SERPL BCP-MCNC: 3.9 G/DL (ref 3.4–5)
ALP SERPL-CCNC: 60 U/L (ref 33–136)
ALT SERPL W P-5'-P-CCNC: 18 U/L (ref 7–45)
ANION GAP SERPL CALC-SCNC: 10 MMOL/L (ref 10–20)
AST SERPL W P-5'-P-CCNC: 28 U/L (ref 9–39)
BASOPHILS # BLD AUTO: 0.07 X10*3/UL (ref 0–0.1)
BASOPHILS NFR BLD AUTO: 1 %
BILIRUB SERPL-MCNC: 0.4 MG/DL (ref 0–1.2)
BUN SERPL-MCNC: 22 MG/DL (ref 6–23)
CALCIUM SERPL-MCNC: 9.3 MG/DL (ref 8.6–10.3)
CHLORIDE SERPL-SCNC: 106 MMOL/L (ref 98–107)
CHOLEST SERPL-MCNC: 201 MG/DL (ref 0–199)
CHOLESTEROL/HDL RATIO: 3.2
CO2 SERPL-SCNC: 33 MMOL/L (ref 21–32)
CREAT SERPL-MCNC: 0.96 MG/DL (ref 0.5–1.05)
EOSINOPHIL # BLD AUTO: 0.19 X10*3/UL (ref 0–0.4)
EOSINOPHIL NFR BLD AUTO: 2.8 %
ERYTHROCYTE [DISTWIDTH] IN BLOOD BY AUTOMATED COUNT: 13.8 % (ref 11.5–14.5)
GFR SERPL CREATININE-BSD FRML MDRD: 62 ML/MIN/1.73M*2
GLUCOSE SERPL-MCNC: 83 MG/DL (ref 74–99)
HCT VFR BLD AUTO: 39.4 % (ref 36–46)
HDLC SERPL-MCNC: 63.8 MG/DL
HGB BLD-MCNC: 12.5 G/DL (ref 12–16)
IMM GRANULOCYTES # BLD AUTO: 0.02 X10*3/UL (ref 0–0.5)
IMM GRANULOCYTES NFR BLD AUTO: 0.3 % (ref 0–0.9)
LDLC SERPL CALC-MCNC: 119 MG/DL
LYMPHOCYTES # BLD AUTO: 2.01 X10*3/UL (ref 0.8–3)
LYMPHOCYTES NFR BLD AUTO: 29.6 %
MCH RBC QN AUTO: 29.1 PG (ref 26–34)
MCHC RBC AUTO-ENTMCNC: 31.7 G/DL (ref 32–36)
MCV RBC AUTO: 92 FL (ref 80–100)
MONOCYTES # BLD AUTO: 0.46 X10*3/UL (ref 0.05–0.8)
MONOCYTES NFR BLD AUTO: 6.8 %
NEUTROPHILS # BLD AUTO: 4.04 X10*3/UL (ref 1.6–5.5)
NEUTROPHILS NFR BLD AUTO: 59.5 %
NON HDL CHOLESTEROL: 137 MG/DL (ref 0–149)
NRBC BLD-RTO: 0 /100 WBCS (ref 0–0)
PLATELET # BLD AUTO: 270 X10*3/UL (ref 150–450)
POTASSIUM SERPL-SCNC: 4.5 MMOL/L (ref 3.5–5.3)
PROT SERPL-MCNC: 6.9 G/DL (ref 6.4–8.2)
RBC # BLD AUTO: 4.3 X10*6/UL (ref 4–5.2)
SODIUM SERPL-SCNC: 144 MMOL/L (ref 136–145)
T4 FREE SERPL-MCNC: 0.93 NG/DL (ref 0.61–1.12)
T4 SERPL-MCNC: 9.4 UG/DL (ref 4.5–11.1)
TRIGL SERPL-MCNC: 90 MG/DL (ref 0–149)
TSH SERPL-ACNC: 3.91 MIU/L (ref 0.44–3.98)
VLDL: 18 MG/DL (ref 0–40)
WBC # BLD AUTO: 6.8 X10*3/UL (ref 4.4–11.3)

## 2024-01-02 PROCEDURE — 84439 ASSAY OF FREE THYROXINE: CPT

## 2024-01-02 PROCEDURE — 80061 LIPID PANEL: CPT

## 2024-01-02 PROCEDURE — 84443 ASSAY THYROID STIM HORMONE: CPT

## 2024-01-02 PROCEDURE — 85025 COMPLETE CBC W/AUTO DIFF WBC: CPT

## 2024-01-02 PROCEDURE — 80053 COMPREHEN METABOLIC PANEL: CPT

## 2024-01-02 PROCEDURE — 36415 COLL VENOUS BLD VENIPUNCTURE: CPT

## 2024-01-02 PROCEDURE — 84436 ASSAY OF TOTAL THYROXINE: CPT

## 2024-01-03 DIAGNOSIS — G47.00 INSOMNIA, UNSPECIFIED TYPE: ICD-10-CM

## 2024-01-03 RX ORDER — TRAZODONE HYDROCHLORIDE 50 MG/1
TABLET ORAL
Qty: 180 TABLET | Refills: 3 | Status: SHIPPED | OUTPATIENT
Start: 2024-01-03 | End: 2024-04-25 | Stop reason: SDUPTHER

## 2024-01-04 ENCOUNTER — OFFICE VISIT (OUTPATIENT)
Dept: PRIMARY CARE | Facility: CLINIC | Age: 74
End: 2024-01-04
Payer: MEDICARE

## 2024-01-04 VITALS
HEIGHT: 64 IN | DIASTOLIC BLOOD PRESSURE: 70 MMHG | SYSTOLIC BLOOD PRESSURE: 128 MMHG | BODY MASS INDEX: 30.9 KG/M2 | OXYGEN SATURATION: 98 % | WEIGHT: 181 LBS | HEART RATE: 74 BPM

## 2024-01-04 DIAGNOSIS — R41.89 PSEUDODEMENTIA: ICD-10-CM

## 2024-01-04 DIAGNOSIS — I48.0 PAROXYSMAL ATRIAL FIBRILLATION (MULTI): ICD-10-CM

## 2024-01-04 DIAGNOSIS — I26.99 PULMONARY EMBOLISM, OTHER, UNSPECIFIED CHRONICITY, UNSPECIFIED WHETHER ACUTE COR PULMONALE PRESENT (MULTI): ICD-10-CM

## 2024-01-04 DIAGNOSIS — F33.8 SEASONAL DEPRESSION (CMS-HCC): ICD-10-CM

## 2024-01-04 DIAGNOSIS — R06.2 WHEEZING ON AUSCULTATION: ICD-10-CM

## 2024-01-04 DIAGNOSIS — J44.9 CHRONIC OBSTRUCTIVE PULMONARY DISEASE, UNSPECIFIED COPD TYPE (MULTI): ICD-10-CM

## 2024-01-04 DIAGNOSIS — F43.21 DYSFUNCTIONAL GRIEVING: ICD-10-CM

## 2024-01-04 DIAGNOSIS — I10 PRIMARY HYPERTENSION: Primary | ICD-10-CM

## 2024-01-04 DIAGNOSIS — F32.A DEPRESSION, UNSPECIFIED DEPRESSION TYPE: ICD-10-CM

## 2024-01-04 DIAGNOSIS — H35.3211 EXUDATIVE AGE-RELATED MACULAR DEGENERATION, RIGHT EYE, WITH ACTIVE CHOROIDAL NEOVASCULARIZATION (MULTI): ICD-10-CM

## 2024-01-04 PROCEDURE — 3078F DIAST BP <80 MM HG: CPT | Performed by: FAMILY MEDICINE

## 2024-01-04 PROCEDURE — 1036F TOBACCO NON-USER: CPT | Performed by: FAMILY MEDICINE

## 2024-01-04 PROCEDURE — 1159F MED LIST DOCD IN RCRD: CPT | Performed by: FAMILY MEDICINE

## 2024-01-04 PROCEDURE — 99214 OFFICE O/P EST MOD 30 MIN: CPT | Performed by: FAMILY MEDICINE

## 2024-01-04 PROCEDURE — 1126F AMNT PAIN NOTED NONE PRSNT: CPT | Performed by: FAMILY MEDICINE

## 2024-01-04 PROCEDURE — 3074F SYST BP LT 130 MM HG: CPT | Performed by: FAMILY MEDICINE

## 2024-01-04 RX ORDER — BUPROPION HYDROCHLORIDE 150 MG/1
150 TABLET ORAL EVERY MORNING
Qty: 30 TABLET | Refills: 3 | Status: SHIPPED | OUTPATIENT
Start: 2024-01-04 | End: 2024-04-25 | Stop reason: SDUPTHER

## 2024-01-04 NOTE — PROGRESS NOTES
Subjective   Patient ID: Luz Crain is a 73 y.o. female who presents for Results.    HPI   Pt presents today for routine exam. Pt had blood work done 01/02/2024 and would like to go over the results today.    Pt states she has been experiencing seasonal depression. Pt is currently taking Prozac 60 mg. Pt states she has no motivation for anything and has been getting confused lately.     Review of Systems  12 Systems have been reviewed as follows.  Constitutional: Fever, weight gain, weight loss, appetite change, night sweats, fatigue, chills.  Eyes : blurry, double vision, vision, loss, tearing, redness, pain, sensitivity to light, glaucoma.  Ears: nose, mouth, and throat: Hearing loss, ringing in the ears, ear pain, nasal congestion, nasal drainage, nosebleeds, mouth, throat, irritation tooth problem.  Cardiovascular: chest pain, pressure, heart racing, palpitations, sweating, leg swelling, high or low blood pressure  Pulmonary: Cough, yellow or green sputum, blood and sputum, shortness of breath, wheezing  Gastrointestinal: Nausea, vomiting, diarrhea, constipation, pain, blood in stool, or vomitus, heartburn, difficulty swallowing  Genitourinary: incontinence, abnormal bleeding, abnormal discharge, urinary frequency, urinary hesitancy, pain, impotence sexual problem, infection, urinary retention  Musculoskeletal: Pain, stiffness, joint, redness or warmth, arthritis, back pain, weakness, muscle wasting, sprain or fracture  Neuro: Weight weakness, dizziness, change in voice, change in taste change in vision, change in hearing, loss, or change of sensation, trouble walking, balance problems coordination problems, shaking, speech problem  Endocrine: cold or heat intolerance, blood sugar problem, weight gain or loss missed periods hot flashes, sweats, change in body hair, change in libido, increased thirst, increased urination  Heme/lymph: Swelling, bleeding, problem anemia, bruising, enlarged lymph  "nodes  Allergic/immunologic: H. plus nasal drip, watery itchy eyes, nasal drainage, immunosuppressed  The above were reviewed and noted negative except as noted in HPI and Problem List.    Objective   /70 (BP Location: Left arm, Patient Position: Sitting, BP Cuff Size: Adult)   Pulse 74   Ht 1.626 m (5' 4\")   Wt 82.1 kg (181 lb)   SpO2 98%   BMI 31.07 kg/m²     Physical Exam  CONSTITUTIONAL- NAD, Pt is A & O x3, Vital signs reviewed per chart.  General Appearance- normal , good hygiene,talks easily  EYES-PERRLA conjunctiva and lids- normal  EARS/NOSE-TM's normal, head normocephalic and atraumatic, naso pharynx-no nasal discharge, no trismus,  oropharynx- normal without exudate  NECK- supple, FROM, without mass  thyroid- NT, normal size, no nodule noted  LYMPH- WNL  CV- RRR without murmur, gallop, or other abnormality.  PULM- CTA bilaterally  Respiratory effort- normal respiratory effort , no retractions or nasal flaring  ABDOMEN- normoactive BS's , soft , NT, no hepatosplenomegaly palpated, or masses. 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    Provider Attestation - Scribe documentation    All medical record entries made by the Sarkisibe were at my direction and personally dictated by me. I have reviewed the chart and agree that the record accurately reflects my personal performance of the history, physical exam, discussion and plan.    30 out of 30 on MMSE today.  This was done because she is concerned about possible dementia.  She does relate that her mother had Cesar Creutzfeldt syndrome.  We will review her response to the Prozac which she has been on and the addition of Wellbutrin to see if she has improved function on follow-up.    She also related that her partner of many years passed away 6 months ago and she has been in a generally depressed state since then.    We also discussed her urologic care which she is seeing them back on 1/8/2024 and we asked that she see what else can be done for her regarding her urologic situation.

## 2024-01-05 RX ORDER — MONTELUKAST SODIUM 10 MG/1
10 TABLET ORAL NIGHTLY
Qty: 90 TABLET | Refills: 3 | Status: SHIPPED | OUTPATIENT
Start: 2024-01-05 | End: 2024-04-25 | Stop reason: SDUPTHER

## 2024-01-08 ENCOUNTER — OFFICE VISIT (OUTPATIENT)
Dept: OBSTETRICS AND GYNECOLOGY | Facility: CLINIC | Age: 74
End: 2024-01-08
Payer: MEDICARE

## 2024-01-08 VITALS
SYSTOLIC BLOOD PRESSURE: 134 MMHG | HEIGHT: 64 IN | BODY MASS INDEX: 30.39 KG/M2 | DIASTOLIC BLOOD PRESSURE: 76 MMHG | WEIGHT: 178 LBS

## 2024-01-08 DIAGNOSIS — N32.81 OVERACTIVE BLADDER: ICD-10-CM

## 2024-01-08 DIAGNOSIS — N81.4 CYSTOCELE WITH PROLAPSE: Primary | ICD-10-CM

## 2024-01-08 PROCEDURE — 1036F TOBACCO NON-USER: CPT | Performed by: NURSE PRACTITIONER

## 2024-01-08 PROCEDURE — 1159F MED LIST DOCD IN RCRD: CPT | Performed by: NURSE PRACTITIONER

## 2024-01-08 PROCEDURE — 3075F SYST BP GE 130 - 139MM HG: CPT | Performed by: NURSE PRACTITIONER

## 2024-01-08 PROCEDURE — 1126F AMNT PAIN NOTED NONE PRSNT: CPT | Performed by: NURSE PRACTITIONER

## 2024-01-08 PROCEDURE — 3078F DIAST BP <80 MM HG: CPT | Performed by: NURSE PRACTITIONER

## 2024-01-08 PROCEDURE — 99213 OFFICE O/P EST LOW 20 MIN: CPT | Performed by: NURSE PRACTITIONER

## 2024-01-08 RX ORDER — MIRABEGRON 50 MG/1
50 TABLET, EXTENDED RELEASE ORAL DAILY
Qty: 90 TABLET | Refills: 3 | Status: SHIPPED | OUTPATIENT
Start: 2024-01-08 | End: 2024-04-22 | Stop reason: SDUPTHER

## 2024-01-08 ASSESSMENT — PAIN SCALES - GENERAL: PAINLEVEL: 0-NO PAIN

## 2024-01-08 NOTE — PROGRESS NOTES
"HISTORY OF PRESENT ILLNESS:  Luz Crain is a 73 y.o. female, who presents in follow up for OAB.     During our last encounter on 11/27/23 we reviewed and agreed to the following: We discontinued Myrbetriq and started Trospium 20 mg BID. Most bothered by UUI, typically 4x in 24 hour period and 2-3 nighttime wake ups.     Today she reports   Urinary Symptoms:   - She says she stayed on Myrbetriq and did not start Trospium.   - Sometimes she feels Myrbetriq \"gives her warning\" so she can make it to the bathroom in time.   - Continues to leak urine 4-5x in 24 hours. Sometimes she does not even notice she is leaking. Denies leaking with position changes or coughing.   - Most bothersome sx is the \"sudden gushing\" of urine.   - Feels she empties her bladder after voiding.     Prolapse Symptoms:   - Sometimes she feels heaviness, but is unsure if this is the POP.     Sexual Activity:   - Not sexually active as her partner passed away.           PHYSICAL EXAMINATION:  No LMP recorded. Patient has had a hysterectomy.  Body mass index is 30.55 kg/m².  /76   Ht 1.626 m (5' 4\")   Wt 80.7 kg (178 lb)   BMI 30.55 kg/m²     General Appearance: well appearing  Neuro: Alert and oriented   HEENT: mucous membranes moist, neck supple  Resp: No respiratory distress, normal work of breathing  MSK: normal range of motion, gait appropriate    PVR (by ultrasound): 14 ml  Urine dip:  No results found for this or any previous visit.       IMPRESSION AND PLAN:  73 y.o. female with OAB/UUI, stage 1 rectocele, urethral caruncle, vaginal atrophy, and varying stool consistency with FI. Comorbidities include: CAD with cardiac stent and pacemaker in place, hx of PE, HTN, paroxysmal atrial fibrillation, GERD, and COPD.      Diagnoses:  #1 Overactive bladder  #2 Urge incontinence   #3 Rectocele, stage 1    Plan:   1. OAB, UUI   - At last visit, we discontinued Myrbetriq 25 mg and planned to start Trospium 20 mg BID. However, today she " is reporting she is on the Myrbetriq 25 mg still and is not on Trospium. She endorses some improvement in urge sxs and feels she has more of a warning when she has the urge to void. She continues to endorse leaking about 4-5x in 24 hours.   - Patient is amenable to increasing to Myrbetriq 50 mg daily, so she was given a 90 day rx for that.    - Discussed bladder sxs may or may not improve with pessary use.      2. Stage 1 rectocele  - Counseled that the rectocele can be caused by genetics, constipation, and operative vaginal deliveries. Managing constipation will make the POP less noticeable.   - This is a quality of life issue, so she only needs to treat based on how bothersome this is to her. Treatment options are PFPT, pessaries, or surgical repair. Unless the POP was past the introitus, she may not feel noticeable improvements in her quality of life after surgical repair.   - She is interested in pessary management. They need to be cleaned every 3 months either in clinic or at home. The pessary should be removed prior to intercourse. We discussed the pessary adds support to the vaginal walls while it is in place.      Follow-up in 2-4 weeks for pessary fitting and medication check (Myrbetriq 50 mg).     Scribe Attestation:   IAkilah, am scribing for virtually, and in the presence of WESTON Denis on 1/8/24 at 12:30 PM.     IPau, personally performed the services described in the documentation as scribed in my presence and confirm it is both complete and accurate.

## 2024-01-25 ENCOUNTER — LAB (OUTPATIENT)
Dept: LAB | Facility: LAB | Age: 74
End: 2024-01-25
Payer: MEDICARE

## 2024-01-25 ENCOUNTER — OFFICE VISIT (OUTPATIENT)
Dept: PRIMARY CARE | Facility: CLINIC | Age: 74
End: 2024-01-25
Payer: MEDICARE

## 2024-01-25 VITALS
DIASTOLIC BLOOD PRESSURE: 92 MMHG | OXYGEN SATURATION: 98 % | SYSTOLIC BLOOD PRESSURE: 130 MMHG | RESPIRATION RATE: 18 BRPM | TEMPERATURE: 97.9 F | HEART RATE: 76 BPM

## 2024-01-25 DIAGNOSIS — I25.10 CORONARY ARTERY DISEASE INVOLVING NATIVE CORONARY ARTERY OF NATIVE HEART WITHOUT ANGINA PECTORIS: ICD-10-CM

## 2024-01-25 DIAGNOSIS — F33.8 SEASONAL DEPRESSION (CMS-HCC): Primary | ICD-10-CM

## 2024-01-25 DIAGNOSIS — J44.9 CHRONIC OBSTRUCTIVE PULMONARY DISEASE, UNSPECIFIED COPD TYPE (MULTI): ICD-10-CM

## 2024-01-25 DIAGNOSIS — E78.5 DYSLIPIDEMIA: ICD-10-CM

## 2024-01-25 DIAGNOSIS — R41.89 PSEUDODEMENTIA: ICD-10-CM

## 2024-01-25 DIAGNOSIS — I48.0 PAROXYSMAL ATRIAL FIBRILLATION (MULTI): ICD-10-CM

## 2024-01-25 DIAGNOSIS — K92.1 BLACK STOOL: ICD-10-CM

## 2024-01-25 DIAGNOSIS — I10 PRIMARY HYPERTENSION: ICD-10-CM

## 2024-01-25 DIAGNOSIS — F43.21 DYSFUNCTIONAL GRIEVING: ICD-10-CM

## 2024-01-25 DIAGNOSIS — R19.5 PENCILLING OF STOOLS: ICD-10-CM

## 2024-01-25 LAB
BASOPHILS # BLD AUTO: 0.06 X10*3/UL (ref 0–0.1)
BASOPHILS NFR BLD AUTO: 0.8 %
EOSINOPHIL # BLD AUTO: 0.17 X10*3/UL (ref 0–0.4)
EOSINOPHIL NFR BLD AUTO: 2.2 %
ERYTHROCYTE [DISTWIDTH] IN BLOOD BY AUTOMATED COUNT: 13.6 % (ref 11.5–14.5)
HCT VFR BLD AUTO: 42 % (ref 36–46)
HGB BLD-MCNC: 13.1 G/DL (ref 12–16)
IMM GRANULOCYTES # BLD AUTO: 0.03 X10*3/UL (ref 0–0.5)
IMM GRANULOCYTES NFR BLD AUTO: 0.4 % (ref 0–0.9)
LYMPHOCYTES # BLD AUTO: 2.41 X10*3/UL (ref 0.8–3)
LYMPHOCYTES NFR BLD AUTO: 30.9 %
MCH RBC QN AUTO: 29.1 PG (ref 26–34)
MCHC RBC AUTO-ENTMCNC: 31.2 G/DL (ref 32–36)
MCV RBC AUTO: 93 FL (ref 80–100)
MONOCYTES # BLD AUTO: 0.46 X10*3/UL (ref 0.05–0.8)
MONOCYTES NFR BLD AUTO: 5.9 %
NEUTROPHILS # BLD AUTO: 4.68 X10*3/UL (ref 1.6–5.5)
NEUTROPHILS NFR BLD AUTO: 59.8 %
NRBC BLD-RTO: 0 /100 WBCS (ref 0–0)
PLATELET # BLD AUTO: 244 X10*3/UL (ref 150–450)
RBC # BLD AUTO: 4.5 X10*6/UL (ref 4–5.2)
WBC # BLD AUTO: 7.8 X10*3/UL (ref 4.4–11.3)

## 2024-01-25 PROCEDURE — 99214 OFFICE O/P EST MOD 30 MIN: CPT | Performed by: FAMILY MEDICINE

## 2024-01-25 PROCEDURE — 85025 COMPLETE CBC W/AUTO DIFF WBC: CPT

## 2024-01-25 PROCEDURE — 36415 COLL VENOUS BLD VENIPUNCTURE: CPT

## 2024-01-25 RX ORDER — FLUOXETINE HYDROCHLORIDE 60 MG/1
60 TABLET, FILM COATED ORAL; ORAL DAILY
Qty: 90 TABLET | Refills: 3 | Status: SHIPPED | OUTPATIENT
Start: 2024-01-25 | End: 2024-04-25 | Stop reason: SDUPTHER

## 2024-01-25 NOTE — PROGRESS NOTES
Subjective   Patient ID: Luz Crain is a 73 y.o. female who presents for Depression.      HPI   Pt presents for follow up on seasonal depression.  Pt was taking Prozac 60 mg and wants to continue this medication. Pt states she has no motivation for anything and has been getting confused lately. Pt was started on bupropion 150 mg daily and states she does feel a slight mood improvement. Pt reports she has not noticed any side effects. Pt states that she does not sleep well.     Pt states that she is having very black stools and come out small, like a ribbon. Onset 2 weeks. Pt denies having abdominal pain.     No other concerns today    Review of Systems  12 Systems have been reviewed as follows.  Constitutional: Fever, weight gain, weight loss, appetite change, night sweats, fatigue, chills.  Eyes : blurry, double vision, vision, loss, tearing, redness, pain, sensitivity to light, glaucoma.  Ears: nose, mouth, and throat: Hearing loss, ringing in the ears, ear pain, nasal congestion, nasal drainage, nosebleeds, mouth, throat, irritation tooth problem.  Cardiovascular: chest pain, pressure, heart racing, palpitations, sweating, leg swelling, high or low blood pressure  Pulmonary: Cough, yellow or green sputum, blood and sputum, shortness of breath, wheezing  Gastrointestinal: Nausea, vomiting, diarrhea, constipation, pain, blood in stool, or vomitus, heartburn, difficulty swallowing  Musculoskeletal: Pain, stiffness, joint, redness or warmth, arthritis, back pain, weakness, muscle wasting, sprain or fracture  Neuro: Weight weakness, dizziness, change in voice, change in taste change in vision, change in hearing, loss, or change of sensation, trouble walking, balance problems coordination problems, shaking, speech problem  Endocrine: cold or heat intolerance, blood sugar problem, weight gain or loss missed periods hot flashes, sweats, change in body hair, change in libido, increased thirst, increased  urination  Heme/lymph: Swelling, bleeding, problem anemia, bruising, enlarged lymph nodes  Allergic/immunologic: H. plus nasal drip, watery itchy eyes, nasal drainage, immunosuppressed  The above were reviewed and noted negative except as noted in HPI and Problem List.    Objective   BP (!) 130/92   Pulse 76   Temp 36.6 °C (97.9 °F)   Resp 18   SpO2 98%     Physical Exam  CONSTITUTIONAL- NAD, Pt is A & O x3, Vital signs reviewed per chart.  General Appearance- normal , good hygiene,talks easily  EYES-PERRLA conjunctiva and lids- normal  EARS/NOSE-TM's normal, head normocephalic and atraumatic, naso pharynx-no nasal discharge, no trismus,  oropharynx- normal without exudate  NECK- supple, FROM, without mass  thyroid- NT, normal size, no nodule noted  LYMPH- WNL  CV- RRR without murmur, gallop, or other abnormality.  PULM- CTA bilaterally  Respiratory effort- normal respiratory effort , no retractions or nasal flaring  ABDOMEN- normoactive BS's , soft , NT, no hepatosplenomegaly palpated, or masses. 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    Provider Attestation - Scribe documentation    All medical record entries made by the Scribe were at my direction and personally dictated by me. I have reviewed the chart and agree that the record accurately reflects my personal performance of the history, physical exam, discussion and plan.      - discontinue aspirin 81 mg and Iron supplements till gastro work up  - continue Prozac 60 mg  - continue Wellbutrin 150mg  - continue protonix  - stop iron  - stat GI eval  - labs now

## 2024-02-26 ENCOUNTER — PROCEDURE VISIT (OUTPATIENT)
Dept: OBSTETRICS AND GYNECOLOGY | Facility: CLINIC | Age: 74
End: 2024-02-26
Payer: MEDICARE

## 2024-02-26 VITALS
DIASTOLIC BLOOD PRESSURE: 78 MMHG | BODY MASS INDEX: 31.07 KG/M2 | WEIGHT: 182 LBS | HEIGHT: 64 IN | SYSTOLIC BLOOD PRESSURE: 138 MMHG

## 2024-02-26 DIAGNOSIS — N39.3 SUI (STRESS URINARY INCONTINENCE, FEMALE): ICD-10-CM

## 2024-02-26 DIAGNOSIS — N81.4 CYSTOCELE WITH PROLAPSE: ICD-10-CM

## 2024-02-26 DIAGNOSIS — N32.81 OVERACTIVE BLADDER: Primary | ICD-10-CM

## 2024-02-26 PROCEDURE — A4562 PESSARY, NON RUBBER,ANY TYPE: HCPCS | Performed by: NURSE PRACTITIONER

## 2024-02-26 PROCEDURE — 99214 OFFICE O/P EST MOD 30 MIN: CPT | Performed by: NURSE PRACTITIONER

## 2024-02-26 PROCEDURE — 57160 INSERT PESSARY/OTHER DEVICE: CPT | Performed by: NURSE PRACTITIONER

## 2024-02-26 ASSESSMENT — PAIN SCALES - GENERAL: PAINLEVEL: 0-NO PAIN

## 2024-02-26 NOTE — PROGRESS NOTES
This is a 73 y.o. following up on OAB/UUI and a stage 1-2 rectocele.     Today she reports:  Urinary Symptoms:   - She reports having 4-5 urinary incontinent episodes per day despite limiting her fluids in the mornings.   - Endorses urinary urgency and frequency.   - Patient has been taking Myrbetriq ER 50mg daily and notes some mild urinary symptom improvement with the medication but is still bothered by frequency, urgency, and UUI episodes (4-5x per day now).   - Goes through 2-5 pads per day due to the pads being saturated with urine due to UUI episodes.   - Largely denies PRANAV with coughing, laughing, and sneezing. She does not notice any PRANAV with positional changes from sitting to standing.     Prolapse Symptoms:   - She is not feeling a vaginal bulge or pressure anymore in comparison to 1-2 months ago.   - Endorses mild pelvic pressure.     Bowel Symptoms:  - Reports FI and insensory ABL.   - She sometimes is unaware that she had ABL until she goes to the bathroom to find feces in her underwear.   - Does endorse sensation of rectal pocketing sometimes when having a bowel movement.       Exam:  Physical Exam  Constitutional:       Appearance: Normal appearance.   Genitourinary:      Vulva and bladder normal.      Mild vaginal atrophy present.       Right Adnexa: not tender and no mass present.     Left Adnexa: not tender and no mass present.     Urethral meatus caruncle present.     Urethral prolapse and stress urinary incontinence with cough stress test present.      Urethra exam comments: + stress incontinence with movement.      Pelvic exam was performed with patient in the lithotomy position.   HENT:      Head: Normocephalic and atraumatic.   Pulmonary:      Effort: Pulmonary effort is normal.   Psychiatric:         Mood and Affect: Mood normal.         Behavior: Behavior normal.         Thought Content: Thought content normal.         Judgment: Judgment normal.        Patient ID: Luz Crain is a 73  y.o. female.    Pessary    Date/Time: 2/26/2024 6:23 PM    Performed by: WESTON Densi  Authorized by: WESTON Denis    Consent:     Procedural risks discussed: yes      Patient agrees, verbalizes understanding, and wants to proceed: yes      Consent given by:  Patient  Indication:     Indication for pessary: rectocele and incontinence    Procedure:     Pessaries tried:  #1 ring with support and knob (too small and fell out)    Pessary type:  Ring w/ support (and knob)    Pessary size:  2  Outcomes:     How did patient test pessary?:  Valsalva, movement, ambulation, and coughing.    Patient tolerance of procedure:  Tolerated well, no immediate complications    Post-procedure education provided: yes      Reviewed by: provider        Assessment/Plan:  73 year old female with OAB/UUI, stage 1-2 rectocele, urethral caruncle, vaginal atrophy, and varying stool consistency with FI. Comorbidities include: CAD with cardiac stent and pacemaker in place, hx of PE, HTN, paroxysmal atrial fibrillation, GERD, and COPD.      Diagnoses:  #1 Overactive bladder  #2 Mixed urinary incontinence, stress and urge   #3 Rectocele, stage 1-2  #4 Fecal incontinence, varying stool consistency    Plan:  1. OAB, UUI  - Previously failed Solifenacin and Trospium due to no symptom improvement.   - Reviewed that a pessary would not improve her UUI leakage; rather a pessary is fitted to address PRANAV leakage or POP symptoms.   - Given that she continues to have urinary frequency, urgency, and 4-5 UUI episodes per day despite being on Myrbetriq ER 50mg daily we discussed next steps such as consideration of going to PFPT, trying an alternative medication, or third-line therapies such as intradetrusor Botox, PTNS, or SNM.   - She is amenable to continuing Myrbetriq and if this fails is considering Botox.   - Plan to continue taking Myrbetriq ER 50mg daily since we fitted her with a pessary today to evaluate which leakage  is more prominent/being improved or not by making one change at a time to her regimen. She is amenable tot this. If the pessary does not improve her leakage we will then rediscuss trying PFPT, switching medication or trying a combined medication therapy, vs. third line OAB txs.     2. Rectocele, PRANAV  - We discussed that since she is not bothered by her POP and not symptomatic of PRANAV we do not think she would notice much benefit from being fitted with a pessary as a pessary would primarily address POP +/- PRANAV leakage symptoms.   - Reviewed that her rectocele is not causing or impacting her OAB symptoms.   - Patient largely denies PRANAV with coughing, laughing, sneezing, and positional changes but had +valsalva test on exam today.   - She is amenable to trying to being fitted with a pessary today to see if her amount of incontinent episodes improves in a 2 week trial period to see if her quality of life improves with reduce urine leakage the pessary in place. If her quality of life is not improved after trial period we will remove the pessary and focus on medication management/Botox to focus on UUI.   - #1 ring with support and knob fell out immediately as it was too small.   - Fitted the patient with a #2 ring with support and knob pessary. This pessary was comfortable and remained in place with valsalva, voiding, ambulation, and movement.     4. Fecal incontinence, insensory ABL, varying stool consistency  - Discussed that PFPT may help strengthen her anal sphincter muscle tone and may improve bowel control.     She will return to the office in 2 weeks for newly placed pessary recheck or sooner should she have any problems.    All questions and concerns were answered and addressed.    Maude Attestation  By signing my name below, John COYLE Scribe, attest that this documentation has been prepared under the direction and in the presence of WESTON Denis on 02/26/2024 at 6:05 PM.     Pau COYLE  Shonda, personally performed the services described in the documentation as scribed in my presence and confirm it is both complete and accurate.

## 2024-03-11 ENCOUNTER — PROCEDURE VISIT (OUTPATIENT)
Dept: OBSTETRICS AND GYNECOLOGY | Facility: CLINIC | Age: 74
End: 2024-03-11
Payer: MEDICARE

## 2024-03-11 VITALS
DIASTOLIC BLOOD PRESSURE: 70 MMHG | BODY MASS INDEX: 31.07 KG/M2 | HEIGHT: 64 IN | SYSTOLIC BLOOD PRESSURE: 114 MMHG | WEIGHT: 182 LBS

## 2024-03-11 DIAGNOSIS — N32.81 OVERACTIVE BLADDER: ICD-10-CM

## 2024-03-11 DIAGNOSIS — R15.1 FECAL SMEARING: ICD-10-CM

## 2024-03-11 DIAGNOSIS — R32 URINARY INCONTINENCE, UNSPECIFIED TYPE: ICD-10-CM

## 2024-03-11 DIAGNOSIS — N81.4 CYSTOCELE WITH PROLAPSE: Primary | ICD-10-CM

## 2024-03-11 DIAGNOSIS — N39.3 SUI (STRESS URINARY INCONTINENCE, FEMALE): ICD-10-CM

## 2024-03-11 PROCEDURE — 99213 OFFICE O/P EST LOW 20 MIN: CPT | Performed by: NURSE PRACTITIONER

## 2024-03-11 NOTE — PROGRESS NOTES
Pessary Check  This is a 73 y.o. female with a #2 ring with support and knob pessary in place to help manage stage 2 rectocele and PRANAV here for a routine pessary check.     Date of last check: 2024   >> BAUTISTA Denis-CNP fitted the patient with a #2 ring with support and knob pessary to help manage stage 1-2 rectocele and PRANAV.   UUI - Plan to continue taking Myrbetriq ER 50mg daily since we fitted her with a pessary today to evaluate which leakage is more prominent/being improved or not by making one change at a time to her regimen. She is amenable to this. If the pessary does not improve her leakage she is considering intradetrusor Botox vs. Returning to PFPT or switching to combined medication therapy.   FI - Reviewed that PFPT may help strengthen anal sphincter muscle tone and prevent ABL/FI episodes.     Today she reports:  Pessary comfortable: Yes - the pessary is managing her prolapse well and denies POP bulging around the pessary  Vaginal bleeding: No  Abnormal vaginal discharge: No  Using vaginal estrogen: No    Urinary Symptoms:  - The patient has not noticed any improvement in her degree of urinary leakage as she continues to endorse several incontinent episodes daily and going through multiple pads per day due to UUI.   - Endorses persistent urinary frequency and urgency.   - She reports waiting on the toilet for a longer period of time to ensure she has emptied her bladder completely; often has a second time she voids when this occurs.   - She is afraid to leave the house because of her incontinence; she goes to the grocery and has UUI/urgency. Her OAB is negatively impacting her quality of life.     Prolapse Symptoms:  - The pessary is comfortable and managing her prolapse well.   - She denies POP bulging around the pessary and feels that her pelvic organs are now well-supported with the pessary in place.     OBGYN History:  - , x3   - Does reports hx of x1 FAVD and  episiotomy.     Exam:  Physical Exam  Constitutional:       Appearance: Normal appearance.   HENT:      Head: Normocephalic and atraumatic.   Pulmonary:      Effort: Pulmonary effort is normal.   Psychiatric:         Mood and Affect: Mood normal.         Behavior: Behavior normal.         Thought Content: Thought content normal.         Judgment: Judgment normal.          Procedure:   Pessary position on presentation: Normal  Pessary removed and cleaned without difficulty  Speculum exam: Vaginal mucosa was examined for the presence of irritation, trauma and/or bleeding   Erosions: No   Vaginal atrophy: Yes   Silver nitrate applied :No  Pessary replaced without difficulty.    Assessment/Plan:  73 year old female with OAB/UUI, stage 1-2 rectocele, urethral caruncle, vaginal atrophy, and varying stool consistency with FI. Comorbidities include: CAD with cardiac stent and pacemaker in place, hx of PE, HTN, paroxysmal atrial fibrillation, GERD, and COPD.      Diagnoses:  #1 Overactive bladder  #2 Mixed urinary incontinence, stress and urge   #3 Rectocele, stage 1-2  #4 Fecal incontinence, varying stool consistency     Plan:  1. OAB, UUI  - Previously failed Solifenacin and Trospium due to no symptom improvement.   - The pessary did not improve her # of incontinent episodes it is evident now that she is primarily bothered by UUI leakage > PRANAV leakage as she has not noticed any degree of improvement in her incontinence with the pessary in place.   - Given that she continues to have urinary frequency, urgency, and 4-5 UUI episodes per day despite being on Myrbetriq ER 50mg daily we discussed next steps such as consideration of starting a combined medication therapy of continuing the Myrbetriq ER 50mg daily and adding the Trospium 20mg BID vs. trying third-line treatments such as PTNS, intradetrusor Botox, or SNM.   - We discussed the risks of intradetrusor Botox and cystoscopy including the scope scraping the urethra which  might cause burning/irritation, hematuria, and UTI. We will give her a one-time dose of Macrobid for UTI ppx and Pyridium to help prevent irritative symptoms from the cystoscope. This procedure is performed in office where we use cystoscopy to visualize the bladder neck/muscles and inject the Botox into 4 areas around the bladder to help reduce bladder spasms in hopes of reducing urinary urgency, frequency, nocturia, and incontinent episodes. There is around a 2% risk of urinary retention with symptoms of retention including strangury and urinary frequency with small volume urine voids however we will have her RTC in one week for a nurse visit to check her PVR. If she experiences urinary retention we discussed that she may learn how to ISC to ensure she is emptying her bladder until the effects of Botox begin to wear off with time (I.e. between 2-8 weeks). Botox typically provides patients with 3-9 months of OAB symptom improvement and patients typically have intradetrusor Botox injections 1-2x per year to help manage their OAB.   - Discussed consideration of pursuing sacral neuromodulation (SNM) for management of her OAB. Reviewed the procedure for sacral neuromodulation and discussed it is a staged procedure which allows us to determine degree of symptom improvement during the PNE trial prior to implanting permanent device.  We counseled her on the risks and steps of the PNE in office placement where we would inject Lidocaine for localized anesthesia and based on the landmarks of her back we will place the lead stimulator into the S3 foramen to stimulate the pudendal nerve in hopes of improving OAB. We would give her an antibiotic after the procedure to prevent infection. The risks include discomfort, bleeding, infection, and since we placing the lead based off landmarks there is a chance of not placing the lead into the correct location. If we do not get a good response from the PNE trial we could consider  placing the lead in the OR with fluoroscopic X-ray to ensure that the lead is placed into the S3 foramen. During the PNE trial period (i.e. around x5 days) she will keep a bladder diary to keep track of her symptoms to determine if she has had 50% or greater in her symptom improvement. Reviewed that if symptoms are at least 50% improved during the trial period we would implant the permanent device in the OR under sedation.  - She is considering Botox or SNM and we gave her PI handouts to review while considering these treatment options. Of note, she will need UDS prior and will plan to hold her Myrbetriq ER 50mg the day before/day of UDS to get accurate results.   - Ordered urodynamics to better characterize her voiding/bladder filling patterns and further characterize her urinary issues.    2. PRANAV, rectocele (stage 1-2), pessary maintenance   - #2 ring with support and knob pessary was removed. Speculum exam did not reveal any areas of active bleeding, erosions, or granulation tissue. Pessary was cleaned and replaced back.  - The patient is satisfied with the pessary and plans to continue use.   - Risks, alternative and routine maintenance reviewed with patient.    3. Fecal incontinence, insensory ABL, varying stool consistency  - We discussed that there are two factors that attribute to fecal incontinence: 1.) stool consistency and 2.) anal sphincter muscle control. Counseled to optimize the consistency of her stool by taking a daily fiber supplement (i.e. Benefiber or Metamucil) which makes the stools formed and easier to control. She may consider going to PFPT to optimize strengthening the anal sphincter muscle tone to learn how to better control stools to prevent FI episodes from occurring.   - We reviewed that the SNM may work improve FI by innervating the sacral nerve which corresponds to the anal sphincter muscle to respond with appropriate timing to prevent ABL from occurring.     She will follow up via  phone call to review UDS results with Dr. Stephanie Nam as the patient is considering Botox vs. SNM to address her UUI.     Plan to return in 3 months for a pessary recheck.     All questions and concerns were answered and addressed.    Scribe Attestation  By signing my name below, I, Maude Velazquez, attest that this documentation has been prepared under the direction and in the presence of WESTON Denis on 03/11/2024 at 6:03 PM.

## 2024-03-25 ENCOUNTER — APPOINTMENT (OUTPATIENT)
Dept: OBSTETRICS AND GYNECOLOGY | Facility: CLINIC | Age: 74
End: 2024-03-25
Payer: MEDICARE

## 2024-03-29 DIAGNOSIS — K21.9 GASTROESOPHAGEAL REFLUX DISEASE, UNSPECIFIED WHETHER ESOPHAGITIS PRESENT: ICD-10-CM

## 2024-03-31 RX ORDER — PANTOPRAZOLE SODIUM 40 MG/1
40 TABLET, DELAYED RELEASE ORAL DAILY
Qty: 90 TABLET | Refills: 1 | Status: SHIPPED | OUTPATIENT
Start: 2024-03-31 | End: 2024-04-25 | Stop reason: SDUPTHER

## 2024-04-08 ENCOUNTER — APPOINTMENT (OUTPATIENT)
Dept: OBSTETRICS AND GYNECOLOGY | Facility: CLINIC | Age: 74
End: 2024-04-08
Payer: MEDICARE

## 2024-04-22 ENCOUNTER — PROCEDURE VISIT (OUTPATIENT)
Dept: OBSTETRICS AND GYNECOLOGY | Facility: CLINIC | Age: 74
End: 2024-04-22
Payer: MEDICARE

## 2024-04-22 DIAGNOSIS — N32.81 OVERACTIVE BLADDER: ICD-10-CM

## 2024-04-22 PROCEDURE — 51784 ANAL/URINARY MUSCLE STUDY: CPT | Performed by: STUDENT IN AN ORGANIZED HEALTH CARE EDUCATION/TRAINING PROGRAM

## 2024-04-22 PROCEDURE — 51741 ELECTRO-UROFLOWMETRY FIRST: CPT | Performed by: STUDENT IN AN ORGANIZED HEALTH CARE EDUCATION/TRAINING PROGRAM

## 2024-04-22 PROCEDURE — 51798 US URINE CAPACITY MEASURE: CPT | Performed by: STUDENT IN AN ORGANIZED HEALTH CARE EDUCATION/TRAINING PROGRAM

## 2024-04-22 PROCEDURE — 51797 INTRAABDOMINAL PRESSURE TEST: CPT | Performed by: STUDENT IN AN ORGANIZED HEALTH CARE EDUCATION/TRAINING PROGRAM

## 2024-04-22 PROCEDURE — 51729 CYSTOMETROGRAM W/VP&UP: CPT | Performed by: STUDENT IN AN ORGANIZED HEALTH CARE EDUCATION/TRAINING PROGRAM

## 2024-04-22 RX ORDER — MIRABEGRON 50 MG/1
50 TABLET, EXTENDED RELEASE ORAL DAILY
Qty: 90 TABLET | Refills: 3 | Status: SHIPPED | OUTPATIENT
Start: 2024-04-22 | End: 2024-04-25 | Stop reason: SDUPTHER

## 2024-04-22 NOTE — PROGRESS NOTES
Patient ID: Luz Crain is a 74 y.o. female.    Uroflowmetry    Date/Time: 4/22/2024 9:12 AM    Performed by: Fiorella Sheldon MA  Authorized by: Stephanie Nam MD    Procedure discussed: discussed risks, benefits and alternatives    Chaperone present: no    Timeout: timeout called immediately prior to procedure    Prep: patient was prepped and draped in usual sterile fashion    Position: sitting    Procedure Details     Procedure: CMG with UPP/voiding pressures, EMG, intra-abdominal voiding study and uroflow      CMG with UPP/Voiding Pressures Details:     Uroflow Details:     Pre-void volume (mL): 73.5    Voiding duration (sec): 10.9    Average flow rate (mL/sec): 6.7    Max flow rate (mL/sec): 13.7    Voided urine (mL): 108    Residual urine (mL): 0    Post-Procedure Details     Outcome: patient tolerated procedure well with no complications      Additional Details      Patient had D/O with leakage through out the filling phase.  I was able to get enough in to do the stress test which was positive. She elected to not replace the pessary at this time.    Urodynamics Results    Uroflowmetry:   Maximum Flow Rate: 13.7 ml/s   Average Flow: 6.7 ml/s   Volume Voided: 73.5 ml   Post void residual: 25 ml    Cystometry:   Unable to determine sensation due to severe DO with low volumes  Bladder sensation: hypersensitivity with reduced capacity    Detrusor activity: +DO with leak starting at 74 ml; peak detrusor pressure 40  Compliance: normal  PRANAV: + cough leak at 141 ml  Urethral pressure profile (UPP): fair  Maximum urethral closure pressure: 34 cm H2O    Voiding Study:   Maximum flow: 12.2 ml/s   Average flow: 7.4 ml/s   P detrusor at peak flow: 13.1 cm H2O   Volume voided: 107.7 ml   Pattern:  bell curve   Mechanism of voiding: detrusor contraction  Detrusor contraction with void: good    UDS INTERPRETATION    Uroflow: low voided volume, PVR normal    CMG: normal compliance,  hypersensitivity with significantly  reduced capacity, + DO, + PRANAV at low volumes    Voiding: normal flow pattern, normal detrusor pressure, normal voiding mechanism    SUMMARY:  +DO, + PRANAV  Candidate for botox versus SNM. Will discuss options at upcoming visit.

## 2024-04-25 ENCOUNTER — TELEPHONE (OUTPATIENT)
Dept: OBSTETRICS AND GYNECOLOGY | Facility: CLINIC | Age: 74
End: 2024-04-25
Payer: MEDICARE

## 2024-04-25 DIAGNOSIS — K21.9 GASTROESOPHAGEAL REFLUX DISEASE, UNSPECIFIED WHETHER ESOPHAGITIS PRESENT: ICD-10-CM

## 2024-04-25 DIAGNOSIS — I10 PRIMARY HYPERTENSION: ICD-10-CM

## 2024-04-25 DIAGNOSIS — N32.81 OVERACTIVE BLADDER: ICD-10-CM

## 2024-04-25 DIAGNOSIS — R06.2 WHEEZING ON AUSCULTATION: ICD-10-CM

## 2024-04-25 DIAGNOSIS — N95.2 VAGINAL ATROPHY: ICD-10-CM

## 2024-04-25 DIAGNOSIS — I25.10 CORONARY ARTERY DISEASE INVOLVING NATIVE CORONARY ARTERY OF NATIVE HEART WITHOUT ANGINA PECTORIS: ICD-10-CM

## 2024-04-25 DIAGNOSIS — G47.00 INSOMNIA, UNSPECIFIED TYPE: ICD-10-CM

## 2024-04-25 DIAGNOSIS — F33.8 SEASONAL DEPRESSION (CMS-HCC): ICD-10-CM

## 2024-04-25 RX ORDER — FUROSEMIDE 20 MG/1
20 TABLET ORAL DAILY
Qty: 90 TABLET | Refills: 3 | Status: SHIPPED | OUTPATIENT
Start: 2024-04-25

## 2024-04-25 RX ORDER — METOPROLOL SUCCINATE 25 MG/1
25 TABLET, EXTENDED RELEASE ORAL DAILY
Qty: 90 TABLET | Refills: 1 | Status: SHIPPED | OUTPATIENT
Start: 2024-04-25

## 2024-04-25 RX ORDER — TRAZODONE HYDROCHLORIDE 50 MG/1
100 TABLET ORAL NIGHTLY
Qty: 180 TABLET | Refills: 3 | Status: SHIPPED | OUTPATIENT
Start: 2024-04-25

## 2024-04-25 RX ORDER — ESTRADIOL 0.1 MG/G
CREAM VAGINAL
Qty: 42.5 G | Refills: 2 | Status: SHIPPED | OUTPATIENT
Start: 2024-04-25

## 2024-04-25 RX ORDER — MIRABEGRON 50 MG/1
50 TABLET, EXTENDED RELEASE ORAL DAILY
Qty: 90 TABLET | Refills: 3 | Status: SHIPPED | OUTPATIENT
Start: 2024-04-25 | End: 2025-04-25

## 2024-04-25 RX ORDER — PANTOPRAZOLE SODIUM 40 MG/1
40 TABLET, DELAYED RELEASE ORAL DAILY
Qty: 90 TABLET | Refills: 1 | Status: SHIPPED | OUTPATIENT
Start: 2024-04-25

## 2024-04-25 RX ORDER — MONTELUKAST SODIUM 10 MG/1
10 TABLET ORAL NIGHTLY
Qty: 90 TABLET | Refills: 3 | Status: SHIPPED | OUTPATIENT
Start: 2024-04-25

## 2024-04-25 RX ORDER — DOFETILIDE 0.12 MG/1
500 CAPSULE ORAL EVERY 12 HOURS
Qty: 360 CAPSULE | Refills: 7 | Status: SHIPPED | OUTPATIENT
Start: 2024-04-25

## 2024-04-25 RX ORDER — BUPROPION HYDROCHLORIDE 150 MG/1
150 TABLET ORAL EVERY MORNING
Qty: 30 TABLET | Refills: 3 | Status: SHIPPED | OUTPATIENT
Start: 2024-04-25

## 2024-04-25 RX ORDER — FLUOXETINE HYDROCHLORIDE 60 MG/1
60 TABLET, FILM COATED ORAL; ORAL DAILY
Qty: 90 TABLET | Refills: 3 | Status: SHIPPED | OUTPATIENT
Start: 2024-04-25

## 2024-04-25 NOTE — TELEPHONE ENCOUNTER
[facility] sent a fax request to office for trospium. Per chart, pt has been getting this med via [facility].On 11/29/23 1 yr was sent. Office called pt to see what pharmacy she is using. Pt states that she is using [facility]. Does not know why we would get this request. Fax was sent back to [facility] refusing Rx. Copy scanned into chart.

## 2024-04-25 NOTE — TELEPHONE ENCOUNTER
PT OF [facility]     PT PHARMACY CALLED IN FOR MED REFILL      BUPROPION   FLUOXETINE   FUROSEMIDE   METOPROLOL  MONTELUKAST  PANTOPRAZOLE   TRAZODONE   DOFETILIDE     [facility] MAIL ORDER

## 2024-04-26 ENCOUNTER — TELEPHONE (OUTPATIENT)
Dept: PRIMARY CARE | Facility: CLINIC | Age: 74
End: 2024-04-26

## 2024-04-26 NOTE — TELEPHONE ENCOUNTER
All of pt's meds were sent to the wrong pharmacy, please cancel and re send all scripts to [facility].

## 2024-05-01 ENCOUNTER — OFFICE VISIT (OUTPATIENT)
Dept: PRIMARY CARE | Facility: CLINIC | Age: 74
End: 2024-05-01
Payer: MEDICARE

## 2024-05-01 ENCOUNTER — HOSPITAL ENCOUNTER (OUTPATIENT)
Dept: RADIOLOGY | Facility: CLINIC | Age: 74
Discharge: HOME | End: 2024-05-01
Payer: MEDICARE

## 2024-05-01 VITALS
OXYGEN SATURATION: 96 % | BODY MASS INDEX: 31.1 KG/M2 | HEIGHT: 64 IN | RESPIRATION RATE: 16 BRPM | SYSTOLIC BLOOD PRESSURE: 143 MMHG | TEMPERATURE: 97.8 F | WEIGHT: 182.2 LBS | HEART RATE: 63 BPM | DIASTOLIC BLOOD PRESSURE: 84 MMHG

## 2024-05-01 DIAGNOSIS — R07.81 RIB PAIN ON LEFT SIDE: ICD-10-CM
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
## 2024-05-01 DIAGNOSIS — Z87.81 HISTORY OF FRACTURE: ICD-10-CM

## 2024-05-01 DIAGNOSIS — Z91.89 AT RISK FOR DECREASED BONE DENSITY: Primary | ICD-10-CM

## 2024-05-01 DIAGNOSIS — Z00.00 HEALTHCARE MAINTENANCE: ICD-10-CM

## 2024-05-01 DIAGNOSIS — M85.88 OTHER SPECIFIED DISORDERS OF BONE DENSITY AND STRUCTURE, OTHER SITE: ICD-10-CM

## 2024-05-01 PROBLEM — Z86.711 HISTORY OF PULMONARY EMBOLISM: Status: ACTIVE | Noted: 2017-08-29

## 2024-05-01 PROCEDURE — 99214 OFFICE O/P EST MOD 30 MIN: CPT | Performed by: NURSE PRACTITIONER

## 2024-05-01 PROCEDURE — 71046 X-RAY EXAM CHEST 2 VIEWS: CPT

## 2024-05-01 PROCEDURE — 71046 X-RAY EXAM CHEST 2 VIEWS: CPT | Performed by: RADIOLOGY

## 2024-05-01 RX ORDER — FERROUS SULFATE 325(65) MG
TABLET ORAL
COMMUNITY

## 2024-05-01 ASSESSMENT — PATIENT HEALTH QUESTIONNAIRE - PHQ9
1. LITTLE INTEREST OR PLEASURE IN DOING THINGS: NOT AT ALL
2. FEELING DOWN, DEPRESSED OR HOPELESS: NOT AT ALL
SUM OF ALL RESPONSES TO PHQ9 QUESTIONS 1 AND 2: 0

## 2024-05-01 NOTE — PROGRESS NOTES
"Subjective   Patient ID: Luz Crain is a 74 y.o. female who presents for Fall.    Symptoms: Patient fell down and heard  her left side rib snap and now is in pain,  patient states she could not sleep at all  due to the pain, pain scale is 8/10, painful to touch. Denies any visible bruising. Reports that she is in pain.   Length of symptoms: yesterday 4/30/24. Reports that it does hurt when she takes a deep breath to the localized area but otherwise feels like she is breathing fine. Denies chest pain or heart palpitations.   OTC: none  Related information: Reports history of back fracture within the last three years. Reports the she has been taking her calcium and vitamin D as prescribed.     Objective   /84 Comment: auto  Pulse 63   Temp 36.6 °C (97.8 °F)   Resp 16   Ht 1.626 m (5' 4\")   Wt 82.6 kg (182 lb 3.2 oz)   SpO2 96%   BMI 31.27 kg/m²     Physical Exam  Vitals and nursing note reviewed.   Constitutional:       Appearance: Normal appearance. She is not ill-appearing.   HENT:      Head: Normocephalic.      Nose: No congestion or rhinorrhea.   Cardiovascular:      Rate and Rhythm: Normal rate and regular rhythm.      Pulses: Normal pulses.      Heart sounds: Normal heart sounds.   Pulmonary:      Effort: Pulmonary effort is normal.      Breath sounds: Normal breath sounds.      Comments: Chest tenderness upon auscultation of left lower lobe anteriorly   Chest:      Chest wall: Tenderness present.   Abdominal:      General: Abdomen is flat.      Palpations: Abdomen is soft.   Skin:     General: Skin is warm and dry.      Capillary Refill: Capillary refill takes less than 2 seconds.   Neurological:      Mental Status: She is alert and oriented to person, place, and time.   Psychiatric:         Mood and Affect: Mood normal.         Behavior: Behavior normal.         Assessment/Plan   Problem List Items Addressed This Visit    None  Visit Diagnoses         Codes    At risk for decreased bone " density    -  Primary Z91.89    Relevant Orders    XR DEXA bone density    History of fracture     Z87.81    Relevant Orders    XR chest 2 views    XR DEXA bone density    Fall against object     W18.00XA    Relevant Orders    XR chest 2 views    Rib pain on left side     R07.81    Relevant Orders    XR chest 2 views    XR DEXA bone density    Healthcare maintenance     Z00.00    Relevant Orders    XR DEXA bone density    Other specified disorders of bone density and structure, other site     M85.88    Relevant Orders    XR DEXA bone density        CXR reviewed revealing osteopenia. Discussed results with patient and ordered a XR DEXA. Instructed to schedule follow up appointment with  with results and management.   Discussed diagnosis, treatment and anticipated course of illness with the patient who verbalized understanding. Discussed use of OTC acetaminophen or motrin for pain management. Instructed to take medications as prescribed. Follow up with primary care provider, ,  in the event signs and symptoms worsen or fail to improve with treatment plan.

## 2024-05-03 ENCOUNTER — PREP FOR PROCEDURE (OUTPATIENT)
Dept: OBSTETRICS AND GYNECOLOGY | Facility: CLINIC | Age: 74
End: 2024-05-03
Payer: MEDICARE

## 2024-05-03 ENCOUNTER — TELEMEDICINE (OUTPATIENT)
Dept: OBSTETRICS AND GYNECOLOGY | Facility: CLINIC | Age: 74
End: 2024-05-03
Payer: MEDICARE

## 2024-05-03 DIAGNOSIS — N32.81 OAB (OVERACTIVE BLADDER): Primary | ICD-10-CM

## 2024-05-03 DIAGNOSIS — N39.3 SUI (STRESS URINARY INCONTINENCE, FEMALE): ICD-10-CM

## 2024-05-03 DIAGNOSIS — R15.9 FULL INCONTINENCE OF FECES: ICD-10-CM

## 2024-05-03 DIAGNOSIS — N81.4 CYSTOCELE WITH PROLAPSE: ICD-10-CM

## 2024-05-03 PROCEDURE — 1126F AMNT PAIN NOTED NONE PRSNT: CPT | Performed by: STUDENT IN AN ORGANIZED HEALTH CARE EDUCATION/TRAINING PROGRAM

## 2024-05-03 PROCEDURE — 1036F TOBACCO NON-USER: CPT | Performed by: STUDENT IN AN ORGANIZED HEALTH CARE EDUCATION/TRAINING PROGRAM

## 2024-05-03 PROCEDURE — 1159F MED LIST DOCD IN RCRD: CPT | Performed by: STUDENT IN AN ORGANIZED HEALTH CARE EDUCATION/TRAINING PROGRAM

## 2024-05-03 PROCEDURE — 1160F RVW MEDS BY RX/DR IN RCRD: CPT | Performed by: STUDENT IN AN ORGANIZED HEALTH CARE EDUCATION/TRAINING PROGRAM

## 2024-05-03 PROCEDURE — 99443 PR PHYS/QHP TELEPHONE EVALUATION 21-30 MIN: CPT | Performed by: STUDENT IN AN ORGANIZED HEALTH CARE EDUCATION/TRAINING PROGRAM

## 2024-05-03 RX ORDER — CEFAZOLIN SODIUM 2 G/100ML
2 INJECTION, SOLUTION INTRAVENOUS ONCE
OUTPATIENT
Start: 2024-05-03 | End: 2024-05-03

## 2024-05-03 RX ORDER — SODIUM CHLORIDE, SODIUM LACTATE, POTASSIUM CHLORIDE, CALCIUM CHLORIDE 600; 310; 30; 20 MG/100ML; MG/100ML; MG/100ML; MG/100ML
100 INJECTION, SOLUTION INTRAVENOUS CONTINUOUS
OUTPATIENT
Start: 2024-05-03

## 2024-05-03 ASSESSMENT — LIFESTYLE VARIABLES
HOW MANY STANDARD DRINKS CONTAINING ALCOHOL DO YOU HAVE ON A TYPICAL DAY: PATIENT DOES NOT DRINK
AUDIT-C TOTAL SCORE: 1
HOW OFTEN DO YOU HAVE SIX OR MORE DRINKS ON ONE OCCASION: NEVER
HOW OFTEN DO YOU HAVE SIX OR MORE DRINKS ON ONE OCCASION: NEVER
SKIP TO QUESTIONS 9-10: 1
HOW MANY STANDARD DRINKS CONTAINING ALCOHOL DO YOU HAVE ON A TYPICAL DAY: 1 OR 2
HOW OFTEN DO YOU HAVE A DRINK CONTAINING ALCOHOL: MONTHLY OR LESS
HOW OFTEN DO YOU HAVE A DRINK CONTAINING ALCOHOL: NEVER
SKIP TO QUESTIONS 9-10: 1
AUDIT-C TOTAL SCORE: 0

## 2024-05-03 ASSESSMENT — ENCOUNTER SYMPTOMS
LOSS OF SENSATION IN FEET: 0
OCCASIONAL FEELINGS OF UNSTEADINESS: 0
DEPRESSION: 0

## 2024-05-03 ASSESSMENT — PAIN SCALES - GENERAL: PAINLEVEL: 0-NO PAIN

## 2024-05-03 ASSESSMENT — PATIENT HEALTH QUESTIONNAIRE - PHQ9
SUM OF ALL RESPONSES TO PHQ9 QUESTIONS 1 AND 2: 0
2. FEELING DOWN, DEPRESSED OR HOPELESS: NOT AT ALL
1. LITTLE INTEREST OR PLEASURE IN DOING THINGS: NOT AT ALL

## 2024-05-03 ASSESSMENT — COLUMBIA-SUICIDE SEVERITY RATING SCALE - C-SSRS
6. HAVE YOU EVER DONE ANYTHING, STARTED TO DO ANYTHING, OR PREPARED TO DO ANYTHING TO END YOUR LIFE?: NO
2. HAVE YOU ACTUALLY HAD ANY THOUGHTS OF KILLING YOURSELF?: NO
1. IN THE PAST MONTH, HAVE YOU WISHED YOU WERE DEAD OR WISHED YOU COULD GO TO SLEEP AND NOT WAKE UP?: NO

## 2024-05-03 NOTE — PROGRESS NOTES
Telemedicine Visit - Urogynecology    A telephone visit (audio only) between the patient (at the originating site) and provider (at the distant site) was utilized to provide this telehealth service  Verbal consent was obtained from Luz Crain on this date 05/03/24 for a telehealth visit.  The patient's identity was confirmed and that she is located in [place]. Stephanie Nam MD identified herself and the patient consented to a telehealth visit today.    HISTORY OF PRESENT ILLNESS:  Luz Crain is a 74 y.o. female, here today for a virtual visit in follow up for OAB/UUI, POP and PRANAV.    During last encounter on 3/11/24 ([facility]), reviewed and agreed to the following:  - considering botox or SNM, currently on myrbetriq 50 mg  - PRANAV/POP has #2 ring with support and knob in place and satisfied with it  - varying stool consistency/ABL (discussed SNM could address this as well)    Today she reports   Pessary is out because she had pain. Had colonoscopy within last year and was told    The following were reviewed to gain additional history:  External notes: Carlos Manuel CNP note re: fall evaluation, CXR showed osteopenia, DEXA ordered  Test results:           UDS results  4/22/24  SUMMARY:  +DO, + PRANAV  Candidate for botox versus SNM.     IMPRESSION AND PLAN:  75 y/o with OAB, FI    OAB  - discussed etiology of OAB and potential management options  - reviewed bladder health recommendations (fluid intake, avoiding bladder triggers)  - discussed treatment options: PFPT, medications, PTNS, intradetrusor botox, SNM   - Reviewed procedure steps for intradetrusor botox injection, and reviewed this is typically done in the office  - discussed success rate around 80%  - counseled on risk of retention following injection (5% retention risk with injection of 100 U, higher with higher doses) and that this can require CIC to manage  - discussed SNM procedure in detail, specifically need for two staged procedure in OR  - reviewed  need for voiding diary prior to stage 1 to determine whether 50% improvement is achieved  - discussed risk of infection between stages and need to avoid showering (ok for sponge baths) and daily antibiotic  - if >50% improvement in symptoms after stage 1 will proceed to stage 2 (permanent implant), if <50% improvement second procedure will just be removal and will consider alternative options at that time  - discussed battery life and anticipated need for replacement in approximately 10 years (may be sooner)  - interested in proceeding with SNM  - OR reservation request sent, ok with [place] location    PRANAV/POP  - considering pessary replacement  - discussed could also plan for surgical management but would have to be staged following completion of SNM procedures  - will TBW Pau regarding plan for this    All questions and concerns were answered and addressed.  The patient expressed understanding and agrees with the plan.     Stephanie Nam MD

## 2024-05-10 ENCOUNTER — HOSPITAL ENCOUNTER (OUTPATIENT)
Dept: RADIOLOGY | Facility: CLINIC | Age: 74
Discharge: HOME | End: 2024-05-10
Payer: MEDICARE

## 2024-05-10 DIAGNOSIS — Z87.81 HISTORY OF FRACTURE: ICD-10-CM

## 2024-05-10 DIAGNOSIS — M85.88 OTHER SPECIFIED DISORDERS OF BONE DENSITY AND STRUCTURE, OTHER SITE: ICD-10-CM

## 2024-05-10 DIAGNOSIS — R07.81 RIB PAIN ON LEFT SIDE: ICD-10-CM

## 2024-05-10 DIAGNOSIS — Z91.89 AT RISK FOR DECREASED BONE DENSITY: ICD-10-CM

## 2024-05-10 DIAGNOSIS — Z00.00 HEALTHCARE MAINTENANCE: ICD-10-CM

## 2024-05-10 PROCEDURE — 77081 DXA BONE DENSITY APPENDICULR: CPT

## 2024-05-13 ENCOUNTER — PROCEDURE VISIT (OUTPATIENT)
Dept: OBSTETRICS AND GYNECOLOGY | Facility: CLINIC | Age: 74
End: 2024-05-13
Payer: MEDICARE

## 2024-05-13 VITALS
WEIGHT: 187 LBS | HEART RATE: 82 BPM | BODY MASS INDEX: 31.92 KG/M2 | SYSTOLIC BLOOD PRESSURE: 124 MMHG | HEIGHT: 64 IN | DIASTOLIC BLOOD PRESSURE: 70 MMHG

## 2024-05-13 DIAGNOSIS — N81.4 CYSTOCELE WITH PROLAPSE: Primary | ICD-10-CM

## 2024-05-13 DIAGNOSIS — N39.3 SUI (STRESS URINARY INCONTINENCE, FEMALE): ICD-10-CM

## 2024-05-13 DIAGNOSIS — N32.81 OAB (OVERACTIVE BLADDER): ICD-10-CM

## 2024-05-13 PROCEDURE — 99213 OFFICE O/P EST LOW 20 MIN: CPT | Performed by: NURSE PRACTITIONER

## 2024-05-13 NOTE — PROGRESS NOTES
"HISTORY OF PRESENT ILLNESS:  Luz Crain is a 74 y.o. female, who presents in follow up for pessary placement.     During last encounter on 5/3/24, reviewed and agreed to the following: She had NAIDA and POP. Patient had UDS on 4/22/24 and she has +DO, +PRANAV. The pessary was taken out during UDS and she didn't want it placed back in at the time. She is planning SNM with Dr. Nam for UUI but it is not yet scheduled.    Today she reports   - She is here to have the pessary placed back. Patient would prefer to not return every 3 months for pessary checks long term. Would consider PRANAV procedure after recovered from SNM.        PHYSICAL EXAMINATION:  No LMP recorded. Patient has had a hysterectomy.  Body mass index is 32.1 kg/m².  /70   Pulse 82   Ht 1.626 m (5' 4\")   Wt 84.8 kg (187 lb)   BMI 32.10 kg/m²       Patient ID: Luz Crain is a 74 y.o. female.    Pessary    Date/Time: 5/13/2024 10:18 AM    Performed by: WESTON Denis  Authorized by: WESTON Denis    Consent:     Consent given by:  Patient  Indication:     Indication for pessary: rectocele and incontinence    Comments:     Procedure comments:  #2 ring with support and knob was placed without issue.       IMPRESSION AND PLAN:  Luz Crain is a 74 y.o. who is being treated for POP and PRANAV. Comorbidities include: CAD with cardiac stent and pacemaker in place, hx of PE, HTN, paroxysmal atrial fibrillation, GERD, and COPD.     Plan    Diagnoses:   #1 Rectocele, stage 1-2   #2 Mixed urinary incontinence, stress and urge       Plan:   1. PRANAV, rectocele (stage 1-2), pessary maintenance   - We reviewed that the SNM treats UUI, but will not address the PRANAV she is having. After SNM, she could have an anti-incontinence procedure for the PRANAV as well.   - She was placed with her #2 ring with support and knob today to address POP and PRANAV.      Follow-up in 3 months for pessary check with Pau Merchant, " APRN-CNP.     Scribe Attestation:   LEONIDES, Akilah Easton, am scribing for virtually, and in the presence of Pau Merchant, WESTON on 5/13/24 at 10:23 AM.

## 2024-05-15 ENCOUNTER — TELEPHONE (OUTPATIENT)
Dept: OBSTETRICS AND GYNECOLOGY | Facility: CLINIC | Age: 74
End: 2024-05-15
Payer: MEDICARE

## 2024-05-15 ENCOUNTER — HOSPITAL ENCOUNTER (OUTPATIENT)
Facility: HOSPITAL | Age: 74
Setting detail: OUTPATIENT SURGERY
End: 2024-05-15
Attending: STUDENT IN AN ORGANIZED HEALTH CARE EDUCATION/TRAINING PROGRAM | Admitting: STUDENT IN AN ORGANIZED HEALTH CARE EDUCATION/TRAINING PROGRAM
Payer: MEDICARE

## 2024-05-15 PROBLEM — R15.9 FULL INCONTINENCE OF FECES: Status: ACTIVE | Noted: 2024-05-03

## 2024-05-15 PROBLEM — N32.81 OAB (OVERACTIVE BLADDER): Status: ACTIVE | Noted: 2024-05-03

## 2024-05-15 NOTE — TELEPHONE ENCOUNTER
Call to patient to schedule Interstim stage I on 7/18/2024 at [place] with Dr Nam. CPM has been ordered. Interstim stage II on 8/1/2024. Patient agrees with both dates and location. [employer] reps were emailed today with the about dates and location.

## 2024-05-15 NOTE — RESULT ENCOUNTER NOTE
Please notify the patient that she has osteoporosis on her DEXA bone density test and that we need to start alendronate 70 mg 1 weekly #12 with 3 refills.  Recheck bone density scan and 2 years.  Please pend that order and add the diagnosis of osteoporosis to her problem list.  Thank you

## 2024-06-06 ENCOUNTER — TELEPHONE (OUTPATIENT)
Dept: OBSTETRICS AND GYNECOLOGY | Facility: CLINIC | Age: 74
End: 2024-06-06
Payer: MEDICARE

## 2024-06-06 NOTE — TELEPHONE ENCOUNTER
PA dept contacted to see if  PA is needed prior to surgery.    S/w Avani.   Pt is having stage 1 interstim with dr bishop on 7/18/24 at [place].  CPT codes 43713 and 94371 provided and do not require  precert.   Reference # [id number] was given.

## 2024-07-02 VITALS — WEIGHT: 186.95 LBS | BODY MASS INDEX: 32.09 KG/M2

## 2024-07-03 ENCOUNTER — TELEPHONE (OUTPATIENT)
Dept: OBSTETRICS AND GYNECOLOGY | Facility: CLINIC | Age: 74
End: 2024-07-03
Payer: MEDICARE

## 2024-07-03 DIAGNOSIS — Z01.818 PREOPERATIVE TESTING: Primary | ICD-10-CM

## 2024-07-10 ENCOUNTER — TELEPHONE (OUTPATIENT)
Dept: OBSTETRICS AND GYNECOLOGY | Facility: CLINIC | Age: 74
End: 2024-07-10
Payer: MEDICARE

## 2024-07-10 ENCOUNTER — ANESTHESIA EVENT (OUTPATIENT)
Dept: OPERATING ROOM | Facility: HOSPITAL | Age: 74
End: 2024-07-10

## 2024-07-10 NOTE — TELEPHONE ENCOUNTER
Pt contacted.   Pt states she is scheduled to have a cardiac cath and possible stent next wed at [place].   She did not pass her stress test.   Cardiologist unable to clear her for stage 1 interstim on 7/18/24  Nurse will make dr bishop aware.

## 2024-07-11 ENCOUNTER — TELEPHONE (OUTPATIENT)
Dept: OBSTETRICS AND GYNECOLOGY | Facility: CLINIC | Age: 74
End: 2024-07-11
Payer: MEDICARE

## 2024-07-11 NOTE — TELEPHONE ENCOUNTER
Message received from Dr Nam that this patient's interstim procedures need to be cancelled because her cardiologist will not victoriano/ar her. Interstim 1 was scheduled for 7/18 and interstim 2 was scheduled for 8/1. Email sent to [employer] reps. Regarding cancellations.

## 2024-07-17 ENCOUNTER — TELEPHONE (OUTPATIENT)
Dept: OBSTETRICS AND GYNECOLOGY | Facility: CLINIC | Age: 74
End: 2024-07-17
Payer: MEDICARE

## 2024-07-17 NOTE — TELEPHONE ENCOUNTER
Patient states that she had to cancel surgery scheduled for 7/18/2024 due to medical condition. She is reporting that the doctor has cleared her to have the surgery and she would like to know if the slot for tomorrow is open, or if she can reschedule.    7/17/24 1350 Pt states that she had her cardiac cath done at [place] this morning and was told that everything was ok. She has a call out to Dr. Yi to send a medical clearance letter to Dr. Nam so her interstem procedures can be rescheduled. Pt is aware she will not be able to have it done tomorrow. She is scheduled for a VV 7/30/24 with Dr. Nam and will discuss POC for rescheduling at that time. In the mean time pt will speak to Dr. Yi for the cardiac clearance letter. [facility] Office fax # given.

## 2024-07-18 ENCOUNTER — ANESTHESIA (OUTPATIENT)
Dept: OPERATING ROOM | Facility: HOSPITAL | Age: 74
End: 2024-07-18
Payer: MEDICARE

## 2024-07-25 ENCOUNTER — OFFICE VISIT (OUTPATIENT)
Dept: PRIMARY CARE | Facility: CLINIC | Age: 74
End: 2024-07-25
Payer: MEDICARE

## 2024-07-25 VITALS
HEIGHT: 64 IN | SYSTOLIC BLOOD PRESSURE: 130 MMHG | WEIGHT: 191.6 LBS | OXYGEN SATURATION: 95 % | DIASTOLIC BLOOD PRESSURE: 80 MMHG | BODY MASS INDEX: 32.71 KG/M2 | RESPIRATION RATE: 16 BRPM | TEMPERATURE: 98.3 F | HEART RATE: 73 BPM

## 2024-07-25 DIAGNOSIS — I10 PRIMARY HYPERTENSION: ICD-10-CM

## 2024-07-25 DIAGNOSIS — E66.9 CLASS 1 OBESITY WITH SERIOUS COMORBIDITY AND BODY MASS INDEX (BMI) OF 32.0 TO 32.9 IN ADULT, UNSPECIFIED OBESITY TYPE: ICD-10-CM

## 2024-07-25 DIAGNOSIS — M79.89 BILATERAL SWELLING OF FEET: Primary | ICD-10-CM

## 2024-07-25 DIAGNOSIS — R60.0 EDEMA OF BOTH FEET: ICD-10-CM

## 2024-07-25 DIAGNOSIS — M79.89 BILATERAL SWELLING OF FEET: ICD-10-CM

## 2024-07-25 PROCEDURE — 99214 OFFICE O/P EST MOD 30 MIN: CPT | Performed by: NURSE PRACTITIONER

## 2024-07-25 RX ORDER — FUROSEMIDE 20 MG/1
20 TABLET ORAL 2 TIMES DAILY
Qty: 60 TABLET | Refills: 0 | Status: SHIPPED | OUTPATIENT
Start: 2024-07-25 | End: 2024-07-26

## 2024-07-25 RX ORDER — MIRABEGRON 8 MG/8ML
GRANULE, FOR SUSPENSION, EXTENDED RELEASE ORAL
COMMUNITY

## 2024-07-25 ASSESSMENT — ENCOUNTER SYMPTOMS
DEPRESSION: 0
OCCASIONAL FEELINGS OF UNSTEADINESS: 0
LOSS OF SENSATION IN FEET: 0

## 2024-07-25 ASSESSMENT — PATIENT HEALTH QUESTIONNAIRE - PHQ9
1. LITTLE INTEREST OR PLEASURE IN DOING THINGS: NOT AT ALL
1. LITTLE INTEREST OR PLEASURE IN DOING THINGS: NOT AT ALL
SUM OF ALL RESPONSES TO PHQ9 QUESTIONS 1 AND 2: 0
2. FEELING DOWN, DEPRESSED OR HOPELESS: NOT AT ALL
2. FEELING DOWN, DEPRESSED OR HOPELESS: NOT AT ALL
SUM OF ALL RESPONSES TO PHQ9 QUESTIONS 1 AND 2: 0

## 2024-07-25 NOTE — PROGRESS NOTES
"Subjective   Patient ID: Luz Crain is a 74 y.o. female who presents for Edema.      Symptoms: bilateral swelling both legs but getting the pain on the left leg only, positions laying and walking cause discomfort and pain, when sitting to standing pain scale is 8/10.  Length of symptoms: 2 weeks ago  OTC: none  Related information: heart catheterization Wednesday 7/17/2024    HPI    Review of Systems    Objective   /88 Comment: auto  Pulse 73   Temp 36.8 °C (98.3 °F)   Resp 16   Ht 1.626 m (5' 4\")   Wt 86.9 kg (191 lb 9.6 oz)   SpO2 95%   BMI 32.89 kg/m²     Physical Exam    Assessment/Plan          "

## 2024-07-25 NOTE — PROGRESS NOTES
Subjective   Patient ID: Luz Crain is a 74 y.o. female who is with chief complaint of swelling on both feet, ankles, and lower legs.    HPI  Patient is a 74 y.o. female who CONSULTED AT [facility] today. Patient is with complaint of swelling on both feet, ankles, and lower legs. She states that about 2 weeks ago the symptoms started and progressively got worse each day. There is also intermittent pain on the left thigh on flexion. She states that she does not recall any injury nor any particular precipitating event. She denies paralysis, paresthesia fever, chills, nor changes in the color of the nail bed nor skin on the tip of toes. She denies having any chest pain, shortness of breath, cough, paroxysmal nocturnal dyspnea, nor orthopnea.     Review of Systems  General: no weight loss, generally healthy, no fatigue  Head:  no headaches / sinus pain, no vertigo, no injury  Eyes: no diplopia, no tearing, no pain,   Ears: no change in hearing, no tinnitus, no bleeding, no vertigo  Mouth:  no dental difficulties, no gingival bleeding, no sore throat, no loss of sense of taste  Nose: no congestion, no  discharge, no bleeding, no obstruction, no loss of sense of smell  Neck: no stiffness, no pain, no tenderness, no masses, no bruit  Pulmonary: no dyspnea, no wheezing, no hemoptysis, no cough  Cardiovascular: no chest pain, no palpitations, no syncope, no orthopnea  Gastrointestinal: no change in appetite, no dysphagia, no abdominal pains, no diarrhea, no emesis, no melena  Genito Urinary: no dysuria, no urinary urgency, no nocturia, no incontinence, no change in nature of urine  Musculoskeletal: no muscle ache, no joint pain, no limitation of range of motion, no paresthesia, no numbness  Extremities: (+) swelling on both feet, ankles, and lower legs,  Constitutional: no fever, no chills, no night sweats    Objective   Physical Exam  General: ambulatory, in no acute distress  Head:  normocephalic, no lesions  Eyes: pink palpebral conjunctiva, anicteric sclerae, PERRLA, EOM's full  Chest: symmetrical chest expansion, no lagging, no retractions, clear breath sounds, no rales, no wheezes  Heart: normal rate, regular rhythm, no heaves, no thrills, no murmurs  Musculoskeletal: no limitation of range of motion, no paralysis, no deformity  Extremities: (+) +2 edema on feet, ankles, and lower 3rd of legs, full and equal peripheral pulses,    Assessment/Plan   Problem List Items Addressed This Visit             ICD-10-CM    HTN (hypertension) I10    Relevant Medications    furosemide (Lasix) 20 mg tablet     Other Visit Diagnoses         Codes    Bilateral swelling of feet    -  Primary M79.89    Relevant Medications    furosemide (Lasix) 20 mg tablet    Edema of both feet     R60.0    Relevant Medications    furosemide (Lasix) 20 mg tablet    BMI 32.0-32.9,adult     Z68.32    Class 1 obesity with serious comorbidity and body mass index (BMI) of 32.0 to 32.9 in adult, unspecified obesity type     E66.9, Z68.32        DISCHARGE SUMMARY:   Patient was seen and examined. Diagnosis, treatment, treatment options, and possible complications of today's illness discussed and explained to patient. Patient to take medication/s associated with this visit. Patient educated and advised on low fat/low salt/high fiber diet, health maintenance, healthy diet, and importance of exercise. Advised and instructed on daily monitoring and recording of blood pressure. Advised to come back if with worsening or persistent symptoms. Patient verbalized understanding of plan of care.    Patient to come back in 7 - 10 days if needed for worsening symptoms.           BAUTISTA Forde-CNP 07/25/24 12:34 PM

## 2024-07-25 NOTE — PATIENT INSTRUCTIONS
DISCHARGE SUMMARY:   Patient was seen and examined. Diagnosis, treatment, treatment options, and possible complications of today's illness discussed and explained to patient. Patient to take medication/s associated with this visit. Patient educated and advised on low fat/low salt/high fiber diet, health maintenance, healthy diet, and importance of exercise. Advised and instructed on daily monitoring and recording of blood pressure. Advised to come back if with worsening or persistent symptoms. Patient verbalized understanding of plan of care.    Patient to come back in 7 - 10 days if needed for worsening symptoms.

## 2024-07-26 RX ORDER — FUROSEMIDE 20 MG/1
20 TABLET ORAL 2 TIMES DAILY
Qty: 60 TABLET | Refills: 0 | Status: SHIPPED | OUTPATIENT
Start: 2024-07-26 | End: 2024-08-25

## 2024-07-30 ENCOUNTER — PREP FOR PROCEDURE (OUTPATIENT)
Dept: OBSTETRICS AND GYNECOLOGY | Facility: CLINIC | Age: 74
End: 2024-07-30

## 2024-07-30 ENCOUNTER — APPOINTMENT (OUTPATIENT)
Dept: OBSTETRICS AND GYNECOLOGY | Facility: CLINIC | Age: 74
End: 2024-07-30
Payer: MEDICARE

## 2024-07-30 DIAGNOSIS — N39.41 URGE URINARY INCONTINENCE: ICD-10-CM

## 2024-07-30 DIAGNOSIS — N39.41 URGE INCONTINENCE OF URINE: ICD-10-CM

## 2024-07-30 DIAGNOSIS — N32.81 OAB (OVERACTIVE BLADDER): Primary | ICD-10-CM

## 2024-07-30 PROCEDURE — 99442 PR PHYS/QHP TELEPHONE EVALUATION 11-20 MIN: CPT | Performed by: STUDENT IN AN ORGANIZED HEALTH CARE EDUCATION/TRAINING PROGRAM

## 2024-07-30 RX ORDER — CEFAZOLIN SODIUM 2 G/100ML
2 INJECTION, SOLUTION INTRAVENOUS ONCE
OUTPATIENT
Start: 2024-07-30 | End: 2024-07-30

## 2024-07-30 RX ORDER — SODIUM CHLORIDE, SODIUM LACTATE, POTASSIUM CHLORIDE, CALCIUM CHLORIDE 600; 310; 30; 20 MG/100ML; MG/100ML; MG/100ML; MG/100ML
100 INJECTION, SOLUTION INTRAVENOUS CONTINUOUS
OUTPATIENT
Start: 2024-07-30

## 2024-07-30 NOTE — PROGRESS NOTES
Telemedicine Visit - Urogynecology    A telephone visit (audio only) between the patient (at the originating site) and provider (at the distant site) was utilized to provide this telehealth service  Verbal consent was obtained from Luz Crain on this date 07/30/24 for a telehealth visit.  The patient's identity was confirmed and that she is located in [place]. Stephanie Nam MD identified herself and the patient consented to a telehealth visit today.    HISTORY OF PRESENT ILLNESS:  Luz Crain is a 74 y.o. female, here today for a virtual visit in follow up for OAB/UUI, POP, PRANAV    During last encounter on 5/14/24 ([facility] reviewed and agreed to the following:  Diagnoses:   #1 Rectocele, stage 1-2   #2 Mixed urinary incontinence, stress and urge        Plan:   1. PRANAV, rectocele (stage 1-2), pessary maintenance   - We reviewed that the SNM treats UUI, but will not address the PRANAV she is having. After SNM, she could have an anti-incontinence procedure for the PRANAV as well.   - She was placed with her #2 ring with support and knob today to address POP and PRANAV.        Today she reports   Cath completed and results were fine per patient. No further follow up needed. Has clearance letter from cardiologist ready to go.     The following were reviewed to gain additional history:  External notes: Cath lab report 7/17/24  Test results: n/a            IMPRESSION AND PLAN:  74 y.o. y/o with OAB/UUI, planning interstim procedure.    OAB/UUI  - interstim was cancelled due to needing cardiac cath for clearance  - now s/p cath which was negative per patient  - will reschedule procedure and request clearance letter from cardiologist  - reviewed no shower x 2 weeks between procedure, and daily antibiotic for prophylaxis  - message sent to Shiloh Graham RN to reschedule procedure    POP/PRANAV  - pessary check with [employer] scheduled 8/26/24    All questions and concerns were answered and addressed.  The patient  expressed understanding and agrees with the plan.     Stephanie Nam MD

## 2024-08-01 ENCOUNTER — TELEPHONE (OUTPATIENT)
Dept: OBSTETRICS AND GYNECOLOGY | Facility: CLINIC | Age: 74
End: 2024-08-01

## 2024-08-01 ENCOUNTER — HOSPITAL ENCOUNTER (OUTPATIENT)
Dept: RADIOLOGY | Facility: CLINIC | Age: 74
Discharge: HOME | End: 2024-08-01
Payer: MEDICARE

## 2024-08-01 ENCOUNTER — APPOINTMENT (OUTPATIENT)
Dept: PRIMARY CARE | Facility: CLINIC | Age: 74
End: 2024-08-01
Payer: MEDICARE

## 2024-08-01 VITALS
TEMPERATURE: 98 F | SYSTOLIC BLOOD PRESSURE: 110 MMHG | DIASTOLIC BLOOD PRESSURE: 70 MMHG | WEIGHT: 188 LBS | HEART RATE: 61 BPM | RESPIRATION RATE: 16 BRPM | OXYGEN SATURATION: 93 % | BODY MASS INDEX: 32.1 KG/M2 | HEIGHT: 64 IN

## 2024-08-01 DIAGNOSIS — M79.89 BILATERAL SWELLING OF FEET: ICD-10-CM

## 2024-08-01 DIAGNOSIS — R60.0 EDEMA OF BOTH FEET: ICD-10-CM

## 2024-08-01 DIAGNOSIS — E66.9 CLASS 1 OBESITY WITH SERIOUS COMORBIDITY AND BODY MASS INDEX (BMI) OF 32.0 TO 32.9 IN ADULT, UNSPECIFIED OBESITY TYPE: ICD-10-CM

## 2024-08-01 DIAGNOSIS — M79.605 PAIN OF LEFT LOWER EXTREMITY: Primary | ICD-10-CM

## 2024-08-01 DIAGNOSIS — M79.605 PAIN OF LEFT LOWER EXTREMITY: ICD-10-CM

## 2024-08-01 PROBLEM — N39.41 URGE INCONTINENCE OF URINE: Status: ACTIVE | Noted: 2024-07-30

## 2024-08-01 PROCEDURE — 99213 OFFICE O/P EST LOW 20 MIN: CPT | Performed by: NURSE PRACTITIONER

## 2024-08-01 PROCEDURE — 93971 EXTREMITY STUDY: CPT

## 2024-08-01 PROCEDURE — 93971 EXTREMITY STUDY: CPT | Performed by: RADIOLOGY

## 2024-08-01 ASSESSMENT — PATIENT HEALTH QUESTIONNAIRE - PHQ9
1. LITTLE INTEREST OR PLEASURE IN DOING THINGS: SEVERAL DAYS
SUM OF ALL RESPONSES TO PHQ9 QUESTIONS 1 AND 2: 1
2. FEELING DOWN, DEPRESSED OR HOPELESS: NOT AT ALL
10. IF YOU CHECKED OFF ANY PROBLEMS, HOW DIFFICULT HAVE THESE PROBLEMS MADE IT FOR YOU TO DO YOUR WORK, TAKE CARE OF THINGS AT HOME, OR GET ALONG WITH OTHER PEOPLE: NOT DIFFICULT AT ALL

## 2024-08-01 ASSESSMENT — ENCOUNTER SYMPTOMS
HEADACHES: 0
SINUS PRESSURE: 0
SHORTNESS OF BREATH: 0
VOMITING: 0
NAUSEA: 0
CONSTIPATION: 0
LIGHT-HEADEDNESS: 0
SINUS PAIN: 0
WHEEZING: 0
CHILLS: 0
DYSPHORIC MOOD: 0
DIZZINESS: 0
FATIGUE: 0
NERVOUS/ANXIOUS: 1
DIARRHEA: 0
PALPITATIONS: 0
COUGH: 0
SORE THROAT: 0
WEAKNESS: 0
SLEEP DISTURBANCE: 1
FEVER: 0

## 2024-08-01 NOTE — TELEPHONE ENCOUNTER
Call to patient to schedule insertion of interstim device. Dr Nam is surgeon for procedures scheduled 9/4 and 9/18/2024 at [place]. Interstim I is scheduled for 9/4 and Interstim II is scheduled for 9/18. No CPM is ordered. [employer] reps have been notified.

## 2024-08-01 NOTE — PROGRESS NOTES
"Subjective   Patient ID: Luz Crain is a 74 y.o. female who presents for Foot Swelling.    Patient states to be having some swelling on her feet for about 2 weeks and some pain on her left thigh.         74-year-old female presents today complaining of bilateral swelling in her feet.  She is also complaining of a pain in her left leg as well.  She states that she is having trouble getting out of a chair.  She was seen 1 week ago by Norbert Brito NP and prescribed Lasix.  She states that she has noticed mild improvement in her swelling.  She does report a history of PE several years ago.  Patient states that she has been dealing with a lot of anxiety/stress over the past year and is wondering if that is the cause of her symptoms.       Review of Systems   Constitutional:  Negative for chills, fatigue and fever.   HENT:  Negative for congestion, ear pain, sinus pressure, sinus pain and sore throat.    Respiratory:  Negative for cough, shortness of breath and wheezing.    Cardiovascular:  Positive for leg swelling. Negative for chest pain and palpitations.   Gastrointestinal:  Negative for constipation, diarrhea, nausea and vomiting.   Musculoskeletal:  Positive for gait problem.   Skin:  Negative for rash.   Neurological:  Negative for dizziness, weakness, light-headedness and headaches.   Psychiatric/Behavioral:  Positive for sleep disturbance. Negative for dysphoric mood. The patient is nervous/anxious.        Objective   /70 (BP Location: Left arm, Patient Position: Sitting, BP Cuff Size: Large adult)   Pulse 61   Temp 36.7 °C (98 °F) (Temporal)   Resp 16   Ht 1.626 m (5' 4\")   Wt 85.3 kg (188 lb)   SpO2 93%   BMI 32.27 kg/m²     Physical Exam  Vitals and nursing note reviewed.   Constitutional:       Appearance: Normal appearance.   Cardiovascular:      Rate and Rhythm: Normal rate and regular rhythm.      Heart sounds: Normal heart sounds.   Pulmonary:      Effort: Pulmonary effort is " normal.      Breath sounds: Normal breath sounds.   Musculoskeletal:         General: Tenderness present.      Right lower leg: Edema present.      Left lower leg: Edema present.      Comments: Tenderness noted in left upper leg.     Lumbar spine: No tenderness to palpation over spine.   No paraspinous muscle tenderness.   SLR negative bilaterally.   Skin:     General: Skin is warm and dry.   Neurological:      Mental Status: She is alert.   Psychiatric:         Mood and Affect: Mood normal.         Behavior: Behavior normal.         Assessment/Plan   Problem List Items Addressed This Visit    None  Visit Diagnoses         Codes    Pain of left lower extremity    -  Primary M79.605    Relevant Orders    Lower extremity venous duplex left    Bilateral swelling of feet     M79.89    Relevant Orders    Lower extremity venous duplex left    Edema of both feet     R60.0    Class 1 obesity with serious comorbidity and body mass index (BMI) of 32.0 to 32.9 in adult, unspecified obesity type     E66.9, Z68.32        Patient declines any medication to help with anxiety/sleep at this time.  Check venous duplux  Continue with Lasix as directed.   Follow up in 2 weeks for recheck, or sooner with any additional concerns.   If developing any new/worsening symptoms, to ER.

## 2024-08-05 ENCOUNTER — TELEPHONE (OUTPATIENT)
Dept: PRIMARY CARE | Facility: CLINIC | Age: 74
End: 2024-08-05
Payer: MEDICARE

## 2024-08-05 NOTE — TELEPHONE ENCOUNTER
Pt states she had missed call, did anyone try to call pt? Saw Jasmyne 8/01 not sure if it is in regards to this-no messages.

## 2024-08-19 ENCOUNTER — TELEPHONE (OUTPATIENT)
Dept: OBSTETRICS AND GYNECOLOGY | Facility: CLINIC | Age: 74
End: 2024-08-19
Payer: MEDICARE

## 2024-08-20 ENCOUNTER — TELEPHONE (OUTPATIENT)
Dept: OBSTETRICS AND GYNECOLOGY | Facility: CLINIC | Age: 74
End: 2024-08-20
Payer: MEDICARE

## 2024-08-20 DIAGNOSIS — N32.81 OVERACTIVE BLADDER: ICD-10-CM

## 2024-08-20 NOTE — TELEPHONE ENCOUNTER
Pt contacted.   Out of her myrbetriq 50mg.    Needs script sent to [facility].  Medication reordered  and sent to provider to approve.

## 2024-08-21 RX ORDER — MIRABEGRON 50 MG/1
50 TABLET, EXTENDED RELEASE ORAL DAILY
Qty: 90 TABLET | Refills: 3 | Status: SHIPPED | OUTPATIENT
Start: 2024-08-21 | End: 2025-08-21

## 2024-08-26 ENCOUNTER — APPOINTMENT (OUTPATIENT)
Dept: OBSTETRICS AND GYNECOLOGY | Facility: CLINIC | Age: 74
End: 2024-08-26
Payer: MEDICARE

## 2024-08-26 ENCOUNTER — TELEPHONE (OUTPATIENT)
Dept: OBSTETRICS AND GYNECOLOGY | Facility: CLINIC | Age: 74
End: 2024-08-26

## 2024-08-26 VITALS
DIASTOLIC BLOOD PRESSURE: 64 MMHG | SYSTOLIC BLOOD PRESSURE: 116 MMHG | WEIGHT: 191 LBS | BODY MASS INDEX: 32.61 KG/M2 | HEIGHT: 64 IN

## 2024-08-26 DIAGNOSIS — N81.4 CYSTOCELE WITH PROLAPSE: ICD-10-CM

## 2024-08-26 DIAGNOSIS — N39.3 SUI (STRESS URINARY INCONTINENCE, FEMALE): Primary | ICD-10-CM

## 2024-08-26 PROCEDURE — G2211 COMPLEX E/M VISIT ADD ON: HCPCS | Performed by: NURSE PRACTITIONER

## 2024-08-26 PROCEDURE — 99213 OFFICE O/P EST LOW 20 MIN: CPT | Performed by: NURSE PRACTITIONER

## 2024-08-26 ASSESSMENT — PAIN SCALES - GENERAL: PAINLEVEL: 0-NO PAIN

## 2024-08-27 ENCOUNTER — LAB (OUTPATIENT)
Dept: LAB | Facility: LAB | Age: 74
End: 2024-08-27
Payer: MEDICARE

## 2024-08-27 DIAGNOSIS — Z01.818 PRE-OP TESTING: ICD-10-CM

## 2024-08-27 LAB
ANION GAP SERPL CALC-SCNC: 11 MMOL/L (ref 10–20)
BUN SERPL-MCNC: 38 MG/DL (ref 6–23)
CALCIUM SERPL-MCNC: 9.4 MG/DL (ref 8.6–10.3)
CHLORIDE SERPL-SCNC: 106 MMOL/L (ref 98–107)
CO2 SERPL-SCNC: 31 MMOL/L (ref 21–32)
CREAT SERPL-MCNC: 1.3 MG/DL (ref 0.5–1.05)
EGFRCR SERPLBLD CKD-EPI 2021: 43 ML/MIN/1.73M*2
ERYTHROCYTE [DISTWIDTH] IN BLOOD BY AUTOMATED COUNT: 13.7 % (ref 11.5–14.5)
GLUCOSE SERPL-MCNC: 87 MG/DL (ref 74–99)
HCT VFR BLD AUTO: 37.3 % (ref 36–46)
HGB BLD-MCNC: 11.7 G/DL (ref 12–16)
MCH RBC QN AUTO: 29.2 PG (ref 26–34)
MCHC RBC AUTO-ENTMCNC: 31.4 G/DL (ref 32–36)
MCV RBC AUTO: 93 FL (ref 80–100)
NRBC BLD-RTO: 0 /100 WBCS (ref 0–0)
PLATELET # BLD AUTO: 259 X10*3/UL (ref 150–450)
POTASSIUM SERPL-SCNC: 4.8 MMOL/L (ref 3.5–5.3)
RBC # BLD AUTO: 4.01 X10*6/UL (ref 4–5.2)
SODIUM SERPL-SCNC: 143 MMOL/L (ref 136–145)
WBC # BLD AUTO: 7.4 X10*3/UL (ref 4.4–11.3)

## 2024-08-27 PROCEDURE — 36415 COLL VENOUS BLD VENIPUNCTURE: CPT

## 2024-08-27 PROCEDURE — 85027 COMPLETE CBC AUTOMATED: CPT

## 2024-08-27 PROCEDURE — 80048 BASIC METABOLIC PNL TOTAL CA: CPT

## 2024-08-27 NOTE — TELEPHONE ENCOUNTER
Pt contacted.   Pt scheduled for stage1 interstim with dr bishop  9/4/24.   Pt saw marc yesterday and was told to perla her preop lab work drawn day of surgery.   Wants to verify if thi is correct.

## 2024-08-27 NOTE — TELEPHONE ENCOUNTER
Message received from dr bishop/marc.  Pt to be contacted to go to lab at [place] for CBC and CMP.

## 2024-08-27 NOTE — PROGRESS NOTES
Pessary Check    This is a 74 y.o. with a #2 ring with support and knob pessary here for a routine pessary check.     Date of last check: 5/13/24     She is scheduled for Interstim 9/4/2024 and 9/18/2024      Today she reports:  Pessary comfortable: She did feel the pessary was sliding down and felt it with wiping. This is not bothersome to her as it eventually slides back up.   Vaginal bleeding:No  Abnormal vaginal discharge:No    Exam:  Physical Exam  Constitutional:       Appearance: Normal appearance.   Genitourinary:      Vulva normal.      No lesions in the vagina.      No vaginal erythema, ulceration or granulation tissue.   Pulmonary:      Effort: Pulmonary effort is normal.   Neurological:      General: No focal deficit present.      Mental Status: She is alert and oriented to person, place, and time.   Psychiatric:         Mood and Affect: Mood normal.         Behavior: Behavior normal.         Thought Content: Thought content normal.         Judgment: Judgment normal.   Vitals reviewed.     Procedure:   Pessary position on presentation: Normal  Pessary removed and cleaned without difficulty  Speculum exam: Vaginal mucosa was examined for the presence of irritation, trauma and/or bleeding   Erosions: No   Vaginal atrophy: Yes   Silver nitrate applied :No  Pessary replaced without difficulty.    Assessment/Plan:  Luz Crain is a 74 y.o. with rectocele (stage 2) and NAIDA with stress and urge. Comorbidities include: CAD with cardiac stent and pacemaker in place, hx of PE, HTN, paroxysmal atrial fibrillation, GERD, and COPD.       Plan:   1. PRANAV, rectocele, pessary maintenance    - The patient is satisfied with the pessary and plans to continue use.   - Pessary was in normal position on exam, so the size was not increased. If she ever feels the pessary at the introitus again, she can push the pessary up higher with a finger.   - We discussed her pessary will be left in place during her SNM procedure and  that SNM is not going to treat her PRANAV or prolapse.     2. UUI  - She is scheduled for InterStim 9/4/24 and stage 2 is 9/18/24 with Dr. Nam.   - Discussed with Dr. Nam and then reviewed with the pt that she does not need blood work prior to the InterStim. There is a study that has to do with programming the device - Kelli is doing the study. Pre op will call before to let her know the exact time to show up. Specifically reviewed that this procedure treats UUI/OAB.     She will return to the office in 2-3 months for recheck or sooner should she have any problems.    All questions and concerns were answered and addressed.    Scribe Attestation:   I, Akilah Easton, am scribing for virtually, and in the presence of WESTON Denis on 8/27/24 at 9:34 AM.     I, Pau Merchant, personally performed the services described in the documentation as scribed in my presence and confirm it is both complete and accurate.

## 2024-08-29 ENCOUNTER — TELEPHONE (OUTPATIENT)
Dept: OBSTETRICS AND GYNECOLOGY | Facility: CLINIC | Age: 74
End: 2024-08-29
Payer: MEDICARE

## 2024-08-29 NOTE — TELEPHONE ENCOUNTER
Pt contacted.   Pt has macular degeneration.  She routinely gets eye injections to help prevent leakage in the back of her eye.  These are performed in the eye doctor's office.  She is scheduled to have an injection day before her interstim procedure next week.  Nurse will make dr bishop aware.

## 2024-09-03 NOTE — PREPROCEDURE INSTRUCTIONS
Current Medications   Medication Instructions    aspirin 81 mg EC tablet Stop 2 days before surgery    atorvastatin (Lipitor) 40 mg tablet Continue until night before surgery    cholecalciferol (Vitamin D-3) 25 MCG (1000 UT) tablet Stop 1 day before surgery    coenzyme Q-10 100 mg capsule Stop 1 day before surgery    cranberry 400 mg capsule Stop 1 day before surgery    dofetilide (Tikosyn) 125 mcg capsule Take morning of surgery with sip of water, no other fluids per anesthesia    estradiol (Estrace) 0.01 % (0.1 mg/gram) vaginal cream Continue until night before surgery    ferrous sulfate, 325 mg ferrous sulfate, (Iron, ferrous sulfate,) tablet Continue until night before surgery    fish oil concentrate (Omega-3) 120-180 mg capsule Stop 1 day before surgery    FLUoxetine (PROzac) 60 mg tablet Take morning of surgery with sip of water, no other fluids    furosemide (Lasix) 20 mg tablet Continue until night before surgery    iron, carbonyl 25 mg iron tablet Stop 1 day before surgery    metoprolol succinate XL (Toprol-XL) 25 mg 24 hr tablet Take morning of surgery with sip of water, no other fluids    mirabegron (Myrbetriq) 50 mg tablet extended release 24 hr 24 hr tablet Take morning of surgery with sip of water, no other fluids per anesthesia    mometasone-formoterol (Dulera 200) 200-5 mcg/actuation inhaler May take as prescribed am of surgery    montelukast (Singulair) 10 mg tablet Take morning of surgery with sip of water, no other fluids    multivitamin tablet Stop 1 day before surgery    NON FORMULARY Stop 1 day before surgery    pantoprazole (ProtoNix) 40 mg EC tablet Take morning of surgery with sip of water, no other fluids    rivaroxaban (Xarelto) 20 mg tablet Last dose 8/31    traZODone (Desyrel) 50 mg tablet Continue until night before surgery          NPO Instructions: Nothing to eat or drink after midnight, you may take medications as discussed with small sips of water.     Additional Instructions: Enter  through main entrance of [facility], located at [address] Proceed to registration, located in the back right hand corner. You will need your ID and insurance card for registration. Please ensure you have a responsible adult to drive you home.     Take a shower the morning of or night before your procedure. After you shower avoid lotions, powders, deodorants or anything applied to the skin. If you wear contacts or glasses, wear the glasses. If you do not have glasses, please bring a case for your contacts. You may wear hearing aids and dentures, bring a case for them or we will provide one. Make sure you wear something loose and comfortable. Keep in mind your surgery type and wear something that will accommodate incisions or bandages. Please remove all jewelry.   You may have up to 13.5 ounces of clear liquids 2 hours before your instructed ARRIVAL time to the hospital. You may take medications discussed during phone call with a small sip of water.    For further questions [facility] can be contacted at [phone] between 7AM-3PM.

## 2024-09-04 ENCOUNTER — APPOINTMENT (OUTPATIENT)
Dept: RADIOLOGY | Facility: HOSPITAL | Age: 74
End: 2024-09-04
Payer: MEDICARE

## 2024-09-04 ENCOUNTER — HOSPITAL ENCOUNTER (OUTPATIENT)
Facility: HOSPITAL | Age: 74
Setting detail: OUTPATIENT SURGERY
Discharge: HOME | End: 2024-09-04
Attending: STUDENT IN AN ORGANIZED HEALTH CARE EDUCATION/TRAINING PROGRAM | Admitting: STUDENT IN AN ORGANIZED HEALTH CARE EDUCATION/TRAINING PROGRAM
Payer: MEDICARE

## 2024-09-04 ENCOUNTER — ANESTHESIA (OUTPATIENT)
Dept: OPERATING ROOM | Facility: HOSPITAL | Age: 74
End: 2024-09-04
Payer: MEDICARE

## 2024-09-04 ENCOUNTER — ANESTHESIA EVENT (OUTPATIENT)
Dept: OPERATING ROOM | Facility: HOSPITAL | Age: 74
End: 2024-09-04
Payer: MEDICARE

## 2024-09-04 VITALS
HEIGHT: 64 IN | SYSTOLIC BLOOD PRESSURE: 175 MMHG | BODY MASS INDEX: 32.11 KG/M2 | RESPIRATION RATE: 18 BRPM | WEIGHT: 188.05 LBS | TEMPERATURE: 96.8 F | OXYGEN SATURATION: 94 % | DIASTOLIC BLOOD PRESSURE: 78 MMHG | HEART RATE: 60 BPM

## 2024-09-04 DIAGNOSIS — N32.81 OAB (OVERACTIVE BLADDER): ICD-10-CM

## 2024-09-04 DIAGNOSIS — N39.41 URGE INCONTINENCE OF URINE: ICD-10-CM

## 2024-09-04 DIAGNOSIS — Z01.818 PRE-OP TESTING: Primary | ICD-10-CM

## 2024-09-04 DIAGNOSIS — Z79.2 PROPHYLACTIC ANTIBIOTIC: ICD-10-CM

## 2024-09-04 PROBLEM — Z95.0 PACEMAKER: Status: ACTIVE | Noted: 2024-09-04

## 2024-09-04 PROBLEM — E66.811 OBESITY, CLASS I, BMI 30-34.9: Status: ACTIVE | Noted: 2021-05-23

## 2024-09-04 PROBLEM — D64.9 ANEMIA: Status: ACTIVE | Noted: 2024-09-04

## 2024-09-04 PROBLEM — I21.4 NON-ST ELEVATION (NSTEMI) MYOCARDIAL INFARCTION (MULTI): Status: ACTIVE | Noted: 2021-05-21

## 2024-09-04 PROBLEM — I21.4 ACUTE NON-ST ELEVATION MYOCARDIAL INFARCTION (NSTEMI) (MULTI): Status: ACTIVE | Noted: 2024-09-04

## 2024-09-04 PROBLEM — E66.9 OBESITY, CLASS I, BMI 30-34.9: Status: ACTIVE | Noted: 2021-05-23

## 2024-09-04 PROCEDURE — 3700000002 HC GENERAL ANESTHESIA TIME - EACH INCREMENTAL 1 MINUTE: Performed by: STUDENT IN AN ORGANIZED HEALTH CARE EDUCATION/TRAINING PROGRAM

## 2024-09-04 PROCEDURE — 2500000004 HC RX 250 GENERAL PHARMACY W/ HCPCS (ALT 636 FOR OP/ED): Mod: JZ | Performed by: STUDENT IN AN ORGANIZED HEALTH CARE EDUCATION/TRAINING PROGRAM

## 2024-09-04 PROCEDURE — 3600000004 HC OR TIME - INITIAL BASE CHARGE - PROCEDURE LEVEL FOUR: Performed by: STUDENT IN AN ORGANIZED HEALTH CARE EDUCATION/TRAINING PROGRAM

## 2024-09-04 PROCEDURE — 2500000004 HC RX 250 GENERAL PHARMACY W/ HCPCS (ALT 636 FOR OP/ED)

## 2024-09-04 PROCEDURE — C1778 LEAD, NEUROSTIMULATOR: HCPCS | Performed by: STUDENT IN AN ORGANIZED HEALTH CARE EDUCATION/TRAINING PROGRAM

## 2024-09-04 PROCEDURE — 3600000009 HC OR TIME - EACH INCREMENTAL 1 MINUTE - PROCEDURE LEVEL FOUR: Performed by: STUDENT IN AN ORGANIZED HEALTH CARE EDUCATION/TRAINING PROGRAM

## 2024-09-04 PROCEDURE — 3700000001 HC GENERAL ANESTHESIA TIME - INITIAL BASE CHARGE: Performed by: STUDENT IN AN ORGANIZED HEALTH CARE EDUCATION/TRAINING PROGRAM

## 2024-09-04 PROCEDURE — C1883 ADAPT/EXT, PACING/NEURO LEAD: HCPCS | Performed by: STUDENT IN AN ORGANIZED HEALTH CARE EDUCATION/TRAINING PROGRAM

## 2024-09-04 PROCEDURE — 7100000009 HC PHASE TWO TIME - INITIAL BASE CHARGE: Performed by: STUDENT IN AN ORGANIZED HEALTH CARE EDUCATION/TRAINING PROGRAM

## 2024-09-04 PROCEDURE — 2500000005 HC RX 250 GENERAL PHARMACY W/O HCPCS: Performed by: STUDENT IN AN ORGANIZED HEALTH CARE EDUCATION/TRAINING PROGRAM

## 2024-09-04 PROCEDURE — 2720000007 HC OR 272 NO HCPCS: Performed by: STUDENT IN AN ORGANIZED HEALTH CARE EDUCATION/TRAINING PROGRAM

## 2024-09-04 PROCEDURE — 2500000005 HC RX 250 GENERAL PHARMACY W/O HCPCS

## 2024-09-04 PROCEDURE — 2780000003 HC OR 278 NO HCPCS: Performed by: STUDENT IN AN ORGANIZED HEALTH CARE EDUCATION/TRAINING PROGRAM

## 2024-09-04 PROCEDURE — 7100000010 HC PHASE TWO TIME - EACH INCREMENTAL 1 MINUTE: Performed by: STUDENT IN AN ORGANIZED HEALTH CARE EDUCATION/TRAINING PROGRAM

## 2024-09-04 PROCEDURE — 2500000004 HC RX 250 GENERAL PHARMACY W/ HCPCS (ALT 636 FOR OP/ED): Performed by: STUDENT IN AN ORGANIZED HEALTH CARE EDUCATION/TRAINING PROGRAM

## 2024-09-04 PROCEDURE — 76000 FLUOROSCOPY <1 HR PHYS/QHP: CPT

## 2024-09-04 DEVICE — LEAD KIT, INTERSTIM SURESCAN MRI 4.32MM SPACING, 28CM LENGTH: Type: IMPLANTABLE DEVICE | Site: SACRUM | Status: FUNCTIONAL

## 2024-09-04 RX ORDER — ACETAMINOPHEN 325 MG/1
650 TABLET ORAL EVERY 4 HOURS PRN
Status: DISCONTINUED | OUTPATIENT
Start: 2024-09-04 | End: 2024-09-04 | Stop reason: HOSPADM

## 2024-09-04 RX ORDER — LIDOCAINE HYDROCHLORIDE 10 MG/ML
0.1 INJECTION INFILTRATION; PERINEURAL ONCE
Status: DISCONTINUED | OUTPATIENT
Start: 2024-09-04 | End: 2024-09-04 | Stop reason: HOSPADM

## 2024-09-04 RX ORDER — SODIUM CHLORIDE, SODIUM LACTATE, POTASSIUM CHLORIDE, CALCIUM CHLORIDE 600; 310; 30; 20 MG/100ML; MG/100ML; MG/100ML; MG/100ML
100 INJECTION, SOLUTION INTRAVENOUS CONTINUOUS
Status: DISCONTINUED | OUTPATIENT
Start: 2024-09-04 | End: 2024-09-04 | Stop reason: HOSPADM

## 2024-09-04 RX ORDER — IPRATROPIUM BROMIDE 0.5 MG/2.5ML
500 SOLUTION RESPIRATORY (INHALATION) ONCE
Status: DISCONTINUED | OUTPATIENT
Start: 2024-09-04 | End: 2024-09-04 | Stop reason: HOSPADM

## 2024-09-04 RX ORDER — ALBUTEROL SULFATE 0.83 MG/ML
2.5 SOLUTION RESPIRATORY (INHALATION) ONCE AS NEEDED
Status: DISCONTINUED | OUTPATIENT
Start: 2024-09-04 | End: 2024-09-04 | Stop reason: HOSPADM

## 2024-09-04 RX ORDER — FENTANYL CITRATE 50 UG/ML
INJECTION, SOLUTION INTRAMUSCULAR; INTRAVENOUS AS NEEDED
Status: DISCONTINUED | OUTPATIENT
Start: 2024-09-04 | End: 2024-09-04

## 2024-09-04 RX ORDER — CEPHALEXIN 500 MG/1
500 CAPSULE ORAL DAILY
Qty: 14 CAPSULE | Refills: 0 | Status: SHIPPED | OUTPATIENT
Start: 2024-09-04

## 2024-09-04 RX ORDER — ONDANSETRON HYDROCHLORIDE 2 MG/ML
INJECTION, SOLUTION INTRAVENOUS AS NEEDED
Status: DISCONTINUED | OUTPATIENT
Start: 2024-09-04 | End: 2024-09-04

## 2024-09-04 RX ORDER — LIDOCAINE HCL/PF 100 MG/5ML
SYRINGE (ML) INTRAVENOUS AS NEEDED
Status: DISCONTINUED | OUTPATIENT
Start: 2024-09-04 | End: 2024-09-04

## 2024-09-04 RX ORDER — ONDANSETRON HYDROCHLORIDE 2 MG/ML
4 INJECTION, SOLUTION INTRAVENOUS ONCE AS NEEDED
Status: DISCONTINUED | OUTPATIENT
Start: 2024-09-04 | End: 2024-09-04 | Stop reason: HOSPADM

## 2024-09-04 RX ORDER — PROCHLORPERAZINE EDISYLATE 5 MG/ML
5 INJECTION INTRAMUSCULAR; INTRAVENOUS ONCE AS NEEDED
Status: DISCONTINUED | OUTPATIENT
Start: 2024-09-04 | End: 2024-09-04 | Stop reason: HOSPADM

## 2024-09-04 RX ORDER — BUPIVACAINE HYDROCHLORIDE 2.5 MG/ML
INJECTION, SOLUTION INFILTRATION; PERINEURAL AS NEEDED
Status: DISCONTINUED | OUTPATIENT
Start: 2024-09-04 | End: 2024-09-04 | Stop reason: HOSPADM

## 2024-09-04 RX ORDER — PROPOFOL 10 MG/ML
INJECTION, EMULSION INTRAVENOUS CONTINUOUS PRN
Status: DISCONTINUED | OUTPATIENT
Start: 2024-09-04 | End: 2024-09-04

## 2024-09-04 RX ORDER — CEFAZOLIN SODIUM 2 G/100ML
2 INJECTION, SOLUTION INTRAVENOUS ONCE
Status: COMPLETED | OUTPATIENT
Start: 2024-09-04 | End: 2024-09-04

## 2024-09-04 RX ORDER — SODIUM CHLORIDE 0.9 G/100ML
IRRIGANT IRRIGATION AS NEEDED
Status: DISCONTINUED | OUTPATIENT
Start: 2024-09-04 | End: 2024-09-04 | Stop reason: HOSPADM

## 2024-09-04 SDOH — HEALTH STABILITY: MENTAL HEALTH: CURRENT SMOKER: 0

## 2024-09-04 ASSESSMENT — PAIN SCALES - GENERAL
PAINLEVEL_OUTOF10: 0 - NO PAIN
PAINLEVEL_OUTOF10: 0 - NO PAIN
PAIN_LEVEL: 0
PAINLEVEL_OUTOF10: 0 - NO PAIN

## 2024-09-04 ASSESSMENT — COLUMBIA-SUICIDE SEVERITY RATING SCALE - C-SSRS
6. HAVE YOU EVER DONE ANYTHING, STARTED TO DO ANYTHING, OR PREPARED TO DO ANYTHING TO END YOUR LIFE?: NO
1. IN THE PAST MONTH, HAVE YOU WISHED YOU WERE DEAD OR WISHED YOU COULD GO TO SLEEP AND NOT WAKE UP?: NO
2. HAVE YOU ACTUALLY HAD ANY THOUGHTS OF KILLING YOURSELF?: NO

## 2024-09-04 ASSESSMENT — PAIN - FUNCTIONAL ASSESSMENT
PAIN_FUNCTIONAL_ASSESSMENT: 0-10

## 2024-09-04 NOTE — ANESTHESIA POSTPROCEDURE EVALUATION
Patient: Luz Crain    Procedure Summary       Date: 09/04/24 Room / Location: [facility] OR 02 / Virtual [facility] OR    Anesthesia Start: 0741 Anesthesia Stop:     Procedure: INTERSTIM STAGE 1 INSERTION WITH C-ARM Diagnosis:       OAB (overactive bladder)      Urge incontinence of urine      (OAB (overactive bladder) [N32.81])      (Urge incontinence of urine [N39.41])    Surgeons: Stephanie Nam MD Responsible Provider: Yayo Dutta MD    Anesthesia Type: MAC ASA Status: 3            Anesthesia Type: MAC    Vitals Value Taken Time   /67 09/04/24 0841   Temp 36 °C (96.8 °F) 09/04/24 0841   Pulse 65 09/04/24 0841   Resp 18 09/04/24 0841   SpO2 96 % 09/04/24 0841       Anesthesia Post Evaluation    Patient location during evaluation: PACU  Patient participation: complete - patient participated  Level of consciousness: awake and alert  Pain score: 0  Pain management: adequate  Airway patency: patent  Cardiovascular status: acceptable, blood pressure returned to baseline and hemodynamically stable  Respiratory status: acceptable  Hydration status: acceptable  Postoperative Nausea and Vomiting: none        There were no known notable events for this encounter.

## 2024-09-04 NOTE — H&P
History Of Present Illness  Luz Crain is a 74 y.o. female presenting with OAB/UUI     Past Medical History  She has a past medical history of Combined form of senile cataract of both eyes (11/05/2015), Combined form of senile cataract of left eye (11/12/2015), Combined form of senile cataract of right eye (11/12/2015), Cystoid macular edema (12/23/2015), Cystoid macular edema following cataract surgery, left eye (12/08/2015), Depression, unspecified (08/06/2021), Essential (primary) hypertension (05/11/2022), Gastro-esophageal reflux disease without esophagitis (06/17/2020), Hyperlipidemia, unspecified (12/08/2021), Hyperlipidemia, unspecified (05/11/2022), Insomnia, unspecified, Other abnormal cytological findings on specimens from anus (09/23/2008), and Other pulmonary embolism without acute cor pulmonale (Multi) (05/12/2021).    Surgical History  She has a past surgical history that includes Other surgical history (05/02/2019); Other surgical history (05/02/2019); Other surgical history (05/02/2019); Other surgical history (05/02/2019); Other surgical history (12/08/2021); and Other surgical history (07/29/2019).     OB History  OB History   No obstetric history on file.       Sexual History  Social History     Substance and Sexual Activity   Sexual Activity Not Currently        Social History  She reports that she has never smoked. She has never used smokeless tobacco. She reports current alcohol use. She reports that she does not use drugs.    Family History  Family History   Problem Relation Name Age of Onset    Diabetes Mother      Other (Creutzfeldt-Gus disease) Mother      Diabetes Paternal Grandmother          Allergies  Latex and Tolterodine    Physical Exam  Gen: no acute distress  ENT: neck supple, mucous membranes moist  Pulm: normal respiratory effort  Abd: abdomen soft, non-tender  Neuro: alert and oriented  MSK: full ROM  Ext: no significant edema  Psych: appropriate affect     Last  "Recorded Vitals  BP (!) 199/88   Pulse 81   Temp 36.3 °C (97.3 °F) (Temporal)   Resp 18   Ht 1.626 m (5' 4\")   Wt 85.3 kg (188 lb 0.8 oz)   SpO2 96%   BMI 32.28 kg/m²          Assessment/Plan   Assessment & Plan  Urge incontinence of urine    OAB (overactive bladder)        Luz Crain is a 74 y.o. who presents with OAB/UUI, desires surgical management, planning interstim stage I procedure             Stephanie Nam MD    "

## 2024-09-04 NOTE — ANESTHESIA PREPROCEDURE EVALUATION
Patient: Luz Crain    Procedure Information       Date/Time: 09/04/24 0730    Procedure: INTERSTIM STAGE 1 INSERTION WITH C-ARM - INTERSTIM STAGE I INSERTION  METRONICS REPS NOTIFIED    Location: [facility] / [facility]    Surgeons: Stephanie Nam MD            Relevant Problems   Cardiac   (+) Acute non-ST elevation myocardial infarction (NSTEMI) (Multi)   (+) CAD (coronary artery disease)   (+) Chest pain   (+) HTN (hypertension)   (+) Hyperlipidemia   (+) Non-ST elevation (NSTEMI) myocardial infarction (Multi)   (+) Paroxysmal atrial fibrillation (Multi)      Pulmonary   (+) COPD (chronic obstructive pulmonary disease) (Multi)   (+) Pulmonary embolism (Multi)      Neuro   (+) Depression   (+) Seasonal depression (CMS-HCC)      GI   (+) GERD (gastroesophageal reflux disease)      Hematology   (+) Anemia       Clinical information reviewed:                   NPO Detail:  No data recorded     Physical Exam    Airway  Mallampati: III  TM distance: <3 FB  Neck ROM: full     Cardiovascular - normal exam  Rhythm: regular  Rate: normal     Dental - normal exam     Pulmonary   Breath sounds clear to auscultation  (+) decreased breath sounds     Abdominal            Anesthesia Plan    History of general anesthesia?: yes  History of complications of general anesthesia?: no    ASA 3     MAC     The patient is not a current smoker.    intravenous induction   Postoperative administration of opioids is intended.  Trial extubation is planned.  Anesthetic plan and risks discussed with patient.    Plan discussed with CRNA.

## 2024-09-04 NOTE — OP NOTE
INTERSTIM STAGE 1 INSERTION WITH C-ARM Operative Note     Date: 2024  OR Location: [facility]    Name: Luz Crain, : 1950, Age: 74 y.o., MRN: [id number], Sex: female    Diagnosis  Pre-op Diagnosis      * OAB (overactive bladder) [N32.81]     * Urge incontinence of urine [N39.41] Post-op Diagnosis     * OAB (overactive bladder) [N32.81]     * Urge incontinence of urine [N39.41]     Procedures  INTERSTIM STAGE 1 INSERTION WITH C-ARM  29128 - CT PRQ IMPLTJ NEUROSTIM ELTRD SACRAL NRVE W/IMAGING    CHG FLUOROSCOPY UP TO 1 HOUR PHYSICIAN/QHP TIME [55872]  Surgeons      * Stephanie Nam - Primary    Resident/Fellow/Other Assistant:  Surgeons and Role:  * No surgeons found with a matching role *    Procedure Summary  Anesthesia: Monitor Anesthesia Care  ASA: III  Anesthesia Staff: Anesthesiologist: Yayo Dutta MD  CRNA: BAUTISTA Marte-CRNA  SRNA: Radha Verdugo  Estimated Blood Loss: 10 mL  Intra-op Medications: * Intraprocedure medication information is unavailable because the case start and end events have not been set *           Anesthesia Record               Intraprocedure I/O Totals          Intake    lactated Ringer's infusion 400.00 mL    ceFAZolin (Ancef) 2 g in dextrose (iso)  mL 100.00 mL    Total Intake 500 mL       Output    Est. Blood Loss 15 mL    Total Output 15 mL       Net    Net Volume 485 mL          Specimen: No specimens collected     Staff:   Circulator: Kiera  Scrub Person: Jonathan Dockeryub Person: Adelita Maldonado Circulator: Larissa         Drains and/or Catheters: * None in log *    Tourniquet Times:         Implants:  Implants       Type Name Action Serial No.      Implant LEAD KIT, INTERSTIM SURESCAN MRI 4.32MM SPACING, 28CM LENGTH - [id number] Implanted               Findings: normal observed anatomy    Indications: Luz Crain is an 74 y.o. female who is having surgery for OAB (overactive bladder) [N32.81]  Urge incontinence of urine [N39.41].     The  patient was seen in the preoperative area. The risks, benefits, complications, treatment options, non-operative alternatives, expected recovery and outcomes were discussed with the patient. The possibilities of reaction to medication, pulmonary aspiration, injury to surrounding structures, bleeding, recurrent infection, the need for additional procedures, failure to diagnose a condition, and creating a complication requiring transfusion or operation were discussed with the patient. The patient concurred with the proposed plan, giving informed consent.  The site of surgery was properly noted/marked if necessary per policy. The patient has been actively warmed in preoperative area. Preoperative antibiotics have been ordered and given within 1 hours of incision. Venous thrombosis prophylaxis have been ordered including bilateral sequential compression devices    Procedure Details:   Preoperative diagnosis:   Overactive bladder  Urgency urinary incontinence    Postoperative diagnosis:   Same    Antibiotics: Ancef 2 gm IV    Procedures: InterStim Peripheral Nerve Stimulation Stage 1, Incision and implantation of tined quadripolar lead electrodes into Foramen S3 with fluoroscopic guidance for needle placement.    Attending: Stephanie Nam MD    Implants: Medtronic neurostimulator electrodes    INDICATIONS FOR PROCEDURE: OAB/UUI    PROCEDURE IN DETAIL: Patient was properly identified and placed in prone position per OR protocol.  MAC anesthesia was administered.  Pillows were placed under lower abdomen to flatten sacrum and under shins to allow the toes to dangle freely.  Patient was prepped and draped in usual sterile fashion. The C-arm was moved into PA position to provide fluoroscopic visualization of midline of the vertebrae, SI notches, and medial foraminal borders which were marked.  The C-arm was moved to lateral position to image the area from sacral promontory to the coccyx.  Local injection of 0.25% marcaine  was  "administered.    A foramen needle was introduced approximately 2cm above the SI notch on the patient's left side, feeling for foraminal margins until the S3 foramen was identified and penetrated.  The depth of the needle was confirmed and adjusted fluoroscopically.  Proper needle position was confirmed by patient identification of location of sensation, direct observation of the lifting of the perineum or \"bellowing\"; and observation of plantar flexion of the great toe utilizing the external test stimulator box.    The needle stylet was removed and a directional guide wire was placed and confirmed fluoroscopically.  The foramen needle was removed.  An incision was made peripherally to the directional guide wire through the fascial layer.  The dilator and introducer sheath was placed over the directional guide wire and directed into the foramen until the opaque marker of the dilator was seen on the anterior rim of the sacrum.  The dilator obturator was unlocked and removed.  The lead was then placed through the introducer sheath to the first white line.  Position was checked fluoroscopically.  The lead was then further introduced until 3 electrodes were visible below the sacrum.  Each electrode was tested for location of patient sensation, visualization of ani and plantar flexion of the great toe.  After satisfactory positioning was confirmed, under fluoroscopic guidance, the introducer sheath was retracted, deploying lead tines into the perisacral tissue.    Further incision was made into subcutaneous tissue posterior to the left iliac crest and blunt dissection was continued until the gluteal fascia was identified and hemostasis was achieved.  A tunneling tool and tube was placed from the lead subcutaneously to the incised pocket site.  The tunneling tool was removed and the lead was fed through the tube and pulled out at the pocket site.  The lead was cleansed of bodily fluids and a boot was placed over the " lead.  The lead was inserted into the temporary extension and the metal bands were aligned.  The 4 setscrews were tightened with the hex wrench.  The boot was pushed over the connection and 0 silk ties were sutured to the boot grooves on either side of the connection.    A tunnel was made subcutaneously and exited to a puncture site above the ipsilateral buttock.  The extension cable was placed through the straw and exposed.  The extension cable connector was connected to the extension cable.    The wounds were irrigated with antibiotic solution in water and closed with 3-0 Vicryl suture subcutaneously and 4-0 Monocryl subcuticular suture.  Counts were correct. Aquacel was placed over entire site to cover incision, wire and cable connection.      The patient was transferred to PACU in satisfactory condition.      I was present and scrubbed for the entire procedure and performed all critical aspects of the procedure myself.     Stephanie Nam MD    Complications:  None; patient tolerated the procedure well.    Disposition: PACU - hemodynamically stable.  Condition: stable       Attending Attestation: I performed the procedure.    Stephanie Nam  Phone Number: [phone]

## 2024-09-04 NOTE — DISCHARGE INSTRUCTIONS
UROGYNECOLOGY POST-OPERATIVE INSTRUCTIONS    GENERAL:  It is recommended that a responsible adult stay with you for 24 hours after receiving anesthesia. Do not make any important decisions, sign any important documents, or drive for about 24 hours.    FOOD:  You can eat your normal regular food.  There are no restrictions.     ACTIVITY  1. You may feel tired and weak because your body is focusing on healing and recovering from anesthesia.  2. We encourage you to walk. You will get tired quickly so take breaks when you need to. Then get up and move around again. It is important NOT to lie in bed all day. Being active throughout the recovery process is the best way to speed up your healing and avoid complications.   3. You can go outside the house.   4. You can go up and down the stairs.   5. You can shower.      PAIN:  You can use over the counter tylenol and/or ibuprofen for pain as needed  If your pain is not well controlled with these options, call the office for further guidance.     CONSTIPATION: Avoiding constipation is key after surgery!  1) Take 1 capful of Miralax daily  2) If you have not moved your bowels within 48 hours or 2 days of surgery, increase frequency of Miralax to twice a day  3) The goal is to have toothpaste consistency stools. You can decrease the amount of Miralax if you are having diarrhea.  4) If you have not had a bowel movement 5 days after surgery, call Dr. Nam's office.     WOUND CARE:  1.  If you had vaginal surgery, you will have light vaginal bleeding or discharge that will go on for 6 weeks or possibly longer.  You will need a light pad.  If you have heavy bleeding (enough to fully soak a pad in an hour or less), call Dr. Nam's office. You may also notice some clots passing particularly if you just got up from being in bed or sitting for a while - this is normal!  2. You may shower daily, no special soaps or cleansing agents.  3. Dr. Nam may ask you to insert estrogen  cream in the vagina using an applicator.  This is safe and will not hurt you in any way. Sometimes it can stir up a little bleeding, but it is generally not heavy.     CATHETER CARE (if you go home with a catheter):  1.  Simply empty the bag every 3-4 hours.  2. You can shower and let the catheter get wet, pat it dry with a towel.  3. Dr. Nam's office will call you to schedule an appointment for catheter removal. If you have not heard from them within 1-2 business days, call the office.    REASONS TO CALL US  1. If you have a fever (temperature more than 100.3 degrees). Please check your temperature prior to calling.  2. If your pain is not controlled after taking the medications as recommended above.  3. If you are vomiting and unable to hold down food or liquid.  4. If you are unable to urinate despite having an urge to do so. Similarly, if you have a catheter in place and have a strong urge to urinate but are not seeing urine draining into the bag, give us a call.  5. If you are unable to have a bowel movement despite following the recommendations listed above.    If you ever feel that something isn't right or you have concerns, please don't hesitate to call the office!    HOW TO REACH US:  [facility] patients: [phone], option #2 then option #3  [facility] patients: [phone], option #2 then option #3  [place] patients: [phone]

## 2024-09-05 ENCOUNTER — TELEPHONE (OUTPATIENT)
Dept: OBSTETRICS AND GYNECOLOGY | Facility: CLINIC | Age: 74
End: 2024-09-05
Payer: MEDICARE

## 2024-09-05 NOTE — TELEPHONE ENCOUNTER
"Call to patient for follow up from procedure with Dr Nam yesterday. Patient states that she is \"doing fantastic\" ! Has no questions or concerns.   "

## 2024-09-06 ENCOUNTER — TELEPHONE (OUTPATIENT)
Dept: OBSTETRICS AND GYNECOLOGY | Facility: CLINIC | Age: 74
End: 2024-09-06
Payer: MEDICARE

## 2024-09-06 NOTE — TELEPHONE ENCOUNTER
S/w Claudai HOLLOWAY at [facility] to inquire if a PA is needed for stage 2 interstim.   Pt has it scheduled on 9/19/24 at [place] with dr bishop.  CPT code 18874 provided.  Dx codes N39.41 & N32.81 given.  Office informed it does require an authorization.  This was verbally initiated with Claudia.    Office informed a  will be contacting the office if further clinical information is required.   At such time, they will provide their fax # to fax notes over.  Pending auth # is [id number].  Reference # for this call is [id number].

## 2024-09-13 ENCOUNTER — TELEPHONE (OUTPATIENT)
Dept: OBSTETRICS AND GYNECOLOGY | Facility: CLINIC | Age: 74
End: 2024-09-13
Payer: MEDICARE

## 2024-09-13 NOTE — TELEPHONE ENCOUNTER
Pt received faxed correspondence yesterday from [facility] stating clinical notes need to be faxed as the pending auth is still under review fro stage 2 interstim scheduled for next wed 9/18/24.  Notes were faxed to [phone].  New pending auth # is [id number].  An expedited response was requested.

## 2024-09-17 ENCOUNTER — TELEPHONE (OUTPATIENT)
Dept: OBSTETRICS AND GYNECOLOGY | Facility: CLINIC | Age: 74
End: 2024-09-17

## 2024-09-17 ENCOUNTER — APPOINTMENT (OUTPATIENT)
Dept: OBSTETRICS AND GYNECOLOGY | Facility: CLINIC | Age: 74
End: 2024-09-17
Payer: MEDICARE

## 2024-09-17 DIAGNOSIS — N32.81 OAB (OVERACTIVE BLADDER): Primary | ICD-10-CM

## 2024-09-17 DIAGNOSIS — N39.41 URGE INCONTINENCE OF URINE: ICD-10-CM

## 2024-09-17 PROCEDURE — 99442 PR PHYS/QHP TELEPHONE EVALUATION 11-20 MIN: CPT | Performed by: STUDENT IN AN ORGANIZED HEALTH CARE EDUCATION/TRAINING PROGRAM

## 2024-09-17 NOTE — H&P (VIEW-ONLY)
Telemedicine Visit - Urogynecology    A telephone visit (audio only) between the patient (at the originating site) and provider (at the distant site) was utilized to provide this telehealth service    Verbal consent was obtained from Luz Crain on this date 09/17/24 for a telehealth visit.  The patient's identity was confirmed and that she is located in [place]. Stephanie Nam MD identified herself and the patient consented to a telehealth visit today.    HISTORY OF PRESENT ILLNESS:  Luz Crain is a 74 y.o. female, here today for a virtual visit in follow up for OAB/UUI, s/p stage I SNM procedure on 9/4/24    Today she reports   Working phenomenally. Only had one bad accident in the past two weeks. The rest of the time has made it to the bathroom each time. Hasn't had to wake up in the middle of the night to go.     Absolutely has more than 50% improvement. She states it is close to 100% improved.     IMPRESSION AND PLAN:  74 y.o. with OAB/UUI, s/p stage I SNM procedure on 9/4/24.    OAB/UUI  - very happy with stage 1 SNM, nearly 100% improvement  - meets criteria for stage 2 SNM interstim procedure, implantation of permanent interstim device  - requires return to OR for removal of lead in order to avoid infection risk from sacral lead being in place with external device connected    All questions and concerns were answered and addressed.  The patient expressed understanding and agrees with the plan.     Stephanie Nam MD

## 2024-09-17 NOTE — TELEPHONE ENCOUNTER
Office received word that stage 2 interstim has been denied, even after office submitted updated clinicals and a successful voiding trail after stage1 was performed on 9/4/24.   Office was advised to fax all clinicals the EXPEDITED APPEALS  DEPT @ [phone].  Done.    Await response.

## 2024-09-17 NOTE — PREPROCEDURE INSTRUCTIONS
Current Medications   Medication Instructions    aspirin 81 mg EC tablet Hold    atorvastatin (Lipitor) 40 mg tablet Take per normal schedule    budesonide-glycopyr-formoterol (Breztri Aerosphere) 160-9-4.8 mcg/actuation HFA aerosol inhaler Use as needed    cephalexin (Keflex) 500 mg capsule Take in am with small sip of water    cholecalciferol (Vitamin D-3) 25 MCG (1000 UT) tablet Hold      coenzyme Q-10 100 mg capsule Hold    cranberry 400 mg capsule Hold    dofetilide (Tikosyn) 125 mcg capsule Take in am with small sip of water    estradiol (Estrace) 0.01 % (0.1 mg/gram) vaginal cream Use per normal schedule    ferrous sulfate, 325 mg ferrous sulfate, (Iron, ferrous sulfate,) tablet Hold    fish oil concentrate (Omega-3) 120-180 mg capsule Hold    Fluoxetine Take in am with small sip of water    furosemide (Lasix) 20 mg tablet Take in am with small sip of water    iron, carbonyl 25 mg iron tablet Hold    magnesium oxide (Mag-Ox) 400 mg (241.3 mg magnesium) tablet Hold    metoprolol succinate XL (Toprol-XL) 25 mg 24 hr tablet Take in am with small sip of water    mirabegron (Myrbetriq) 50 mg tablet extended release 24 hr 24 hr tablet Hold    mometasone-formoterol (Dulera 200) 200-5 mcg/actuation inhaler Use as needed    montelukast (Singulair) 10 mg tablet Take per regular schedule    multivitamin tablet Hold    NON FORMULARY Hold    pantoprazole (ProtoNix) 40 mg EC tablet Take in am with small sip of water    traZODone (Desyrel) 50 mg tablet Take per regular schedule    trospium (Sanctura) 20 mg tablet Hold           NPO Instructions:  Nothing to eat after midnight tonight        Additional Instructions:     Enter through main entrance of [facility], located at [address] [facility] [address]. Proceed to registration, located in the back right hand corner. You will need your ID and insurance card for registration. Please ensure you have a responsible adult to drive you home.     Take a shower the morning of or  night before your procedure. After you shower avoid lotions, powders, deodorants or anything applied to the skin. If you wear contacts or glasses, wear the glasses. If you do not have glasses, please bring a case for your contacts. You may wear hearing aids and dentures, bring a case for them or we will provide one. Make sure you wear something loose and comfortable. Keep in mind your surgery type and wear something that will accommodate incisions or bandages. Please remove all jewelry.     You may have up to 13.5 ounces of clear liquids 2 hours before*  your instructed ARRIVAL time (11:30)*  to the hospital. You may take medications discussed during phone call with a small sip of water.    For further questions Mauro LARRY can be contacted at [phone] between 7AM-3PM.

## 2024-09-17 NOTE — TELEPHONE ENCOUNTER
Birgit at [place] contacted to make her aware that the authorization is still pending for tomorrow's surgery with dr bishop.  Birgit was informed earlier today that an expedited appeal was requested and no response as yet.  Per verbal order of dr bishop: pt to proceed with surgery as planned, as it is medically neccessary to be performed, even if the auth is pending.

## 2024-09-17 NOTE — TELEPHONE ENCOUNTER
S/w carlota to inquire status of expedited appeal we faxed over this morning.   She states she does not see any notes in her system, despite the fact that the fax number was confirmed to be the correct one, AND office received a successful transmission.    Art states perhaps someone on the appeals team has it on their desk in their back office to work on.  When asked if this is still going to be reviewed, and he appeal is overturned, can it be used retroactively?   Carlota stated yes.

## 2024-09-18 ENCOUNTER — HOSPITAL ENCOUNTER (OUTPATIENT)
Facility: HOSPITAL | Age: 74
Setting detail: OUTPATIENT SURGERY
Discharge: HOME | End: 2024-09-18
Attending: STUDENT IN AN ORGANIZED HEALTH CARE EDUCATION/TRAINING PROGRAM | Admitting: STUDENT IN AN ORGANIZED HEALTH CARE EDUCATION/TRAINING PROGRAM
Payer: MEDICARE

## 2024-09-18 ENCOUNTER — ANESTHESIA (OUTPATIENT)
Dept: OPERATING ROOM | Facility: HOSPITAL | Age: 74
End: 2024-09-18
Payer: MEDICARE

## 2024-09-18 ENCOUNTER — ANESTHESIA EVENT (OUTPATIENT)
Dept: OPERATING ROOM | Facility: HOSPITAL | Age: 74
End: 2024-09-18
Payer: MEDICARE

## 2024-09-18 ENCOUNTER — HOSPITAL ENCOUNTER (OUTPATIENT)
Dept: CARDIOLOGY | Facility: HOSPITAL | Age: 74
Discharge: HOME | End: 2024-09-18
Payer: MEDICARE

## 2024-09-18 VITALS
HEIGHT: 64 IN | HEART RATE: 61 BPM | RESPIRATION RATE: 18 BRPM | DIASTOLIC BLOOD PRESSURE: 79 MMHG | SYSTOLIC BLOOD PRESSURE: 185 MMHG | OXYGEN SATURATION: 99 % | BODY MASS INDEX: 32.11 KG/M2 | TEMPERATURE: 97.3 F | WEIGHT: 188.05 LBS

## 2024-09-18 DIAGNOSIS — N39.41 URGE INCONTINENCE OF URINE: ICD-10-CM

## 2024-09-18 DIAGNOSIS — N32.81 OAB (OVERACTIVE BLADDER): Primary | ICD-10-CM

## 2024-09-18 PROCEDURE — 93005 ELECTROCARDIOGRAM TRACING: CPT

## 2024-09-18 PROCEDURE — 2500000005 HC RX 250 GENERAL PHARMACY W/O HCPCS: Performed by: STUDENT IN AN ORGANIZED HEALTH CARE EDUCATION/TRAINING PROGRAM

## 2024-09-18 PROCEDURE — 3700000001 HC GENERAL ANESTHESIA TIME - INITIAL BASE CHARGE: Performed by: STUDENT IN AN ORGANIZED HEALTH CARE EDUCATION/TRAINING PROGRAM

## 2024-09-18 PROCEDURE — 2500000004 HC RX 250 GENERAL PHARMACY W/ HCPCS (ALT 636 FOR OP/ED): Performed by: STUDENT IN AN ORGANIZED HEALTH CARE EDUCATION/TRAINING PROGRAM

## 2024-09-18 PROCEDURE — 2500000004 HC RX 250 GENERAL PHARMACY W/ HCPCS (ALT 636 FOR OP/ED): Performed by: NURSE ANESTHETIST, CERTIFIED REGISTERED

## 2024-09-18 PROCEDURE — 2500000005 HC RX 250 GENERAL PHARMACY W/O HCPCS: Performed by: NURSE ANESTHETIST, CERTIFIED REGISTERED

## 2024-09-18 PROCEDURE — A64590 PR IMPLANT PERIPH/GASTRIC NEUROSTIM/RECEIVER: Performed by: ANESTHESIOLOGY

## 2024-09-18 PROCEDURE — C1889 IMPLANT/INSERT DEVICE, NOC: HCPCS | Performed by: STUDENT IN AN ORGANIZED HEALTH CARE EDUCATION/TRAINING PROGRAM

## 2024-09-18 PROCEDURE — 2720000007 HC OR 272 NO HCPCS: Performed by: STUDENT IN AN ORGANIZED HEALTH CARE EDUCATION/TRAINING PROGRAM

## 2024-09-18 PROCEDURE — A64590 PR IMPLANT PERIPH/GASTRIC NEUROSTIM/RECEIVER: Performed by: NURSE ANESTHETIST, CERTIFIED REGISTERED

## 2024-09-18 PROCEDURE — 2500000004 HC RX 250 GENERAL PHARMACY W/ HCPCS (ALT 636 FOR OP/ED): Mod: JZ | Performed by: STUDENT IN AN ORGANIZED HEALTH CARE EDUCATION/TRAINING PROGRAM

## 2024-09-18 PROCEDURE — 3600000004 HC OR TIME - INITIAL BASE CHARGE - PROCEDURE LEVEL FOUR: Performed by: STUDENT IN AN ORGANIZED HEALTH CARE EDUCATION/TRAINING PROGRAM

## 2024-09-18 PROCEDURE — 7100000009 HC PHASE TWO TIME - INITIAL BASE CHARGE: Performed by: STUDENT IN AN ORGANIZED HEALTH CARE EDUCATION/TRAINING PROGRAM

## 2024-09-18 PROCEDURE — 64590 INS/RPL PRPH SAC/GSTR NPG/R: CPT | Performed by: STUDENT IN AN ORGANIZED HEALTH CARE EDUCATION/TRAINING PROGRAM

## 2024-09-18 PROCEDURE — 3700000002 HC GENERAL ANESTHESIA TIME - EACH INCREMENTAL 1 MINUTE: Performed by: STUDENT IN AN ORGANIZED HEALTH CARE EDUCATION/TRAINING PROGRAM

## 2024-09-18 PROCEDURE — C1787 PATIENT PROGR, NEUROSTIM: HCPCS | Performed by: STUDENT IN AN ORGANIZED HEALTH CARE EDUCATION/TRAINING PROGRAM

## 2024-09-18 PROCEDURE — 2780000003 HC OR 278 NO HCPCS: Performed by: STUDENT IN AN ORGANIZED HEALTH CARE EDUCATION/TRAINING PROGRAM

## 2024-09-18 PROCEDURE — 3600000009 HC OR TIME - EACH INCREMENTAL 1 MINUTE - PROCEDURE LEVEL FOUR: Performed by: STUDENT IN AN ORGANIZED HEALTH CARE EDUCATION/TRAINING PROGRAM

## 2024-09-18 PROCEDURE — 2500000004 HC RX 250 GENERAL PHARMACY W/ HCPCS (ALT 636 FOR OP/ED): Performed by: ANESTHESIOLOGY

## 2024-09-18 PROCEDURE — 99100 ANES PT EXTEME AGE<1 YR&>70: CPT | Performed by: ANESTHESIOLOGY

## 2024-09-18 PROCEDURE — 95972 ALYS CPLX SP/PN NPGT W/PRGRM: CPT | Performed by: STUDENT IN AN ORGANIZED HEALTH CARE EDUCATION/TRAINING PROGRAM

## 2024-09-18 PROCEDURE — 7100000010 HC PHASE TWO TIME - EACH INCREMENTAL 1 MINUTE: Performed by: STUDENT IN AN ORGANIZED HEALTH CARE EDUCATION/TRAINING PROGRAM

## 2024-09-18 DEVICE — ENVELOPE, NEURO, ABSORBABLE, MED: Type: IMPLANTABLE DEVICE | Site: SACRUM | Status: FUNCTIONAL

## 2024-09-18 RX ORDER — PROCHLORPERAZINE EDISYLATE 5 MG/ML
5 INJECTION INTRAMUSCULAR; INTRAVENOUS ONCE AS NEEDED
Status: DISCONTINUED | OUTPATIENT
Start: 2024-09-18 | End: 2024-09-18 | Stop reason: HOSPADM

## 2024-09-18 RX ORDER — SODIUM CHLORIDE, SODIUM LACTATE, POTASSIUM CHLORIDE, CALCIUM CHLORIDE 600; 310; 30; 20 MG/100ML; MG/100ML; MG/100ML; MG/100ML
100 INJECTION, SOLUTION INTRAVENOUS CONTINUOUS
Status: DISCONTINUED | OUTPATIENT
Start: 2024-09-18 | End: 2024-09-18 | Stop reason: HOSPADM

## 2024-09-18 RX ORDER — IPRATROPIUM BROMIDE 0.5 MG/2.5ML
500 SOLUTION RESPIRATORY (INHALATION) ONCE
Status: DISCONTINUED | OUTPATIENT
Start: 2024-09-18 | End: 2024-09-18 | Stop reason: HOSPADM

## 2024-09-18 RX ORDER — MEPERIDINE HYDROCHLORIDE 25 MG/ML
12.5 INJECTION INTRAMUSCULAR; INTRAVENOUS; SUBCUTANEOUS EVERY 10 MIN PRN
Status: DISCONTINUED | OUTPATIENT
Start: 2024-09-18 | End: 2024-09-18 | Stop reason: HOSPADM

## 2024-09-18 RX ORDER — PROPOFOL 10 MG/ML
INJECTION, EMULSION INTRAVENOUS AS NEEDED
Status: DISCONTINUED | OUTPATIENT
Start: 2024-09-18 | End: 2024-09-18

## 2024-09-18 RX ORDER — ONDANSETRON HYDROCHLORIDE 2 MG/ML
INJECTION, SOLUTION INTRAVENOUS AS NEEDED
Status: DISCONTINUED | OUTPATIENT
Start: 2024-09-18 | End: 2024-09-18

## 2024-09-18 RX ORDER — LIDOCAINE HYDROCHLORIDE AND EPINEPHRINE 10; 10 MG/ML; UG/ML
INJECTION, SOLUTION INFILTRATION; PERINEURAL AS NEEDED
Status: DISCONTINUED | OUTPATIENT
Start: 2024-09-18 | End: 2024-09-18 | Stop reason: HOSPADM

## 2024-09-18 RX ORDER — ONDANSETRON HYDROCHLORIDE 2 MG/ML
4 INJECTION, SOLUTION INTRAVENOUS ONCE AS NEEDED
Status: DISCONTINUED | OUTPATIENT
Start: 2024-09-18 | End: 2024-09-18 | Stop reason: HOSPADM

## 2024-09-18 RX ORDER — LIDOCAINE HYDROCHLORIDE 20 MG/ML
INJECTION, SOLUTION EPIDURAL; INFILTRATION; INTRACAUDAL; PERINEURAL AS NEEDED
Status: DISCONTINUED | OUTPATIENT
Start: 2024-09-18 | End: 2024-09-18

## 2024-09-18 RX ORDER — ACETAMINOPHEN 325 MG/1
650 TABLET ORAL EVERY 4 HOURS PRN
Status: DISCONTINUED | OUTPATIENT
Start: 2024-09-18 | End: 2024-09-18 | Stop reason: HOSPADM

## 2024-09-18 RX ORDER — CEFAZOLIN SODIUM 2 G/100ML
2 INJECTION, SOLUTION INTRAVENOUS ONCE
Status: COMPLETED | OUTPATIENT
Start: 2024-09-18 | End: 2024-09-18

## 2024-09-18 RX ORDER — ALBUTEROL SULFATE 0.83 MG/ML
2.5 SOLUTION RESPIRATORY (INHALATION) ONCE AS NEEDED
Status: DISCONTINUED | OUTPATIENT
Start: 2024-09-18 | End: 2024-09-18 | Stop reason: HOSPADM

## 2024-09-18 RX ORDER — LIDOCAINE HYDROCHLORIDE 10 MG/ML
0.1 INJECTION, SOLUTION INFILTRATION; PERINEURAL ONCE
Status: DISCONTINUED | OUTPATIENT
Start: 2024-09-18 | End: 2024-09-18 | Stop reason: HOSPADM

## 2024-09-18 SDOH — HEALTH STABILITY: MENTAL HEALTH: CURRENT SMOKER: 0

## 2024-09-18 ASSESSMENT — PAIN SCALES - GENERAL
PAINLEVEL_OUTOF10: 0 - NO PAIN
PAINLEVEL_OUTOF10: 4
PAINLEVEL_OUTOF10: 3
PAINLEVEL_OUTOF10: 4
PAINLEVEL_OUTOF10: 3
PAINLEVEL_OUTOF10: 4
PAINLEVEL_OUTOF10: 4
PAINLEVEL_OUTOF10: 1
PAIN_LEVEL: 0
PAINLEVEL_OUTOF10: 4

## 2024-09-18 ASSESSMENT — PAIN - FUNCTIONAL ASSESSMENT
PAIN_FUNCTIONAL_ASSESSMENT: 0-10

## 2024-09-18 ASSESSMENT — COLUMBIA-SUICIDE SEVERITY RATING SCALE - C-SSRS
6. HAVE YOU EVER DONE ANYTHING, STARTED TO DO ANYTHING, OR PREPARED TO DO ANYTHING TO END YOUR LIFE?: NO
2. HAVE YOU ACTUALLY HAD ANY THOUGHTS OF KILLING YOURSELF?: NO
1. IN THE PAST MONTH, HAVE YOU WISHED YOU WERE DEAD OR WISHED YOU COULD GO TO SLEEP AND NOT WAKE UP?: NO
1. IN THE PAST MONTH, HAVE YOU WISHED YOU WERE DEAD OR WISHED YOU COULD GO TO SLEEP AND NOT WAKE UP?: NO
2. HAVE YOU ACTUALLY HAD ANY THOUGHTS OF KILLING YOURSELF?: NO
6. HAVE YOU EVER DONE ANYTHING, STARTED TO DO ANYTHING, OR PREPARED TO DO ANYTHING TO END YOUR LIFE?: NO

## 2024-09-18 ASSESSMENT — PAIN DESCRIPTION - DESCRIPTORS
DESCRIPTORS: ACHING
DESCRIPTORS: TENDER

## 2024-09-18 ASSESSMENT — PAIN DESCRIPTION - ORIENTATION: ORIENTATION: LEFT

## 2024-09-18 NOTE — ANESTHESIA PREPROCEDURE EVALUATION
Patient: Luz Crain    Procedure Information       Date/Time: 09/18/24 1300    Procedure: INTERSTIM STAGE 2 - INTERSTIM STAGE II  [employer] REPS NOTIFIED  THIS IS A 1 PM START TIME    Location: [facility] 12 / [facility]    Surgeons: Stephanie Nam MD            Relevant Problems   Cardiac   (+) Acute non-ST elevation myocardial infarction (NSTEMI) (Multi)   (+) CAD (coronary artery disease)   (+) Chest pain   (+) HTN (hypertension)   (+) Hyperlipidemia   (+) Non-ST elevation (NSTEMI) myocardial infarction (Multi)   (+) Pacemaker   (+) Paroxysmal atrial fibrillation (Multi)      Pulmonary   (+) COPD (chronic obstructive pulmonary disease) (Multi)   (+) Pulmonary embolism (Multi)      Neuro   (+) Depression   (+) Seasonal depression (CMS-[facility])      GI   (+) GERD (gastroesophageal reflux disease)      Hematology   (+) Anemia   (+) Iron deficiency anemia, unspecified       Clinical information reviewed:    Allergies   Problems              NPO Detail:  No data recorded     Physical Exam    Airway  Mallampati: II  TM distance: <3 FB     Cardiovascular   Rhythm: regular  Rate: normal     Dental - normal exam     Pulmonary   Breath sounds clear to auscultation     Abdominal          Anesthesia Plan    History of general anesthesia?: yes  History of complications of general anesthesia?: no    ASA 3     MAC     The patient is not a current smoker.    intravenous induction   Postoperative administration of opioids is intended.  Anesthetic plan and risks discussed with patient.    Plan discussed with CRNA.

## 2024-09-18 NOTE — NURSING NOTE
1347: Patient new admit to PACU, arrived with Surgical Team and Anesthesia. Per Doctor Viv, patient does not require to void prior to discharge from hospital. Patient awake/alert. VSS. States pain is tolerable. Denies nausea.    1349: Charmaine with Device Management at bedside providing teaching instruction to patient regarding Medtronic Device. Blue Box labeled Medtr[employer] left with patients belongings.

## 2024-09-18 NOTE — OP NOTE
INTERSTIM STAGE 2 Operative Note     Date: 2024  OR Location: [facility]    Name: Luz Crain, : 1950, Age: 74 y.o., MRN: [id number], Sex: female    Diagnosis  Pre-op Diagnosis      * OAB (overactive bladder) [N32.81]     * Urge incontinence of urine [N39.41] Post-op Diagnosis     * OAB (overactive bladder) [N32.81]     * Urge incontinence of urine [N39.41]     Procedures  INTERSTIM STAGE 2  27115 - IA INS/RPLC PERPH SAC/GSTRC NPG/RCVR PCKT CRTJ&CONN      Surgeons      * Stephanie Nam - Primary    Resident/Fellow/Other Assistant:  Surgeons and Role:  * No surgeons found with a matching role *    Procedure Summary  Anesthesia: Monitor Anesthesia Care  ASA: III  Anesthesia Staff: Anesthesiologist: Yayo Dutta MD  CRNA: BAUTISTA Villa-NADYA COELHO  Estimated Blood Loss: 5 mL  Intra-op Medications: * Intraprocedure medication information is unavailable because the case start and end events have not been set *           Anesthesia Record               Intraprocedure I/O Totals          Intake    lactated Ringer's infusion 600.00 mL    ceFAZolin (Ancef) 2 g in dextrose (iso)  mL 100.00 mL    Total Intake 700 mL          Specimen: No specimens collected     Staff:   Circulator: Cathleen Duran Person: Luis         Drains and/or Catheters: * None in log *    Tourniquet Times:         Implants:  Implants       Type Name Action Serial No.      Neuro Interventional Implant ENVELOPE, NEURO, ABSORBABLE, MED - [id number] Implanted               Findings: normal observed anatomy    Indications: Luz Crain is an 74 y.o. female who is having surgery for OAB (overactive bladder) [N32.81]  Urge incontinence of urine [N39.41].     The patient was seen in the preoperative area. The risks, benefits, complications, treatment options, non-operative alternatives, expected recovery and outcomes were discussed with the patient. The possibilities of reaction to medication, pulmonary aspiration, injury to  surrounding structures, bleeding, recurrent infection, the need for additional procedures, failure to diagnose a condition, and creating a complication requiring transfusion or operation were discussed with the patient. The patient concurred with the proposed plan, giving informed consent.  The site of surgery was properly noted/marked if necessary per policy. The patient has been actively warmed in preoperative area. Preoperative antibiotics have been ordered and given within 1 hours of incision. Venous thrombosis prophylaxis have been ordered including bilateral sequential compression devices    Procedure Details:   Preoperative diagnosis: overactive bladder/urge incontinence    Postoperative diagnosis: overactive bladder/urge incontinence    Anesthesia: MAC     Antibiotics: Ancef    Procedures: InterStim Peripheral Nerve Stimulation Stage 2; Incision and subcutaneous implantation of sacral nerve neurostimulator with electronic analysis and complex programming.    Attending: Stephanie Nam MD      Implants: InterStim II Neurostimulator    INDICATIONS FOR PROCEDURE: successful stage 1 interstim trial, overactive bladder/Urge incontinence    ??PROCEDURE IN DETAIL: Patient was properly identified and placed in prone position per OR protocol.  MAC anesthesia was administered.  Pillows were placed under lower abdomen to flatten sacrum and under shins to allow the toes to dangle freely.  Patient was prepped and draped in usual sterile fashion.  Local injection of lidocaine with epi was administered.    The previous incision was opened using sharp and blunt dissection and the neurostimulator test connection was identified and elevated.  The sutures holding the protective boot were cut and the protective boot was retracted.  The setscrews were exposed and loosened with a hex wrench.  The boot was removed and discarded.  The percutaneous extension was cut and removed from the field.    The subcutaneous pocket anterior to  the muscle surface was enlarged and hemostasis was established.  The lead was cleansed of body fluid.  The lead was inserted into the header of the InterStim II Neurostimulator until the white tip was visualized at the distal window.  The single set screw was tightened.     The neurostimulator pulse generator was placed into the subcutaneous pocket with the etched identification side placed upwards and the extension wrapped counterclockwise around the pulse generator.  The programming head was placed over the implanted neurostimulator.  The impedance was verified to insure adequate lead placement and if parameters were within normal limits.  Impedances were checked and confirmed to be within normal limits.  After implantation of the neurostimulator was completed, then programming of the neurostimulator was performed based on impedance values.  Estimated time for analysis and complex programming was 10 minutes.  Complex programming was conducted.    The wounds were irrigated with sterile water and closed with 3-0 Vicryl suture subcutaneously and 4-0 Monocryl subcuticular suture.  Counts were correct. Skin glue was placed over the wounds.     The patient was transferred to PACU in satisfactory condition.      I was present for the entire procedure and performed all critical aspects of the procedure myself.     Stephanie Nam MD    Complications:  None; patient tolerated the procedure well.    Disposition: PACU - hemodynamically stable.  Condition: stable         Additional Details: none    Attending Attestation: I performed the procedure.    Stephanie Nam  Phone Number: [phone]

## 2024-09-18 NOTE — SIGNIFICANT EVENT
Discharge instruction provided and reviewed to patient. Patient denies questions related to material reviewed. [employer] device box provided to patient. Per patient, she denies questions related to device.

## 2024-09-18 NOTE — ANESTHESIA POSTPROCEDURE EVALUATION
Patient: Luz Crain    Procedure Summary       Date: 09/18/24 Room / Location: [facility] OR 12 / [facility]    Anesthesia Start: 1246 Anesthesia Stop: 1350    Procedure: INTERSTIM STAGE 2 Diagnosis:       OAB (overactive bladder)      Urge incontinence of urine      (OAB (overactive bladder) [N32.81])      (Urge incontinence of urine [N39.41])    Surgeons: Stephanie Nam MD Responsible Provider: Yayo Dutta MD    Anesthesia Type: MAC ASA Status: 3            Anesthesia Type: MAC    Vitals Value Taken Time   /71 09/18/24 1403   Temp 37.3 °C (99.1 °F) 09/18/24 1352   Pulse 65 09/18/24 1407   Resp 18 09/18/24 1400   SpO2 91 % 09/18/24 1407   Vitals shown include unfiled device data.    Anesthesia Post Evaluation    Patient location during evaluation: PACU  Patient participation: complete - patient participated  Level of consciousness: awake  Pain score: 0  Pain management: adequate  Airway patency: patent  Cardiovascular status: acceptable and hemodynamically stable  Respiratory status: face mask  Hydration status: acceptable  Postoperative Nausea and Vomiting: none        There were no known notable events for this encounter.

## 2024-09-18 NOTE — TELEPHONE ENCOUNTER
Late entry:  office received text communication from dr bishop last evening stating she had heard back from [facility] Admin that the surgery has been approved for this pt.

## 2024-09-18 NOTE — DISCHARGE INSTRUCTIONS
UROGYNECOLOGY POST-OPERATIVE INSTRUCTIONS    GENERAL:  It is recommended that a responsible adult stay with you for 24 hours after receiving anesthesia. Do not make any important decisions, sign any important documents, or drive for about 24 hours.    FOOD:  You can eat your normal regular food.  There are no restrictions.     ACTIVITY  1. You may feel tired and weak because your body is focusing on healing and recovering from anesthesia.  2. We encourage you to walk. You will get tired quickly so take breaks when you need to. Then get up and move around again. It is important NOT to lie in bed all day. Being active throughout the recovery process is the best way to speed up your healing and avoid complications.   3. You can go outside the house.   4. You can go up and down the stairs.   5. You can shower.      PAIN:  You can use over the counter tylenol and/or ibuprofen for pain as needed  If your pain is not well controlled with these options, call the office for further guidance.     CONSTIPATION: Avoiding constipation is key after surgery!  1) Take 1 capful of Miralax daily  2) If you have not moved your bowels within 48 hours or 2 days of surgery, increase frequency of Miralax to twice a day  3) The goal is to have toothpaste consistency stools. You can decrease the amount of Miralax if you are having diarrhea.  4) If you have not had a bowel movement 5 days after surgery, call Dr. Nam's office.     WOUND CARE:  1.  If you had vaginal surgery, you will have light vaginal bleeding or discharge that will go on for 6 weeks or possibly longer.  You will need a light pad.  If you have heavy bleeding (enough to fully soak a pad in an hour or less), call Dr. Nam's office. You may also notice some clots passing particularly if you just got up from being in bed or sitting for a while - this is normal!  2. You may shower daily, no special soaps or cleansing agents.  3. Dr. Nam may ask you to insert estrogen  cream in the vagina using an applicator.  This is safe and will not hurt you in any way. Sometimes it can stir up a little bleeding, but it is generally not heavy.     CATHETER CARE (if you go home with a catheter):  1.  Simply empty the bag every 3-4 hours.  2. You can shower and let the catheter get wet, pat it dry with a towel.  3. Dr. Nam's office will call you to schedule an appointment for catheter removal. If you have not heard from them within 1-2 business days, call the office.    REASONS TO CALL US  1. If you have a fever (temperature more than 100.3 degrees). Please check your temperature prior to calling.  2. If your pain is not controlled after taking the medications as recommended above.  3. If you are vomiting and unable to hold down food or liquid.  4. If you are unable to urinate despite having an urge to do so. Similarly, if you have a catheter in place and have a strong urge to urinate but are not seeing urine draining into the bag, give us a call.  5. If you are unable to have a bowel movement despite following the recommendations listed above.    If you ever feel that something isn't right or you have concerns, please don't hesitate to call the office!    HOW TO REACH US:  [place] patients: [phone], option #2 then option #3  [place] patients: [phone], option #2 then option #3  [place] patients: [phone]

## 2024-09-19 ENCOUNTER — TELEPHONE (OUTPATIENT)
Dept: OBSTETRICS AND GYNECOLOGY | Facility: CLINIC | Age: 74
End: 2024-09-19
Payer: MEDICARE

## 2024-09-19 NOTE — TELEPHONE ENCOUNTER
Received call from Avani, in the appeal dept at [facility].   Office was informed the decision was overturned for CPT code 46433.   Stage 2 interstim is approved.     Auth # [id number] was provided.

## 2024-09-20 ENCOUNTER — TELEPHONE (OUTPATIENT)
Dept: OBSTETRICS AND GYNECOLOGY | Facility: CLINIC | Age: 74
End: 2024-09-20
Payer: MEDICARE

## 2024-09-20 LAB
ATRIAL RATE: 60 BPM
P AXIS: 67 DEGREES
P OFFSET: 201 MS
P ONSET: 141 MS
PR INTERVAL: 220 MS
Q ONSET: 226 MS
QRS COUNT: 10 BEATS
QRS DURATION: 132 MS
QT INTERVAL: 498 MS
QTC CALCULATION(BAZETT): 498 MS
QTC FREDERICIA: 498 MS
R AXIS: -17 DEGREES
T AXIS: -20 DEGREES
T OFFSET: 475 MS
VENTRICULAR RATE: 60 BPM

## 2024-09-20 NOTE — TELEPHONE ENCOUNTER
"Call to patient for follow up from Interstim procedure on 9/18. She states that she is \"doing fine\". She is happy with the fact that she has had \"no accidents\". I asked her if she has the information to contact the reps for [employer], if needed and she said yes. Told to call me for any recovery concerns.   "

## 2024-09-24 DIAGNOSIS — R60.0 EDEMA OF BOTH FEET: ICD-10-CM

## 2024-09-24 DIAGNOSIS — M79.89 BILATERAL SWELLING OF FEET: ICD-10-CM

## 2024-09-24 DIAGNOSIS — I10 PRIMARY HYPERTENSION: ICD-10-CM

## 2024-09-25 RX ORDER — FUROSEMIDE 20 MG/1
20 TABLET ORAL 2 TIMES DAILY
Qty: 180 TABLET | Refills: 3 | Status: SHIPPED | OUTPATIENT
Start: 2024-09-25

## 2024-10-29 ENCOUNTER — APPOINTMENT (OUTPATIENT)
Dept: OBSTETRICS AND GYNECOLOGY | Facility: CLINIC | Age: 74
End: 2024-10-29
Payer: MEDICARE

## 2024-11-04 ENCOUNTER — APPOINTMENT (OUTPATIENT)
Dept: OBSTETRICS AND GYNECOLOGY | Facility: CLINIC | Age: 74
End: 2024-11-04
Payer: MEDICARE

## 2024-11-04 VITALS
SYSTOLIC BLOOD PRESSURE: 134 MMHG | WEIGHT: 189 LBS | BODY MASS INDEX: 32.27 KG/M2 | DIASTOLIC BLOOD PRESSURE: 70 MMHG | HEIGHT: 64 IN

## 2024-11-04 DIAGNOSIS — N32.81 OAB (OVERACTIVE BLADDER): Primary | ICD-10-CM

## 2024-11-04 PROCEDURE — 3075F SYST BP GE 130 - 139MM HG: CPT | Performed by: STUDENT IN AN ORGANIZED HEALTH CARE EDUCATION/TRAINING PROGRAM

## 2024-11-04 PROCEDURE — 3078F DIAST BP <80 MM HG: CPT | Performed by: STUDENT IN AN ORGANIZED HEALTH CARE EDUCATION/TRAINING PROGRAM

## 2024-11-04 PROCEDURE — 1159F MED LIST DOCD IN RCRD: CPT | Performed by: STUDENT IN AN ORGANIZED HEALTH CARE EDUCATION/TRAINING PROGRAM

## 2024-11-04 PROCEDURE — 1126F AMNT PAIN NOTED NONE PRSNT: CPT | Performed by: STUDENT IN AN ORGANIZED HEALTH CARE EDUCATION/TRAINING PROGRAM

## 2024-11-04 PROCEDURE — 1036F TOBACCO NON-USER: CPT | Performed by: STUDENT IN AN ORGANIZED HEALTH CARE EDUCATION/TRAINING PROGRAM

## 2024-11-04 PROCEDURE — 99024 POSTOP FOLLOW-UP VISIT: CPT | Performed by: STUDENT IN AN ORGANIZED HEALTH CARE EDUCATION/TRAINING PROGRAM

## 2024-11-04 ASSESSMENT — PAIN SCALES - GENERAL: PAINLEVEL_OUTOF10: 0-NO PAIN

## 2025-01-10 DIAGNOSIS — F33.8 SEASONAL DEPRESSION (CMS-HCC): ICD-10-CM

## 2025-01-10 DIAGNOSIS — K21.9 GASTROESOPHAGEAL REFLUX DISEASE, UNSPECIFIED WHETHER ESOPHAGITIS PRESENT: ICD-10-CM

## 2025-01-10 NOTE — TELEPHONE ENCOUNTER
[facility] - [place], [place] - [address]   Pt needs refills on  pantoprazole (ProtoNix) 40 mg EC tablet   FLUoxetine (PROzac) 60 mg tablet

## 2025-01-14 RX ORDER — FLUOXETINE HYDROCHLORIDE 60 MG/1
60 TABLET, FILM COATED ORAL; ORAL DAILY
Qty: 90 TABLET | Refills: 3 | Status: SHIPPED | OUTPATIENT
Start: 2025-01-14 | End: 2025-01-15 | Stop reason: SDUPTHER

## 2025-01-14 RX ORDER — PANTOPRAZOLE SODIUM 40 MG/1
40 TABLET, DELAYED RELEASE ORAL DAILY
Qty: 90 TABLET | Refills: 3 | Status: SHIPPED | OUTPATIENT
Start: 2025-01-14 | End: 2025-01-15 | Stop reason: SDUPTHER

## 2025-01-15 DIAGNOSIS — F33.8 SEASONAL DEPRESSION (CMS-HCC): ICD-10-CM

## 2025-01-15 DIAGNOSIS — K21.9 GASTROESOPHAGEAL REFLUX DISEASE, UNSPECIFIED WHETHER ESOPHAGITIS PRESENT: ICD-10-CM

## 2025-01-15 RX ORDER — PANTOPRAZOLE SODIUM 40 MG/1
40 TABLET, DELAYED RELEASE ORAL DAILY
Qty: 90 TABLET | Refills: 3 | Status: SHIPPED | OUTPATIENT
Start: 2025-01-15

## 2025-01-15 RX ORDER — FLUOXETINE HYDROCHLORIDE 60 MG/1
60 TABLET, FILM COATED ORAL; ORAL DAILY
Qty: 90 TABLET | Refills: 3 | Status: SHIPPED | OUTPATIENT
Start: 2025-01-15

## 2025-01-15 NOTE — TELEPHONE ENCOUNTER
Dr Beck pt    Pt phoned office and stated the meds below were sent to the wrong pharm. Please resend asap      pantoprazole (ProtoNix) 40 mg EC tablet   FLUoxetine (PROzac) 60 mg tablet     [facility]

## 2025-03-24 ENCOUNTER — CLINICAL SUPPORT (OUTPATIENT)
Dept: PRIMARY CARE | Facility: CLINIC | Age: 75
End: 2025-03-24
Payer: MEDICARE

## 2025-03-24 VITALS — DIASTOLIC BLOOD PRESSURE: 86 MMHG | SYSTOLIC BLOOD PRESSURE: 150 MMHG

## 2025-03-24 DIAGNOSIS — I10 PRIMARY HYPERTENSION: ICD-10-CM

## 2025-03-24 RX ORDER — METOPROLOL SUCCINATE 50 MG/1
50 TABLET, EXTENDED RELEASE ORAL DAILY
Qty: 90 TABLET | Refills: 3 | Status: SHIPPED | OUTPATIENT
Start: 2025-03-24

## 2025-03-24 NOTE — PROGRESS NOTES
Subjective   Patient ID: Luz Crain is a 74 y.o. female who presents for Hypertension.    HPI   BP READING     The patient states her blood pressure has been averaging to 199/88 . Patient on 25 mg of metoprolol . States her blood pressure has been high for a couple days due to a situation that upset her with her son.   Review of Systems    Objective   /86 (BP Location: Right arm, Patient Position: Sitting, BP Cuff Size: Large adult)     Physical Exam    Assessment/Plan

## 2025-04-02 ENCOUNTER — APPOINTMENT (OUTPATIENT)
Dept: PRIMARY CARE | Facility: CLINIC | Age: 75
End: 2025-04-02
Payer: MEDICARE

## 2025-04-02 VITALS
OXYGEN SATURATION: 94 % | WEIGHT: 188.4 LBS | BODY MASS INDEX: 32.17 KG/M2 | TEMPERATURE: 98.2 F | HEIGHT: 64 IN | HEART RATE: 67 BPM | SYSTOLIC BLOOD PRESSURE: 136 MMHG | RESPIRATION RATE: 16 BRPM | DIASTOLIC BLOOD PRESSURE: 92 MMHG

## 2025-04-02 DIAGNOSIS — I26.99 PULMONARY EMBOLISM, UNSPECIFIED CHRONICITY, UNSPECIFIED PULMONARY EMBOLISM TYPE, UNSPECIFIED WHETHER ACUTE COR PULMONALE PRESENT (MULTI): ICD-10-CM

## 2025-04-02 DIAGNOSIS — E78.5 DYSLIPIDEMIA: ICD-10-CM

## 2025-04-02 DIAGNOSIS — I10 PRIMARY HYPERTENSION: Primary | ICD-10-CM

## 2025-04-02 DIAGNOSIS — R73.9 HYPERGLYCEMIA: ICD-10-CM

## 2025-04-02 DIAGNOSIS — N18.32 CHRONIC KIDNEY DISEASE, STAGE 3B (MULTI): ICD-10-CM

## 2025-04-02 DIAGNOSIS — H35.3211 EXUDATIVE AGE-RELATED MACULAR DEGENERATION, RIGHT EYE, WITH ACTIVE CHOROIDAL NEOVASCULARIZATION: ICD-10-CM

## 2025-04-02 DIAGNOSIS — E55.9 VITAMIN D DEFICIENCY: ICD-10-CM

## 2025-04-02 DIAGNOSIS — R53.83 FATIGUE, UNSPECIFIED TYPE: ICD-10-CM

## 2025-04-02 DIAGNOSIS — J44.9 CHRONIC OBSTRUCTIVE PULMONARY DISEASE, UNSPECIFIED COPD TYPE (MULTI): ICD-10-CM

## 2025-04-02 PROCEDURE — 1159F MED LIST DOCD IN RCRD: CPT | Performed by: FAMILY MEDICINE

## 2025-04-02 PROCEDURE — 1158F ADVNC CARE PLAN TLK DOCD: CPT | Performed by: FAMILY MEDICINE

## 2025-04-02 PROCEDURE — 3080F DIAST BP >= 90 MM HG: CPT | Performed by: FAMILY MEDICINE

## 2025-04-02 PROCEDURE — G0439 PPPS, SUBSEQ VISIT: HCPCS | Performed by: FAMILY MEDICINE

## 2025-04-02 PROCEDURE — 1123F ACP DISCUSS/DSCN MKR DOCD: CPT | Performed by: FAMILY MEDICINE

## 2025-04-02 PROCEDURE — 1170F FXNL STATUS ASSESSED: CPT | Performed by: FAMILY MEDICINE

## 2025-04-02 PROCEDURE — 3075F SYST BP GE 130 - 139MM HG: CPT | Performed by: FAMILY MEDICINE

## 2025-04-02 ASSESSMENT — ENCOUNTER SYMPTOMS
POLYPHAGIA: 0
MYALGIAS: 0
HEADACHES: 0
AGITATION: 0
TROUBLE SWALLOWING: 0
SHORTNESS OF BREATH: 0
SPEECH DIFFICULTY: 0
EYE PAIN: 0
HEMATURIA: 0
DECREASED CONCENTRATION: 0
COLOR CHANGE: 0
NERVOUS/ANXIOUS: 0
ACTIVITY CHANGE: 0
RHINORRHEA: 0
FEVER: 0
CONSTITUTIONAL NEGATIVE: 1
DYSPHORIC MOOD: 0
ABDOMINAL DISTENTION: 0
CHEST TIGHTNESS: 0
COUGH: 0
DYSURIA: 0
DEPRESSION: 1
NECK STIFFNESS: 0
POLYDIPSIA: 0
PHOTOPHOBIA: 0
APPETITE CHANGE: 0
SINUS PAIN: 0
ARTHRALGIAS: 0
STRIDOR: 0
ADENOPATHY: 0
FATIGUE: 0
ABDOMINAL PAIN: 0
LOSS OF SENSATION IN FEET: 0
SORE THROAT: 0
OCCASIONAL FEELINGS OF UNSTEADINESS: 0
FLANK PAIN: 0
DIARRHEA: 0
CONSTIPATION: 0
SEIZURES: 0
RECTAL PAIN: 0
SINUS PRESSURE: 0
DIZZINESS: 0
SLEEP DISTURBANCE: 0
BLOOD IN STOOL: 0
PALPITATIONS: 0
CONFUSION: 0

## 2025-04-02 ASSESSMENT — PATIENT HEALTH QUESTIONNAIRE - PHQ9
10. IF YOU CHECKED OFF ANY PROBLEMS, HOW DIFFICULT HAVE THESE PROBLEMS MADE IT FOR YOU TO DO YOUR WORK, TAKE CARE OF THINGS AT HOME, OR GET ALONG WITH OTHER PEOPLE: NOT DIFFICULT AT ALL
SUM OF ALL RESPONSES TO PHQ9 QUESTIONS 1 AND 2: 2
2. FEELING DOWN, DEPRESSED OR HOPELESS: SEVERAL DAYS
1. LITTLE INTEREST OR PLEASURE IN DOING THINGS: SEVERAL DAYS

## 2025-04-02 ASSESSMENT — ACTIVITIES OF DAILY LIVING (ADL)
DRESSING: INDEPENDENT
MANAGING_FINANCES: INDEPENDENT
TAKING_MEDICATION: INDEPENDENT
BATHING: INDEPENDENT
GROCERY_SHOPPING: INDEPENDENT
DOING_HOUSEWORK: INDEPENDENT

## 2025-04-02 NOTE — ASSESSMENT & PLAN NOTE
Orders:    Thyroid Stimulating Hormone; Future    Thyroxine, Total; Future    Thyroxine, Free; Future

## 2025-04-02 NOTE — ASSESSMENT & PLAN NOTE
Orders:    rivaroxaban (Xarelto) 20 mg tablet; Take 1 tablet (20 mg) by mouth once daily. Last dose 9/14/24

## 2025-04-02 NOTE — PROGRESS NOTES
Subjective   Reason for Visit: Luz Crain is an 74 y.o. female here for a Medicare Wellness visit.     Past Medical, Surgical, and Family History reviewed and updated in chart.    Reviewed all medications by prescribing practitioner or clinical pharmacist (such as prescriptions, OTCs, herbal therapies and supplements) and documented in the medical record.    HPI  Patient is at the office today  for AWV.    Patient reports that for the last 10 days she is getting elevated BP readings.Patient reports she was at the dentist 1 week ago and her BP reading was 199/98. Patient states BP is slowly coming down.    Patient reports she is compliant with medication prescribed to manage HTN.    Patient's last eye procedure was 4/1/25  Patient Care Team:  Jesse Beck DO as PCP - General     Review of Systems   Constitutional: Negative.  Negative for activity change, appetite change, fatigue and fever.   HENT:  Negative for congestion, dental problem, ear discharge, ear pain, mouth sores, rhinorrhea, sinus pressure, sinus pain, sore throat, tinnitus and trouble swallowing.    Eyes:  Negative for photophobia, pain and visual disturbance.   Respiratory:  Negative for cough, chest tightness, shortness of breath and stridor.    Cardiovascular:  Negative for chest pain and palpitations.   Gastrointestinal:  Negative for abdominal distention, abdominal pain, blood in stool, constipation, diarrhea and rectal pain.   Endocrine: Negative for cold intolerance, heat intolerance, polydipsia, polyphagia and polyuria.   Genitourinary:  Negative for dysuria, flank pain, hematuria and urgency.   Musculoskeletal:  Negative for arthralgias, gait problem, myalgias and neck stiffness.   Skin:  Negative for color change and rash.   Allergic/Immunologic: Negative for environmental allergies and food allergies.   Neurological:  Negative for dizziness, seizures, syncope, speech difficulty and headaches.   Hematological:  Negative for  "adenopathy.   Psychiatric/Behavioral:  Negative for agitation, confusion, decreased concentration, dysphoric mood and sleep disturbance. The patient is not nervous/anxious.        Objective   Vitals:  BP (!) 136/92 (BP Location: Left arm, Patient Position: Sitting, BP Cuff Size: Adult)   Pulse 67   Temp 36.8 °C (98.2 °F) (Temporal)   Resp 16   Ht 1.626 m (5' 4\")   Wt 85.5 kg (188 lb 6.4 oz)   SpO2 94%   BMI 32.34 kg/m²       Physical Exam  Vitals reviewed.   Constitutional:       General: She is not in acute distress.     Appearance: Normal appearance. She is normal weight. She is not ill-appearing or diaphoretic.   HENT:      Head: Normocephalic.      Right Ear: Tympanic membrane and external ear normal.      Left Ear: Tympanic membrane and external ear normal.      Nose: Nose normal. No congestion.      Mouth/Throat:      Pharynx: No posterior oropharyngeal erythema.   Eyes:      General:         Right eye: No discharge.         Left eye: No discharge.      Extraocular Movements: Extraocular movements intact.      Conjunctiva/sclera: Conjunctivae normal.      Pupils: Pupils are equal, round, and reactive to light.   Cardiovascular:      Rate and Rhythm: Normal rate and regular rhythm.      Pulses: Normal pulses.      Heart sounds: Normal heart sounds. No murmur heard.  Pulmonary:      Effort: Pulmonary effort is normal. No respiratory distress.      Breath sounds: Normal breath sounds. No wheezing or rales.   Chest:      Chest wall: No tenderness.   Abdominal:      General: Abdomen is flat. Bowel sounds are normal. There is no distension.      Palpations: There is no mass.      Tenderness: There is no abdominal tenderness. There is no guarding.   Musculoskeletal:         General: No tenderness. Normal range of motion.      Cervical back: Normal range of motion and neck supple. No tenderness.      Right lower leg: No edema.      Left lower leg: No edema.   Skin:     General: Skin is dry.      Coloration: " Skin is not jaundiced.      Findings: No bruising, erythema or rash.   Neurological:      General: No focal deficit present.      Mental Status: She is alert and oriented to person, place, and time. Mental status is at baseline.      Cranial Nerves: No cranial nerve deficit.      Sensory: No sensory deficit.      Coordination: Coordination normal.      Gait: Gait normal.   Psychiatric:         Mood and Affect: Mood normal.         Thought Content: Thought content normal.         Judgment: Judgment normal.         Assessment & Plan  Primary hypertension         Exudative age-related macular degeneration, right eye, with active choroidal neovascularization         Chronic obstructive pulmonary disease, unspecified COPD type (Multi)         Chronic kidney disease, stage 3b (Multi)         Fatigue, unspecified type    Orders:    Thyroid Stimulating Hormone; Future    Thyroxine, Total; Future    Thyroxine, Free; Future    Dyslipidemia    Orders:    Comprehensive Metabolic Panel; Future    Lipid Panel; Future    CBC and Auto Differential; Future    Vitamin D deficiency    Orders:    Vitamin D 25-Hydroxy,Total (for eval of Vitamin D levels); Future    Hyperglycemia    Orders:    Hemoglobin A1C; Future    Pulmonary embolism, unspecified chronicity, unspecified pulmonary embolism type, unspecified whether acute cor pulmonale present (Multi)    Orders:    rivaroxaban (Xarelto) 20 mg tablet; Take 1 tablet (20 mg) by mouth once daily. Last dose 9/14/24                 Scribe Attestation  By signing my name below, I, EVERETT Kaur , Maude   attest that this documentation has been prepared under the direction and in the presence of Jesse Beck DO.     All medical record entries made by the Scribe were at my direction and personally dictated by me. I have reviewed the chart and agree that the record accurately reflects my personal performance of the history, physical exam, discussion and plan.

## 2025-04-03 LAB
25(OH)D3+25(OH)D2 SERPL-MCNC: 130 NG/ML (ref 30–100)
ALBUMIN SERPL-MCNC: 3.9 G/DL (ref 3.6–5.1)
ALP SERPL-CCNC: 65 U/L (ref 37–153)
ALT SERPL-CCNC: 17 U/L (ref 6–29)
ANION GAP SERPL CALCULATED.4IONS-SCNC: 10 MMOL/L (CALC) (ref 7–17)
AST SERPL-CCNC: 26 U/L (ref 10–35)
BASOPHILS # BLD AUTO: 75 CELLS/UL (ref 0–200)
BASOPHILS NFR BLD AUTO: 0.6 %
BILIRUB SERPL-MCNC: 0.3 MG/DL (ref 0.2–1.2)
BUN SERPL-MCNC: 31 MG/DL (ref 7–25)
CALCIUM SERPL-MCNC: 9.9 MG/DL (ref 8.6–10.4)
CHLORIDE SERPL-SCNC: 101 MMOL/L (ref 98–110)
CHOLEST SERPL-MCNC: 170 MG/DL
CHOLEST/HDLC SERPL: 3.5 (CALC)
CO2 SERPL-SCNC: 30 MMOL/L (ref 20–32)
CREAT SERPL-MCNC: 1.2 MG/DL (ref 0.6–1)
EGFRCR SERPLBLD CKD-EPI 2021: 47 ML/MIN/1.73M2
EOSINOPHIL # BLD AUTO: 313 CELLS/UL (ref 15–500)
EOSINOPHIL NFR BLD AUTO: 2.5 %
ERYTHROCYTE [DISTWIDTH] IN BLOOD BY AUTOMATED COUNT: 13.1 % (ref 11–15)
EST. AVERAGE GLUCOSE BLD GHB EST-MCNC: 117 MG/DL
EST. AVERAGE GLUCOSE BLD GHB EST-SCNC: 6.5 MMOL/L
GLUCOSE SERPL-MCNC: 86 MG/DL (ref 65–99)
HBA1C MFR BLD: 5.7 % OF TOTAL HGB
HCT VFR BLD AUTO: 38.5 % (ref 35–45)
HDLC SERPL-MCNC: 48 MG/DL
HGB BLD-MCNC: 12.5 G/DL (ref 11.7–15.5)
LDLC SERPL CALC-MCNC: 96 MG/DL (CALC)
LYMPHOCYTES # BLD AUTO: 2800 CELLS/UL (ref 850–3900)
LYMPHOCYTES NFR BLD AUTO: 22.4 %
MCH RBC QN AUTO: 28.9 PG (ref 27–33)
MCHC RBC AUTO-ENTMCNC: 32.5 G/DL (ref 32–36)
MCV RBC AUTO: 88.9 FL (ref 80–100)
MONOCYTES # BLD AUTO: 625 CELLS/UL (ref 200–950)
MONOCYTES NFR BLD AUTO: 5 %
NEUTROPHILS # BLD AUTO: 8688 CELLS/UL (ref 1500–7800)
NEUTROPHILS NFR BLD AUTO: 69.5 %
NONHDLC SERPL-MCNC: 122 MG/DL (CALC)
PLATELET # BLD AUTO: 273 THOUSAND/UL (ref 140–400)
PMV BLD REES-ECKER: 10.1 FL (ref 7.5–12.5)
POTASSIUM SERPL-SCNC: 4.7 MMOL/L (ref 3.5–5.3)
PROT SERPL-MCNC: 7 G/DL (ref 6.1–8.1)
RBC # BLD AUTO: 4.33 MILLION/UL (ref 3.8–5.1)
SODIUM SERPL-SCNC: 141 MMOL/L (ref 135–146)
T4 FREE SERPL-MCNC: 1.3 NG/DL (ref 0.8–1.8)
T4 SERPL-MCNC: 8.7 MCG/DL (ref 5.1–11.9)
TRIGL SERPL-MCNC: 163 MG/DL
TSH SERPL-ACNC: 3.35 MIU/L (ref 0.4–4.5)
WBC # BLD AUTO: 12.5 THOUSAND/UL (ref 3.8–10.8)

## 2025-04-04 ENCOUNTER — TELEPHONE (OUTPATIENT)
Dept: OBSTETRICS AND GYNECOLOGY | Facility: CLINIC | Age: 75
End: 2025-04-04

## 2025-04-04 ENCOUNTER — TELEPHONE (OUTPATIENT)
Dept: OBSTETRICS AND GYNECOLOGY | Facility: CLINIC | Age: 75
End: 2025-04-04
Payer: MEDICARE

## 2025-04-04 NOTE — TELEPHONE ENCOUNTER
Attempted to call pt back to further discuss her telephone message.   Got her voice mail.   Message left to call office.

## 2025-04-07 ENCOUNTER — APPOINTMENT (OUTPATIENT)
Dept: OBSTETRICS AND GYNECOLOGY | Facility: CLINIC | Age: 75
End: 2025-04-07
Payer: MEDICARE

## 2025-04-07 VITALS
WEIGHT: 190 LBS | BODY MASS INDEX: 32.44 KG/M2 | HEIGHT: 64 IN | SYSTOLIC BLOOD PRESSURE: 118 MMHG | DIASTOLIC BLOOD PRESSURE: 76 MMHG

## 2025-04-07 DIAGNOSIS — N81.4 CYSTOCELE WITH PROLAPSE: Primary | ICD-10-CM

## 2025-04-07 DIAGNOSIS — N32.81 OVERACTIVE BLADDER: ICD-10-CM

## 2025-04-07 DIAGNOSIS — N39.41 URGE URINARY INCONTINENCE: ICD-10-CM

## 2025-04-07 DIAGNOSIS — N95.2 VAGINAL ATROPHY: ICD-10-CM

## 2025-04-07 PROCEDURE — 99213 OFFICE O/P EST LOW 20 MIN: CPT | Performed by: NURSE PRACTITIONER

## 2025-04-07 ASSESSMENT — PAIN SCALES - GENERAL: PAINLEVEL_OUTOF10: 0-NO PAIN

## 2025-04-07 NOTE — PROGRESS NOTES
Pessary Check    This is a 74 y.o. with a #2 ring with support and knob pessary here for a routine pessary check. Interstim placement for OAB/UUI 9/2024 with Viv.    Date of last check: 8/26/24     Today she reports:  Pessary comfortable: Yes  Vaginal bleeding:Yes, she noticed a spot or two of blood recently, light spotting  Abnormal vaginal discharge:She noticed a change in vaginal odor, which is what prompted her to schedule an appointment  Using vaginal estrogen:Yes, but she is not consistent with this.     Bladder Symptoms: The InterStim has significantly helped her. She's had a few episodes of minor leakage, but is very happy with the results.     Exam:  Physical Exam  Constitutional:       General: She is not in acute distress.     Appearance: Normal appearance. She is normal weight. She is not ill-appearing.   Genitourinary:      Vaginal erythema, bleeding and granulation tissue present.   Pulmonary:      Effort: Pulmonary effort is normal.   Neurological:      Mental Status: She is alert.   Psychiatric:         Mood and Affect: Mood normal.         Behavior: Behavior normal.         Thought Content: Thought content normal.         Judgment: Judgment normal.     Procedure:   Pessary position on presentation: Normal  Pessary removed and cleaned without difficulty  Speculum exam: Vaginal mucosa was examined for the presence of irritation, trauma and/or bleeding   Erosions: Yes   Vaginal atrophy: Yes   Silver nitrate applied :No  Pessary was not placed back.    Assessment/Plan:  Luz Crain is a 74 y.o. being treated for rectocele (stage 2) and NAIDA with stress and urge. Comorbidities include: CAD with cardiac stent and pacemaker in place, hx of PE, HTN, paroxysmal atrial fibrillation, GERD, and COPD.       1. PRANAV, rectocele, pessary maintenance    - The patient is satisfied with the pessary and plans to continue use. Risks, alternative and routine maintenance reviewed with patient.  - Continue low  dose vaginal estrogen.   - She will leave the pessary out for one week to help heal the erosions as well as clear the vaginal odor  - At next visit, we will swap to a new pessary of the same size (#2 ring with support and knob). In case we don't have this size in stock, patient was given this pessary to take home.     2. UUI  - She is s/p interstim placement on 9/18/24 for OAB/UUI with Dr. Nam. Patient is very happy with her interstim results.     She will return to the office in 1 week for pessary reinsertion     All questions and concerns were answered and addressed.    Scribe Attestation:   IAkilah, am scribing for virtually, and in the presence of WESTON Denis on 04/07/2025 at 10:37 AM.     I, Pau Merchant, personally performed the services described in the documentation as scribed in my presence and confirm it is both complete and accurate.

## 2025-04-07 NOTE — PATIENT INSTRUCTIONS
"To reach the Urogynecology nurses for Dr. Nam and Pau Merchant, call [phone]. You will then select option 2 to be connected to the your provider's office and then option 3 for \"[place] [facility]\". This will connect you with Cely or Cathleen Ventura.   "

## 2025-04-14 ENCOUNTER — APPOINTMENT (OUTPATIENT)
Dept: OBSTETRICS AND GYNECOLOGY | Facility: CLINIC | Age: 75
End: 2025-04-14
Payer: MEDICARE

## 2025-04-14 VITALS
SYSTOLIC BLOOD PRESSURE: 118 MMHG | BODY MASS INDEX: 32.44 KG/M2 | WEIGHT: 190 LBS | DIASTOLIC BLOOD PRESSURE: 70 MMHG | HEIGHT: 64 IN

## 2025-04-14 DIAGNOSIS — N32.81 OAB (OVERACTIVE BLADDER): ICD-10-CM

## 2025-04-14 DIAGNOSIS — N95.2 VAGINAL ATROPHY: ICD-10-CM

## 2025-04-14 DIAGNOSIS — N39.3 SUI (STRESS URINARY INCONTINENCE, FEMALE): ICD-10-CM

## 2025-04-14 DIAGNOSIS — N81.4 CYSTOCELE WITH PROLAPSE: Primary | ICD-10-CM

## 2025-04-14 ASSESSMENT — PAIN SCALES - GENERAL: PAINLEVEL_OUTOF10: 0-NO PAIN

## 2025-04-14 NOTE — PROGRESS NOTES
Pessary Check    This is a 74 y.o. with a #2 ring with support and knob pessary here for a pessary insertion. Her pessary was removed and left out at last visit due to bleeding.     Date of last check: 4/7/25    Today she reports:  Vaginal bleeding:No  Abnormal vaginal discharge:No  Using vaginal estrogen:Yes, she's been using it daily since the pessary has been out.   After 4 days of having the pessary out, she started having to rush to the bathroom again with urinary urgency.     Exam:  Physical Exam  Constitutional:       General: She is not in acute distress.     Appearance: Normal appearance. She is normal weight. She is not ill-appearing.   Genitourinary:      Vulva normal.      No lesions in the vagina.      No vaginal erythema, ulceration or granulation tissue.   POP-Q measurements were:      Aa: -2, Ba: C:      gH: 2, pB: TVL:      Ap: -2, Bp: D:   Pulmonary:      Effort: Pulmonary effort is normal.   Neurological:      General: No focal deficit present.      Mental Status: She is alert and oriented to person, place, and time.   Psychiatric:         Mood and Affect: Mood normal.         Behavior: Behavior normal.         Thought Content: Thought content normal.         Judgment: Judgment normal.   Vitals reviewed.     Procedure:   Pessary position on presentation: Pessary is absent as it was removed last week.   She was placed with a new #1 ring with support and knob. The #2 ring with support and knob caused pain when it was inserted and it wasn't able to fully open. This is what she had used in the past.   Speculum exam: Vaginal mucosa was examined for the presence of irritation, trauma and/or bleeding   Erosions: No   Vaginal atrophy: Yes   Silver nitrate applied :No  Pessary replaced without difficulty.    Assessment/Plan:  Luz Crain is a 74 y.o. being treated for rectocele (stage 2) and NAIDA with stress and urge. Comorbidities include: CAD with cardiac stent and pacemaker in place, hx of PE,  HTN, paroxysmal atrial fibrillation, GERD, and COPD. She is s/p interstim placement on 9/18/24 for OAB/UUI with Dr. Nam.       1. PRANAV, rectocele, pessary maintenance    - The patient is satisfied with the pessary and plans to continue use.   - We reviewed other options including PFPT or surgery if she did not want to continue with pessary maintenance.   - Risks, alternative and routine maintenance reviewed with patient.  - Continue low dose vaginal estrogen to help prevent erosions with pessary. Use this cream 2x/week in the vagina.   - We went down one size to a #1 ring with support and knob as the size #2 was too large for her. The #2 ring with support and knob is what was removed last week. It should have fit without issue, but was a very tight fit and caused her pain.     2. OAB  - Interstim 9/18/24 with dario, works well    All questions and concerns were answered and addressed.    Follow-up in 4-6 months with Dr. Nam. RTC sooner if she has vaginal bleeding.     Scribe Attestation:   IAkilah, am scribing for virtually, and in the presence of Pau Merchant, [place] on 04/14/2025 at 10:54 AM.     I, Pau Merchant, personally performed the services described in the documentation as scribed in my presence and confirm it is both complete and accurate.

## 2025-04-14 NOTE — PATIENT INSTRUCTIONS
"To reach the Urogynecology nurses for Dr. Nam and Pau Merchant, call [phone]. You will then select option 2 to be connected to the your provider's office and then option 3 for \"[facility]\". This will connect you with Cely or Cathleen [facility].   "

## 2025-04-23 ENCOUNTER — TELEPHONE (OUTPATIENT)
Dept: PRIMARY CARE | Facility: CLINIC | Age: 75
End: 2025-04-23
Payer: MEDICARE

## 2025-04-23 NOTE — TELEPHONE ENCOUNTER
Pt had some tests done and would like JE to review and someone give her a call.  She said it was over a month ago, and she's anxious for the results.    Please review and call pt

## 2025-04-28 ENCOUNTER — APPOINTMENT (OUTPATIENT)
Dept: OBSTETRICS AND GYNECOLOGY | Facility: CLINIC | Age: 75
End: 2025-04-28
Payer: MEDICARE

## 2025-04-28 VITALS
BODY MASS INDEX: 32.44 KG/M2 | DIASTOLIC BLOOD PRESSURE: 78 MMHG | HEIGHT: 64 IN | WEIGHT: 190 LBS | SYSTOLIC BLOOD PRESSURE: 122 MMHG

## 2025-04-28 DIAGNOSIS — N32.81 OAB (OVERACTIVE BLADDER): ICD-10-CM

## 2025-04-28 DIAGNOSIS — N39.3 SUI (STRESS URINARY INCONTINENCE, FEMALE): Primary | ICD-10-CM

## 2025-04-28 PROCEDURE — 99212 OFFICE O/P EST SF 10 MIN: CPT | Performed by: NURSE PRACTITIONER

## 2025-04-28 ASSESSMENT — PAIN SCALES - GENERAL: PAINLEVEL_OUTOF10: 0-NO PAIN

## 2025-04-28 NOTE — PROGRESS NOTES
Pessary Check    This is a 75 y.o. with an anti incontinence  pessary here for follow up as her pessary fell out. She is s/p successful interstim placement on 9/18/24 for OAB/UUI with Dr. Nam.       Date of last check: 4/14/25 - #2 ring with support and knob was going to be replaced after pessary vacation due to erosions. When it was placed back, she could no longer wear the #2 without pain, so we replaced it with #1 ring with support and knob.     Today she reports:  - The patient reports that the #1 anti incontinence ring pessary fell out two days after it was reinserted during the last visit.  - She has been using estrogen cream.   - Patient is s/p hysterectomy.     Exam:  Physical Exam  Constitutional:       General: She is not in acute distress.     Appearance: Normal appearance. She is normal weight. She is not ill-appearing.   Genitourinary:   POP-Q measurements were:      Aa: Ba: C:      gH: 1, pB: TVL: 7     Ap: Bp: D:   Pulmonary:      Effort: Pulmonary effort is normal.   Neurological:      Mental Status: She is alert.   Psychiatric:         Mood and Affect: Mood normal.         Behavior: Behavior normal.         Thought Content: Thought content normal.         Judgment: Judgment normal.     Procedure:   Pessary position on presentation: Absent since it fell out.   Speculum exam: Vaginal mucosa was examined for the presence of irritation, trauma and/or bleeding   Erosions: No   Vaginal atrophy: Yes   Silver nitrate applied :No  #2 ring with support and knob pessary placed without difficulty.    Assessment/Plan:  Luz Crain is a 75 y.o. being treated for rectocele (stage 2) and NAIDA with stress and urge. Comorbidities include: CAD with cardiac stent and pacemaker in place, hx of PE, HTN, paroxysmal atrial fibrillation, GERD, and COPD.     Stress incontinence  #2 ring with support and knob pessary placed without difficulty. It stayed in place with voiding and was comfortable.  The #1 anti  incontinence ring fell out after 2 days placement  The #2 anti incontinence ring was very uncomfortable following re-placement after pessary vacation  Continue low dose vaginal estrogen to help prevent erosions with pessary.   As long as this pessary is comfortable, we will return to q4 month pessary maintenance  OAB/UUI  S/p successful interstim placement on 9/18/24 for OAB/UUI with Dr. Nam.     RTC 4 months pessary check with Viv. Come in sooner if she develops any issues with the pessary.     All questions and concerns were answered and addressed.    Scribe Attestation:   IAkilah, am scribing for virtually, and in the presence of Pau Merchant, [place] on 04/28/2025 at 12:24 PM.     I, Pau Merchant, personally performed the services described in the documentation as scribed in my presence and confirm it is both complete and accurate.

## 2025-07-01 DIAGNOSIS — N32.81 OVERACTIVE BLADDER: ICD-10-CM

## 2025-07-03 RX ORDER — MIRABEGRON 50 MG/1
50 TABLET, FILM COATED, EXTENDED RELEASE ORAL DAILY
Qty: 90 TABLET | Refills: 0 | Status: SHIPPED | OUTPATIENT
Start: 2025-07-03

## 2025-09-22 ENCOUNTER — APPOINTMENT (OUTPATIENT)
Dept: OBSTETRICS AND GYNECOLOGY | Facility: CLINIC | Age: 75
End: 2025-09-22
Payer: MEDICARE

## (undated) DEVICE — GLOVE ORANGE PI 8   MSG9080

## (undated) DEVICE — KAIRISON TUBING SET PNEUMATIC, (3000 MM), STERILE, DISPOSABLE, TO BE USED WITH: FK898R, PACKAGE OF 10 PIECES: Brand: KAIRISON

## (undated) DEVICE — SUTURE VCRL SZ 1 L36IN ABSRB UD L36MM CT-1 1/2 CIR J947H

## (undated) DEVICE — GAUZE,SPONGE,4"X4",16PLY,XRAY,STRL,LF: Brand: MEDLINE

## (undated) DEVICE — NEEDLE BNE ACCS SZ 3 11GA DMND FOR VERTPLSTY EXPR FRAC

## (undated) DEVICE — PACK,LAPAROTOMY,NO GOWNS: Brand: MEDLINE

## (undated) DEVICE — BLADE ULTRASONIC L25MM STD BNE BLNT DISP BNE SCALP

## (undated) DEVICE — 3M™ STERI-DRAPE™ ISOLATION BAG, 10 PER CARTON / 4 CARTONS PER CASE, 1003: Brand: 3M™ STERI-DRAPE™

## (undated) DEVICE — BONE FILLER DEVICE F04B SIZE 3

## (undated) DEVICE — SNAP KOVER: Brand: UNBRANDED

## (undated) DEVICE — COUNTER NDL 40 COUNT HLD 70 FOAM BLK ADH W/ MAG

## (undated) DEVICE — COVER LT HNDL BLU PLAS

## (undated) DEVICE — 5.0MM ROUND FLUTED SOFT TOUCH

## (undated) DEVICE — SYRINGE IRRIG 60ML SFT PLIABLE BLB EZ TO GRP 1 HND USE W/

## (undated) DEVICE — 3M™ STERI-STRIP™ REINFORCED ADHESIVE SKIN CLOSURES, R1547, 1/2 IN X 4 IN (12 MM X 100 MM), 6 STRIPS/ENVELOPE: Brand: 3M™ STERI-STRIP™

## (undated) DEVICE — Device

## (undated) DEVICE — NEEDLE SPNL L3.5IN PNK HUB S STL REG WALL FIT STYL W/ QNCKE

## (undated) DEVICE — GAUZE SPONGES,12 PLY: Brand: CURITY

## (undated) DEVICE — SUTURE MCRYL SZ 3-0 L27IN ABSRB UD L19MM PS-2 3/8 CIR PRIM Y427H

## (undated) DEVICE — SLEEVE, VASO PRESS, CALF GARMENT, MEDIUM, GREEN

## (undated) DEVICE — MARKER SURG SKIN GENTIAN VLT REG TIP W/ 6IN RUL

## (undated) DEVICE — INTENDED FOR TISSUE SEPARATION, AND OTHER PROCEDURES THAT REQUIRE A SHARP SURGICAL BLADE TO PUNCTURE OR CUT.: Brand: BARD-PARKER ® CARBON RIB-BACK BLADES

## (undated) DEVICE — SUTURE VCRL SZ 2-0 L18IN ABSRB VLT MO-6 L26MM 1/2 CIR J789D

## (undated) DEVICE — ADHESIVE, SKIN, LIQUIBAND EXCEED

## (undated) DEVICE — TOWEL,OR,DSP,ST,BLUE,STD,4/PK,20PK/CS: Brand: MEDLINE

## (undated) DEVICE — GLOVE ORANGE PI 7 1/2   MSG9075

## (undated) DEVICE — APPLICATOR, CHLORAPREP, W/ORANGE TINT, 26ML

## (undated) DEVICE — SHEET,DRAPE,53X77,STERILE: Brand: MEDLINE

## (undated) DEVICE — NEEDLE SPINAL 22GA L3.5IN SPINOCAN

## (undated) DEVICE — GOWN,AURORA,NONREINFORCED,LARGE: Brand: MEDLINE

## (undated) DEVICE — SPONGE,LAP,18"X18",DLX,XR,ST,5/PK,40/PK: Brand: MEDLINE

## (undated) DEVICE — DRESSING, MEPILEX BORDER, POST-OP AG, 4 X 6 IN

## (undated) DEVICE — INTRODUCER T15D OIS BLUNT: Brand: KYPHON® OSTEO INTRODUCER™ SYSTEM

## (undated) DEVICE — KIT JACK TBL PT CARE

## (undated) DEVICE — SUTURE VCRL + SZ 2-0 L36IN ABSRB UD L36MM CT-1 1/2 CIR VCP945H

## (undated) DEVICE — 3M™ IOBAN™ 2 ANTIMICROBIAL INCISE DRAPE 6651EZ: Brand: IOBAN™ 2

## (undated) DEVICE — GLOVE ORANGE PI 8 1/2   MSG9085

## (undated) DEVICE — LABEL MED MINI W/ MARKER

## (undated) DEVICE — 1010 S-DRAPE TOWEL DRAPE 10/BX: Brand: STERI-DRAPE™

## (undated) DEVICE — STIMULATOR, INTERSTIM TEST

## (undated) DEVICE — CORD BPLR 2 PIN FLAT AND RND DISP

## (undated) DEVICE — ALCOHOL RUBBING ISO 16OZ 70%

## (undated) DEVICE — CATHETER IV 16GA 205ML/MIN L1.77IN OD1.74MM ID1.359MM GRY

## (undated) DEVICE — DRESSING NEG PRSS 13CM PREVENA

## (undated) DEVICE — PROGRAMMER KIT, INTERSTIM X SMART, COMM HANDSET

## (undated) DEVICE — FLOSEAL MATRIX IS INDICATED IN SURGICAL PROCEDURES (OTHER THAN IN OPHTHALMIC) AS AN ADJUNCT TO HEMOSTASIS WHEN CONTROL OF BLEEDING BY LIGATURE OR CONVENTIONAL PROCEDURES IS INEFFECTIVE OR IMPRACTICAL.: Brand: FLOSEAL HEMOSTATIC MATRIX

## (undated) DEVICE — 3M™ IOBAN™ 2 ANTIMICROBIAL INCISE DRAPE 6650EZ: Brand: IOBAN™ 2

## (undated) DEVICE — SUTURE PERMA-HAND SZ 2-0 L30IN NONABSORBABLE BLK L26MM SH K833H

## (undated) DEVICE — SPONGE,PEANUT,XRAY,ST,SM,3/8",5/CARD: Brand: MEDLINE INDUSTRIES, INC.

## (undated) DEVICE — 35 ML SYRINGE LUER-LOCK TIP: Brand: MONOJECT

## (undated) DEVICE — CODMAN® SURGICAL PATTIES 1/2" X 1/2" (1.27CM X 1.27CM): Brand: CODMAN®

## (undated) DEVICE — TIBURON TOP SHEET: Brand: CONVERTORS

## (undated) DEVICE — PATIENT RETURN ELECTRODE, SINGLE-USE, CONTACT QUALITY MONITORING, ADULT, WITH 9FT CORD, FOR PATIENTS WEIGING OVER 33LBS. (15KG): Brand: MEGADYNE

## (undated) DEVICE — 3M™ STERI-DRAPE™ INSTRUMENT POUCH 1018: Brand: STERI-DRAPE™

## (undated) DEVICE — TAMP K09A XPAN INFLAT BONE  SIZE 15/3-RB: Brand: KYPHON XPANDER™ INFLATABLE BONE TAMP

## (undated) DEVICE — CABLE, PERCUTANEOUS EXTENSION, INTERSTIM, FOR 4.32MM LEAD

## (undated) DEVICE — SUTURE MCRYL SZ 4-0 L27IN ABSRB UD L19MM PS-2 1/2 CIR PRIM Y426H

## (undated) DEVICE — DRAPE,ISOLATION,INCISE,INVISISHIELD: Brand: MEDLINE

## (undated) DEVICE — GAUZE,SPONGE,4"X4",12PLY,STERILE,LF,2'S: Brand: MEDLINE

## (undated) DEVICE — ELECTROSURGICAL PENCIL BUTTON SWITCH E-Z CLEAN COATED BLADE ELECTRODE 10 FT (3 M) CORD HOLSTER: Brand: MEGADYNE

## (undated) DEVICE — NEUROSTIMULATOR, INTERSTIM X, RECHARGE-FREE

## (undated) DEVICE — DRAPE C ARM W41XL125IN UNIV W CLP AND BND FOR FULL SZ C ARM

## (undated) DEVICE — APPLICATOR MEDICATED 10.5 CC SOLUTION HI LT ORNG CHLORAPREP

## (undated) DEVICE — TUBING, SUCTION, 1/4" X 10', STRAIGHT: Brand: MEDLINE

## (undated) DEVICE — BANDAGE ADH W2XL4IN NITRL FAB STRP CURAD

## (undated) DEVICE — CHLORAPREP 26ML ORANGE

## (undated) DEVICE — SPONGE DRN W4XL4IN RAYON/POLYESTER 6 PLY NONWOVEN PRECUT

## (undated) DEVICE — 3M™ STERI-DRAPE™ U-DRAPE 1015: Brand: STERI-DRAPE™

## (undated) DEVICE — WAX SURG 2.5GM HEMSTAT BNE BEESWAX PARAFFIN ISO PALMITATE

## (undated) DEVICE — SYRINGE A08A KYPHX XPANDER INFLATION: Brand: KYPHON®  INFLATION SYRINGE

## (undated) DEVICE — APPLICATOR MEDICATED 26 CC SOLUTION HI LT ORNG CHLORAPREP

## (undated) DEVICE — BAG, BANDED, 36IN X 28IN